# Patient Record
Sex: FEMALE | Race: BLACK OR AFRICAN AMERICAN | Employment: OTHER
[De-identification: names, ages, dates, MRNs, and addresses within clinical notes are randomized per-mention and may not be internally consistent; named-entity substitution may affect disease eponyms.]

---

## 2017-06-23 PROBLEM — I50.9 ACUTE EXACERBATION OF CHF (CONGESTIVE HEART FAILURE) (HCC): Status: ACTIVE | Noted: 2017-06-23

## 2017-06-24 PROBLEM — J96.02 ACUTE RESPIRATORY FAILURE WITH HYPERCAPNIA (HCC): Status: ACTIVE | Noted: 2017-06-24

## 2017-07-20 ENCOUNTER — TELEPHONE (OUTPATIENT)
Dept: CASE MANAGEMENT | Age: 52
End: 2017-07-20

## 2017-12-08 PROBLEM — I50.43 CHF (CONGESTIVE HEART FAILURE), NYHA CLASS I, ACUTE ON CHRONIC, COMBINED (HCC): Status: ACTIVE | Noted: 2017-12-08

## 2017-12-20 PROBLEM — I50.9 CONGESTIVE HEART FAILURE (HCC): Status: ACTIVE | Noted: 2017-12-20

## 2017-12-22 PROBLEM — R09.89 LEFT VENTRICULAR EJECTION FRACTION OF 10% TO 20%: Status: ACTIVE | Noted: 2017-12-22

## 2018-02-02 PROBLEM — R06.00 DYSPNEA: Status: ACTIVE | Noted: 2018-02-02

## 2018-02-02 PROBLEM — J16.0 CAP (COMMUNITY ACQUIRED PNEUMONIA) DUE TO CHLAMYDIA SPECIES: Status: ACTIVE | Noted: 2018-02-02

## 2018-02-02 PROBLEM — J18.9 HCAP (HEALTHCARE-ASSOCIATED PNEUMONIA): Status: ACTIVE | Noted: 2018-02-02

## 2018-02-14 PROBLEM — I25.10 CORONARY ARTERY DISEASE INVOLVING NATIVE CORONARY ARTERY OF NATIVE HEART WITHOUT ANGINA PECTORIS: Status: ACTIVE | Noted: 2018-02-14

## 2018-03-14 ENCOUNTER — TELEPHONE (OUTPATIENT)
Dept: NON INVASIVE DIAGNOSTICS | Age: 53
End: 2018-03-14

## 2018-03-14 LAB
LEFT VENTRICULAR EJECTION FRACTION MODE: NORMAL
LV EF: 15 %

## 2018-03-14 NOTE — TELEPHONE ENCOUNTER
Appears to be inappropriate oversensing in primary. She needs to be reprogrammed to 2ndary.   Thanks    Lisa Munoz MD, [facility]  Cardiac Electrophysiology  [facility]) Physicians  [facility] [place]: [facility]  5:05 PM  3/14/2018

## 2018-03-15 NOTE — TELEPHONE ENCOUNTER
Pop. I just spoke to [facility] and changes were already made by doubling the gain. But she SHOULD have early follow up in our office after she is discharged with me  (within a month if possible).   Thanks    Cori Naranjo MD, [facility]  Cardiac Electrophysiology  [facility] ([facility]) Physicians  [facility] [place]: [facility]  3:32 PM  3/15/2018

## 2018-03-28 ENCOUNTER — OFFICE VISIT (OUTPATIENT)
Dept: CARDIOLOGY CLINIC | Age: 53
End: 2018-03-28
Payer: COMMERCIAL

## 2018-03-28 ENCOUNTER — TELEPHONE (OUTPATIENT)
Dept: CARDIOLOGY CLINIC | Age: 53
End: 2018-03-28

## 2018-03-28 ENCOUNTER — HOSPITAL ENCOUNTER (OUTPATIENT)
Dept: OTHER | Age: 53
Setting detail: THERAPIES SERIES
Discharge: HOME | DRG: 286 | End: 2018-03-28
Payer: COMMERCIAL

## 2018-03-28 ENCOUNTER — APPOINTMENT (OUTPATIENT)
Dept: GENERAL RADIOLOGY | Age: 53
DRG: 286 | End: 2018-03-28
Payer: COMMERCIAL

## 2018-03-28 ENCOUNTER — HOSPITAL ENCOUNTER (INPATIENT)
Age: 53
LOS: 9 days | Discharge: HOME HEALTH | DRG: 286 | End: 2018-04-06
Attending: EMERGENCY MEDICINE | Admitting: INTERNAL MEDICINE
Payer: COMMERCIAL

## 2018-03-28 VITALS
OXYGEN SATURATION: 100 % | BODY MASS INDEX: 21.37 KG/M2 | DIASTOLIC BLOOD PRESSURE: 80 MMHG | HEIGHT: 64 IN | SYSTOLIC BLOOD PRESSURE: 120 MMHG | HEART RATE: 75 BPM | WEIGHT: 125.2 LBS | RESPIRATION RATE: 16 BRPM

## 2018-03-28 VITALS
WEIGHT: 124 LBS | RESPIRATION RATE: 18 BRPM | SYSTOLIC BLOOD PRESSURE: 157 MMHG | HEART RATE: 129 BPM | BODY MASS INDEX: 21.28 KG/M2 | DIASTOLIC BLOOD PRESSURE: 110 MMHG

## 2018-03-28 DIAGNOSIS — I50.22 CHRONIC SYSTOLIC CHF (CONGESTIVE HEART FAILURE), NYHA CLASS 3 (HCC): Primary | ICD-10-CM

## 2018-03-28 DIAGNOSIS — M79.89 LEG SWELLING: Primary | ICD-10-CM

## 2018-03-28 DIAGNOSIS — I42.8 NONISCHEMIC CARDIOMYOPATHY (HCC): ICD-10-CM

## 2018-03-28 PROBLEM — I50.23 ACUTE ON CHRONIC SYSTOLIC HEART FAILURE (HCC): Status: ACTIVE | Noted: 2018-03-28

## 2018-03-28 LAB
ALBUMIN SERPL-MCNC: 3.8 G/DL (ref 3.5–5.2)
ALP BLD-CCNC: 132 U/L (ref 35–104)
ALT SERPL-CCNC: 15 U/L (ref 0–32)
ANION GAP SERPL CALCULATED.3IONS-SCNC: 15 MMOL/L (ref 7–16)
ANION GAP SERPL CALCULATED.3IONS-SCNC: 17 MMOL/L (ref 7–16)
AST SERPL-CCNC: 22 U/L (ref 0–31)
BASOPHILS ABSOLUTE: 0.11 E9/L (ref 0–0.2)
BASOPHILS RELATIVE PERCENT: 1.6 % (ref 0–2)
BILIRUB SERPL-MCNC: 0.8 MG/DL (ref 0–1.2)
BUN BLDV-MCNC: 12 MG/DL (ref 6–20)
BUN BLDV-MCNC: 13 MG/DL (ref 6–20)
CALCIUM SERPL-MCNC: 8.8 MG/DL (ref 8.6–10.2)
CALCIUM SERPL-MCNC: 9.4 MG/DL (ref 8.6–10.2)
CHLORIDE BLD-SCNC: 103 MMOL/L (ref 98–107)
CHLORIDE BLD-SCNC: 107 MMOL/L (ref 98–107)
CO2: 21 MMOL/L (ref 22–29)
CO2: 28 MMOL/L (ref 22–29)
CREAT SERPL-MCNC: 0.7 MG/DL (ref 0.5–1)
CREAT SERPL-MCNC: 0.8 MG/DL (ref 0.5–1)
EKG ATRIAL RATE: 77 BPM
EKG ATRIAL RATE: 81 BPM
EKG P AXIS: 58 DEGREES
EKG P AXIS: 59 DEGREES
EKG P-R INTERVAL: 206 MS
EKG P-R INTERVAL: 216 MS
EKG Q-T INTERVAL: 418 MS
EKG Q-T INTERVAL: 418 MS
EKG QRS DURATION: 98 MS
EKG QRS DURATION: 98 MS
EKG QTC CALCULATION (BAZETT): 473 MS
EKG QTC CALCULATION (BAZETT): 485 MS
EKG R AXIS: -101 DEGREES
EKG R AXIS: -97 DEGREES
EKG T AXIS: 18 DEGREES
EKG T AXIS: 66 DEGREES
EKG VENTRICULAR RATE: 77 BPM
EKG VENTRICULAR RATE: 81 BPM
EOSINOPHILS ABSOLUTE: 0.14 E9/L (ref 0.05–0.5)
EOSINOPHILS RELATIVE PERCENT: 2.1 % (ref 0–6)
GFR AFRICAN AMERICAN: >60
GFR AFRICAN AMERICAN: >60
GFR NON-AFRICAN AMERICAN: >60 ML/MIN/1.73
GFR NON-AFRICAN AMERICAN: >60 ML/MIN/1.73
GLUCOSE BLD-MCNC: 100 MG/DL (ref 74–109)
GLUCOSE BLD-MCNC: 107 MG/DL (ref 74–109)
HCT VFR BLD CALC: 43.1 % (ref 34–48)
HEMOGLOBIN: 13.1 G/DL (ref 11.5–15.5)
IMMATURE GRANULOCYTES #: 0.02 E9/L
IMMATURE GRANULOCYTES %: 0.3 % (ref 0–5)
LYMPHOCYTES ABSOLUTE: 1.91 E9/L (ref 1.5–4)
LYMPHOCYTES RELATIVE PERCENT: 28.2 % (ref 20–42)
MCH RBC QN AUTO: 24.8 PG (ref 26–35)
MCHC RBC AUTO-ENTMCNC: 30.4 % (ref 32–34.5)
MCV RBC AUTO: 81.6 FL (ref 80–99.9)
MONOCYTES ABSOLUTE: 0.49 E9/L (ref 0.1–0.95)
MONOCYTES RELATIVE PERCENT: 7.2 % (ref 2–12)
NEUTROPHILS ABSOLUTE: 4.11 E9/L (ref 1.8–7.3)
NEUTROPHILS RELATIVE PERCENT: 60.6 % (ref 43–80)
PDW BLD-RTO: 15.3 FL (ref 11.5–15)
PLATELET # BLD: 348 E9/L (ref 130–450)
PMV BLD AUTO: 9.2 FL (ref 7–12)
POTASSIUM SERPL-SCNC: 3.9 MMOL/L (ref 3.5–5)
POTASSIUM SERPL-SCNC: 4 MMOL/L (ref 3.5–5)
PRO-BNP: ABNORMAL PG/ML (ref 0–125)
PRO-BNP: ABNORMAL PG/ML (ref 0–125)
RBC # BLD: 5.28 E12/L (ref 3.5–5.5)
SODIUM BLD-SCNC: 145 MMOL/L (ref 132–146)
SODIUM BLD-SCNC: 146 MMOL/L (ref 132–146)
TOTAL PROTEIN: 6.8 G/DL (ref 6.4–8.3)
TROPONIN: <0.01 NG/ML (ref 0–0.03)
WBC # BLD: 6.8 E9/L (ref 4.5–11.5)

## 2018-03-28 PROCEDURE — 2140000000 HC CCU INTERMEDIATE R&B

## 2018-03-28 PROCEDURE — 36415 COLL VENOUS BLD VENIPUNCTURE: CPT

## 2018-03-28 PROCEDURE — 71046 X-RAY EXAM CHEST 2 VIEWS: CPT

## 2018-03-28 PROCEDURE — 80053 COMPREHEN METABOLIC PANEL: CPT

## 2018-03-28 PROCEDURE — 2500000003 HC RX 250 WO HCPCS: Performed by: NURSE PRACTITIONER

## 2018-03-28 PROCEDURE — 93005 ELECTROCARDIOGRAM TRACING: CPT | Performed by: NURSE PRACTITIONER

## 2018-03-28 PROCEDURE — 99223 1ST HOSP IP/OBS HIGH 75: CPT | Performed by: INTERNAL MEDICINE

## 2018-03-28 PROCEDURE — 2580000003 HC RX 258: Performed by: INTERNAL MEDICINE

## 2018-03-28 PROCEDURE — 99285 EMERGENCY DEPT VISIT HI MDM: CPT

## 2018-03-28 PROCEDURE — APPSS180 APP SPLIT SHARED TIME > 60 MINUTES: Performed by: NURSE PRACTITIONER

## 2018-03-28 PROCEDURE — 83880 ASSAY OF NATRIURETIC PEPTIDE: CPT

## 2018-03-28 PROCEDURE — 2580000003 HC RX 258: Performed by: NURSE PRACTITIONER

## 2018-03-28 PROCEDURE — 99204 OFFICE O/P NEW MOD 45 MIN: CPT

## 2018-03-28 PROCEDURE — 2500000003 HC RX 250 WO HCPCS: Performed by: INTERNAL MEDICINE

## 2018-03-28 PROCEDURE — 84484 ASSAY OF TROPONIN QUANT: CPT

## 2018-03-28 PROCEDURE — 93000 ELECTROCARDIOGRAM COMPLETE: CPT | Performed by: INTERNAL MEDICINE

## 2018-03-28 PROCEDURE — 51702 INSERT TEMP BLADDER CATH: CPT

## 2018-03-28 PROCEDURE — 96374 THER/PROPH/DIAG INJ IV PUSH: CPT

## 2018-03-28 PROCEDURE — 6370000000 HC RX 637 (ALT 250 FOR IP): Performed by: NURSE PRACTITIONER

## 2018-03-28 PROCEDURE — 80048 BASIC METABOLIC PNL TOTAL CA: CPT

## 2018-03-28 PROCEDURE — 85025 COMPLETE CBC W/AUTO DIFF WBC: CPT

## 2018-03-28 RX ORDER — BUMETANIDE 0.25 MG/ML
1 INJECTION, SOLUTION INTRAMUSCULAR; INTRAVENOUS 2 TIMES DAILY
Status: DISCONTINUED | OUTPATIENT
Start: 2018-03-28 | End: 2018-03-29

## 2018-03-28 RX ORDER — BUMETANIDE 0.25 MG/ML
1 INJECTION, SOLUTION INTRAMUSCULAR; INTRAVENOUS ONCE
Status: COMPLETED | OUTPATIENT
Start: 2018-03-28 | End: 2018-03-28

## 2018-03-28 RX ORDER — METOPROLOL SUCCINATE 25 MG/1
25 TABLET, EXTENDED RELEASE ORAL DAILY
Qty: 30 TABLET | Refills: 3 | Status: SHIPPED | OUTPATIENT
Start: 2018-03-28 | End: 2018-03-28

## 2018-03-28 RX ORDER — OMEPRAZOLE 20 MG/1
20 CAPSULE, DELAYED RELEASE ORAL DAILY
Status: ON HOLD | COMMUNITY
End: 2018-04-05 | Stop reason: HOSPADM

## 2018-03-28 RX ORDER — PANTOPRAZOLE SODIUM 40 MG/1
40 TABLET, DELAYED RELEASE ORAL
Status: DISCONTINUED | OUTPATIENT
Start: 2018-03-29 | End: 2018-04-06 | Stop reason: HOSPADM

## 2018-03-28 RX ORDER — PANTOPRAZOLE SODIUM 40 MG/1
40 TABLET, DELAYED RELEASE ORAL DAILY
COMMUNITY
End: 2018-03-28

## 2018-03-28 RX ORDER — NITROGLYCERIN 0.4 MG/1
0.4 TABLET SUBLINGUAL EVERY 5 MIN PRN
Status: ON HOLD | COMMUNITY
End: 2018-04-05 | Stop reason: HOSPADM

## 2018-03-28 RX ORDER — ASPIRIN 81 MG/1
81 TABLET, CHEWABLE ORAL DAILY
Status: DISCONTINUED | OUTPATIENT
Start: 2018-03-28 | End: 2018-04-06 | Stop reason: HOSPADM

## 2018-03-28 RX ORDER — SODIUM CHLORIDE 0.9 % (FLUSH) 0.9 %
10 SYRINGE (ML) INJECTION PRN
Status: DISCONTINUED | OUTPATIENT
Start: 2018-03-28 | End: 2018-04-06 | Stop reason: HOSPADM

## 2018-03-28 RX ORDER — MONTELUKAST SODIUM 10 MG/1
10 TABLET ORAL NIGHTLY
Status: DISCONTINUED | OUTPATIENT
Start: 2018-03-28 | End: 2018-04-06 | Stop reason: HOSPADM

## 2018-03-28 RX ORDER — SODIUM CHLORIDE 0.9 % (FLUSH) 0.9 %
10 SYRINGE (ML) INJECTION PRN
Status: DISCONTINUED | OUTPATIENT
Start: 2018-03-28 | End: 2018-03-29 | Stop reason: HOSPADM

## 2018-03-28 RX ORDER — SODIUM CHLORIDE 0.9 % (FLUSH) 0.9 %
10 SYRINGE (ML) INJECTION EVERY 12 HOURS SCHEDULED
Status: DISCONTINUED | OUTPATIENT
Start: 2018-03-28 | End: 2018-04-06 | Stop reason: HOSPADM

## 2018-03-28 RX ORDER — METOPROLOL SUCCINATE 25 MG/1
25 TABLET, EXTENDED RELEASE ORAL DAILY
Status: DISCONTINUED | OUTPATIENT
Start: 2018-03-28 | End: 2018-03-29

## 2018-03-28 RX ORDER — CLOPIDOGREL BISULFATE 75 MG/1
75 TABLET ORAL DAILY
Status: DISCONTINUED | OUTPATIENT
Start: 2018-03-28 | End: 2018-03-29

## 2018-03-28 RX ORDER — FLUOXETINE HYDROCHLORIDE 20 MG/1
20 CAPSULE ORAL DAILY
Status: DISCONTINUED | OUTPATIENT
Start: 2018-03-28 | End: 2018-04-06 | Stop reason: HOSPADM

## 2018-03-28 RX ORDER — POTASSIUM CHLORIDE 20 MEQ/1
20 TABLET, EXTENDED RELEASE ORAL DAILY
Status: DISCONTINUED | OUTPATIENT
Start: 2018-03-28 | End: 2018-04-06 | Stop reason: HOSPADM

## 2018-03-28 RX ORDER — SPIRONOLACTONE 25 MG/1
25 TABLET ORAL DAILY
Status: DISCONTINUED | OUTPATIENT
Start: 2018-03-28 | End: 2018-04-06 | Stop reason: HOSPADM

## 2018-03-28 RX ORDER — HYDROCHLOROTHIAZIDE 12.5 MG/1
12.5 TABLET ORAL DAILY
COMMUNITY
End: 2018-03-28

## 2018-03-28 RX ORDER — NITROGLYCERIN 0.4 MG/1
0.4 TABLET SUBLINGUAL EVERY 5 MIN PRN
Status: DISCONTINUED | OUTPATIENT
Start: 2018-03-28 | End: 2018-03-29

## 2018-03-28 RX ORDER — POTASSIUM CHLORIDE 20 MEQ/1
20 TABLET, EXTENDED RELEASE ORAL DAILY
Qty: 60 TABLET | Refills: 3 | Status: SHIPPED | OUTPATIENT
Start: 2018-03-28 | End: 2018-03-28

## 2018-03-28 RX ORDER — ONDANSETRON 2 MG/ML
4 INJECTION INTRAMUSCULAR; INTRAVENOUS EVERY 6 HOURS PRN
Status: DISCONTINUED | OUTPATIENT
Start: 2018-03-28 | End: 2018-04-06 | Stop reason: HOSPADM

## 2018-03-28 RX ORDER — BUDESONIDE AND FORMOTEROL FUMARATE DIHYDRATE 160; 4.5 UG/1; UG/1
2 AEROSOL RESPIRATORY (INHALATION) 2 TIMES DAILY
COMMUNITY
End: 2018-05-16

## 2018-03-28 RX ORDER — ATORVASTATIN CALCIUM 10 MG/1
10 TABLET, FILM COATED ORAL DAILY
Status: DISCONTINUED | OUTPATIENT
Start: 2018-03-28 | End: 2018-03-29

## 2018-03-28 RX ORDER — ALBUTEROL SULFATE 90 UG/1
2 AEROSOL, METERED RESPIRATORY (INHALATION) EVERY 6 HOURS PRN
Status: DISCONTINUED | OUTPATIENT
Start: 2018-03-28 | End: 2018-04-06 | Stop reason: HOSPADM

## 2018-03-28 RX ORDER — ACETAMINOPHEN 325 MG/1
650 TABLET ORAL EVERY 4 HOURS PRN
Status: DISCONTINUED | OUTPATIENT
Start: 2018-03-28 | End: 2018-04-06 | Stop reason: HOSPADM

## 2018-03-28 RX ORDER — BUMETANIDE 0.25 MG/ML
0.5 INJECTION, SOLUTION INTRAMUSCULAR; INTRAVENOUS ONCE
Status: DISCONTINUED | OUTPATIENT
Start: 2018-03-28 | End: 2018-03-29

## 2018-03-28 RX ORDER — FLUOXETINE HYDROCHLORIDE 20 MG/1
20 CAPSULE ORAL DAILY
COMMUNITY
End: 2018-03-28

## 2018-03-28 RX ORDER — POTASSIUM CHLORIDE 1.5 G/1.77G
20 POWDER, FOR SOLUTION ORAL DAILY
COMMUNITY
End: 2018-10-22 | Stop reason: SDUPTHER

## 2018-03-28 RX ADMIN — POTASSIUM CHLORIDE 20 MEQ: 20 TABLET, EXTENDED RELEASE ORAL at 17:35

## 2018-03-28 RX ADMIN — Medication 10 ML: at 22:21

## 2018-03-28 RX ADMIN — METOPROLOL SUCCINATE 25 MG: 25 TABLET, EXTENDED RELEASE ORAL at 17:35

## 2018-03-28 RX ADMIN — MOMETASONE FUROATE AND FORMOTEROL FUMARATE DIHYDRATE 2 PUFF: 200; 5 AEROSOL RESPIRATORY (INHALATION) at 22:21

## 2018-03-28 RX ADMIN — SACUBITRIL AND VALSARTAN 1 TABLET: 97; 103 TABLET, FILM COATED ORAL at 22:21

## 2018-03-28 RX ADMIN — BUMETANIDE 1 MG: 0.25 INJECTION INTRAMUSCULAR; INTRAVENOUS at 22:54

## 2018-03-28 RX ADMIN — BUMETANIDE 1 MG: 0.25 INJECTION INTRAMUSCULAR; INTRAVENOUS at 12:20

## 2018-03-28 RX ADMIN — Medication 10 ML: at 12:19

## 2018-03-28 RX ADMIN — MONTELUKAST SODIUM 10 MG: 10 TABLET, FILM COATED ORAL at 22:21

## 2018-03-28 RX ADMIN — BUMETANIDE 2 MG: 0.25 INJECTION INTRAMUSCULAR; INTRAVENOUS at 12:19

## 2018-03-28 RX ADMIN — ALBUTEROL SULFATE 2 PUFF: 90 AEROSOL, METERED RESPIRATORY (INHALATION) at 22:21

## 2018-03-28 ASSESSMENT — EJECTION FRACTION
EF_VALUE: 10%
EF_SOURCE: 2D ECHO

## 2018-03-28 ASSESSMENT — PAIN DESCRIPTION - PAIN TYPE: TYPE: ACUTE PAIN

## 2018-03-28 ASSESSMENT — PAIN SCALES - GENERAL
PAINLEVEL_OUTOF10: 0
PAINLEVEL_OUTOF10: 0

## 2018-03-28 NOTE — PATIENT INSTRUCTIONS
metoprolol may make it harder for you to tell when you have low blood sugar);  · liver disease;  · congestive heart failure;  · problems with circulation (such as Raynaud's syndrome);  · a thyroid disorder; or  · pheochromocytoma (tumor of the adrenal gland). It is not known whether metoprolol will harm an unborn baby. Tell your doctor right away if you become pregnant while using this medicine. Metoprolol can pass into breast milk and may harm a nursing baby. Tell your doctor if you are breast-feeding a baby. Metoprolol is not approved for use by anyone younger than 25years old. How should I take metoprolol? Follow all directions on your prescription label. Your doctor may occasionally change your dose to make sure you get the best results. Do not take this medicine in larger or smaller amounts or for longer than recommended. Take the medicine at the same time each day. Metoprolol should be taken with a meal or just after a meal.  A Toprol XL  tablet can be divided in half if your doctor has told you to do so. The half tablet should be swallowed whole, without chewing or crushing. While using metoprolol, you may need frequent blood tests at your doctor's office. Your blood pressure will need to be checked often. If you need surgery, tell the surgeon ahead of time that you are using metoprolol. You should not stop using metoprolol suddenly. Stopping suddenly may make your condition worse. If you are being treated for high blood pressure, keep using this medication even if you feel well. High blood pressure often has no symptoms. You may need to use blood pressure medication for the rest of your life. Store at room temperature away from moisture and heat. What happens if I miss a dose? Take the missed dose as soon as you remember. Skip the missed dose if it is almost time for your next scheduled dose. Do not  take extra medicine to make up the missed dose. What happens if I overdose?   Seek emergency medical attention or call the Poison Help line at 1-203.234.8963. What should I avoid while taking metoprolol? Metoprolol may impair your thinking or reactions. Be careful if you drive or do anything that requires you to be alert. Drinking alcohol can increase certain side effects of metoprolol. What are the possible side effects of metoprolol? Get emergency medical help if you have signs of an allergic reaction: hives; difficulty breathing; swelling of your face, lips, tongue, or throat. Call your doctor at once if you have:  · very slow heartbeats;  · a light-headed feeling, like you might pass out;  · shortness of breath (even with mild exertion), swelling, rapid weight gain; or  · cold feeling in your hands and feet. Common side effects may include:  · dizziness, tired feeling;  · confusion, memory problems;  · nightmares, trouble sleeping;  · diarrhea; or  · mild itching or rash. This is not a complete list of side effects and others may occur. Call your doctor for medical advice about side effects. You may report side effects to FDA at 3-329-RXK-2772. What other drugs will affect metoprolol? Tell your doctor about all medicines you use, and those you start or stop using during your treatment with metoprolol, especially:  · prazosin;  · terbinafine;  · an antidepressant --bupropion, clomipramine, desipramine, duloxetine, fluoxetine, fluvoxamine, paroxetine, sertraline;  · an ergot medicine --dihydroergotamine, ergonovine, ergotamine, methylergonovine;  · heart or blood pressure medications --amlodipine, clonidine, digoxin, diltiazem, dipyridamole, hydralazine, methyldopa, nifedipine, quinidine, reserpine, verapamil, and others;  · an MAO inhibitor --isocarboxazid, linezolid, phenelzine, rasagiline, selegiline, tranylcypromine; or  · medicine to treat mental illness --chlorpromazine, fluphenazine haloperidol, thioridazine. This list is not complete.  Other drugs may interact with metoprolol, including prescription and over-the-counter medicines, vitamins, and herbal products. Not all possible interactions are listed in this medication guide. Where can I get more information? Your pharmacist can provide more information about metoprolol. Remember, keep this and all other medicines out of the reach of children, never share your medicines with others, and use this medication only for the indication prescribed. Every effort has been made to ensure that the information provided by [facility] [address] [address] is accurate, up-to-date, and complete, but no guarantee is made to that effect. Drug information contained herein may be time sensitive. [facility] information has been compiled for use by healthcare practitioners and consumers in the [place] and therefore [facility] does not warrant that uses outside of the [place] are appropriate, unless specifically indicated otherwise. [facility]'s drug information does not endorse drugs, diagnose patients or recommend therapy. [facility] drug information is an informational resource designed to assist licensed healthcare practitioners in caring for their patients and/or to serve consumers viewing this service as a supplement to, and not a substitute for, the expertise, skill, knowledge and judgment of healthcare practitioners. The absence of a warning for a given drug or drug combination in no way should be construed to indicate that the drug or drug combination is safe, effective or appropriate for any given patient. [facility] does not assume any responsibility for any aspect of healthcare administered with the aid of information [facility] provides. The information contained herein is not intended to cover all possible uses, directions, precautions, warnings, drug interactions, allergic reactions, or adverse effects. If you have questions about the drugs you are taking, check with your doctor, nurse or pharmacist.  Copyright 6599-2416 [address]. Version: 16.04. Revision date: 3/25/2016. Care instructions adapted under license by [facility] ([facility]). If you have questions about a medical condition or this instruction, always ask your healthcare professional. [address] any warranty or liability for your use of this information. Patient Education          bumetanide  Pronunciation:  byoo MET a nide  Brand:  Bumex  What is the most important information I should know about bumetanide? You should not use bumetanide if you are allergic to it, if you are unable to urinate, if you have severe kidney or liver disease, or if you are severely dehydrated. Before you take bumetanide, tell your doctor if you have kidney or liver disease, gout, diabetes, or an allergy to sulfa drugs. To be sure this medication is not causing harmful effects, your blood may need to be tested often. Your kidney or liver function may also need to be tested. Visit your doctor regularly. Bumetanide will make you urinate more often and you may get dehydrated easily. Follow your doctor's instructions about using potassium supplements or getting enough salt and potassium in your diet. Avoid becoming dehydrated. Follow your doctor's instructions about the type and amount of liquids you should drink while you are taking bumetanide. There are many other drugs that can interact with bumetanide (including some over-the-counter medicines). Tell your doctor about all medications you use. This includes prescription, over-the-counter, vitamin, and herbal products. Do not start a new medication without telling your doctor. Keep a list of all your medicines and show it to any healthcare provider who treats you. What is bumetanide? Bumetanide is a loop diuretic (water pill) that prevents your body from absorbing too much salt, allowing the salt to instead be passed in your urine.   Bumetanide treats fluid retention (edema) in people with congestive heart failure, liver disease, or a kidney made to that effect. Drug information contained herein may be time sensitive. [facility] information has been compiled for use by healthcare practitioners and consumers in the [place] and therefore [facility] does not warrant that uses outside of the [place] are appropriate, unless specifically indicated otherwise. [facility] drug information does not endorse drugs, diagnose patients or recommend therapy. [facility] drug information is an informational resource designed to assist licensed healthcare practitioners in caring for their patients and/or to serve consumers viewing this service as a supplement to, and not a substitute for, the expertise, skill, knowledge and judgment of healthcare practitioners. The absence of a warning for a given drug or drug combination in no way should be construed to indicate that the drug or drug combination is safe, effective or appropriate for any given patient. [facility] does not assume any responsibility for any aspect of healthcare administered with the aid of information [facility] provides. The information contained herein is not intended to cover all possible uses, directions, precautions, warnings, drug interactions, allergic reactions, or adverse effects. If you have questions about the drugs you are taking, check with your doctor, nurse or pharmacist.  Copyright 1212-5535 [address]. Version: 6.02. Revision date: 12/15/2010. Care instructions adapted under license by [facility] ([facility]). If you have questions about a medical condition or this instruction, always ask your healthcare professional. [place] [address] any warranty or liability for your use of this information.      Patient Education          potassium chloride  Pronunciation:  thanh GOINS [facility]  Lenard Florez, EPIKLOR/25, K-Dur 10, K-Juany, K-Tab, Tenzin Potassium 99, Kaochlor S-F, Kaon-CL 10, Kaon-CL 20%,  Corporation, KCl-20, Klor-Con, Klor-Con 10, Klor-Con 8, Klor-Con M10, Klor-Con M15, Klor-Con M20,

## 2018-03-28 NOTE — PROGRESS NOTES
Sent to [facility] from Dr Jayme Alexander office. EKG ordered for tachycardia vs a fib rate 129. /110 on repeat reading. Lower bilateral 2 + pitting edema noted. Current ef10%. bmp, bnp obtained. Education done, literature and scale given. lungs clear.

## 2018-03-28 NOTE — TELEPHONE ENCOUNTER
Tysonklaus Russell 1965 [id number]  Phoned Sole Harmon @ [address] [facility] [address]. Able to accomodate client at 1230pm today. Will provide free scale for daily weights. Per Dr Yari Santiago give  2mg iv push bumex  And flush per protocol with BMP And PRO BNP labs today. Client uses provide a ride, will walk across street to [address] [facility] and Back to [facility] as she uses [employer] transportation.    Rayne Dorado RN

## 2018-03-28 NOTE — PROGRESS NOTES
Normal bowel sounds. No bruits. soft, nondistended, no masses or organomegaly. no evidence of hernia. Percussion: Normal without hepatosplenomegally. Tenderness: absent. RECTAL: deferred, not clinically indicated  NEUROLOGIC: There are no focalizing motor or sensory deficits. CN II-XII are grossly intact. Ramiro Press EXTREMITIES: no cyanosis, no clubbing. 2+ bilateral pitting edema. Lab Data:   Ref.  Range 2/4/2018 07:07   Sodium Latest Ref Range: 132 - 146 mmol/L 140   Potassium Latest Ref Range: 3.5 - 5.0 mmol/L 3.3 (L)   Chloride Latest Ref Range: 98 - 107 mmol/L 100   CO2 Latest Ref Range: 22 - 29 mmol/L 26   BUN Latest Ref Range: 6 - 20 mg/dL 15   Creatinine Latest Ref Range: 0.5 - 1.0 mg/dL 0.8   Anion Gap Latest Ref Range: 7 - 16 mmol/L 14   GFR Non- Latest Ref Range: >=60 mL/min/1.73 >60   GFR African American Unknown >60   Magnesium Latest Ref Range: 1.6 - 2.6 mg/dL 1.7   Glucose Latest Ref Range: 74 - 109 mg/dL 107   Calcium Latest Ref Range: 8.6 - 10.2 mg/dL 8.4 (L)   Pro-BNP Latest Ref Range: 0 - 125 pg/mL 4,962 (H)   WBC Latest Ref Range: 4.5 - 11.5 E9/L 7.8   RBC Latest Ref Range: 3.50 - 5.50 E12/L 5.65 (H)   Hemoglobin Quant Latest Ref Range: 11.5 - 15.5 g/dL 15.2   Hematocrit Latest Ref Range: 34.0 - 48.0 % 47.2   MCV Latest Ref Range: 80.0 - 99.9 fL 83.5   MCH Latest Ref Range: 26.0 - 35.0 pg 26.9   MCHC Latest Ref Range: 32.0 - 34.5 % 32.2   MPV Latest Ref Range: 7.0 - 12.0 fL 10.2   RDW Latest Ref Range: 11.5 - 15.0 fL 14.0   Platelet Count Latest Ref Range: 130 - 450 E9/L 328   Neutrophils % Latest Ref Range: 43.0 - 80.0 % 69.9   Immature Granulocytes % Latest Ref Range: 0.0 - 5.0 % 0.5   Lymphocyte % Latest Ref Range: 20.0 - 42.0 % 17.4 (L)   Monocytes % Latest Ref Range: 2.0 - 12.0 % 9.3   Eosinophils % Latest Ref Range: 0.0 - 6.0 % 1.9   Basophils % Latest Ref Range: 0.0 - 2.0 % 1.0   Neutrophils # Latest Ref Range: 1.80 - 7.30 E9/L 5.42   Immature Granulocytes # Latest Units: E9/L 0.04   Lymphocytes # Latest Ref Range: 1.50 - 4.00 E9/L 1.35 (L)   Monocytes # Latest Ref Range: 0.10 - 0.95 E9/L 0.72   Eosinophils # Latest Ref Range: 0.05 - 0.50 E9/L 0.15   Basophils # Latest Ref Range: 0.00 - 0.20 E9/L 0.08       Imaging:    PFTs (04/26/2016, Dr. Amber Franklin)  Angela Ortiz:  Pulmonary function tests reveal forced vital capacity post   bronchodilator of 2.04 L, 69% of predicted with an FEV1 of 1.56 L, 66% of   predicted, with an FEV1/FVC ratio of 69%.  Maximum voluntary ventilation is   59 L/min, 60% of predicted.      Static lung volumes reveal a slow vital capacity of 2.12 L, 72% of predicted   inspiratory capacity 1.58 L, 71% of predicted; expiratory reserve volume 77%   of predicted; thoracic gas volume of 2.52 L, 90% of predicted; total lung   capacity 4.10 L,  81%.  Diffusing capacity is only 44% of predicted.      IMPRESSION:  Moderate airflow obstruction, GOLD II COPD with mild evidence of   air trapping. No restrictive pathology.  Reduced diffusing capacity   consistent with destruction at the alveolar capillary bed consistent with   emphysema.  Clinical correlation needed. CT Chest (02/02/2018):  1. Development of areas of multi segmental consolidation in the left lower lobe. This represents a combination of atelectasis/infiltrate. Please correlate clinically. This is a new development since the previous study. 2. Resolved right-sided pleural effusion since the examination of June 2017.  3. Early emphysematous changes in the upper lobes, as commented. 4. Several pulmonary nodular parenchymal opacities overall stable back to the previous study. Continued follow-up study in 6 months time interval is recommended. Device Interrogation:     (03/14/2018): Two untreated episode and 1 treated episode where a shock delivered     ECG: SR with PACs rate 81. Assessment:   1.  Chronic HFrEF  -acc stage c, nyha class III  -warm and well perfused  -does not have a scale to

## 2018-03-28 NOTE — TELEPHONE ENCOUNTER
Aaron Bullock 1965 [phone] (home)    Ambulated to [address] from [facility] office. 6 stops and sat down 3 times due to dyspnea. Once in [facility], obtained W/C, pushed to CHF clinic @ [address] [place] [address]. See CHF note.

## 2018-03-29 ENCOUNTER — APPOINTMENT (OUTPATIENT)
Dept: CARDIAC CATH/INVASIVE PROCEDURES | Age: 53
DRG: 286 | End: 2018-03-29
Payer: COMMERCIAL

## 2018-03-29 LAB
ALBUMIN SERPL-MCNC: 3.5 G/DL (ref 3.5–5.2)
ALP BLD-CCNC: 109 U/L (ref 35–104)
ALT SERPL-CCNC: 13 U/L (ref 0–32)
ANION GAP SERPL CALCULATED.3IONS-SCNC: 11 MMOL/L (ref 7–16)
ANION GAP SERPL CALCULATED.3IONS-SCNC: 13 MMOL/L (ref 7–16)
ANION GAP SERPL CALCULATED.3IONS-SCNC: 14 MMOL/L (ref 7–16)
AST SERPL-CCNC: 21 U/L (ref 0–31)
B.E.: 4.1 MMOL/L
B.E.: 8.7 MMOL/L
BILIRUB SERPL-MCNC: 0.6 MG/DL (ref 0–1.2)
BUN BLDV-MCNC: 12 MG/DL (ref 6–20)
BUN BLDV-MCNC: 12 MG/DL (ref 6–20)
BUN BLDV-MCNC: 13 MG/DL (ref 6–20)
CALCIUM SERPL-MCNC: 8.7 MG/DL (ref 8.6–10.2)
CALCIUM SERPL-MCNC: 9 MG/DL (ref 8.6–10.2)
CALCIUM SERPL-MCNC: 9.2 MG/DL (ref 8.6–10.2)
CHLORIDE BLD-SCNC: 103 MMOL/L (ref 98–107)
CHLORIDE BLD-SCNC: 97 MMOL/L (ref 98–107)
CHLORIDE BLD-SCNC: 98 MMOL/L (ref 98–107)
CO2: 29 MMOL/L (ref 22–29)
CO2: 29 MMOL/L (ref 22–29)
CO2: 34 MMOL/L (ref 22–29)
COHB: 1.3 % (ref 0–1.5)
COHB: 1.5 % (ref 0–1.5)
CREAT SERPL-MCNC: 0.7 MG/DL (ref 0.5–1)
CREAT SERPL-MCNC: 0.7 MG/DL (ref 0.5–1)
CREAT SERPL-MCNC: 0.8 MG/DL (ref 0.5–1)
CRITICAL: ABNORMAL
CRITICAL: ABNORMAL
DATE ANALYZED: ABNORMAL
DATE ANALYZED: ABNORMAL
DATE OF COLLECTION: ABNORMAL
DATE OF COLLECTION: ABNORMAL
FERRITIN: 87 NG/ML
GFR AFRICAN AMERICAN: >60
GFR NON-AFRICAN AMERICAN: >60 ML/MIN/1.73
GLUCOSE BLD-MCNC: 107 MG/DL (ref 74–109)
GLUCOSE BLD-MCNC: 117 MG/DL (ref 74–109)
GLUCOSE BLD-MCNC: 135 MG/DL (ref 74–109)
HCO3: 29.2 MMOL/L
HCO3: 33.8 MMOL/L
HHB: 48.3 %
HHB: 62.3 %
IRON SATURATION: 8 % (ref 15–50)
IRON: 33 MCG/DL (ref 37–145)
LAB: ABNORMAL
LAB: ABNORMAL
LACTIC ACID: 1.4 MMOL/L (ref 0.5–2.2)
Lab: 1311
Lab: 2100
MAGNESIUM: 1.6 MG/DL (ref 1.6–2.6)
METHB: 0.5 % (ref 0–1.5)
METHB: 0.8 % (ref 0–1.5)
MODE: ABNORMAL
MODE: ABNORMAL
O2 CONTENT: 6.7 ML/DL
O2 SATURATION: 36.4 %
O2 SATURATION: 50.7 %
O2HB: 35.6 %
O2HB: 49.7 %
OPERATOR ID: ABNORMAL
OPERATOR ID: ABNORMAL
PATIENT TEMP: 37 C
PATIENT TEMP: 37 C
PCO2: 46 MMHG
PCO2: 48.1 MMHG
PH BLOOD GAS: 7.42 (ref 7.3–7.42)
PH BLOOD GAS: 7.46 (ref 7.3–7.42)
PO2: 26.3 MMHG
PO2: 30.6 MMHG
POTASSIUM SERPL-SCNC: 3.4 MMOL/L (ref 3.5–5)
POTASSIUM SERPL-SCNC: 3.4 MMOL/L (ref 3.5–5)
POTASSIUM SERPL-SCNC: 3.8 MMOL/L (ref 3.5–5)
PRO-BNP: ABNORMAL PG/ML (ref 0–125)
SODIUM BLD-SCNC: 141 MMOL/L (ref 132–146)
SODIUM BLD-SCNC: 142 MMOL/L (ref 132–146)
SODIUM BLD-SCNC: 145 MMOL/L (ref 132–146)
SOURCE, BLOOD GAS: ABNORMAL
SOURCE, BLOOD GAS: ABNORMAL
THB: 13.3 G/DL (ref 11.5–16.5)
THB: 14.7 G/DL (ref 11.5–16.5)
TIME ANALYZED: 1315
TIME ANALYZED: 2103
TOTAL IRON BINDING CAPACITY: 430 MCG/DL (ref 250–450)
TOTAL PROTEIN: 6.4 G/DL (ref 6.4–8.3)
TRANSFERRIN: 367 MG/DL (ref 200–360)

## 2018-03-29 PROCEDURE — 2580000003 HC RX 258: Performed by: INTERNAL MEDICINE

## 2018-03-29 PROCEDURE — C1751 CATH, INF, PER/CENT/MIDLINE: HCPCS

## 2018-03-29 PROCEDURE — 82728 ASSAY OF FERRITIN: CPT

## 2018-03-29 PROCEDURE — 6370000000 HC RX 637 (ALT 250 FOR IP): Performed by: NURSE PRACTITIONER

## 2018-03-29 PROCEDURE — 2580000003 HC RX 258: Performed by: NURSE PRACTITIONER

## 2018-03-29 PROCEDURE — 4A023N6 MEASUREMENT OF CARDIAC SAMPLING AND PRESSURE, RIGHT HEART, PERCUTANEOUS APPROACH: ICD-10-PCS | Performed by: INTERNAL MEDICINE

## 2018-03-29 PROCEDURE — 2500000003 HC RX 250 WO HCPCS: Performed by: INTERNAL MEDICINE

## 2018-03-29 PROCEDURE — 83735 ASSAY OF MAGNESIUM: CPT

## 2018-03-29 PROCEDURE — 83605 ASSAY OF LACTIC ACID: CPT

## 2018-03-29 PROCEDURE — 6370000000 HC RX 637 (ALT 250 FOR IP): Performed by: INTERNAL MEDICINE

## 2018-03-29 PROCEDURE — 83540 ASSAY OF IRON: CPT

## 2018-03-29 PROCEDURE — 6360000002 HC RX W HCPCS

## 2018-03-29 PROCEDURE — 82805 BLOOD GASES W/O2 SATURATION: CPT

## 2018-03-29 PROCEDURE — 6360000002 HC RX W HCPCS: Performed by: NURSE PRACTITIONER

## 2018-03-29 PROCEDURE — 93451 RIGHT HEART CATH: CPT | Performed by: INTERNAL MEDICINE

## 2018-03-29 PROCEDURE — 99291 CRITICAL CARE FIRST HOUR: CPT | Performed by: INTERNAL MEDICINE

## 2018-03-29 PROCEDURE — 2500000003 HC RX 250 WO HCPCS

## 2018-03-29 PROCEDURE — 80053 COMPREHEN METABOLIC PANEL: CPT

## 2018-03-29 PROCEDURE — C1894 INTRO/SHEATH, NON-LASER: HCPCS

## 2018-03-29 PROCEDURE — 36415 COLL VENOUS BLD VENIPUNCTURE: CPT

## 2018-03-29 PROCEDURE — 6360000002 HC RX W HCPCS: Performed by: INTERNAL MEDICINE

## 2018-03-29 PROCEDURE — 2709999900 HC NON-CHARGEABLE SUPPLY

## 2018-03-29 PROCEDURE — 2000000000 HC ICU R&B

## 2018-03-29 PROCEDURE — 84466 ASSAY OF TRANSFERRIN: CPT

## 2018-03-29 PROCEDURE — 83550 IRON BINDING TEST: CPT

## 2018-03-29 PROCEDURE — 83880 ASSAY OF NATRIURETIC PEPTIDE: CPT

## 2018-03-29 PROCEDURE — 80048 BASIC METABOLIC PNL TOTAL CA: CPT

## 2018-03-29 RX ORDER — HEPARIN SODIUM (PORCINE) LOCK FLUSH IV SOLN 100 UNIT/ML 100 UNIT/ML
3 SOLUTION INTRAVENOUS PRN
Status: DISCONTINUED | OUTPATIENT
Start: 2018-03-29 | End: 2018-04-06 | Stop reason: HOSPADM

## 2018-03-29 RX ORDER — SODIUM CHLORIDE 0.9 % (FLUSH) 0.9 %
10 SYRINGE (ML) INJECTION PRN
Status: DISCONTINUED | OUTPATIENT
Start: 2018-03-29 | End: 2018-03-29 | Stop reason: SDUPTHER

## 2018-03-29 RX ORDER — MILRINONE LACTATE 0.2 MG/ML
0.12 INJECTION, SOLUTION INTRAVENOUS CONTINUOUS
Status: DISCONTINUED | OUTPATIENT
Start: 2018-03-29 | End: 2018-04-01

## 2018-03-29 RX ORDER — POTASSIUM CHLORIDE 20 MEQ/1
40 TABLET, EXTENDED RELEASE ORAL ONCE
Status: COMPLETED | OUTPATIENT
Start: 2018-03-29 | End: 2018-03-29

## 2018-03-29 RX ORDER — LIDOCAINE HYDROCHLORIDE 10 MG/ML
5 INJECTION, SOLUTION EPIDURAL; INFILTRATION; INTRACAUDAL; PERINEURAL ONCE
Status: DISCONTINUED | OUTPATIENT
Start: 2018-03-29 | End: 2018-04-02

## 2018-03-29 RX ORDER — HEPARIN SODIUM (PORCINE) LOCK FLUSH IV SOLN 100 UNIT/ML 100 UNIT/ML
3 SOLUTION INTRAVENOUS EVERY 12 HOURS SCHEDULED
Status: DISCONTINUED | OUTPATIENT
Start: 2018-03-29 | End: 2018-04-06 | Stop reason: HOSPADM

## 2018-03-29 RX ORDER — FENTANYL CITRATE 50 UG/ML
25 INJECTION, SOLUTION INTRAMUSCULAR; INTRAVENOUS ONCE
Status: COMPLETED | OUTPATIENT
Start: 2018-03-29 | End: 2018-03-29

## 2018-03-29 RX ADMIN — POTASSIUM CHLORIDE 20 MEQ: 20 TABLET, EXTENDED RELEASE ORAL at 18:29

## 2018-03-29 RX ADMIN — SPIRONOLACTONE 25 MG: 25 TABLET ORAL at 18:29

## 2018-03-29 RX ADMIN — ASPIRIN 81 MG 81 MG: 81 TABLET ORAL at 15:37

## 2018-03-29 RX ADMIN — PANTOPRAZOLE SODIUM 40 MG: 40 TABLET, DELAYED RELEASE ORAL at 06:23

## 2018-03-29 RX ADMIN — POTASSIUM CHLORIDE 40 MEQ: 20 TABLET, EXTENDED RELEASE ORAL at 15:37

## 2018-03-29 RX ADMIN — MONTELUKAST SODIUM 10 MG: 10 TABLET, FILM COATED ORAL at 20:42

## 2018-03-29 RX ADMIN — Medication 10 ML: at 17:42

## 2018-03-29 RX ADMIN — FENTANYL CITRATE 25 MCG: 50 INJECTION INTRAMUSCULAR; INTRAVENOUS at 14:18

## 2018-03-29 RX ADMIN — SACUBITRIL AND VALSARTAN 1 TABLET: 97; 103 TABLET, FILM COATED ORAL at 20:43

## 2018-03-29 RX ADMIN — BUMETANIDE 2 MG/HR: 0.25 INJECTION INTRAMUSCULAR; INTRAVENOUS at 14:18

## 2018-03-29 RX ADMIN — SODIUM CHLORIDE 25 MG: 9 INJECTION, SOLUTION INTRAVENOUS at 17:38

## 2018-03-29 RX ADMIN — SODIUM CHLORIDE 100 MG: 9 INJECTION, SOLUTION INTRAVENOUS at 21:03

## 2018-03-29 RX ADMIN — Medication 10 ML: at 14:19

## 2018-03-29 RX ADMIN — AMIODARONE HYDROCHLORIDE 150 MG: 50 INJECTION, SOLUTION INTRAVENOUS at 20:00

## 2018-03-29 RX ADMIN — MOMETASONE FUROATE AND FORMOTEROL FUMARATE DIHYDRATE 2 PUFF: 200; 5 AEROSOL RESPIRATORY (INHALATION) at 15:10

## 2018-03-29 RX ADMIN — Medication 10 ML: at 20:43

## 2018-03-29 RX ADMIN — MILRINONE LACTATE IN DEXTROSE 0.38 MCG/KG/MIN: 200 INJECTION, SOLUTION INTRAVENOUS at 14:18

## 2018-03-29 RX ADMIN — AMIODARONE HYDROCHLORIDE 1 MG/MIN: 50 INJECTION, SOLUTION INTRAVENOUS at 20:21

## 2018-03-29 RX ADMIN — POTASSIUM CHLORIDE 40 MEQ: 20 TABLET, EXTENDED RELEASE ORAL at 20:43

## 2018-03-29 ASSESSMENT — PAIN SCALES - GENERAL
PAINLEVEL_OUTOF10: 0
PAINLEVEL_OUTOF10: 0
PAINLEVEL_OUTOF10: 5
PAINLEVEL_OUTOF10: 0

## 2018-03-29 ASSESSMENT — PAIN DESCRIPTION - PAIN TYPE: TYPE: ACUTE PAIN

## 2018-03-30 LAB
ABO/RH: NORMAL
ALBUMIN SERPL-MCNC: 3.4 G/DL (ref 3.5–5.2)
ALBUMIN SERPL-MCNC: 3.7 G/DL (ref 3.5–5.2)
ALP BLD-CCNC: 115 U/L (ref 35–104)
ALP BLD-CCNC: 124 U/L (ref 35–104)
ALT SERPL-CCNC: 11 U/L (ref 0–32)
ALT SERPL-CCNC: 14 U/L (ref 0–32)
ANION GAP SERPL CALCULATED.3IONS-SCNC: 10 MMOL/L (ref 7–16)
ANION GAP SERPL CALCULATED.3IONS-SCNC: 13 MMOL/L (ref 7–16)
ANION GAP SERPL CALCULATED.3IONS-SCNC: 14 MMOL/L (ref 7–16)
ANTIBODY SCREEN: NORMAL
AST SERPL-CCNC: 17 U/L (ref 0–31)
AST SERPL-CCNC: 21 U/L (ref 0–31)
B.E.: 11.7 MMOL/L
B.E.: 9.1 MMOL/L
BILIRUB SERPL-MCNC: 0.6 MG/DL (ref 0–1.2)
BILIRUB SERPL-MCNC: 0.9 MG/DL (ref 0–1.2)
BUN BLDV-MCNC: 10 MG/DL (ref 6–20)
BUN BLDV-MCNC: 10 MG/DL (ref 6–20)
BUN BLDV-MCNC: 13 MG/DL (ref 6–20)
CALCIUM SERPL-MCNC: 8.7 MG/DL (ref 8.6–10.2)
CALCIUM SERPL-MCNC: 9.2 MG/DL (ref 8.6–10.2)
CALCIUM SERPL-MCNC: 9.3 MG/DL (ref 8.6–10.2)
CHLORIDE BLD-SCNC: 91 MMOL/L (ref 98–107)
CHLORIDE BLD-SCNC: 92 MMOL/L (ref 98–107)
CHLORIDE BLD-SCNC: 93 MMOL/L (ref 98–107)
CO2: 33 MMOL/L (ref 22–29)
COHB: 1.4 % (ref 0–1.5)
COHB: 1.9 % (ref 0–1.5)
CREAT SERPL-MCNC: 0.8 MG/DL (ref 0.5–1)
CREAT SERPL-MCNC: 0.8 MG/DL (ref 0.5–1)
CREAT SERPL-MCNC: 0.9 MG/DL (ref 0.5–1)
CRITICAL: ABNORMAL
CRITICAL: ABNORMAL
DATE ANALYZED: ABNORMAL
DATE ANALYZED: ABNORMAL
DATE OF COLLECTION: ABNORMAL
DATE OF COLLECTION: ABNORMAL
GFR AFRICAN AMERICAN: >60
GFR NON-AFRICAN AMERICAN: >60 ML/MIN/1.73
GLUCOSE BLD-MCNC: 139 MG/DL (ref 74–109)
GLUCOSE BLD-MCNC: 140 MG/DL (ref 74–109)
GLUCOSE BLD-MCNC: 157 MG/DL (ref 74–109)
HAV IGM SER IA-ACNC: NORMAL
HCO3: 34.7 MMOL/L
HCO3: 37 MMOL/L
HEPATITIS B CORE IGM ANTIBODY: NORMAL
HEPATITIS B SURFACE ANTIGEN INTERPRETATION: NORMAL
HEPATITIS C ANTIBODY INTERPRETATION: NORMAL
HHB: 42.4 %
HHB: 55.4 %
HIV-1 AND HIV-2 ANTIBODIES: NORMAL
LAB: ABNORMAL
LAB: ABNORMAL
Lab: 1952
Lab: 822
MAGNESIUM: 1.7 MG/DL (ref 1.6–2.6)
METHB: 0.5 % (ref 0–1.5)
METHB: 0.7 % (ref 0–1.5)
MODE: ABNORMAL
O2 CONTENT: 11.9 ML/DL
O2 SATURATION: 43.4 %
O2 SATURATION: 56.6 %
O2HB: 42.5 %
O2HB: 55.2 %
OPERATOR ID: ABNORMAL
OPERATOR ID: ABNORMAL
PATIENT TEMP: 37 C
PATIENT TEMP: 37 C
PCO2: 49.7 MMHG
PCO2: 50.7 MMHG
PH BLOOD GAS: 7.45 (ref 7.3–7.42)
PH BLOOD GAS: 7.49 (ref 7.3–7.42)
PO2: 28.6 MMHG
PO2: 32.8 MMHG
POTASSIUM SERPL-SCNC: 3.6 MMOL/L (ref 3.5–5)
POTASSIUM SERPL-SCNC: 3.7 MMOL/L (ref 3.5–5)
POTASSIUM SERPL-SCNC: 3.8 MMOL/L (ref 3.5–5)
SODIUM BLD-SCNC: 134 MMOL/L (ref 132–146)
SODIUM BLD-SCNC: 139 MMOL/L (ref 132–146)
SODIUM BLD-SCNC: 139 MMOL/L (ref 132–146)
SOURCE, BLOOD GAS: ABNORMAL
SOURCE, BLOOD GAS: ABNORMAL
THB: 14.1 G/DL (ref 11.5–16.5)
THB: 15.4 G/DL (ref 11.5–16.5)
TIME ANALYZED: 1955
TIME ANALYZED: 826
TOTAL PROTEIN: 6.6 G/DL (ref 6.4–8.3)
TOTAL PROTEIN: 6.9 G/DL (ref 6.4–8.3)

## 2018-03-30 PROCEDURE — 86900 BLOOD TYPING SEROLOGIC ABO: CPT

## 2018-03-30 PROCEDURE — 86901 BLOOD TYPING SEROLOGIC RH(D): CPT

## 2018-03-30 PROCEDURE — 86255 FLUORESCENT ANTIBODY SCREEN: CPT

## 2018-03-30 PROCEDURE — 86225 DNA ANTIBODY NATIVE: CPT

## 2018-03-30 PROCEDURE — 86703 HIV-1/HIV-2 1 RESULT ANTBDY: CPT

## 2018-03-30 PROCEDURE — 80074 ACUTE HEPATITIS PANEL: CPT

## 2018-03-30 PROCEDURE — 83516 IMMUNOASSAY NONANTIBODY: CPT

## 2018-03-30 PROCEDURE — 80048 BASIC METABOLIC PNL TOTAL CA: CPT

## 2018-03-30 PROCEDURE — 82805 BLOOD GASES W/O2 SATURATION: CPT

## 2018-03-30 PROCEDURE — 6370000000 HC RX 637 (ALT 250 FOR IP): Performed by: NURSE PRACTITIONER

## 2018-03-30 PROCEDURE — 2500000003 HC RX 250 WO HCPCS: Performed by: INTERNAL MEDICINE

## 2018-03-30 PROCEDURE — 83735 ASSAY OF MAGNESIUM: CPT

## 2018-03-30 PROCEDURE — 2000000000 HC ICU R&B

## 2018-03-30 PROCEDURE — 6370000000 HC RX 637 (ALT 250 FOR IP): Performed by: INTERNAL MEDICINE

## 2018-03-30 PROCEDURE — 36415 COLL VENOUS BLD VENIPUNCTURE: CPT

## 2018-03-30 PROCEDURE — 6360000002 HC RX W HCPCS: Performed by: INTERNAL MEDICINE

## 2018-03-30 PROCEDURE — 80053 COMPREHEN METABOLIC PANEL: CPT

## 2018-03-30 PROCEDURE — 2580000003 HC RX 258: Performed by: NURSE PRACTITIONER

## 2018-03-30 PROCEDURE — 36569 INSJ PICC 5 YR+ W/O IMAGING: CPT

## 2018-03-30 PROCEDURE — 99291 CRITICAL CARE FIRST HOUR: CPT | Performed by: INTERNAL MEDICINE

## 2018-03-30 PROCEDURE — 05HC33Z INSERTION OF INFUSION DEVICE INTO LEFT BASILIC VEIN, PERCUTANEOUS APPROACH: ICD-10-PCS | Performed by: INTERNAL MEDICINE

## 2018-03-30 PROCEDURE — 2580000003 HC RX 258: Performed by: INTERNAL MEDICINE

## 2018-03-30 PROCEDURE — 36592 COLLECT BLOOD FROM PICC: CPT

## 2018-03-30 PROCEDURE — 86850 RBC ANTIBODY SCREEN: CPT

## 2018-03-30 PROCEDURE — 86038 ANTINUCLEAR ANTIBODIES: CPT

## 2018-03-30 PROCEDURE — 86665 EPSTEIN-BARR CAPSID VCA: CPT

## 2018-03-30 PROCEDURE — 76937 US GUIDE VASCULAR ACCESS: CPT

## 2018-03-30 PROCEDURE — 6360000002 HC RX W HCPCS: Performed by: NURSE PRACTITIONER

## 2018-03-30 RX ORDER — HYDRALAZINE HYDROCHLORIDE 50 MG/1
50 TABLET, FILM COATED ORAL EVERY 8 HOURS SCHEDULED
Status: DISCONTINUED | OUTPATIENT
Start: 2018-03-31 | End: 2018-04-06 | Stop reason: HOSPADM

## 2018-03-30 RX ORDER — HEPARIN SODIUM 10000 [USP'U]/ML
5000 INJECTION, SOLUTION INTRAVENOUS; SUBCUTANEOUS EVERY 8 HOURS SCHEDULED
Status: DISCONTINUED | OUTPATIENT
Start: 2018-03-30 | End: 2018-04-06 | Stop reason: HOSPADM

## 2018-03-30 RX ORDER — HYDRALAZINE HYDROCHLORIDE 25 MG/1
25 TABLET, FILM COATED ORAL EVERY 8 HOURS SCHEDULED
Status: DISCONTINUED | OUTPATIENT
Start: 2018-03-30 | End: 2018-03-30

## 2018-03-30 RX ORDER — ISOSORBIDE DINITRATE 10 MG/1
30 TABLET ORAL 3 TIMES DAILY
Status: DISCONTINUED | OUTPATIENT
Start: 2018-03-31 | End: 2018-04-06 | Stop reason: HOSPADM

## 2018-03-30 RX ORDER — CARVEDILOL 6.25 MG/1
6.25 TABLET ORAL 2 TIMES DAILY WITH MEALS
Status: DISCONTINUED | OUTPATIENT
Start: 2018-03-31 | End: 2018-04-02

## 2018-03-30 RX ORDER — ISOSORBIDE DINITRATE 10 MG/1
20 TABLET ORAL 3 TIMES DAILY
Status: DISCONTINUED | OUTPATIENT
Start: 2018-03-30 | End: 2018-03-30

## 2018-03-30 RX ADMIN — AMIODARONE HYDROCHLORIDE 1 MG/MIN: 50 INJECTION, SOLUTION INTRAVENOUS at 21:44

## 2018-03-30 RX ADMIN — MILRINONE LACTATE IN DEXTROSE 0.38 MCG/KG/MIN: 200 INJECTION, SOLUTION INTRAVENOUS at 04:30

## 2018-03-30 RX ADMIN — SACUBITRIL AND VALSARTAN 1 TABLET: 97; 103 TABLET, FILM COATED ORAL at 08:58

## 2018-03-30 RX ADMIN — SODIUM CHLORIDE 125 MG: 9 INJECTION, SOLUTION INTRAVENOUS at 09:45

## 2018-03-30 RX ADMIN — MONTELUKAST SODIUM 10 MG: 10 TABLET, FILM COATED ORAL at 21:02

## 2018-03-30 RX ADMIN — ASPIRIN 81 MG 81 MG: 81 TABLET ORAL at 09:00

## 2018-03-30 RX ADMIN — AMIODARONE HYDROCHLORIDE 1 MG/MIN: 50 INJECTION, SOLUTION INTRAVENOUS at 13:48

## 2018-03-30 RX ADMIN — ISOSORBIDE DINITRATE 20 MG: 10 TABLET ORAL at 21:01

## 2018-03-30 RX ADMIN — Medication 10 ML: at 18:05

## 2018-03-30 RX ADMIN — Medication 10 ML: at 21:01

## 2018-03-30 RX ADMIN — ISOSORBIDE DINITRATE 20 MG: 10 TABLET ORAL at 13:07

## 2018-03-30 RX ADMIN — AMIODARONE HYDROCHLORIDE 1 MG/MIN: 50 INJECTION, SOLUTION INTRAVENOUS at 05:00

## 2018-03-30 RX ADMIN — FLUOXETINE HYDROCHLORIDE 20 MG: 20 CAPSULE ORAL at 08:58

## 2018-03-30 RX ADMIN — SODIUM CHLORIDE, PRESERVATIVE FREE 300 UNITS: 5 INJECTION INTRAVENOUS at 21:04

## 2018-03-30 RX ADMIN — ACETAMINOPHEN 650 MG: 325 TABLET, FILM COATED ORAL at 17:12

## 2018-03-30 RX ADMIN — ACETAMINOPHEN 650 MG: 325 TABLET, FILM COATED ORAL at 22:36

## 2018-03-30 RX ADMIN — SPIRONOLACTONE 25 MG: 25 TABLET ORAL at 08:58

## 2018-03-30 RX ADMIN — HYDRALAZINE HYDROCHLORIDE 25 MG: 25 TABLET, FILM COATED ORAL at 11:04

## 2018-03-30 RX ADMIN — SACUBITRIL AND VALSARTAN 1 TABLET: 97; 103 TABLET, FILM COATED ORAL at 21:01

## 2018-03-30 RX ADMIN — Medication 10 ML: at 09:45

## 2018-03-30 RX ADMIN — HYDRALAZINE HYDROCHLORIDE 25 MG: 25 TABLET, FILM COATED ORAL at 21:46

## 2018-03-30 RX ADMIN — ISOSORBIDE DINITRATE 20 MG: 10 TABLET ORAL at 17:10

## 2018-03-30 RX ADMIN — BUMETANIDE 0.5 MG/HR: 0.25 INJECTION INTRAMUSCULAR; INTRAVENOUS at 02:00

## 2018-03-30 RX ADMIN — POTASSIUM CHLORIDE 20 MEQ: 20 TABLET, EXTENDED RELEASE ORAL at 08:58

## 2018-03-30 ASSESSMENT — PAIN DESCRIPTION - PAIN TYPE: TYPE: ACUTE PAIN

## 2018-03-30 ASSESSMENT — PAIN SCALES - GENERAL
PAINLEVEL_OUTOF10: 0
PAINLEVEL_OUTOF10: 6
PAINLEVEL_OUTOF10: 0
PAINLEVEL_OUTOF10: 0
PAINLEVEL_OUTOF10: 3
PAINLEVEL_OUTOF10: 0

## 2018-03-30 ASSESSMENT — PAIN DESCRIPTION - LOCATION: LOCATION: NECK

## 2018-03-30 ASSESSMENT — PAIN DESCRIPTION - ORIENTATION: ORIENTATION: RIGHT

## 2018-03-30 ASSESSMENT — PAIN DESCRIPTION - DESCRIPTORS: DESCRIPTORS: SORE

## 2018-03-31 ENCOUNTER — APPOINTMENT (OUTPATIENT)
Dept: GENERAL RADIOLOGY | Age: 53
DRG: 286 | End: 2018-03-31
Payer: COMMERCIAL

## 2018-03-31 LAB
ALBUMIN SERPL-MCNC: 3.1 G/DL (ref 3.5–5.2)
ALBUMIN SERPL-MCNC: 3.1 G/DL (ref 3.5–5.2)
ALP BLD-CCNC: 103 U/L (ref 35–104)
ALP BLD-CCNC: 99 U/L (ref 35–104)
ALT SERPL-CCNC: 7 U/L (ref 0–32)
ALT SERPL-CCNC: 9 U/L (ref 0–32)
ANION GAP SERPL CALCULATED.3IONS-SCNC: 11 MMOL/L (ref 7–16)
ANION GAP SERPL CALCULATED.3IONS-SCNC: 12 MMOL/L (ref 7–16)
ANION GAP SERPL CALCULATED.3IONS-SCNC: 14 MMOL/L (ref 7–16)
AST SERPL-CCNC: 12 U/L (ref 0–31)
AST SERPL-CCNC: 13 U/L (ref 0–31)
B.E.: 3.9 MMOL/L
B.E.: 4 MMOL/L
B.E.: 6.8 MMOL/L
BILIRUB SERPL-MCNC: 0.6 MG/DL (ref 0–1.2)
BILIRUB SERPL-MCNC: 0.7 MG/DL (ref 0–1.2)
BUN BLDV-MCNC: 11 MG/DL (ref 6–20)
BUN BLDV-MCNC: 12 MG/DL (ref 6–20)
BUN BLDV-MCNC: 12 MG/DL (ref 6–20)
CALCIUM SERPL-MCNC: 8.1 MG/DL (ref 8.6–10.2)
CALCIUM SERPL-MCNC: 8.4 MG/DL (ref 8.6–10.2)
CALCIUM SERPL-MCNC: 8.5 MG/DL (ref 8.6–10.2)
CHLORIDE BLD-SCNC: 93 MMOL/L (ref 98–107)
CHLORIDE BLD-SCNC: 93 MMOL/L (ref 98–107)
CHLORIDE BLD-SCNC: 94 MMOL/L (ref 98–107)
CO2: 28 MMOL/L (ref 22–29)
CO2: 29 MMOL/L (ref 22–29)
CO2: 30 MMOL/L (ref 22–29)
COHB: 0.7 % (ref 0–1.5)
COHB: 1.7 % (ref 0–1.5)
COHB: 1.8 % (ref 0–1.5)
CREAT SERPL-MCNC: 0.8 MG/DL (ref 0.5–1)
CRITICAL: ABNORMAL
DATE ANALYZED: ABNORMAL
DATE OF COLLECTION: ABNORMAL
GFR AFRICAN AMERICAN: >60
GFR NON-AFRICAN AMERICAN: >60 ML/MIN/1.73
GLUCOSE BLD-MCNC: 141 MG/DL (ref 74–109)
GLUCOSE BLD-MCNC: 150 MG/DL (ref 74–109)
GLUCOSE BLD-MCNC: 154 MG/DL (ref 74–109)
HCO3: 29.1 MMOL/L
HCO3: 29.2 MMOL/L
HCO3: 30.2 MMOL/L
HHB: 28.7 %
HHB: 35 %
HHB: 35.6 %
LAB: ABNORMAL
Lab: 1342
Lab: 2053
Lab: 815
MAGNESIUM: 1.6 MG/DL (ref 1.6–2.6)
METHB: 0.1 % (ref 0–1.5)
METHB: 0.3 % (ref 0–1.5)
METHB: 0.4 % (ref 0–1.5)
MODE: ABNORMAL
MODE: ABNORMAL
O2 CONTENT: 12.4 ML/DL
O2 CONTENT: 12.8 ML/DL
O2 CONTENT: 14.4 ML/DL
O2 SATURATION: 64.1 %
O2 SATURATION: 64.2 %
O2 SATURATION: 70.7 %
O2HB: 62.9 %
O2HB: 63.6 %
O2HB: 69.2 %
OPERATOR ID: 2593
OPERATOR ID: 2593
OPERATOR ID: ABNORMAL
PATIENT TEMP: 37 C
PCO2: 38.9 MMHG
PCO2: 45.4 MMHG
PCO2: 46.1 MMHG
PH BLOOD GAS: 7.42 (ref 7.3–7.42)
PH BLOOD GAS: 7.42 (ref 7.3–7.42)
PH BLOOD GAS: 7.51 (ref 7.3–7.42)
PO2: 36.3 MMHG
PO2: 37.5 MMHG
PO2: 39.4 MMHG
POTASSIUM SERPL-SCNC: 3.5 MMOL/L (ref 3.5–5)
POTASSIUM SERPL-SCNC: 3.7 MMOL/L (ref 3.5–5)
POTASSIUM SERPL-SCNC: 3.8 MMOL/L (ref 3.5–5)
PRO-BNP: 689 PG/ML (ref 0–125)
SODIUM BLD-SCNC: 133 MMOL/L (ref 132–146)
SODIUM BLD-SCNC: 134 MMOL/L (ref 132–146)
SODIUM BLD-SCNC: 137 MMOL/L (ref 132–146)
SOURCE, BLOOD GAS: ABNORMAL
THB: 14 G/DL (ref 11.5–16.5)
THB: 14.4 G/DL (ref 11.5–16.5)
THB: 14.8 G/DL (ref 11.5–16.5)
TIME ANALYZED: 1345
TIME ANALYZED: 2058
TIME ANALYZED: 817
TOTAL PROTEIN: 5.9 G/DL (ref 6.4–8.3)
TOTAL PROTEIN: 6 G/DL (ref 6.4–8.3)

## 2018-03-31 PROCEDURE — 82805 BLOOD GASES W/O2 SATURATION: CPT

## 2018-03-31 PROCEDURE — 2580000003 HC RX 258: Performed by: NURSE PRACTITIONER

## 2018-03-31 PROCEDURE — 2500000003 HC RX 250 WO HCPCS: Performed by: INTERNAL MEDICINE

## 2018-03-31 PROCEDURE — 80048 BASIC METABOLIC PNL TOTAL CA: CPT

## 2018-03-31 PROCEDURE — 36591 DRAW BLOOD OFF VENOUS DEVICE: CPT

## 2018-03-31 PROCEDURE — 71045 X-RAY EXAM CHEST 1 VIEW: CPT

## 2018-03-31 PROCEDURE — 83735 ASSAY OF MAGNESIUM: CPT

## 2018-03-31 PROCEDURE — 2000000000 HC ICU R&B

## 2018-03-31 PROCEDURE — 2580000003 HC RX 258: Performed by: INTERNAL MEDICINE

## 2018-03-31 PROCEDURE — 83880 ASSAY OF NATRIURETIC PEPTIDE: CPT

## 2018-03-31 PROCEDURE — 6370000000 HC RX 637 (ALT 250 FOR IP): Performed by: INTERNAL MEDICINE

## 2018-03-31 PROCEDURE — 80053 COMPREHEN METABOLIC PANEL: CPT

## 2018-03-31 PROCEDURE — 6360000002 HC RX W HCPCS: Performed by: INTERNAL MEDICINE

## 2018-03-31 PROCEDURE — 6370000000 HC RX 637 (ALT 250 FOR IP): Performed by: NURSE PRACTITIONER

## 2018-03-31 PROCEDURE — 36415 COLL VENOUS BLD VENIPUNCTURE: CPT

## 2018-03-31 RX ORDER — BUMETANIDE 0.25 MG/ML
1 INJECTION, SOLUTION INTRAMUSCULAR; INTRAVENOUS 2 TIMES DAILY
Status: DISCONTINUED | OUTPATIENT
Start: 2018-03-31 | End: 2018-03-31

## 2018-03-31 RX ORDER — 0.9 % SODIUM CHLORIDE 0.9 %
500 INTRAVENOUS SOLUTION INTRAVENOUS ONCE
Status: COMPLETED | OUTPATIENT
Start: 2018-03-31 | End: 2018-03-31

## 2018-03-31 RX ORDER — SODIUM CHLORIDE 9 MG/ML
INJECTION, SOLUTION INTRAVENOUS CONTINUOUS
Status: DISCONTINUED | OUTPATIENT
Start: 2018-03-31 | End: 2018-04-01

## 2018-03-31 RX ORDER — BUMETANIDE 0.25 MG/ML
1 INJECTION, SOLUTION INTRAMUSCULAR; INTRAVENOUS DAILY
Status: DISCONTINUED | OUTPATIENT
Start: 2018-04-01 | End: 2018-03-31

## 2018-03-31 RX ORDER — 0.9 % SODIUM CHLORIDE 0.9 %
250 INTRAVENOUS SOLUTION INTRAVENOUS ONCE
Status: COMPLETED | OUTPATIENT
Start: 2018-03-31 | End: 2018-04-01

## 2018-03-31 RX ADMIN — Medication 10 ML: at 09:53

## 2018-03-31 RX ADMIN — ISOSORBIDE DINITRATE 30 MG: 10 TABLET ORAL at 09:50

## 2018-03-31 RX ADMIN — SODIUM CHLORIDE, PRESERVATIVE FREE 300 UNITS: 5 INJECTION INTRAVENOUS at 09:53

## 2018-03-31 RX ADMIN — SODIUM CHLORIDE 500 ML: 9 INJECTION, SOLUTION INTRAVENOUS at 10:34

## 2018-03-31 RX ADMIN — ACETAMINOPHEN 650 MG: 325 TABLET, FILM COATED ORAL at 09:53

## 2018-03-31 RX ADMIN — CARVEDILOL 6.25 MG: 6.25 TABLET, FILM COATED ORAL at 08:30

## 2018-03-31 RX ADMIN — CARVEDILOL 6.25 MG: 6.25 TABLET, FILM COATED ORAL at 17:45

## 2018-03-31 RX ADMIN — SODIUM CHLORIDE: 9 INJECTION, SOLUTION INTRAVENOUS at 11:53

## 2018-03-31 RX ADMIN — ISOSORBIDE DINITRATE 30 MG: 10 TABLET ORAL at 13:23

## 2018-03-31 RX ADMIN — MOMETASONE FUROATE AND FORMOTEROL FUMARATE DIHYDRATE 2 PUFF: 200; 5 AEROSOL RESPIRATORY (INHALATION) at 21:09

## 2018-03-31 RX ADMIN — FLUOXETINE HYDROCHLORIDE 20 MG: 20 CAPSULE ORAL at 09:51

## 2018-03-31 RX ADMIN — MOMETASONE FUROATE AND FORMOTEROL FUMARATE DIHYDRATE 2 PUFF: 200; 5 AEROSOL RESPIRATORY (INHALATION) at 08:00

## 2018-03-31 RX ADMIN — POTASSIUM CHLORIDE 20 MEQ: 20 TABLET, EXTENDED RELEASE ORAL at 09:50

## 2018-03-31 RX ADMIN — BUMETANIDE 1 MG: 0.25 INJECTION INTRAMUSCULAR; INTRAVENOUS at 09:51

## 2018-03-31 RX ADMIN — SPIRONOLACTONE 25 MG: 25 TABLET ORAL at 09:49

## 2018-03-31 RX ADMIN — HYDRALAZINE HYDROCHLORIDE 50 MG: 50 TABLET ORAL at 06:04

## 2018-03-31 RX ADMIN — ACETAMINOPHEN 650 MG: 325 TABLET, FILM COATED ORAL at 20:30

## 2018-03-31 RX ADMIN — PANTOPRAZOLE SODIUM 40 MG: 40 TABLET, DELAYED RELEASE ORAL at 06:03

## 2018-03-31 RX ADMIN — AMIODARONE HYDROCHLORIDE 1 MG/MIN: 50 INJECTION, SOLUTION INTRAVENOUS at 14:41

## 2018-03-31 RX ADMIN — SACUBITRIL AND VALSARTAN 1 TABLET: 97; 103 TABLET, FILM COATED ORAL at 09:51

## 2018-03-31 RX ADMIN — HYDRALAZINE HYDROCHLORIDE 50 MG: 50 TABLET ORAL at 13:23

## 2018-03-31 RX ADMIN — Medication 10 ML: at 21:53

## 2018-03-31 RX ADMIN — SODIUM CHLORIDE, PRESERVATIVE FREE 300 UNITS: 5 INJECTION INTRAVENOUS at 21:53

## 2018-03-31 RX ADMIN — MILRINONE LACTATE IN DEXTROSE 0.2 MCG/KG/MIN: 200 INJECTION, SOLUTION INTRAVENOUS at 04:55

## 2018-03-31 RX ADMIN — ASPIRIN 81 MG 81 MG: 81 TABLET ORAL at 10:35

## 2018-03-31 RX ADMIN — ACETAMINOPHEN 650 MG: 325 TABLET, FILM COATED ORAL at 04:12

## 2018-03-31 RX ADMIN — MONTELUKAST SODIUM 10 MG: 10 TABLET, FILM COATED ORAL at 21:53

## 2018-03-31 ASSESSMENT — PAIN SCALES - GENERAL
PAINLEVEL_OUTOF10: 0
PAINLEVEL_OUTOF10: 6
PAINLEVEL_OUTOF10: 0
PAINLEVEL_OUTOF10: 5
PAINLEVEL_OUTOF10: 0
PAINLEVEL_OUTOF10: 4
PAINLEVEL_OUTOF10: 0
PAINLEVEL_OUTOF10: 2

## 2018-03-31 ASSESSMENT — PAIN DESCRIPTION - ORIENTATION: ORIENTATION: RIGHT

## 2018-03-31 ASSESSMENT — PAIN DESCRIPTION - DESCRIPTORS: DESCRIPTORS: SORE;DISCOMFORT;ACHING

## 2018-03-31 ASSESSMENT — PAIN DESCRIPTION - LOCATION: LOCATION: NECK

## 2018-03-31 ASSESSMENT — PAIN DESCRIPTION - PAIN TYPE: TYPE: ACUTE PAIN

## 2018-04-01 LAB
ALBUMIN SERPL-MCNC: 2.8 G/DL (ref 3.5–5.2)
ALP BLD-CCNC: 92 U/L (ref 35–104)
ALT SERPL-CCNC: 7 U/L (ref 0–32)
ANION GAP SERPL CALCULATED.3IONS-SCNC: 13 MMOL/L (ref 7–16)
AST SERPL-CCNC: 12 U/L (ref 0–31)
B.E.: 1.4 MMOL/L
B.E.: 1.5 MMOL/L
BILIRUB SERPL-MCNC: 0.5 MG/DL (ref 0–1.2)
BUN BLDV-MCNC: 12 MG/DL (ref 6–20)
CALCIUM SERPL-MCNC: 8.3 MG/DL (ref 8.6–10.2)
CHLORIDE BLD-SCNC: 93 MMOL/L (ref 98–107)
CO2: 24 MMOL/L (ref 22–29)
COHB: 1.3 % (ref 0–1.5)
COHB: 1.4 % (ref 0–1.5)
CREAT SERPL-MCNC: 0.7 MG/DL (ref 0.5–1)
CRITICAL: NORMAL
CRITICAL: NORMAL
DATE ANALYZED: NORMAL
DATE ANALYZED: NORMAL
DATE OF COLLECTION: NORMAL
DATE OF COLLECTION: NORMAL
EPSTEIN-BARR VCA IGM: <10 U/ML (ref 0–43.9)
GFR AFRICAN AMERICAN: >60
GFR NON-AFRICAN AMERICAN: >60 ML/MIN/1.73
GLUCOSE BLD-MCNC: 163 MG/DL (ref 74–109)
HCO3: 26 MMOL/L
HCO3: 26.5 MMOL/L
HHB: 25.7 %
HHB: 32.6 %
LAB: NORMAL
LAB: NORMAL
Lab: 1954
Lab: 832
MAGNESIUM: 1.6 MG/DL (ref 1.6–2.6)
METHB: 0.5 % (ref 0–1.5)
METHB: 0.5 % (ref 0–1.5)
MODE: NORMAL
MODE: NORMAL
O2 CONTENT: 12.5 ML/DL
O2 CONTENT: 13.5 ML/DL
O2 SATURATION: 66.8 %
O2 SATURATION: 73.8 %
O2HB: 65.6 %
O2HB: 72.4 %
OPERATOR ID: 1088
OPERATOR ID: NORMAL
PATIENT TEMP: 37 C
PATIENT TEMP: 37 C
PCO2: 41.1 MMHG
PCO2: 42.9 MMHG
PH BLOOD GAS: 7.41 (ref 7.3–7.42)
PH BLOOD GAS: 7.42 (ref 7.3–7.42)
PO2: 38.5 MMHG
PO2: 42.4 MMHG
POTASSIUM SERPL-SCNC: 3.6 MMOL/L (ref 3.5–5)
SODIUM BLD-SCNC: 130 MMOL/L (ref 132–146)
SOURCE, BLOOD GAS: NORMAL
SOURCE, BLOOD GAS: NORMAL
THB: 13.3 G/DL (ref 11.5–16.5)
THB: 13.6 G/DL (ref 11.5–16.5)
TIME ANALYZED: 1956
TIME ANALYZED: 837
TOTAL PROTEIN: 5.9 G/DL (ref 6.4–8.3)

## 2018-04-01 PROCEDURE — 2000000000 HC ICU R&B

## 2018-04-01 PROCEDURE — 80053 COMPREHEN METABOLIC PANEL: CPT

## 2018-04-01 PROCEDURE — 36415 COLL VENOUS BLD VENIPUNCTURE: CPT

## 2018-04-01 PROCEDURE — 2580000003 HC RX 258: Performed by: INTERNAL MEDICINE

## 2018-04-01 PROCEDURE — 6360000002 HC RX W HCPCS: Performed by: INTERNAL MEDICINE

## 2018-04-01 PROCEDURE — 2580000003 HC RX 258: Performed by: NURSE PRACTITIONER

## 2018-04-01 PROCEDURE — 94640 AIRWAY INHALATION TREATMENT: CPT

## 2018-04-01 PROCEDURE — 99291 CRITICAL CARE FIRST HOUR: CPT | Performed by: INTERNAL MEDICINE

## 2018-04-01 PROCEDURE — 6370000000 HC RX 637 (ALT 250 FOR IP): Performed by: INTERNAL MEDICINE

## 2018-04-01 PROCEDURE — 82805 BLOOD GASES W/O2 SATURATION: CPT

## 2018-04-01 PROCEDURE — 2700000000 HC OXYGEN THERAPY PER DAY

## 2018-04-01 PROCEDURE — 83735 ASSAY OF MAGNESIUM: CPT

## 2018-04-01 PROCEDURE — 6360000002 HC RX W HCPCS: Performed by: NURSE PRACTITIONER

## 2018-04-01 PROCEDURE — 6370000000 HC RX 637 (ALT 250 FOR IP): Performed by: NURSE PRACTITIONER

## 2018-04-01 RX ADMIN — SODIUM CHLORIDE: 9 INJECTION, SOLUTION INTRAVENOUS at 11:18

## 2018-04-01 RX ADMIN — ONDANSETRON 4 MG: 2 INJECTION INTRAMUSCULAR; INTRAVENOUS at 15:43

## 2018-04-01 RX ADMIN — SPIRONOLACTONE 25 MG: 25 TABLET ORAL at 08:37

## 2018-04-01 RX ADMIN — HYDRALAZINE HYDROCHLORIDE 50 MG: 50 TABLET ORAL at 22:18

## 2018-04-01 RX ADMIN — SACUBITRIL AND VALSARTAN 1 TABLET: 97; 103 TABLET, FILM COATED ORAL at 08:37

## 2018-04-01 RX ADMIN — HYDRALAZINE HYDROCHLORIDE 50 MG: 50 TABLET ORAL at 13:07

## 2018-04-01 RX ADMIN — ACETAMINOPHEN 650 MG: 325 TABLET, FILM COATED ORAL at 11:50

## 2018-04-01 RX ADMIN — ACETAMINOPHEN 650 MG: 325 TABLET, FILM COATED ORAL at 19:55

## 2018-04-01 RX ADMIN — SODIUM CHLORIDE, PRESERVATIVE FREE 300 UNITS: 5 INJECTION INTRAVENOUS at 08:38

## 2018-04-01 RX ADMIN — SODIUM CHLORIDE: 9 INJECTION, SOLUTION INTRAVENOUS at 05:57

## 2018-04-01 RX ADMIN — Medication 10 ML: at 08:38

## 2018-04-01 RX ADMIN — ISOSORBIDE DINITRATE 30 MG: 10 TABLET ORAL at 11:18

## 2018-04-01 RX ADMIN — SACUBITRIL AND VALSARTAN 1 TABLET: 97; 103 TABLET, FILM COATED ORAL at 21:28

## 2018-04-01 RX ADMIN — PANTOPRAZOLE SODIUM 40 MG: 40 TABLET, DELAYED RELEASE ORAL at 06:16

## 2018-04-01 RX ADMIN — CARVEDILOL 6.25 MG: 6.25 TABLET, FILM COATED ORAL at 17:09

## 2018-04-01 RX ADMIN — ASPIRIN 81 MG 81 MG: 81 TABLET ORAL at 11:18

## 2018-04-01 RX ADMIN — ISOSORBIDE DINITRATE 30 MG: 10 TABLET ORAL at 15:33

## 2018-04-01 RX ADMIN — SODIUM CHLORIDE, PRESERVATIVE FREE 300 UNITS: 5 INJECTION INTRAVENOUS at 21:18

## 2018-04-01 RX ADMIN — POTASSIUM CHLORIDE 20 MEQ: 20 TABLET, EXTENDED RELEASE ORAL at 08:37

## 2018-04-01 RX ADMIN — AMIODARONE HYDROCHLORIDE 1 MG/MIN: 50 INJECTION, SOLUTION INTRAVENOUS at 05:55

## 2018-04-01 RX ADMIN — HYDRALAZINE HYDROCHLORIDE 50 MG: 50 TABLET ORAL at 06:16

## 2018-04-01 RX ADMIN — MOMETASONE FUROATE AND FORMOTEROL FUMARATE DIHYDRATE 2 PUFF: 200; 5 AEROSOL RESPIRATORY (INHALATION) at 20:10

## 2018-04-01 RX ADMIN — ACETAMINOPHEN 650 MG: 325 TABLET, FILM COATED ORAL at 03:40

## 2018-04-01 RX ADMIN — CARVEDILOL 6.25 MG: 6.25 TABLET, FILM COATED ORAL at 08:37

## 2018-04-01 RX ADMIN — ISOSORBIDE DINITRATE 30 MG: 10 TABLET ORAL at 21:28

## 2018-04-01 RX ADMIN — Medication 10 ML: at 21:28

## 2018-04-01 RX ADMIN — MOMETASONE FUROATE AND FORMOTEROL FUMARATE DIHYDRATE 2 PUFF: 200; 5 AEROSOL RESPIRATORY (INHALATION) at 08:56

## 2018-04-01 RX ADMIN — MONTELUKAST SODIUM 10 MG: 10 TABLET, FILM COATED ORAL at 19:55

## 2018-04-01 RX ADMIN — SODIUM CHLORIDE 250 ML: 9 INJECTION, SOLUTION INTRAVENOUS at 00:16

## 2018-04-01 RX ADMIN — FLUOXETINE HYDROCHLORIDE 20 MG: 20 CAPSULE ORAL at 08:37

## 2018-04-01 ASSESSMENT — PAIN SCALES - GENERAL
PAINLEVEL_OUTOF10: 10
PAINLEVEL_OUTOF10: 0
PAINLEVEL_OUTOF10: 6
PAINLEVEL_OUTOF10: 5
PAINLEVEL_OUTOF10: 0
PAINLEVEL_OUTOF10: 10

## 2018-04-01 ASSESSMENT — PAIN DESCRIPTION - DESCRIPTORS: DESCRIPTORS: SORE;DISCOMFORT

## 2018-04-01 ASSESSMENT — PAIN DESCRIPTION - LOCATION: LOCATION: NECK

## 2018-04-01 ASSESSMENT — PAIN DESCRIPTION - PAIN TYPE: TYPE: ACUTE PAIN

## 2018-04-02 ENCOUNTER — APPOINTMENT (OUTPATIENT)
Dept: ULTRASOUND IMAGING | Age: 53
DRG: 286 | End: 2018-04-02
Payer: COMMERCIAL

## 2018-04-02 LAB
ALBUMIN SERPL-MCNC: 2.6 G/DL (ref 3.5–5.2)
ALP BLD-CCNC: 82 U/L (ref 35–104)
ALT SERPL-CCNC: 6 U/L (ref 0–32)
ANCA IFA: NORMAL
ANION GAP SERPL CALCULATED.3IONS-SCNC: 12 MMOL/L (ref 7–16)
ANTI DNA DOUBLE STRANDED: NEGATIVE
ANTI-NUCLEAR ANTIBODY (ANA): NEGATIVE
AST SERPL-CCNC: 12 U/L (ref 0–31)
B.E.: 2.1 MMOL/L
BILIRUB SERPL-MCNC: 0.5 MG/DL (ref 0–1.2)
BUN BLDV-MCNC: 11 MG/DL (ref 6–20)
CALCIUM SERPL-MCNC: 8.3 MG/DL (ref 8.6–10.2)
CHLORIDE BLD-SCNC: 98 MMOL/L (ref 98–107)
CO2: 24 MMOL/L (ref 22–29)
COHB: 1.2 % (ref 0–1.5)
CREAT SERPL-MCNC: 0.7 MG/DL (ref 0.5–1)
CRITICAL: NORMAL
DATE ANALYZED: NORMAL
DATE OF COLLECTION: NORMAL
GFR AFRICAN AMERICAN: >60
GFR NON-AFRICAN AMERICAN: >60 ML/MIN/1.73
GLUCOSE BLD-MCNC: 127 MG/DL (ref 74–109)
HCO3: 27.5 MMOL/L
HCT VFR BLD CALC: 38.5 % (ref 34–48)
HEMOGLOBIN: 12.2 G/DL (ref 11.5–15.5)
HHB: 27.1 %
HISTONE ANTIBODY IGG: 0.2 UNITS (ref 0–0.9)
LAB: NORMAL
Lab: 755
MAGNESIUM: 1.8 MG/DL (ref 1.6–2.6)
MCH RBC QN AUTO: 24.9 PG (ref 26–35)
MCHC RBC AUTO-ENTMCNC: 31.7 % (ref 32–34.5)
MCV RBC AUTO: 78.7 FL (ref 80–99.9)
METHB: 0.4 % (ref 0–1.5)
O2 CONTENT: 12.7 ML/DL
O2 SATURATION: 72.5 %
O2HB: 71.3 %
OPERATOR ID: NORMAL
PATIENT TEMP: 37 C
PCO2: 46.1 MMHG
PDW BLD-RTO: 15.8 FL (ref 11.5–15)
PH BLOOD GAS: 7.39 (ref 7.3–7.42)
PLATELET # BLD: 245 E9/L (ref 130–450)
PMV BLD AUTO: 9.5 FL (ref 7–12)
PO2: 41.6 MMHG
POTASSIUM SERPL-SCNC: 4.1 MMOL/L (ref 3.5–5)
PRO-BNP: 902 PG/ML (ref 0–125)
RBC # BLD: 4.89 E12/L (ref 3.5–5.5)
SODIUM BLD-SCNC: 134 MMOL/L (ref 132–146)
SOURCE, BLOOD GAS: NORMAL
THB: 12.7 G/DL (ref 11.5–16.5)
TIME ANALYZED: 820
TOTAL PROTEIN: 5.5 G/DL (ref 6.4–8.3)
WBC # BLD: 13.9 E9/L (ref 4.5–11.5)

## 2018-04-02 PROCEDURE — 83735 ASSAY OF MAGNESIUM: CPT

## 2018-04-02 PROCEDURE — 94060 EVALUATION OF WHEEZING: CPT

## 2018-04-02 PROCEDURE — 80053 COMPREHEN METABOLIC PANEL: CPT

## 2018-04-02 PROCEDURE — 2580000003 HC RX 258: Performed by: NURSE PRACTITIONER

## 2018-04-02 PROCEDURE — 36415 COLL VENOUS BLD VENIPUNCTURE: CPT

## 2018-04-02 PROCEDURE — 2000000000 HC ICU R&B

## 2018-04-02 PROCEDURE — 94729 DIFFUSING CAPACITY: CPT

## 2018-04-02 PROCEDURE — 6370000000 HC RX 637 (ALT 250 FOR IP): Performed by: INTERNAL MEDICINE

## 2018-04-02 PROCEDURE — 99291 CRITICAL CARE FIRST HOUR: CPT | Performed by: INTERNAL MEDICINE

## 2018-04-02 PROCEDURE — 86645 CMV ANTIBODY IGM: CPT

## 2018-04-02 PROCEDURE — 83880 ASSAY OF NATRIURETIC PEPTIDE: CPT

## 2018-04-02 PROCEDURE — 2580000003 HC RX 258: Performed by: INTERNAL MEDICINE

## 2018-04-02 PROCEDURE — 2700000000 HC OXYGEN THERAPY PER DAY

## 2018-04-02 PROCEDURE — 94726 PLETHYSMOGRAPHY LUNG VOLUMES: CPT

## 2018-04-02 PROCEDURE — 6360000002 HC RX W HCPCS: Performed by: INTERNAL MEDICINE

## 2018-04-02 PROCEDURE — 85027 COMPLETE CBC AUTOMATED: CPT

## 2018-04-02 PROCEDURE — 94640 AIRWAY INHALATION TREATMENT: CPT

## 2018-04-02 PROCEDURE — 86644 CMV ANTIBODY: CPT

## 2018-04-02 PROCEDURE — 82805 BLOOD GASES W/O2 SATURATION: CPT

## 2018-04-02 PROCEDURE — 6370000000 HC RX 637 (ALT 250 FOR IP): Performed by: NURSE PRACTITIONER

## 2018-04-02 RX ORDER — 0.9 % SODIUM CHLORIDE 0.9 %
500 INTRAVENOUS SOLUTION INTRAVENOUS ONCE
Status: COMPLETED | OUTPATIENT
Start: 2018-04-02 | End: 2018-04-02

## 2018-04-02 RX ORDER — METOPROLOL SUCCINATE 25 MG/1
25 TABLET, EXTENDED RELEASE ORAL 2 TIMES DAILY
Status: DISCONTINUED | OUTPATIENT
Start: 2018-04-02 | End: 2018-04-03

## 2018-04-02 RX ADMIN — MONTELUKAST SODIUM 10 MG: 10 TABLET, FILM COATED ORAL at 20:03

## 2018-04-02 RX ADMIN — POTASSIUM CHLORIDE 20 MEQ: 20 TABLET, EXTENDED RELEASE ORAL at 09:09

## 2018-04-02 RX ADMIN — Medication 10 ML: at 09:09

## 2018-04-02 RX ADMIN — CARVEDILOL 6.25 MG: 6.25 TABLET, FILM COATED ORAL at 11:10

## 2018-04-02 RX ADMIN — METOPROLOL SUCCINATE 25 MG: 25 TABLET, FILM COATED, EXTENDED RELEASE ORAL at 20:02

## 2018-04-02 RX ADMIN — ACETAMINOPHEN 650 MG: 325 TABLET, FILM COATED ORAL at 21:43

## 2018-04-02 RX ADMIN — SODIUM CHLORIDE, PRESERVATIVE FREE 300 UNITS: 5 INJECTION INTRAVENOUS at 20:04

## 2018-04-02 RX ADMIN — ISOSORBIDE DINITRATE 30 MG: 10 TABLET ORAL at 21:14

## 2018-04-02 RX ADMIN — SACUBITRIL AND VALSARTAN 1 TABLET: 97; 103 TABLET, FILM COATED ORAL at 11:35

## 2018-04-02 RX ADMIN — ISOSORBIDE DINITRATE 30 MG: 10 TABLET ORAL at 10:15

## 2018-04-02 RX ADMIN — FLUOXETINE HYDROCHLORIDE 20 MG: 20 CAPSULE ORAL at 09:08

## 2018-04-02 RX ADMIN — ACETAMINOPHEN 650 MG: 325 TABLET, FILM COATED ORAL at 04:56

## 2018-04-02 RX ADMIN — ASPIRIN 81 MG 81 MG: 81 TABLET ORAL at 09:09

## 2018-04-02 RX ADMIN — HYDRALAZINE HYDROCHLORIDE 50 MG: 50 TABLET ORAL at 21:45

## 2018-04-02 RX ADMIN — PANTOPRAZOLE SODIUM 40 MG: 40 TABLET, DELAYED RELEASE ORAL at 09:08

## 2018-04-02 RX ADMIN — SACUBITRIL AND VALSARTAN 1 TABLET: 97; 103 TABLET, FILM COATED ORAL at 20:03

## 2018-04-02 RX ADMIN — SODIUM CHLORIDE, PRESERVATIVE FREE 300 UNITS: 5 INJECTION INTRAVENOUS at 09:08

## 2018-04-02 RX ADMIN — Medication 10 ML: at 20:04

## 2018-04-02 RX ADMIN — MOMETASONE FUROATE AND FORMOTEROL FUMARATE DIHYDRATE 2 PUFF: 200; 5 AEROSOL RESPIRATORY (INHALATION) at 21:26

## 2018-04-02 RX ADMIN — ISOSORBIDE DINITRATE 30 MG: 10 TABLET ORAL at 16:08

## 2018-04-02 RX ADMIN — SPIRONOLACTONE 25 MG: 25 TABLET ORAL at 09:08

## 2018-04-02 RX ADMIN — HYDRALAZINE HYDROCHLORIDE 50 MG: 50 TABLET ORAL at 15:29

## 2018-04-02 RX ADMIN — SODIUM CHLORIDE 500 ML: 9 INJECTION, SOLUTION INTRAVENOUS at 09:00

## 2018-04-02 ASSESSMENT — PAIN DESCRIPTION - DESCRIPTORS: DESCRIPTORS: SORE;ACHING

## 2018-04-02 ASSESSMENT — PAIN SCALES - GENERAL
PAINLEVEL_OUTOF10: 3
PAINLEVEL_OUTOF10: 9
PAINLEVEL_OUTOF10: 0

## 2018-04-02 ASSESSMENT — PAIN DESCRIPTION - FREQUENCY: FREQUENCY: INTERMITTENT

## 2018-04-02 ASSESSMENT — PAIN DESCRIPTION - PAIN TYPE: TYPE: ACUTE PAIN

## 2018-04-02 ASSESSMENT — PAIN DESCRIPTION - ORIENTATION: ORIENTATION: RIGHT

## 2018-04-02 ASSESSMENT — PAIN DESCRIPTION - LOCATION: LOCATION: NECK

## 2018-04-03 ENCOUNTER — APPOINTMENT (OUTPATIENT)
Dept: CT IMAGING | Age: 53
DRG: 286 | End: 2018-04-03
Payer: COMMERCIAL

## 2018-04-03 ENCOUNTER — APPOINTMENT (OUTPATIENT)
Dept: ULTRASOUND IMAGING | Age: 53
DRG: 286 | End: 2018-04-03
Payer: COMMERCIAL

## 2018-04-03 ENCOUNTER — APPOINTMENT (OUTPATIENT)
Dept: INTERVENTIONAL RADIOLOGY/VASCULAR | Age: 53
DRG: 286 | End: 2018-04-03
Payer: COMMERCIAL

## 2018-04-03 PROBLEM — Z67.30 BLOOD TYPE AB+: Status: ACTIVE | Noted: 2018-04-03

## 2018-04-03 LAB
ALBUMIN SERPL-MCNC: 3 G/DL (ref 3.5–5.2)
ALP BLD-CCNC: 85 U/L (ref 35–104)
ALT SERPL-CCNC: 8 U/L (ref 0–32)
ANION GAP SERPL CALCULATED.3IONS-SCNC: 14 MMOL/L (ref 7–16)
AST SERPL-CCNC: 13 U/L (ref 0–31)
BILIRUB SERPL-MCNC: 0.4 MG/DL (ref 0–1.2)
BUN BLDV-MCNC: 9 MG/DL (ref 6–20)
CALCIUM SERPL-MCNC: 8.7 MG/DL (ref 8.6–10.2)
CHLORIDE BLD-SCNC: 99 MMOL/L (ref 98–107)
CO2: 23 MMOL/L (ref 22–29)
CREAT SERPL-MCNC: 0.6 MG/DL (ref 0.5–1)
CYTOMEGALOVIRUS IGG ANTIBODY: NORMAL
CYTOMEGALOVIRUS IGM ANTIBODY: NORMAL
GFR AFRICAN AMERICAN: >60
GFR NON-AFRICAN AMERICAN: >60 ML/MIN/1.73
GLUCOSE BLD-MCNC: 144 MG/DL (ref 74–109)
MAGNESIUM: 2 MG/DL (ref 1.6–2.6)
POTASSIUM SERPL-SCNC: 3.7 MMOL/L (ref 3.5–5)
SODIUM BLD-SCNC: 136 MMOL/L (ref 132–146)
TOTAL PROTEIN: 5.8 G/DL (ref 6.4–8.3)

## 2018-04-03 PROCEDURE — 83735 ASSAY OF MAGNESIUM: CPT

## 2018-04-03 PROCEDURE — 6370000000 HC RX 637 (ALT 250 FOR IP): Performed by: NURSE PRACTITIONER

## 2018-04-03 PROCEDURE — 36415 COLL VENOUS BLD VENIPUNCTURE: CPT

## 2018-04-03 PROCEDURE — 2700000000 HC OXYGEN THERAPY PER DAY

## 2018-04-03 PROCEDURE — 71250 CT THORAX DX C-: CPT

## 2018-04-03 PROCEDURE — 99291 CRITICAL CARE FIRST HOUR: CPT | Performed by: INTERNAL MEDICINE

## 2018-04-03 PROCEDURE — 6370000000 HC RX 637 (ALT 250 FOR IP): Performed by: INTERNAL MEDICINE

## 2018-04-03 PROCEDURE — 2140000000 HC CCU INTERMEDIATE R&B

## 2018-04-03 PROCEDURE — 93922 UPR/L XTREMITY ART 2 LEVELS: CPT

## 2018-04-03 PROCEDURE — 80053 COMPREHEN METABOLIC PANEL: CPT

## 2018-04-03 PROCEDURE — 93880 EXTRACRANIAL BILAT STUDY: CPT

## 2018-04-03 PROCEDURE — 2580000003 HC RX 258: Performed by: NURSE PRACTITIONER

## 2018-04-03 PROCEDURE — 36592 COLLECT BLOOD FROM PICC: CPT

## 2018-04-03 PROCEDURE — 6360000002 HC RX W HCPCS: Performed by: INTERNAL MEDICINE

## 2018-04-03 RX ORDER — METOPROLOL SUCCINATE 50 MG/1
50 TABLET, EXTENDED RELEASE ORAL 2 TIMES DAILY
Status: DISCONTINUED | OUTPATIENT
Start: 2018-04-03 | End: 2018-04-06 | Stop reason: HOSPADM

## 2018-04-03 RX ADMIN — POTASSIUM CHLORIDE 20 MEQ: 20 TABLET, EXTENDED RELEASE ORAL at 08:30

## 2018-04-03 RX ADMIN — ASPIRIN 81 MG 81 MG: 81 TABLET ORAL at 08:30

## 2018-04-03 RX ADMIN — SODIUM CHLORIDE, PRESERVATIVE FREE 300 UNITS: 5 INJECTION INTRAVENOUS at 21:13

## 2018-04-03 RX ADMIN — METOPROLOL SUCCINATE 50 MG: 50 TABLET, FILM COATED, EXTENDED RELEASE ORAL at 21:13

## 2018-04-03 RX ADMIN — HYDRALAZINE HYDROCHLORIDE 50 MG: 50 TABLET ORAL at 21:13

## 2018-04-03 RX ADMIN — HYDRALAZINE HYDROCHLORIDE 50 MG: 50 TABLET ORAL at 14:18

## 2018-04-03 RX ADMIN — HYDRALAZINE HYDROCHLORIDE 50 MG: 50 TABLET ORAL at 05:43

## 2018-04-03 RX ADMIN — ISOSORBIDE DINITRATE 30 MG: 10 TABLET ORAL at 12:11

## 2018-04-03 RX ADMIN — SACUBITRIL AND VALSARTAN 1 TABLET: 97; 103 TABLET, FILM COATED ORAL at 21:12

## 2018-04-03 RX ADMIN — SACUBITRIL AND VALSARTAN 1 TABLET: 97; 103 TABLET, FILM COATED ORAL at 10:29

## 2018-04-03 RX ADMIN — SPIRONOLACTONE 25 MG: 25 TABLET ORAL at 08:30

## 2018-04-03 RX ADMIN — MONTELUKAST SODIUM 10 MG: 10 TABLET, FILM COATED ORAL at 21:12

## 2018-04-03 RX ADMIN — Medication 10 ML: at 21:16

## 2018-04-03 RX ADMIN — FLUOXETINE HYDROCHLORIDE 20 MG: 20 CAPSULE ORAL at 08:30

## 2018-04-03 RX ADMIN — ISOSORBIDE DINITRATE 30 MG: 10 TABLET ORAL at 21:12

## 2018-04-03 RX ADMIN — PANTOPRAZOLE SODIUM 40 MG: 40 TABLET, DELAYED RELEASE ORAL at 05:43

## 2018-04-03 RX ADMIN — METOPROLOL SUCCINATE 25 MG: 25 TABLET, FILM COATED, EXTENDED RELEASE ORAL at 08:30

## 2018-04-03 RX ADMIN — MOMETASONE FUROATE AND FORMOTEROL FUMARATE DIHYDRATE 2 PUFF: 200; 5 AEROSOL RESPIRATORY (INHALATION) at 21:00

## 2018-04-03 ASSESSMENT — PAIN SCALES - GENERAL
PAINLEVEL_OUTOF10: 0

## 2018-04-04 ENCOUNTER — APPOINTMENT (OUTPATIENT)
Dept: CT IMAGING | Age: 53
DRG: 286 | End: 2018-04-04
Payer: COMMERCIAL

## 2018-04-04 LAB
ABO/RH: NORMAL
ALBUMIN SERPL-MCNC: 3.1 G/DL (ref 3.5–5.2)
ALP BLD-CCNC: 88 U/L (ref 35–104)
ALT SERPL-CCNC: 9 U/L (ref 0–32)
ANION GAP SERPL CALCULATED.3IONS-SCNC: 11 MMOL/L (ref 7–16)
ANTIBODY SCREEN: NORMAL
AST SERPL-CCNC: 16 U/L (ref 0–31)
BILIRUB SERPL-MCNC: 0.4 MG/DL (ref 0–1.2)
BUN BLDV-MCNC: 5 MG/DL (ref 6–20)
CALCIUM SERPL-MCNC: 8.8 MG/DL (ref 8.6–10.2)
CHLORIDE BLD-SCNC: 98 MMOL/L (ref 98–107)
CO2: 26 MMOL/L (ref 22–29)
CREAT SERPL-MCNC: 0.7 MG/DL (ref 0.5–1)
GFR AFRICAN AMERICAN: >60
GFR NON-AFRICAN AMERICAN: >60 ML/MIN/1.73
GLUCOSE BLD-MCNC: 108 MG/DL (ref 74–109)
HCT VFR BLD CALC: 37.3 % (ref 34–48)
HEMOGLOBIN: 11.7 G/DL (ref 11.5–15.5)
INR BLD: 1.2
MAGNESIUM: 2.1 MG/DL (ref 1.6–2.6)
MCH RBC QN AUTO: 24.9 PG (ref 26–35)
MCHC RBC AUTO-ENTMCNC: 31.4 % (ref 32–34.5)
MCV RBC AUTO: 79.4 FL (ref 80–99.9)
PDW BLD-RTO: 15.9 FL (ref 11.5–15)
PLATELET # BLD: 297 E9/L (ref 130–450)
PMV BLD AUTO: 9.4 FL (ref 7–12)
POTASSIUM SERPL-SCNC: 3.9 MMOL/L (ref 3.5–5)
PRO-BNP: 3023 PG/ML (ref 0–125)
PROTHROMBIN TIME: 13.5 SEC (ref 9.3–12.4)
RBC # BLD: 4.7 E12/L (ref 3.5–5.5)
SODIUM BLD-SCNC: 135 MMOL/L (ref 132–146)
TOTAL PROTEIN: 6.2 G/DL (ref 6.4–8.3)
WBC # BLD: 10.5 E9/L (ref 4.5–11.5)

## 2018-04-04 PROCEDURE — 4A023N7 MEASUREMENT OF CARDIAC SAMPLING AND PRESSURE, LEFT HEART, PERCUTANEOUS APPROACH: ICD-10-PCS | Performed by: INTERNAL MEDICINE

## 2018-04-04 PROCEDURE — 83735 ASSAY OF MAGNESIUM: CPT

## 2018-04-04 PROCEDURE — 2500000003 HC RX 250 WO HCPCS

## 2018-04-04 PROCEDURE — 70450 CT HEAD/BRAIN W/O DYE: CPT

## 2018-04-04 PROCEDURE — 86850 RBC ANTIBODY SCREEN: CPT

## 2018-04-04 PROCEDURE — 86900 BLOOD TYPING SEROLOGIC ABO: CPT

## 2018-04-04 PROCEDURE — B2111ZZ FLUOROSCOPY OF MULTIPLE CORONARY ARTERIES USING LOW OSMOLAR CONTRAST: ICD-10-PCS | Performed by: INTERNAL MEDICINE

## 2018-04-04 PROCEDURE — 2709999900 HC NON-CHARGEABLE SUPPLY

## 2018-04-04 PROCEDURE — C1894 INTRO/SHEATH, NON-LASER: HCPCS

## 2018-04-04 PROCEDURE — 99233 SBSQ HOSP IP/OBS HIGH 50: CPT | Performed by: INTERNAL MEDICINE

## 2018-04-04 PROCEDURE — 80053 COMPREHEN METABOLIC PANEL: CPT

## 2018-04-04 PROCEDURE — 93454 CORONARY ARTERY ANGIO S&I: CPT | Performed by: INTERNAL MEDICINE

## 2018-04-04 PROCEDURE — 6370000000 HC RX 637 (ALT 250 FOR IP): Performed by: NURSE PRACTITIONER

## 2018-04-04 PROCEDURE — 83880 ASSAY OF NATRIURETIC PEPTIDE: CPT

## 2018-04-04 PROCEDURE — 6370000000 HC RX 637 (ALT 250 FOR IP): Performed by: INTERNAL MEDICINE

## 2018-04-04 PROCEDURE — 2580000003 HC RX 258: Performed by: NURSE PRACTITIONER

## 2018-04-04 PROCEDURE — C1887 CATHETER, GUIDING: HCPCS

## 2018-04-04 PROCEDURE — 6360000002 HC RX W HCPCS: Performed by: INTERNAL MEDICINE

## 2018-04-04 PROCEDURE — 2140000000 HC CCU INTERMEDIATE R&B

## 2018-04-04 PROCEDURE — 36415 COLL VENOUS BLD VENIPUNCTURE: CPT

## 2018-04-04 PROCEDURE — 6360000002 HC RX W HCPCS

## 2018-04-04 PROCEDURE — 86901 BLOOD TYPING SEROLOGIC RH(D): CPT

## 2018-04-04 PROCEDURE — 85027 COMPLETE CBC AUTOMATED: CPT

## 2018-04-04 PROCEDURE — C1769 GUIDE WIRE: HCPCS

## 2018-04-04 PROCEDURE — 85610 PROTHROMBIN TIME: CPT

## 2018-04-04 RX ORDER — BUMETANIDE 1 MG/1
2 TABLET ORAL DAILY
Status: DISCONTINUED | OUTPATIENT
Start: 2018-04-04 | End: 2018-04-06 | Stop reason: HOSPADM

## 2018-04-04 RX ADMIN — Medication 10 ML: at 21:10

## 2018-04-04 RX ADMIN — ISOSORBIDE DINITRATE 30 MG: 10 TABLET ORAL at 11:28

## 2018-04-04 RX ADMIN — HYDRALAZINE HYDROCHLORIDE 50 MG: 50 TABLET ORAL at 21:10

## 2018-04-04 RX ADMIN — ISOSORBIDE DINITRATE 30 MG: 10 TABLET ORAL at 04:57

## 2018-04-04 RX ADMIN — MONTELUKAST SODIUM 10 MG: 10 TABLET, FILM COATED ORAL at 21:10

## 2018-04-04 RX ADMIN — Medication 10 ML: at 09:51

## 2018-04-04 RX ADMIN — PANTOPRAZOLE SODIUM 40 MG: 40 TABLET, DELAYED RELEASE ORAL at 06:57

## 2018-04-04 RX ADMIN — ACETAMINOPHEN 650 MG: 325 TABLET, FILM COATED ORAL at 05:30

## 2018-04-04 RX ADMIN — ISOSORBIDE DINITRATE 30 MG: 10 TABLET ORAL at 19:39

## 2018-04-04 RX ADMIN — METOPROLOL SUCCINATE 50 MG: 50 TABLET, FILM COATED, EXTENDED RELEASE ORAL at 09:52

## 2018-04-04 RX ADMIN — HYDRALAZINE HYDROCHLORIDE 50 MG: 50 TABLET ORAL at 06:57

## 2018-04-04 RX ADMIN — SACUBITRIL AND VALSARTAN 1 TABLET: 97; 103 TABLET, FILM COATED ORAL at 09:52

## 2018-04-04 RX ADMIN — SPIRONOLACTONE 25 MG: 25 TABLET ORAL at 09:52

## 2018-04-04 RX ADMIN — METOPROLOL SUCCINATE 50 MG: 50 TABLET, FILM COATED, EXTENDED RELEASE ORAL at 21:10

## 2018-04-04 RX ADMIN — MOMETASONE FUROATE AND FORMOTEROL FUMARATE DIHYDRATE 2 PUFF: 200; 5 AEROSOL RESPIRATORY (INHALATION) at 19:40

## 2018-04-04 RX ADMIN — FLUOXETINE HYDROCHLORIDE 20 MG: 20 CAPSULE ORAL at 09:52

## 2018-04-04 RX ADMIN — MOMETASONE FUROATE AND FORMOTEROL FUMARATE DIHYDRATE 2 PUFF: 200; 5 AEROSOL RESPIRATORY (INHALATION) at 09:53

## 2018-04-04 RX ADMIN — BUMETANIDE 2 MG: 1 TABLET ORAL at 09:52

## 2018-04-04 RX ADMIN — SACUBITRIL AND VALSARTAN 1 TABLET: 97; 103 TABLET, FILM COATED ORAL at 21:10

## 2018-04-04 RX ADMIN — SODIUM CHLORIDE, PRESERVATIVE FREE 300 UNITS: 5 INJECTION INTRAVENOUS at 09:51

## 2018-04-04 RX ADMIN — HYDRALAZINE HYDROCHLORIDE 50 MG: 50 TABLET ORAL at 14:33

## 2018-04-04 RX ADMIN — POTASSIUM CHLORIDE 20 MEQ: 20 TABLET, EXTENDED RELEASE ORAL at 09:52

## 2018-04-04 RX ADMIN — SODIUM CHLORIDE, PRESERVATIVE FREE 300 UNITS: 5 INJECTION INTRAVENOUS at 21:10

## 2018-04-04 RX ADMIN — ASPIRIN 81 MG 81 MG: 81 TABLET ORAL at 09:52

## 2018-04-04 ASSESSMENT — PAIN SCALES - GENERAL
PAINLEVEL_OUTOF10: 0
PAINLEVEL_OUTOF10: 0
PAINLEVEL_OUTOF10: 9

## 2018-04-05 ENCOUNTER — TELEPHONE (OUTPATIENT)
Dept: INPATIENT UNIT | Age: 53
End: 2018-04-05

## 2018-04-05 PROBLEM — Z95.810 ICD (IMPLANTABLE CARDIOVERTER-DEFIBRILLATOR) IN PLACE: Status: ACTIVE | Noted: 2018-04-05

## 2018-04-05 LAB
ALBUMIN SERPL-MCNC: 2.9 G/DL (ref 3.5–5.2)
ALP BLD-CCNC: 94 U/L (ref 35–104)
ALT SERPL-CCNC: 8 U/L (ref 0–32)
ANION GAP SERPL CALCULATED.3IONS-SCNC: 14 MMOL/L (ref 7–16)
AST SERPL-CCNC: 13 U/L (ref 0–31)
BILIRUB SERPL-MCNC: 0.2 MG/DL (ref 0–1.2)
BUN BLDV-MCNC: 7 MG/DL (ref 6–20)
CALCIUM SERPL-MCNC: 8.7 MG/DL (ref 8.6–10.2)
CHLORIDE BLD-SCNC: 104 MMOL/L (ref 98–107)
CO2: 26 MMOL/L (ref 22–29)
CREAT SERPL-MCNC: 0.7 MG/DL (ref 0.5–1)
GFR AFRICAN AMERICAN: >60
GFR NON-AFRICAN AMERICAN: >60 ML/MIN/1.73
GLUCOSE BLD-MCNC: 100 MG/DL (ref 74–109)
MAGNESIUM: 1.8 MG/DL (ref 1.6–2.6)
POTASSIUM SERPL-SCNC: 4.1 MMOL/L (ref 3.5–5)
SODIUM BLD-SCNC: 144 MMOL/L (ref 132–146)
TOTAL PROTEIN: 5.6 G/DL (ref 6.4–8.3)

## 2018-04-05 PROCEDURE — 6370000000 HC RX 637 (ALT 250 FOR IP): Performed by: INTERNAL MEDICINE

## 2018-04-05 PROCEDURE — 80053 COMPREHEN METABOLIC PANEL: CPT

## 2018-04-05 PROCEDURE — 36415 COLL VENOUS BLD VENIPUNCTURE: CPT

## 2018-04-05 PROCEDURE — 2580000003 HC RX 258: Performed by: NURSE PRACTITIONER

## 2018-04-05 PROCEDURE — 6360000002 HC RX W HCPCS: Performed by: INTERNAL MEDICINE

## 2018-04-05 PROCEDURE — 99233 SBSQ HOSP IP/OBS HIGH 50: CPT | Performed by: INTERNAL MEDICINE

## 2018-04-05 PROCEDURE — 2140000000 HC CCU INTERMEDIATE R&B

## 2018-04-05 PROCEDURE — 6370000000 HC RX 637 (ALT 250 FOR IP): Performed by: NURSE PRACTITIONER

## 2018-04-05 PROCEDURE — 83735 ASSAY OF MAGNESIUM: CPT

## 2018-04-05 RX ORDER — HYDRALAZINE HYDROCHLORIDE 50 MG/1
50 TABLET, FILM COATED ORAL EVERY 8 HOURS SCHEDULED
Qty: 90 TABLET | Refills: 3 | Status: ON HOLD | OUTPATIENT
Start: 2018-04-05 | End: 2018-08-26

## 2018-04-05 RX ORDER — BUMETANIDE 2 MG/1
2 TABLET ORAL DAILY
Qty: 30 TABLET | Refills: 3 | Status: SHIPPED | OUTPATIENT
Start: 2018-04-06 | End: 2018-05-25 | Stop reason: SDUPTHER

## 2018-04-05 RX ORDER — METOPROLOL SUCCINATE 50 MG/1
50 TABLET, EXTENDED RELEASE ORAL 2 TIMES DAILY
Qty: 30 TABLET | Refills: 3 | Status: ON HOLD | OUTPATIENT
Start: 2018-04-05 | End: 2018-08-26

## 2018-04-05 RX ORDER — ISOSORBIDE DINITRATE 30 MG/1
30 TABLET ORAL 3 TIMES DAILY
Qty: 90 TABLET | Refills: 3 | Status: ON HOLD | OUTPATIENT
Start: 2018-04-05 | End: 2018-08-26

## 2018-04-05 RX ORDER — FLUOXETINE HYDROCHLORIDE 20 MG/1
20 CAPSULE ORAL DAILY
Qty: 30 CAPSULE | Refills: 3 | Status: ON HOLD | OUTPATIENT
Start: 2018-04-06 | End: 2018-08-26

## 2018-04-05 RX ADMIN — ISOSORBIDE DINITRATE 30 MG: 10 TABLET ORAL at 12:08

## 2018-04-05 RX ADMIN — ACETAMINOPHEN 650 MG: 325 TABLET, FILM COATED ORAL at 21:53

## 2018-04-05 RX ADMIN — ASPIRIN 81 MG 81 MG: 81 TABLET ORAL at 09:26

## 2018-04-05 RX ADMIN — HYDRALAZINE HYDROCHLORIDE 50 MG: 50 TABLET ORAL at 05:25

## 2018-04-05 RX ADMIN — SACUBITRIL AND VALSARTAN 1 TABLET: 97; 103 TABLET, FILM COATED ORAL at 20:58

## 2018-04-05 RX ADMIN — MOMETASONE FUROATE AND FORMOTEROL FUMARATE DIHYDRATE 2 PUFF: 200; 5 AEROSOL RESPIRATORY (INHALATION) at 20:58

## 2018-04-05 RX ADMIN — BUMETANIDE 2 MG: 1 TABLET ORAL at 09:26

## 2018-04-05 RX ADMIN — SODIUM CHLORIDE, PRESERVATIVE FREE 300 UNITS: 5 INJECTION INTRAVENOUS at 20:59

## 2018-04-05 RX ADMIN — Medication 10 ML: at 20:57

## 2018-04-05 RX ADMIN — ISOSORBIDE DINITRATE 30 MG: 10 TABLET ORAL at 04:23

## 2018-04-05 RX ADMIN — POTASSIUM CHLORIDE 20 MEQ: 20 TABLET, EXTENDED RELEASE ORAL at 09:26

## 2018-04-05 RX ADMIN — PANTOPRAZOLE SODIUM 40 MG: 40 TABLET, DELAYED RELEASE ORAL at 05:25

## 2018-04-05 RX ADMIN — METOPROLOL SUCCINATE 50 MG: 50 TABLET, FILM COATED, EXTENDED RELEASE ORAL at 20:58

## 2018-04-05 RX ADMIN — METOPROLOL SUCCINATE 50 MG: 50 TABLET, FILM COATED, EXTENDED RELEASE ORAL at 09:26

## 2018-04-05 RX ADMIN — ISOSORBIDE DINITRATE 30 MG: 10 TABLET ORAL at 20:58

## 2018-04-05 RX ADMIN — FLUOXETINE HYDROCHLORIDE 20 MG: 20 CAPSULE ORAL at 09:26

## 2018-04-05 RX ADMIN — HYDRALAZINE HYDROCHLORIDE 50 MG: 50 TABLET ORAL at 16:10

## 2018-04-05 RX ADMIN — SPIRONOLACTONE 25 MG: 25 TABLET ORAL at 09:26

## 2018-04-05 RX ADMIN — HYDRALAZINE HYDROCHLORIDE 50 MG: 50 TABLET ORAL at 21:53

## 2018-04-05 RX ADMIN — ACETAMINOPHEN 650 MG: 325 TABLET, FILM COATED ORAL at 06:29

## 2018-04-05 RX ADMIN — MONTELUKAST SODIUM 10 MG: 10 TABLET, FILM COATED ORAL at 20:58

## 2018-04-05 RX ADMIN — SACUBITRIL AND VALSARTAN 1 TABLET: 97; 103 TABLET, FILM COATED ORAL at 09:26

## 2018-04-05 ASSESSMENT — PAIN DESCRIPTION - DESCRIPTORS
DESCRIPTORS: ACHING;DISCOMFORT;DULL
DESCRIPTORS: HEADACHE

## 2018-04-05 ASSESSMENT — PAIN DESCRIPTION - ONSET: ONSET: SUDDEN

## 2018-04-05 ASSESSMENT — PAIN DESCRIPTION - LOCATION
LOCATION: HEAD
LOCATION: HEAD

## 2018-04-05 ASSESSMENT — PAIN SCALES - GENERAL
PAINLEVEL_OUTOF10: 0
PAINLEVEL_OUTOF10: 2
PAINLEVEL_OUTOF10: 9
PAINLEVEL_OUTOF10: 9
PAINLEVEL_OUTOF10: 0

## 2018-04-05 ASSESSMENT — PAIN DESCRIPTION - PROGRESSION: CLINICAL_PROGRESSION: NOT CHANGED

## 2018-04-05 ASSESSMENT — PAIN DESCRIPTION - PAIN TYPE: TYPE: ACUTE PAIN

## 2018-04-05 ASSESSMENT — PAIN DESCRIPTION - FREQUENCY: FREQUENCY: INTERMITTENT

## 2018-04-06 VITALS
OXYGEN SATURATION: 96 % | BODY MASS INDEX: 17.62 KG/M2 | WEIGHT: 103.2 LBS | RESPIRATION RATE: 18 BRPM | TEMPERATURE: 97.9 F | HEART RATE: 68 BPM | DIASTOLIC BLOOD PRESSURE: 60 MMHG | SYSTOLIC BLOOD PRESSURE: 120 MMHG | HEIGHT: 64 IN

## 2018-04-06 LAB
ALBUMIN SERPL-MCNC: 3.1 G/DL (ref 3.5–5.2)
ALP BLD-CCNC: 106 U/L (ref 35–104)
ALT SERPL-CCNC: 9 U/L (ref 0–32)
ANION GAP SERPL CALCULATED.3IONS-SCNC: 12 MMOL/L (ref 7–16)
AST SERPL-CCNC: 16 U/L (ref 0–31)
BILIRUB SERPL-MCNC: 0.2 MG/DL (ref 0–1.2)
BUN BLDV-MCNC: 8 MG/DL (ref 6–20)
CALCIUM SERPL-MCNC: 9 MG/DL (ref 8.6–10.2)
CHLORIDE BLD-SCNC: 97 MMOL/L (ref 98–107)
CHOLESTEROL, FASTING: 180 MG/DL (ref 0–199)
CO2: 31 MMOL/L (ref 22–29)
CREAT SERPL-MCNC: 0.7 MG/DL (ref 0.5–1)
GFR AFRICAN AMERICAN: >60
GFR NON-AFRICAN AMERICAN: >60 ML/MIN/1.73
GLUCOSE BLD-MCNC: 103 MG/DL (ref 74–109)
HBA1C MFR BLD: 6.4 % (ref 4.8–5.9)
HDLC SERPL-MCNC: 44 MG/DL
LDL CHOLESTEROL CALCULATED: 121 MG/DL (ref 0–99)
MAGNESIUM: 1.9 MG/DL (ref 1.6–2.6)
POTASSIUM SERPL-SCNC: 3.8 MMOL/L (ref 3.5–5)
PRO-BNP: 2718 PG/ML (ref 0–125)
SODIUM BLD-SCNC: 140 MMOL/L (ref 132–146)
TOTAL PROTEIN: 6.3 G/DL (ref 6.4–8.3)
TRIGLYCERIDE, FASTING: 76 MG/DL (ref 0–149)
VLDLC SERPL CALC-MCNC: 15 MG/DL

## 2018-04-06 PROCEDURE — 6360000002 HC RX W HCPCS: Performed by: INTERNAL MEDICINE

## 2018-04-06 PROCEDURE — 84165 PROTEIN E-PHORESIS SERUM: CPT

## 2018-04-06 PROCEDURE — 83735 ASSAY OF MAGNESIUM: CPT

## 2018-04-06 PROCEDURE — 84166 PROTEIN E-PHORESIS/URINE/CSF: CPT

## 2018-04-06 PROCEDURE — 6370000000 HC RX 637 (ALT 250 FOR IP): Performed by: NURSE PRACTITIONER

## 2018-04-06 PROCEDURE — 80053 COMPREHEN METABOLIC PANEL: CPT

## 2018-04-06 PROCEDURE — 36415 COLL VENOUS BLD VENIPUNCTURE: CPT

## 2018-04-06 PROCEDURE — 83036 HEMOGLOBIN GLYCOSYLATED A1C: CPT

## 2018-04-06 PROCEDURE — 2580000003 HC RX 258: Performed by: NURSE PRACTITIONER

## 2018-04-06 PROCEDURE — 6370000000 HC RX 637 (ALT 250 FOR IP): Performed by: INTERNAL MEDICINE

## 2018-04-06 PROCEDURE — 86334 IMMUNOFIX E-PHORESIS SERUM: CPT

## 2018-04-06 PROCEDURE — 83880 ASSAY OF NATRIURETIC PEPTIDE: CPT

## 2018-04-06 PROCEDURE — 99239 HOSP IP/OBS DSCHRG MGMT >30: CPT | Performed by: INTERNAL MEDICINE

## 2018-04-06 PROCEDURE — 80061 LIPID PANEL: CPT

## 2018-04-06 RX ADMIN — ISOSORBIDE DINITRATE 30 MG: 10 TABLET ORAL at 04:56

## 2018-04-06 RX ADMIN — HYDRALAZINE HYDROCHLORIDE 50 MG: 50 TABLET ORAL at 06:29

## 2018-04-06 RX ADMIN — Medication 10 ML: at 08:43

## 2018-04-06 RX ADMIN — ASPIRIN 81 MG 81 MG: 81 TABLET ORAL at 08:43

## 2018-04-06 RX ADMIN — PANTOPRAZOLE SODIUM 40 MG: 40 TABLET, DELAYED RELEASE ORAL at 06:29

## 2018-04-06 RX ADMIN — SPIRONOLACTONE 25 MG: 25 TABLET ORAL at 08:43

## 2018-04-06 RX ADMIN — ACETAMINOPHEN 650 MG: 325 TABLET, FILM COATED ORAL at 12:16

## 2018-04-06 RX ADMIN — MOMETASONE FUROATE AND FORMOTEROL FUMARATE DIHYDRATE 2 PUFF: 200; 5 AEROSOL RESPIRATORY (INHALATION) at 08:44

## 2018-04-06 RX ADMIN — SODIUM CHLORIDE, PRESERVATIVE FREE 300 UNITS: 5 INJECTION INTRAVENOUS at 08:44

## 2018-04-06 RX ADMIN — BUMETANIDE 2 MG: 1 TABLET ORAL at 08:43

## 2018-04-06 RX ADMIN — FLUOXETINE HYDROCHLORIDE 20 MG: 20 CAPSULE ORAL at 08:43

## 2018-04-06 RX ADMIN — METOPROLOL SUCCINATE 50 MG: 50 TABLET, FILM COATED, EXTENDED RELEASE ORAL at 08:43

## 2018-04-06 RX ADMIN — POTASSIUM CHLORIDE 20 MEQ: 20 TABLET, EXTENDED RELEASE ORAL at 08:43

## 2018-04-06 RX ADMIN — SACUBITRIL AND VALSARTAN 1 TABLET: 97; 103 TABLET, FILM COATED ORAL at 08:43

## 2018-04-06 RX ADMIN — ISOSORBIDE DINITRATE 30 MG: 10 TABLET ORAL at 11:51

## 2018-04-06 ASSESSMENT — PAIN DESCRIPTION - DESCRIPTORS: DESCRIPTORS: ACHING;HEADACHE;SHARP

## 2018-04-06 ASSESSMENT — PAIN DESCRIPTION - ORIENTATION: ORIENTATION: RIGHT

## 2018-04-06 ASSESSMENT — PAIN SCALES - GENERAL
PAINLEVEL_OUTOF10: 0
PAINLEVEL_OUTOF10: 10
PAINLEVEL_OUTOF10: 0

## 2018-04-06 ASSESSMENT — PAIN DESCRIPTION - FREQUENCY: FREQUENCY: INTERMITTENT

## 2018-04-06 ASSESSMENT — PAIN DESCRIPTION - ONSET: ONSET: PROGRESSIVE

## 2018-04-06 ASSESSMENT — PAIN DESCRIPTION - PAIN TYPE: TYPE: ACUTE PAIN

## 2018-04-06 ASSESSMENT — PAIN DESCRIPTION - LOCATION: LOCATION: HEAD

## 2018-04-09 LAB
ADDENDUM ELECTROPHORESIS URINE RANDOM: NORMAL
ALBUMIN SERPL-MCNC: 2.5 G/DL (ref 3.5–4.7)
ALPHA-1-GLOBULIN: 0.4 G/DL (ref 0.2–0.4)
ALPHA-2-GLOBULIN: 1 G/FL (ref 0.5–1)
BETA GLOBULIN: 1.1 G/DL (ref 0.8–1.3)
ELECTROPHORESIS: ABNORMAL
GAMMA GLOBULIN: 1 G/DL (ref 0.7–1.6)
IMMUNOFIXATION URINE: NORMAL
TOTAL PROTEIN: 5.9 G/DL (ref 6.4–8.3)

## 2018-04-15 PROBLEM — I50.41 ACUTE COMBINED SYSTOLIC AND DIASTOLIC ACC/AHA STAGE C CONGESTIVE HEART FAILURE (HCC): Status: ACTIVE | Noted: 2018-04-15

## 2018-04-24 ENCOUNTER — TELEPHONE (OUTPATIENT)
Dept: FAMILY MEDICINE CLINIC | Age: 53
End: 2018-04-24
Payer: COMMERCIAL

## 2018-04-24 DIAGNOSIS — I25.5 ISCHEMIC CARDIOMYOPATHY: ICD-10-CM

## 2018-04-24 DIAGNOSIS — J44.9 OBSTRUCTIVE CHRONIC BRONCHITIS WITHOUT EXACERBATION (HCC): ICD-10-CM

## 2018-04-24 DIAGNOSIS — Z01.810 PRE-OPERATIVE CARDIOVASCULAR EXAMINATION, ICD IN PLACE: ICD-10-CM

## 2018-04-24 DIAGNOSIS — I25.10 ATHEROSCLEROSIS OF NATIVE CORONARY ARTERY OF NATIVE HEART WITHOUT ANGINA PECTORIS: Primary | ICD-10-CM

## 2018-04-24 DIAGNOSIS — Z87.01 HISTORY OF RECURRENT PNEUMONIA: ICD-10-CM

## 2018-04-24 DIAGNOSIS — I50.22 CHRONIC SYSTOLIC HEART FAILURE (HCC): ICD-10-CM

## 2018-04-24 DIAGNOSIS — Z86.73 PERSONAL HISTORY OF TRANSIENT CEREBRAL ISCHEMIA: ICD-10-CM

## 2018-04-24 DIAGNOSIS — Z72.0 TOBACCO ABUSE: ICD-10-CM

## 2018-04-24 DIAGNOSIS — I11.0 HYPERTENSIVE HEART DISEASE WITH CONGESTIVE HEART FAILURE (HCC): ICD-10-CM

## 2018-04-24 DIAGNOSIS — Z91.81 PERSONAL HISTORY OF FALL: ICD-10-CM

## 2018-04-24 DIAGNOSIS — Z95.810 PRE-OPERATIVE CARDIOVASCULAR EXAMINATION, ICD IN PLACE: ICD-10-CM

## 2018-04-24 PROCEDURE — G0180 MD CERTIFICATION HHA PATIENT: HCPCS | Performed by: FAMILY MEDICINE

## 2018-04-27 ENCOUNTER — OFFICE VISIT (OUTPATIENT)
Dept: CARDIOLOGY CLINIC | Age: 53
End: 2018-04-27
Payer: COMMERCIAL

## 2018-04-27 VITALS
BODY MASS INDEX: 18.1 KG/M2 | HEIGHT: 64 IN | DIASTOLIC BLOOD PRESSURE: 58 MMHG | SYSTOLIC BLOOD PRESSURE: 94 MMHG | RESPIRATION RATE: 16 BRPM | HEART RATE: 86 BPM | WEIGHT: 106 LBS | OXYGEN SATURATION: 96 %

## 2018-04-27 DIAGNOSIS — I47.20 VENTRICULAR TACHYCARDIA: ICD-10-CM

## 2018-04-27 DIAGNOSIS — I50.22 CHRONIC SYSTOLIC CHF (CONGESTIVE HEART FAILURE), NYHA CLASS 3 (HCC): Primary | ICD-10-CM

## 2018-04-27 DIAGNOSIS — R07.9 CHEST PAIN, UNSPECIFIED TYPE: ICD-10-CM

## 2018-04-27 PROCEDURE — G8598 ASA/ANTIPLAT THER USED: HCPCS | Performed by: INTERNAL MEDICINE

## 2018-04-27 PROCEDURE — 94621 CARDIOPULM EXERCISE TESTING: CPT | Performed by: INTERNAL MEDICINE

## 2018-04-27 PROCEDURE — G8419 CALC BMI OUT NRM PARAM NOF/U: HCPCS | Performed by: INTERNAL MEDICINE

## 2018-04-27 PROCEDURE — 1036F TOBACCO NON-USER: CPT | Performed by: INTERNAL MEDICINE

## 2018-04-27 PROCEDURE — 1111F DSCHRG MED/CURRENT MED MERGE: CPT | Performed by: INTERNAL MEDICINE

## 2018-04-27 PROCEDURE — G8427 DOCREV CUR MEDS BY ELIG CLIN: HCPCS | Performed by: INTERNAL MEDICINE

## 2018-04-27 PROCEDURE — 99215 OFFICE O/P EST HI 40 MIN: CPT | Performed by: INTERNAL MEDICINE

## 2018-04-27 PROCEDURE — 3017F COLORECTAL CA SCREEN DOC REV: CPT | Performed by: INTERNAL MEDICINE

## 2018-04-27 PROCEDURE — 93000 ELECTROCARDIOGRAM COMPLETE: CPT | Performed by: INTERNAL MEDICINE

## 2018-04-27 RX ORDER — OMEPRAZOLE 20 MG/1
20 CAPSULE, DELAYED RELEASE ORAL DAILY
COMMUNITY
End: 2018-08-24

## 2018-04-27 RX ORDER — ASPIRIN 81 MG/1
81 TABLET, CHEWABLE ORAL DAILY
COMMUNITY
End: 2018-08-24

## 2018-04-27 RX ORDER — CLOPIDOGREL BISULFATE 75 MG/1
75 TABLET ORAL DAILY
COMMUNITY
End: 2018-08-24

## 2018-04-27 RX ORDER — PANTOPRAZOLE SODIUM 40 MG/1
40 TABLET, DELAYED RELEASE ORAL DAILY
COMMUNITY
End: 2018-08-24

## 2018-04-27 RX ORDER — ATORVASTATIN CALCIUM 10 MG/1
10 TABLET, FILM COATED ORAL DAILY
COMMUNITY
End: 2018-08-24

## 2018-05-02 ENCOUNTER — TELEPHONE (OUTPATIENT)
Dept: CARDIOLOGY CLINIC | Age: 53
End: 2018-05-02

## 2018-05-03 RX ORDER — BUDESONIDE AND FORMOTEROL FUMARATE DIHYDRATE 160; 4.5 UG/1; UG/1
2 AEROSOL RESPIRATORY (INHALATION) 2 TIMES DAILY
Qty: 1 INHALER | OUTPATIENT
Start: 2018-05-03

## 2018-05-09 ENCOUNTER — TELEPHONE (OUTPATIENT)
Dept: FAMILY MEDICINE CLINIC | Age: 53
End: 2018-05-09

## 2018-05-16 ENCOUNTER — OFFICE VISIT (OUTPATIENT)
Dept: CARDIOLOGY CLINIC | Age: 53
End: 2018-05-16
Payer: COMMERCIAL

## 2018-05-16 VITALS
HEART RATE: 76 BPM | HEIGHT: 64 IN | BODY MASS INDEX: 18.23 KG/M2 | RESPIRATION RATE: 18 BRPM | SYSTOLIC BLOOD PRESSURE: 114 MMHG | WEIGHT: 106.8 LBS | DIASTOLIC BLOOD PRESSURE: 72 MMHG

## 2018-05-16 DIAGNOSIS — I27.20 PULMONARY HYPERTENSION (HCC): ICD-10-CM

## 2018-05-16 DIAGNOSIS — I25.10 MILD CAD: ICD-10-CM

## 2018-05-16 DIAGNOSIS — I38 VHD (VALVULAR HEART DISEASE): ICD-10-CM

## 2018-05-16 DIAGNOSIS — I34.0 NON-RHEUMATIC MITRAL REGURGITATION: ICD-10-CM

## 2018-05-16 DIAGNOSIS — I50.22 CHRONIC SYSTOLIC CHF (CONGESTIVE HEART FAILURE), NYHA CLASS 3 (HCC): Primary | ICD-10-CM

## 2018-05-16 DIAGNOSIS — I10 ESSENTIAL HYPERTENSION: ICD-10-CM

## 2018-05-16 DIAGNOSIS — Z95.810 ICD (IMPLANTABLE CARDIOVERTER-DEFIBRILLATOR) IN PLACE: ICD-10-CM

## 2018-05-16 DIAGNOSIS — Z86.73 HISTORY OF CVA (CEREBROVASCULAR ACCIDENT): ICD-10-CM

## 2018-05-16 PROCEDURE — 1036F TOBACCO NON-USER: CPT | Performed by: INTERNAL MEDICINE

## 2018-05-16 PROCEDURE — 3017F COLORECTAL CA SCREEN DOC REV: CPT | Performed by: INTERNAL MEDICINE

## 2018-05-16 PROCEDURE — G8427 DOCREV CUR MEDS BY ELIG CLIN: HCPCS | Performed by: INTERNAL MEDICINE

## 2018-05-16 PROCEDURE — 93000 ELECTROCARDIOGRAM COMPLETE: CPT | Performed by: INTERNAL MEDICINE

## 2018-05-16 PROCEDURE — G8598 ASA/ANTIPLAT THER USED: HCPCS | Performed by: INTERNAL MEDICINE

## 2018-05-16 PROCEDURE — G8419 CALC BMI OUT NRM PARAM NOF/U: HCPCS | Performed by: INTERNAL MEDICINE

## 2018-05-16 PROCEDURE — 99214 OFFICE O/P EST MOD 30 MIN: CPT | Performed by: INTERNAL MEDICINE

## 2018-05-25 ENCOUNTER — OFFICE VISIT (OUTPATIENT)
Dept: CARDIOLOGY CLINIC | Age: 53
End: 2018-05-25
Payer: COMMERCIAL

## 2018-05-25 VITALS
DIASTOLIC BLOOD PRESSURE: 80 MMHG | WEIGHT: 108.4 LBS | OXYGEN SATURATION: 99 % | BODY MASS INDEX: 18.51 KG/M2 | RESPIRATION RATE: 16 BRPM | HEIGHT: 64 IN | SYSTOLIC BLOOD PRESSURE: 140 MMHG | HEART RATE: 67 BPM

## 2018-05-25 DIAGNOSIS — I42.8 NONISCHEMIC CARDIOMYOPATHY (HCC): ICD-10-CM

## 2018-05-25 DIAGNOSIS — I50.22 CHRONIC SYSTOLIC CHF (CONGESTIVE HEART FAILURE), NYHA CLASS 3 (HCC): Primary | ICD-10-CM

## 2018-05-25 PROCEDURE — 99215 OFFICE O/P EST HI 40 MIN: CPT | Performed by: INTERNAL MEDICINE

## 2018-05-25 PROCEDURE — 3017F COLORECTAL CA SCREEN DOC REV: CPT | Performed by: INTERNAL MEDICINE

## 2018-05-25 PROCEDURE — 93000 ELECTROCARDIOGRAM COMPLETE: CPT | Performed by: INTERNAL MEDICINE

## 2018-05-25 PROCEDURE — G8420 CALC BMI NORM PARAMETERS: HCPCS | Performed by: INTERNAL MEDICINE

## 2018-05-25 PROCEDURE — G8427 DOCREV CUR MEDS BY ELIG CLIN: HCPCS | Performed by: INTERNAL MEDICINE

## 2018-05-25 PROCEDURE — 94621 CARDIOPULM EXERCISE TESTING: CPT | Performed by: INTERNAL MEDICINE

## 2018-05-25 PROCEDURE — 1036F TOBACCO NON-USER: CPT | Performed by: INTERNAL MEDICINE

## 2018-05-25 PROCEDURE — G8598 ASA/ANTIPLAT THER USED: HCPCS | Performed by: INTERNAL MEDICINE

## 2018-05-25 RX ORDER — BUMETANIDE 2 MG/1
2 TABLET ORAL EVERY OTHER DAY
Qty: 90 TABLET | Refills: 3 | Status: ON HOLD | OUTPATIENT
Start: 2018-05-25 | End: 2018-08-26

## 2018-05-25 RX ORDER — BUMETANIDE 2 MG/1
2 TABLET ORAL EVERY OTHER DAY
Qty: 90 TABLET | Refills: 3 | Status: SHIPPED | OUTPATIENT
Start: 2018-05-25 | End: 2018-05-25 | Stop reason: SDUPTHER

## 2018-05-30 ENCOUNTER — TELEPHONE (OUTPATIENT)
Dept: NON INVASIVE DIAGNOSTICS | Age: 53
End: 2018-05-30

## 2018-06-25 ENCOUNTER — HOSPITAL ENCOUNTER (EMERGENCY)
Age: 53
Discharge: HOME | End: 2018-06-25
Payer: COMMERCIAL

## 2018-06-25 ENCOUNTER — APPOINTMENT (OUTPATIENT)
Dept: GENERAL RADIOLOGY | Age: 53
End: 2018-06-25
Payer: COMMERCIAL

## 2018-06-25 VITALS
TEMPERATURE: 98 F | WEIGHT: 107 LBS | HEIGHT: 64 IN | SYSTOLIC BLOOD PRESSURE: 158 MMHG | BODY MASS INDEX: 18.27 KG/M2 | DIASTOLIC BLOOD PRESSURE: 100 MMHG | HEART RATE: 82 BPM | OXYGEN SATURATION: 100 %

## 2018-06-25 DIAGNOSIS — S93.402A SPRAIN OF LEFT ANKLE, UNSPECIFIED LIGAMENT, INITIAL ENCOUNTER: Primary | ICD-10-CM

## 2018-06-25 DIAGNOSIS — S92.502A CLOSED DISPLACED FRACTURE OF PHALANX OF LESSER TOE OF LEFT FOOT, UNSPECIFIED PHALANX, INITIAL ENCOUNTER: ICD-10-CM

## 2018-06-25 DIAGNOSIS — S90.32XA CONTUSION OF LEFT FOOT, INITIAL ENCOUNTER: ICD-10-CM

## 2018-06-25 PROCEDURE — 73630 X-RAY EXAM OF FOOT: CPT

## 2018-06-25 PROCEDURE — 6370000000 HC RX 637 (ALT 250 FOR IP): Performed by: PHYSICIAN ASSISTANT

## 2018-06-25 PROCEDURE — 73610 X-RAY EXAM OF ANKLE: CPT

## 2018-06-25 PROCEDURE — 99283 EMERGENCY DEPT VISIT LOW MDM: CPT

## 2018-06-25 RX ORDER — ACETAMINOPHEN 325 MG/1
650 TABLET ORAL ONCE
Status: COMPLETED | OUTPATIENT
Start: 2018-06-25 | End: 2018-06-25

## 2018-06-25 RX ADMIN — ACETAMINOPHEN 650 MG: 325 TABLET, FILM COATED ORAL at 18:51

## 2018-06-25 ASSESSMENT — PAIN DESCRIPTION - PAIN TYPE: TYPE: ACUTE PAIN

## 2018-06-25 ASSESSMENT — PAIN SCALES - GENERAL: PAINLEVEL_OUTOF10: 10

## 2018-06-25 ASSESSMENT — PAIN DESCRIPTION - LOCATION: LOCATION: FOOT

## 2018-06-25 ASSESSMENT — PAIN DESCRIPTION - ORIENTATION: ORIENTATION: LEFT

## 2018-07-02 PROBLEM — J18.9 HCAP (HEALTHCARE-ASSOCIATED PNEUMONIA): Status: RESOLVED | Noted: 2018-02-02 | Resolved: 2018-07-02

## 2018-07-02 PROBLEM — I50.41 ACUTE COMBINED SYSTOLIC AND DIASTOLIC ACC/AHA STAGE C CONGESTIVE HEART FAILURE (HCC): Status: RESOLVED | Noted: 2018-04-15 | Resolved: 2018-07-02

## 2018-07-02 PROBLEM — I50.43 CHF (CONGESTIVE HEART FAILURE), NYHA CLASS I, ACUTE ON CHRONIC, COMBINED (HCC): Status: RESOLVED | Noted: 2017-12-08 | Resolved: 2018-07-02

## 2018-07-02 PROBLEM — I50.9 ACUTE EXACERBATION OF CHF (CONGESTIVE HEART FAILURE) (HCC): Status: RESOLVED | Noted: 2017-06-23 | Resolved: 2018-07-02

## 2018-07-02 PROBLEM — J96.02 ACUTE RESPIRATORY FAILURE WITH HYPERCAPNIA (HCC): Status: RESOLVED | Noted: 2017-06-24 | Resolved: 2018-07-02

## 2018-07-02 PROBLEM — I50.9 CONGESTIVE HEART FAILURE (HCC): Status: RESOLVED | Noted: 2017-12-20 | Resolved: 2018-07-02

## 2018-07-02 PROBLEM — I50.23 ACUTE ON CHRONIC SYSTOLIC HEART FAILURE (HCC): Status: RESOLVED | Noted: 2018-03-28 | Resolved: 2018-07-02

## 2018-07-06 ENCOUNTER — TELEPHONE (OUTPATIENT)
Dept: NON INVASIVE DIAGNOSTICS | Age: 53
End: 2018-07-06

## 2018-07-19 ENCOUNTER — TELEPHONE (OUTPATIENT)
Dept: NON INVASIVE DIAGNOSTICS | Age: 53
End: 2018-07-19

## 2018-07-19 NOTE — TELEPHONE ENCOUNTER
Called and spoke to patient about her device not working she told me that she does not have the right equipment [employer] was suppose to be sending her a new cellular adaptor and she has not received it. I gave her tech support number again she is suppose to call them and check with them when that is going to be sent out.      She is due July 2018 for an appointment she said as soon as she hears about a ride she will give us a call to reschedule the appointment

## 2018-07-24 ENCOUNTER — TELEPHONE (OUTPATIENT)
Dept: CARDIOLOGY CLINIC | Age: 53
End: 2018-07-24

## 2018-07-24 NOTE — TELEPHONE ENCOUNTER
Received a phone call [phone] from [employer] ([place]. They were telling her that Dr Mayo Angela sent a prescription to their pharmacy yesterday and some samples were left at the office. I called [facility] and she has not filled prescriptions there in a few years. I asked Najma Stone to let me know how I can help her. She verbalized understanding.

## 2018-08-24 ENCOUNTER — HOSPITAL ENCOUNTER (INPATIENT)
Age: 53
LOS: 2 days | Discharge: HOME | DRG: 293 | End: 2018-08-26
Attending: EMERGENCY MEDICINE | Admitting: FAMILY MEDICINE
Payer: COMMERCIAL

## 2018-08-24 ENCOUNTER — TELEPHONE (OUTPATIENT)
Dept: CARDIOLOGY CLINIC | Age: 53
End: 2018-08-24

## 2018-08-24 ENCOUNTER — APPOINTMENT (OUTPATIENT)
Dept: GENERAL RADIOLOGY | Age: 53
DRG: 293 | End: 2018-08-24
Payer: COMMERCIAL

## 2018-08-24 DIAGNOSIS — I50.22 CHRONIC SYSTOLIC CHF (CONGESTIVE HEART FAILURE), NYHA CLASS 3 (HCC): ICD-10-CM

## 2018-08-24 DIAGNOSIS — Z76.0 MEDICATION REFILL: ICD-10-CM

## 2018-08-24 DIAGNOSIS — R06.00 DYSPNEA, UNSPECIFIED TYPE: Primary | ICD-10-CM

## 2018-08-24 DIAGNOSIS — I50.9 ACUTE ON CHRONIC CONGESTIVE HEART FAILURE, UNSPECIFIED HEART FAILURE TYPE (HCC): ICD-10-CM

## 2018-08-24 PROBLEM — I50.23 SYSTOLIC CHF, ACUTE ON CHRONIC (HCC): Status: ACTIVE | Noted: 2018-08-24

## 2018-08-24 LAB
ALBUMIN SERPL-MCNC: 3.9 G/DL (ref 3.5–5.2)
ALP BLD-CCNC: 137 U/L (ref 35–104)
ALT SERPL-CCNC: 42 U/L (ref 0–32)
ANION GAP SERPL CALCULATED.3IONS-SCNC: 10 MMOL/L (ref 7–16)
AST SERPL-CCNC: 53 U/L (ref 0–31)
BILIRUB SERPL-MCNC: 0.5 MG/DL (ref 0–1.2)
BUN BLDV-MCNC: 10 MG/DL (ref 6–20)
CALCIUM SERPL-MCNC: 9.4 MG/DL (ref 8.6–10.2)
CHLORIDE BLD-SCNC: 106 MMOL/L (ref 98–107)
CO2: 24 MMOL/L (ref 22–29)
CREAT SERPL-MCNC: 0.8 MG/DL (ref 0.5–1)
GFR AFRICAN AMERICAN: >60
GFR NON-AFRICAN AMERICAN: >60 ML/MIN/1.73
GLUCOSE BLD-MCNC: 119 MG/DL (ref 74–109)
HCT VFR BLD CALC: 40 % (ref 34–48)
HEMOGLOBIN: 12.6 G/DL (ref 11.5–15.5)
MCH RBC QN AUTO: 27.8 PG (ref 26–35)
MCHC RBC AUTO-ENTMCNC: 31.5 % (ref 32–34.5)
MCV RBC AUTO: 88.3 FL (ref 80–99.9)
PDW BLD-RTO: 13.7 FL (ref 11.5–15)
PLATELET # BLD: 316 E9/L (ref 130–450)
PMV BLD AUTO: 9.9 FL (ref 7–12)
POTASSIUM SERPL-SCNC: 4.8 MMOL/L (ref 3.5–5)
PRO-BNP: ABNORMAL PG/ML (ref 0–125)
RBC # BLD: 4.53 E12/L (ref 3.5–5.5)
SODIUM BLD-SCNC: 140 MMOL/L (ref 132–146)
TOTAL PROTEIN: 7 G/DL (ref 6.4–8.3)
TROPONIN: <0.01 NG/ML (ref 0–0.03)
WBC # BLD: 6.1 E9/L (ref 4.5–11.5)

## 2018-08-24 PROCEDURE — G0378 HOSPITAL OBSERVATION PER HR: HCPCS

## 2018-08-24 PROCEDURE — 99285 EMERGENCY DEPT VISIT HI MDM: CPT

## 2018-08-24 PROCEDURE — 93005 ELECTROCARDIOGRAM TRACING: CPT | Performed by: FAMILY MEDICINE

## 2018-08-24 PROCEDURE — 2700000000 HC OXYGEN THERAPY PER DAY

## 2018-08-24 PROCEDURE — 2140000000 HC CCU INTERMEDIATE R&B

## 2018-08-24 PROCEDURE — 71045 X-RAY EXAM CHEST 1 VIEW: CPT

## 2018-08-24 PROCEDURE — 2580000003 HC RX 258: Performed by: STUDENT IN AN ORGANIZED HEALTH CARE EDUCATION/TRAINING PROGRAM

## 2018-08-24 PROCEDURE — 84484 ASSAY OF TROPONIN QUANT: CPT

## 2018-08-24 PROCEDURE — 6370000000 HC RX 637 (ALT 250 FOR IP): Performed by: STUDENT IN AN ORGANIZED HEALTH CARE EDUCATION/TRAINING PROGRAM

## 2018-08-24 PROCEDURE — 85027 COMPLETE CBC AUTOMATED: CPT

## 2018-08-24 PROCEDURE — 80053 COMPREHEN METABOLIC PANEL: CPT

## 2018-08-24 PROCEDURE — 83880 ASSAY OF NATRIURETIC PEPTIDE: CPT

## 2018-08-24 PROCEDURE — 96374 THER/PROPH/DIAG INJ IV PUSH: CPT

## 2018-08-24 PROCEDURE — 36415 COLL VENOUS BLD VENIPUNCTURE: CPT

## 2018-08-24 PROCEDURE — 94664 DEMO&/EVAL PT USE INHALER: CPT

## 2018-08-24 PROCEDURE — 6360000002 HC RX W HCPCS: Performed by: INTERNAL MEDICINE

## 2018-08-24 PROCEDURE — 6360000002 HC RX W HCPCS: Performed by: PHYSICIAN ASSISTANT

## 2018-08-24 RX ORDER — IPRATROPIUM BROMIDE AND ALBUTEROL SULFATE 2.5; .5 MG/3ML; MG/3ML
1 SOLUTION RESPIRATORY (INHALATION)
Status: CANCELLED | OUTPATIENT
Start: 2018-08-24

## 2018-08-24 RX ORDER — ISOSORBIDE DINITRATE 10 MG/1
30 TABLET ORAL 3 TIMES DAILY
Status: CANCELLED | OUTPATIENT
Start: 2018-08-24

## 2018-08-24 RX ORDER — IPRATROPIUM BROMIDE AND ALBUTEROL SULFATE 2.5; .5 MG/3ML; MG/3ML
1 SOLUTION RESPIRATORY (INHALATION) ONCE
Status: DISCONTINUED | OUTPATIENT
Start: 2018-08-24 | End: 2018-08-24 | Stop reason: SDUPTHER

## 2018-08-24 RX ORDER — ONDANSETRON 2 MG/ML
4 INJECTION INTRAMUSCULAR; INTRAVENOUS EVERY 6 HOURS PRN
Status: CANCELLED | OUTPATIENT
Start: 2018-08-24

## 2018-08-24 RX ORDER — SODIUM CHLORIDE 0.9 % (FLUSH) 0.9 %
10 SYRINGE (ML) INJECTION PRN
Status: DISCONTINUED | OUTPATIENT
Start: 2018-08-24 | End: 2018-08-24 | Stop reason: SDUPTHER

## 2018-08-24 RX ORDER — BUDESONIDE AND FORMOTEROL FUMARATE DIHYDRATE 160; 4.5 UG/1; UG/1
2 AEROSOL RESPIRATORY (INHALATION) 2 TIMES DAILY
Status: ON HOLD | COMMUNITY
End: 2018-08-26

## 2018-08-24 RX ORDER — MONTELUKAST SODIUM 10 MG/1
10 TABLET ORAL NIGHTLY
Status: DISCONTINUED | OUTPATIENT
Start: 2018-08-24 | End: 2018-08-26 | Stop reason: HOSPADM

## 2018-08-24 RX ORDER — IPRATROPIUM BROMIDE AND ALBUTEROL SULFATE 2.5; .5 MG/3ML; MG/3ML
1 SOLUTION RESPIRATORY (INHALATION)
Status: DISCONTINUED | OUTPATIENT
Start: 2018-08-24 | End: 2018-08-26 | Stop reason: HOSPADM

## 2018-08-24 RX ORDER — SODIUM CHLORIDE 0.9 % (FLUSH) 0.9 %
10 SYRINGE (ML) INJECTION EVERY 12 HOURS SCHEDULED
Status: DISCONTINUED | OUTPATIENT
Start: 2018-08-24 | End: 2018-08-26 | Stop reason: HOSPADM

## 2018-08-24 RX ORDER — SODIUM CHLORIDE 0.9 % (FLUSH) 0.9 %
10 SYRINGE (ML) INJECTION EVERY 12 HOURS SCHEDULED
Status: DISCONTINUED | OUTPATIENT
Start: 2018-08-24 | End: 2018-08-24 | Stop reason: SDUPTHER

## 2018-08-24 RX ORDER — FLUOXETINE HYDROCHLORIDE 20 MG/1
20 CAPSULE ORAL DAILY
Status: CANCELLED | OUTPATIENT
Start: 2018-08-24

## 2018-08-24 RX ORDER — HYDRALAZINE HYDROCHLORIDE 50 MG/1
50 TABLET, FILM COATED ORAL EVERY 8 HOURS SCHEDULED
Status: DISCONTINUED | OUTPATIENT
Start: 2018-08-24 | End: 2018-08-26 | Stop reason: HOSPADM

## 2018-08-24 RX ORDER — FUROSEMIDE 10 MG/ML
40 INJECTION INTRAMUSCULAR; INTRAVENOUS 2 TIMES DAILY
Status: DISCONTINUED | OUTPATIENT
Start: 2018-08-24 | End: 2018-08-26

## 2018-08-24 RX ORDER — SODIUM CHLORIDE 0.9 % (FLUSH) 0.9 %
10 SYRINGE (ML) INJECTION PRN
Status: DISCONTINUED | OUTPATIENT
Start: 2018-08-24 | End: 2018-08-26 | Stop reason: HOSPADM

## 2018-08-24 RX ORDER — IPRATROPIUM BROMIDE AND ALBUTEROL SULFATE 2.5; .5 MG/3ML; MG/3ML
1 SOLUTION RESPIRATORY (INHALATION) EVERY 4 HOURS PRN
Status: DISCONTINUED | OUTPATIENT
Start: 2018-08-24 | End: 2018-08-24 | Stop reason: SDUPTHER

## 2018-08-24 RX ORDER — ACETAMINOPHEN 325 MG/1
650 TABLET ORAL EVERY 4 HOURS PRN
Status: DISCONTINUED | OUTPATIENT
Start: 2018-08-24 | End: 2018-08-26 | Stop reason: HOSPADM

## 2018-08-24 RX ORDER — SODIUM CHLORIDE 0.9 % (FLUSH) 0.9 %
10 SYRINGE (ML) INJECTION EVERY 12 HOURS SCHEDULED
Status: CANCELLED | OUTPATIENT
Start: 2018-08-24

## 2018-08-24 RX ORDER — PANTOPRAZOLE SODIUM 40 MG/1
40 TABLET, DELAYED RELEASE ORAL
Status: DISCONTINUED | OUTPATIENT
Start: 2018-08-25 | End: 2018-08-24 | Stop reason: SDUPTHER

## 2018-08-24 RX ORDER — ISOSORBIDE DINITRATE 10 MG/1
30 TABLET ORAL 3 TIMES DAILY
Status: DISCONTINUED | OUTPATIENT
Start: 2018-08-24 | End: 2018-08-26 | Stop reason: HOSPADM

## 2018-08-24 RX ORDER — SPIRONOLACTONE 25 MG/1
25 TABLET ORAL DAILY
Status: DISCONTINUED | OUTPATIENT
Start: 2018-08-24 | End: 2018-08-26 | Stop reason: HOSPADM

## 2018-08-24 RX ORDER — FUROSEMIDE 10 MG/ML
40 INJECTION INTRAMUSCULAR; INTRAVENOUS ONCE
Status: COMPLETED | OUTPATIENT
Start: 2018-08-24 | End: 2018-08-24

## 2018-08-24 RX ORDER — ALBUTEROL SULFATE 90 UG/1
2 AEROSOL, METERED RESPIRATORY (INHALATION) EVERY 6 HOURS PRN
Status: CANCELLED | OUTPATIENT
Start: 2018-08-24

## 2018-08-24 RX ORDER — METOPROLOL SUCCINATE 50 MG/1
50 TABLET, EXTENDED RELEASE ORAL 2 TIMES DAILY
Status: CANCELLED | OUTPATIENT
Start: 2018-08-24

## 2018-08-24 RX ORDER — MONTELUKAST SODIUM 10 MG/1
10 TABLET ORAL NIGHTLY
Status: CANCELLED | OUTPATIENT
Start: 2018-08-24

## 2018-08-24 RX ORDER — HYDRALAZINE HYDROCHLORIDE 50 MG/1
50 TABLET, FILM COATED ORAL EVERY 8 HOURS SCHEDULED
Status: CANCELLED | OUTPATIENT
Start: 2018-08-24

## 2018-08-24 RX ORDER — PANTOPRAZOLE SODIUM 40 MG/1
40 TABLET, DELAYED RELEASE ORAL
Status: DISCONTINUED | OUTPATIENT
Start: 2018-08-25 | End: 2018-08-26 | Stop reason: HOSPADM

## 2018-08-24 RX ORDER — SPIRONOLACTONE 25 MG/1
25 TABLET ORAL DAILY
Status: CANCELLED | OUTPATIENT
Start: 2018-08-24

## 2018-08-24 RX ORDER — METOPROLOL SUCCINATE 50 MG/1
50 TABLET, EXTENDED RELEASE ORAL 2 TIMES DAILY
Status: DISCONTINUED | OUTPATIENT
Start: 2018-08-24 | End: 2018-08-26 | Stop reason: HOSPADM

## 2018-08-24 RX ORDER — FLUOXETINE HYDROCHLORIDE 20 MG/1
20 CAPSULE ORAL DAILY
Status: DISCONTINUED | OUTPATIENT
Start: 2018-08-24 | End: 2018-08-26 | Stop reason: HOSPADM

## 2018-08-24 RX ORDER — ALBUTEROL SULFATE 90 UG/1
2 AEROSOL, METERED RESPIRATORY (INHALATION) EVERY 6 HOURS PRN
Status: DISCONTINUED | OUTPATIENT
Start: 2018-08-24 | End: 2018-08-26 | Stop reason: HOSPADM

## 2018-08-24 RX ORDER — SODIUM CHLORIDE 0.9 % (FLUSH) 0.9 %
10 SYRINGE (ML) INJECTION PRN
Status: CANCELLED | OUTPATIENT
Start: 2018-08-24

## 2018-08-24 RX ADMIN — HYDRALAZINE HYDROCHLORIDE 50 MG: 50 TABLET, FILM COATED ORAL at 22:36

## 2018-08-24 RX ADMIN — IPRATROPIUM BROMIDE AND ALBUTEROL SULFATE 1 AMPULE: .5; 3 SOLUTION RESPIRATORY (INHALATION) at 19:29

## 2018-08-24 RX ADMIN — MONTELUKAST SODIUM 10 MG: 10 TABLET, FILM COATED ORAL at 20:40

## 2018-08-24 RX ADMIN — ACETAMINOPHEN 650 MG: 325 TABLET, FILM COATED ORAL at 21:26

## 2018-08-24 RX ADMIN — SPIRONOLACTONE 25 MG: 25 TABLET ORAL at 18:22

## 2018-08-24 RX ADMIN — METOPROLOL SUCCINATE 50 MG: 50 TABLET, FILM COATED, EXTENDED RELEASE ORAL at 20:40

## 2018-08-24 RX ADMIN — FUROSEMIDE 40 MG: 10 INJECTION, SOLUTION INTRAMUSCULAR; INTRAVENOUS at 18:22

## 2018-08-24 RX ADMIN — ISOSORBIDE DINITRATE 30 MG: 10 TABLET ORAL at 20:40

## 2018-08-24 RX ADMIN — FUROSEMIDE 40 MG: 10 INJECTION, SOLUTION INTRAMUSCULAR; INTRAVENOUS at 11:21

## 2018-08-24 RX ADMIN — SACUBITRIL AND VALSARTAN 1 TABLET: 97; 103 TABLET, FILM COATED ORAL at 20:40

## 2018-08-24 RX ADMIN — MOMETASONE FUROATE AND FORMOTEROL FUMARATE DIHYDRATE 2 PUFF: 200; 5 AEROSOL RESPIRATORY (INHALATION) at 18:24

## 2018-08-24 RX ADMIN — Medication 10 ML: at 20:40

## 2018-08-24 RX ADMIN — FLUOXETINE HYDROCHLORIDE 20 MG: 20 CAPSULE ORAL at 18:22

## 2018-08-24 ASSESSMENT — PAIN DESCRIPTION - PROGRESSION: CLINICAL_PROGRESSION: GRADUALLY IMPROVING

## 2018-08-24 ASSESSMENT — PAIN DESCRIPTION - DIRECTION
RADIATING_TOWARDS: BACK
RADIATING_TOWARDS: BACK

## 2018-08-24 ASSESSMENT — PAIN DESCRIPTION - ORIENTATION
ORIENTATION: MID

## 2018-08-24 ASSESSMENT — PAIN DESCRIPTION - LOCATION
LOCATION: CHEST

## 2018-08-24 ASSESSMENT — PAIN DESCRIPTION - PAIN TYPE
TYPE: ACUTE PAIN
TYPE: ACUTE PAIN

## 2018-08-24 ASSESSMENT — PAIN DESCRIPTION - FREQUENCY: FREQUENCY: INTERMITTENT

## 2018-08-24 ASSESSMENT — PAIN SCALES - GENERAL
PAINLEVEL_OUTOF10: 6
PAINLEVEL_OUTOF10: 6
PAINLEVEL_OUTOF10: 0
PAINLEVEL_OUTOF10: 3
PAINLEVEL_OUTOF10: 6

## 2018-08-24 ASSESSMENT — PAIN DESCRIPTION - DESCRIPTORS
DESCRIPTORS: OTHER (COMMENT)
DESCRIPTORS: TIGHTNESS
DESCRIPTORS: OTHER (COMMENT)

## 2018-08-24 NOTE — H&P
[facility] Resident Inpatient  History and Physical      CC: Shortness of Breath      HPI: History obtained from patient. Kenya Verdin is a 48 y.o. 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 Patient presented to the ED today stating ran out of all her medications 3 weeks ago due to being unable to pay for her prescritions, Patient developed JOHANSEN on 10 days ago with walking short distances, no chest pain and relieved with rest. SOB and JOHANSEN became progressively worse and 3 days ago, pt had episode SOB from rest, and developed CP. Pain was retrosternal, non radiating, dull, increasing with deep inspirations, which relieved by rest after 1-2 minutes. He had associated dizziness, diaphoresis, palpatations, nausea and episode of vomiting. Patient endorses cough with white sputum, non-bloody. Patient self treated with mother's albuterol inhaler and home oxygen (mother has CHF as well) Patient with orthopnea (three pillows at night), SOB with lying flat. Patient denies HA, blurred or double vison, rash. Patient with fever and chills occasionally, she is menopausal. Denies night sweats. No blurred or doube vision, headache. Patient denies urinary symptoms, one episode of diarrhea yesterday. Does complain of occasional paresthesias in her feet. Patient with varied diet, mostly chicken and fish with vegetables. Patient denies any trauma or travel. ED Course:      Pro-BNP 10,340  Trop negative  AST 42  ALT 53  WBC 6.1  CXR : RLL edema vs pna   12 lead - negative for STEMI  Lasix 40mg IV  UA  Admitted for CHF exacerbation      PMH: She has a past medical history of Angina at rest [facility]); Asthma; Blood type AB+; CAD (coronary artery disease);  Carpal tunnel syndrome on left; CHF (congestive heart failure) ([address] COPD (chronic

## 2018-08-25 ENCOUNTER — APPOINTMENT (OUTPATIENT)
Dept: GENERAL RADIOLOGY | Age: 53
DRG: 293 | End: 2018-08-25
Payer: COMMERCIAL

## 2018-08-25 LAB
EKG ATRIAL RATE: 95 BPM
EKG P AXIS: 67 DEGREES
EKG P-R INTERVAL: 206 MS
EKG Q-T INTERVAL: 388 MS
EKG QRS DURATION: 106 MS
EKG QTC CALCULATION (BAZETT): 487 MS
EKG R AXIS: -93 DEGREES
EKG T AXIS: 68 DEGREES
EKG VENTRICULAR RATE: 95 BPM

## 2018-08-25 PROCEDURE — 93010 ELECTROCARDIOGRAM REPORT: CPT | Performed by: FAMILY MEDICINE

## 2018-08-25 PROCEDURE — 2140000000 HC CCU INTERMEDIATE R&B

## 2018-08-25 PROCEDURE — G0378 HOSPITAL OBSERVATION PER HR: HCPCS

## 2018-08-25 PROCEDURE — 6360000002 HC RX W HCPCS: Performed by: INTERNAL MEDICINE

## 2018-08-25 PROCEDURE — 2580000003 HC RX 258: Performed by: STUDENT IN AN ORGANIZED HEALTH CARE EDUCATION/TRAINING PROGRAM

## 2018-08-25 PROCEDURE — 2700000000 HC OXYGEN THERAPY PER DAY

## 2018-08-25 PROCEDURE — 6370000000 HC RX 637 (ALT 250 FOR IP): Performed by: STUDENT IN AN ORGANIZED HEALTH CARE EDUCATION/TRAINING PROGRAM

## 2018-08-25 PROCEDURE — 94640 AIRWAY INHALATION TREATMENT: CPT

## 2018-08-25 PROCEDURE — 99222 1ST HOSP IP/OBS MODERATE 55: CPT | Performed by: INTERNAL MEDICINE

## 2018-08-25 PROCEDURE — 99223 1ST HOSP IP/OBS HIGH 75: CPT | Performed by: STUDENT IN AN ORGANIZED HEALTH CARE EDUCATION/TRAINING PROGRAM

## 2018-08-25 PROCEDURE — 6360000002 HC RX W HCPCS: Performed by: STUDENT IN AN ORGANIZED HEALTH CARE EDUCATION/TRAINING PROGRAM

## 2018-08-25 PROCEDURE — 71046 X-RAY EXAM CHEST 2 VIEWS: CPT

## 2018-08-25 RX ADMIN — SPIRONOLACTONE 25 MG: 25 TABLET ORAL at 09:36

## 2018-08-25 RX ADMIN — MOMETASONE FUROATE AND FORMOTEROL FUMARATE DIHYDRATE 2 PUFF: 200; 5 AEROSOL RESPIRATORY (INHALATION) at 09:37

## 2018-08-25 RX ADMIN — FLUOXETINE HYDROCHLORIDE 20 MG: 20 CAPSULE ORAL at 09:36

## 2018-08-25 RX ADMIN — METOPROLOL SUCCINATE 50 MG: 50 TABLET, FILM COATED, EXTENDED RELEASE ORAL at 09:36

## 2018-08-25 RX ADMIN — Medication 10 ML: at 21:45

## 2018-08-25 RX ADMIN — MONTELUKAST SODIUM 10 MG: 10 TABLET, FILM COATED ORAL at 21:44

## 2018-08-25 RX ADMIN — Medication 10 ML: at 09:37

## 2018-08-25 RX ADMIN — SACUBITRIL AND VALSARTAN 1 TABLET: 97; 103 TABLET, FILM COATED ORAL at 09:37

## 2018-08-25 RX ADMIN — SACUBITRIL AND VALSARTAN 1 TABLET: 97; 103 TABLET, FILM COATED ORAL at 21:44

## 2018-08-25 RX ADMIN — Medication 10 ML: at 17:21

## 2018-08-25 RX ADMIN — METOPROLOL SUCCINATE 50 MG: 50 TABLET, FILM COATED, EXTENDED RELEASE ORAL at 21:44

## 2018-08-25 RX ADMIN — IPRATROPIUM BROMIDE AND ALBUTEROL SULFATE 1 AMPULE: .5; 3 SOLUTION RESPIRATORY (INHALATION) at 09:14

## 2018-08-25 RX ADMIN — IPRATROPIUM BROMIDE AND ALBUTEROL SULFATE 1 AMPULE: .5; 3 SOLUTION RESPIRATORY (INHALATION) at 14:16

## 2018-08-25 RX ADMIN — FUROSEMIDE 40 MG: 10 INJECTION, SOLUTION INTRAMUSCULAR; INTRAVENOUS at 09:38

## 2018-08-25 RX ADMIN — MOMETASONE FUROATE AND FORMOTEROL FUMARATE DIHYDRATE 2 PUFF: 200; 5 AEROSOL RESPIRATORY (INHALATION) at 21:45

## 2018-08-25 RX ADMIN — ISOSORBIDE DINITRATE 30 MG: 10 TABLET ORAL at 21:45

## 2018-08-25 RX ADMIN — IPRATROPIUM BROMIDE AND ALBUTEROL SULFATE 1 AMPULE: .5; 3 SOLUTION RESPIRATORY (INHALATION) at 20:24

## 2018-08-25 RX ADMIN — HYDRALAZINE HYDROCHLORIDE 50 MG: 50 TABLET, FILM COATED ORAL at 23:38

## 2018-08-25 RX ADMIN — IPRATROPIUM BROMIDE AND ALBUTEROL SULFATE 1 AMPULE: .5; 3 SOLUTION RESPIRATORY (INHALATION) at 16:48

## 2018-08-25 RX ADMIN — ISOSORBIDE DINITRATE 30 MG: 10 TABLET ORAL at 09:36

## 2018-08-25 RX ADMIN — PANTOPRAZOLE SODIUM 40 MG: 40 TABLET, DELAYED RELEASE ORAL at 05:22

## 2018-08-25 RX ADMIN — HYDRALAZINE HYDROCHLORIDE 50 MG: 50 TABLET, FILM COATED ORAL at 05:22

## 2018-08-25 RX ADMIN — FUROSEMIDE 40 MG: 10 INJECTION, SOLUTION INTRAMUSCULAR; INTRAVENOUS at 17:21

## 2018-08-25 ASSESSMENT — PAIN SCALES - GENERAL
PAINLEVEL_OUTOF10: 0

## 2018-08-25 NOTE — PROGRESS NOTES
[place] [facility] Inpatient   Resident Progress Note    S:  Hospital day: 1:   Brief Synopsis: This is a 48 y.o. 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 Overnight patient reported chest pain that lasted 3 minutes. EKG did not show any changes. Symptoms improved with acetaminophen. Patient was asymptomatic for the rest of the night. Patient was seen and examined this morning. She reports that her shortness of breath is much improved this morning. She still has concerns about not being able to afford her medications. She denies fever or chills. Cont meds:   Scheduled meds:    mometasone-formoterol  2 puff Inhalation BID    FLUoxetine  20 mg Oral Daily    hydrALAZINE  50 mg Oral 3 times per day    isosorbide dinitrate  30 mg Oral TID    metoprolol succinate  50 mg Oral BID    montelukast  10 mg Oral Nightly    sacubitril-valsartan  1 tablet Oral BID    spironolactone  25 mg Oral Daily    sodium chloride flush  10 mL Intravenous 2 times per day    enoxaparin  40 mg Subcutaneous Daily    ipratropium-albuterol  1 ampule Inhalation Q4H WA    pantoprazole  40 mg Oral QAM AC    furosemide  40 mg Intravenous BID     PRN meds: albuterol sulfate HFA, sodium chloride flush, magnesium hydroxide, acetaminophen     I reviewed the patient's past medical and surgical history, Medications and Allergies. O:  Vitals:    08/25/18 0030 08/25/18 0515 08/25/18 0539 08/25/18 0914   BP: (!) 146/100 128/84     Pulse: 72 68     Resp: 16      Temp: 98.3 °F (36.8 °C)      TempSrc: Temporal      SpO2:    98%   Weight:   105 lb 1.6 oz (47.7 kg)    Height:         24 hour I&O: I/O last 3 completed shifts:   In: 240 [P.O.:240]  Out: 525 [Urine:525]  I/O this shift:  In: 240 [P.O.:240]  Out: -      GENERAL: Alert, edema  Patient reports that her SOB is much improved  Cardiology consulted - recommends 24 more hours of IV diuretics  SW consulted for patient's medication coverage problems  Continue to monitor symptoms    COPD/asthma  Stable, resumed home inhalers  Continue to monitor symptoms    HTN  Stable, resumed home meds    CAD  Cath from 03/2018 showing normal coronary arteries  Resumed home meds  EKG unchanged, negative cycled troponins  Cardiology following    DVT / GI prophylaxis: lovenox and PPI  Code: Full    Electronically signed by Bharath Araujo MD PGY-2 on 8/25/2018 at 12:34 PM  This case was discussed with senior resident and attending physician: Dr. Torrey Feliciano

## 2018-08-25 NOTE — CONSULTS
SICD    CARDIOVASCULAR STRESS TEST  2009 Normal      SECTION      COLONOSCOPY  10/2015    DIAGNOSTIC CARDIAC CATH LAB PROCEDURE  2007    [facility]: No significant CAD. Mild LVD with apical akinesis. EF 50%.  DIAGNOSTIC CARDIAC CATH LAB PROCEDURE  4/15    with PTCA to 1st ob [email or web address]    ENDOSCOPY, COLON, DIAGNOSTIC      HYSTERECTOMY  10/1/15    at [facility] by Dr. Cleo Poole PTCA  4/10/15    at [address] [place] [address] ECHOCARDIOGRAM  3/19/13    EF 65%, mild MR    TUBAL LIGATION         SOC:  reports that she quit smoking about 7 weeks ago. Her smoking use included Cigarettes. She has a 15.00 pack-year smoking history. She has never used smokeless tobacco. She reports that she drinks about 1.8 oz of alcohol per week . She reports that she uses drugs, including Marijuana. FH: family history includes Heart Disease in her father; High Blood Pressure in her father and mother; Pacemaker in her father; Stroke in her father and mother. OP Meds:   Prior to Admission medications    Medication Sig Start Date End Date Taking?  Authorizing Provider   budesonide-formoterol (SYMBICORT) 160-4.5 MCG/ACT AERO Inhale 2 puffs into the lungs 2 times daily   Yes Historical Provider, MD   bumetanide (BUMEX) 2 MG tablet Take 1 tablet by mouth every other day  Patient taking differently: Take 2 mg by mouth daily  18  Yes Razia Patel MD   isosorbide dinitrate (ISORDIL) 30 MG tablet Take 1 tablet by mouth 3 times daily 18  Yes Razia Patel MD   FLUoxetine (PROZAC) 20 MG capsule Take 1 capsule by mouth daily 18  Yes Razia Patel MD   hydrALAZINE (APRESOLINE) 50 MG tablet Take 1 tablet by mouth every 8 hours 18  Yes Razia Patel MD   metoprolol succinate (TOPROL XL) 50 MG extended release tablet Take 1 tablet by mouth 2 times daily 18  Yes Razia Patel MD   sacubitril-valsartan (ENTRESTO)  MG per tablet Take 1 tablet by mouth 2 times daily 4/5/18  Yes Queenie Simms MD   potassium chloride (KLOR-CON) 20 MEQ packet Take 20 mEq by mouth daily   Yes Historical Provider, MD   spironolactone (ALDACTONE) 25 MG tablet Take 1 tablet by mouth daily 12/22/17  Yes August Apley, MD   montelukast (SINGULAIR) 10 MG tablet Take 1 tablet by mouth nightly 12/20/17  Yes Gillian Snow APRN - TD   albuterol sulfate HFA (PROVENTIL HFA) 108 (90 Base) MCG/ACT inhaler Inhale 2 puffs into the lungs every 6 hours as needed for Wheezing 7/31/17 2/2/26 Yes Rodrigo Ma MD        IP Meds:   Mei Hilario mometasone-formoterol  2 puff Inhalation BID    FLUoxetine  20 mg Oral Daily    hydrALAZINE  50 mg Oral 3 times per day    isosorbide dinitrate  30 mg Oral TID    metoprolol succinate  50 mg Oral BID    montelukast  10 mg Oral Nightly    sacubitril-valsartan  1 tablet Oral BID    spironolactone  25 mg Oral Daily    sodium chloride flush  10 mL Intravenous 2 times per day    enoxaparin  40 mg Subcutaneous Daily    ipratropium-albuterol  1 ampule Inhalation Q4H WA    pantoprazole  40 mg Oral QAM AC    furosemide  40 mg Intravenous BID       Allergies: Orange fruit [citrus]; Crestor [rosuvastatin calcium];  Loratadine; Norco [hydrocodone-acetaminophen]; Sulfa antibiotics; and Aspirin    Review of Systems:  HEENT: negative for acute visual and auditory problems  Constitutional: negative for fever and chills  Respiratory: negative for cough and hemoptysis  Cardiovascular: negative for chest pain and dyspnea  Gastrointestinal: negative for abdominal pain, diarrhea, nausea and vomiting  Genitourinary: negative for dysuria and hematuria  Derm: negative for rash and skin lesion(s)  Neurological: negative for seizures and tremors  Endocrine: negative for diabetic symptoms including polydipsia and polyuria  Musculoskeletal: negative for CTD  Psychiatric: negative for anxiety and major depression      Physical Exam: well developed, alert and mentation intact. /84   Pulse 68   Temp 98.3 °F (36.8 °C) (Temporal)   Resp 16   Ht 5' 4\" (1.626 m)   Wt 105 lb 1.6 oz (47.7 kg)   LMP 05/12/2014 (Approximate)   SpO2 99%   BMI 18.04 kg/m²    CONST: In general, this is a well developed, middle aged female who appears of stated age. Awake, alert, cooperative, no apparent distress. Throat: Oral mucosa pink and moist.  HEENT: Head- normocephalic, atraumatic; Eyes:conjunctivae pink, no noted xanthomas. Neck: no stridor, no noted enlargement of the thyroid,symmetric, no tenderness, no jugular venous distention. No carotid bruit noted. CHEST: Symmetrical and non-tender to palpation. No noted accessory muscle use or intercostal retractions. LUNGS: diminished AE b/l; no creps; no ronchi. CARDIOVASCULAR:   Heart Inspection shows no noted pulsations  Heart Palpation: no heaves or thrills, PMI in 5th ISC near left midclavicular line   Heart Ausculation -Normal S1 and S2, RRR, 3/6 SM apex  PV: no extremity edema. No varicosities. Pedal pulses palpable, no clubbing or cyanosis   ABDOMEN: Soft, non-tender to light palpation. Bowel sounds present. No palpable masses no hepatosplenomegaly or splenomegaly; no abdominal bruit / pulsation  MS: Good muscle strength and tone. Not atrophy or abnormal movements. : deferred. SKIN: Warm and dry no statis dermatitis or ulcers. NEURO / PSYCH: Oriented to person, place and time. Speech clear and appropriate. Follows all commands. Pleasant affect. Labs:  Last 3 Troponin:    Lab Results   Component Value Date    TROPONINI <0.01 08/24/2018    TROPONINI <0.01 08/24/2018    TROPONINI <0.01 08/24/2018        Recent Labs      08/24/18   1038   WBC  6.1   HGB  12.6   HCT  40.0   PLT  316       Recent Labs      08/24/18   1030   NA  140   K  4.8   CL  106   CO2  24   BUN  10   CREATININE  0.8       Recent Labs      08/24/18   1030   AST  53*   ALT  42*   ALKPHOS  137*         Imaging:   CXR: mild CHF on my review.

## 2018-08-25 NOTE — PROGRESS NOTES
[facility]  Family Medicine Attending    S: 48 y.o. female with COPD, CHF, CAD, HTN who presents with dyspnea related to inability to obtain her meds for past month. States that she is unable to afford her medications and so had stopped them all. She developed SOB and presented to ED. Feels back to normal now. O: VS- Blood pressure 128/84, pulse 68, temperature 98.3 °F (36.8 °C), temperature source Temporal, resp. rate 16, height 5' 4\" (1.626 m), weight 105 lb 1.6 oz (47.7 kg), last menstrual period 05/12/2014, SpO2 98 %, not currently breastfeeding. Exam is as noted by resident with the following changes, additions or corrections:  Heart- RRR with S3 gallop  Lungs- clear  Ext - no edema    Impressions: Active Problems:    COPD (chronic obstructive pulmonary disease) ([facility])    Asthma    Chronic systolic CHF (congestive heart failure), NYHA class 3 ([facility])    Essential hypertension    Coronary artery disease involving native coronary artery of native heart without angina pectoris    Systolic CHF, acute on chronic ([facility])  Resolved Problems:    * No resolved hospital problems. *      Plan:   Restart all meds. IV diuretics for 24 hours   Could be discharge after that if able to arrange her medications. Attending Physician Statement  I have reviewed the chart, including any radiology or labs, and seen the patient with the resident(s). I personally reviewed and performed key elements of the history and exam.  I agree with the assessment, plan and orders as documented by the resident. Please refer to the resident note for additional information.       Minda Alaniz

## 2018-08-26 ENCOUNTER — HOSPITAL ENCOUNTER (INPATIENT)
Age: 53
LOS: 2 days | Discharge: HOME | DRG: 062 | End: 2018-08-28
Attending: EMERGENCY MEDICINE | Admitting: FAMILY MEDICINE
Payer: COMMERCIAL

## 2018-08-26 ENCOUNTER — APPOINTMENT (OUTPATIENT)
Dept: CT IMAGING | Age: 53
DRG: 062 | End: 2018-08-26
Payer: COMMERCIAL

## 2018-08-26 ENCOUNTER — APPOINTMENT (OUTPATIENT)
Dept: GENERAL RADIOLOGY | Age: 53
DRG: 062 | End: 2018-08-26
Payer: COMMERCIAL

## 2018-08-26 VITALS
SYSTOLIC BLOOD PRESSURE: 107 MMHG | TEMPERATURE: 98 F | HEIGHT: 64 IN | OXYGEN SATURATION: 93 % | RESPIRATION RATE: 16 BRPM | HEART RATE: 71 BPM | DIASTOLIC BLOOD PRESSURE: 66 MMHG | BODY MASS INDEX: 17.94 KG/M2 | WEIGHT: 105.1 LBS

## 2018-08-26 DIAGNOSIS — I63.9 ACUTE CVA (CEREBROVASCULAR ACCIDENT) (HCC): Primary | ICD-10-CM

## 2018-08-26 LAB
ALBUMIN SERPL-MCNC: 3.7 G/DL (ref 3.5–5.2)
ALBUMIN SERPL-MCNC: 4.3 G/DL (ref 3.5–5.2)
ALP BLD-CCNC: 111 U/L (ref 35–104)
ALP BLD-CCNC: 125 U/L (ref 35–104)
ALT SERPL-CCNC: 21 U/L (ref 0–32)
ALT SERPL-CCNC: 22 U/L (ref 0–32)
ANION GAP SERPL CALCULATED.3IONS-SCNC: 14 MMOL/L (ref 7–16)
ANION GAP SERPL CALCULATED.3IONS-SCNC: 17 MMOL/L (ref 7–16)
APTT: 31 SEC (ref 24.5–35.1)
AST SERPL-CCNC: 14 U/L (ref 0–31)
AST SERPL-CCNC: 17 U/L (ref 0–31)
BASOPHILS ABSOLUTE: 0.09 E9/L (ref 0–0.2)
BASOPHILS ABSOLUTE: 0.09 E9/L (ref 0–0.2)
BASOPHILS RELATIVE PERCENT: 1.1 % (ref 0–2)
BASOPHILS RELATIVE PERCENT: 1.2 % (ref 0–2)
BILIRUB SERPL-MCNC: 0.2 MG/DL (ref 0–1.2)
BILIRUB SERPL-MCNC: 0.5 MG/DL (ref 0–1.2)
BUN BLDV-MCNC: 13 MG/DL (ref 6–20)
BUN BLDV-MCNC: 17 MG/DL (ref 6–20)
CALCIUM SERPL-MCNC: 10.1 MG/DL (ref 8.6–10.2)
CALCIUM SERPL-MCNC: 9.2 MG/DL (ref 8.6–10.2)
CHLORIDE BLD-SCNC: 101 MMOL/L (ref 98–107)
CHLORIDE BLD-SCNC: 98 MMOL/L (ref 98–107)
CO2: 23 MMOL/L (ref 22–29)
CO2: 30 MMOL/L (ref 22–29)
CREAT SERPL-MCNC: 0.8 MG/DL (ref 0.5–1)
CREAT SERPL-MCNC: 0.9 MG/DL (ref 0.5–1)
EKG ATRIAL RATE: 74 BPM
EKG P AXIS: 73 DEGREES
EKG P-R INTERVAL: 236 MS
EKG Q-T INTERVAL: 466 MS
EKG QRS DURATION: 114 MS
EKG QTC CALCULATION (BAZETT): 517 MS
EKG R AXIS: -85 DEGREES
EKG T AXIS: 46 DEGREES
EKG VENTRICULAR RATE: 74 BPM
EOSINOPHILS ABSOLUTE: 0.15 E9/L (ref 0.05–0.5)
EOSINOPHILS ABSOLUTE: 0.19 E9/L (ref 0.05–0.5)
EOSINOPHILS RELATIVE PERCENT: 2.1 % (ref 0–6)
EOSINOPHILS RELATIVE PERCENT: 2.4 % (ref 0–6)
GFR AFRICAN AMERICAN: >60
GFR AFRICAN AMERICAN: >60
GFR NON-AFRICAN AMERICAN: >60 ML/MIN/1.73
GFR NON-AFRICAN AMERICAN: >60 ML/MIN/1.73
GLUCOSE BLD-MCNC: 103 MG/DL (ref 74–109)
GLUCOSE BLD-MCNC: 152 MG/DL (ref 74–109)
HCT VFR BLD CALC: 42.3 % (ref 34–48)
HCT VFR BLD CALC: 44.7 % (ref 34–48)
HEMOGLOBIN: 13.7 G/DL (ref 11.5–15.5)
HEMOGLOBIN: 14.4 G/DL (ref 11.5–15.5)
IMMATURE GRANULOCYTES #: 0.02 E9/L
IMMATURE GRANULOCYTES #: 0.03 E9/L
IMMATURE GRANULOCYTES %: 0.3 % (ref 0–5)
IMMATURE GRANULOCYTES %: 0.4 % (ref 0–5)
INR BLD: 1.2
LACTIC ACID: 0.9 MMOL/L (ref 0.5–2.2)
LYMPHOCYTES ABSOLUTE: 1.53 E9/L (ref 1.5–4)
LYMPHOCYTES ABSOLUTE: 2.24 E9/L (ref 1.5–4)
LYMPHOCYTES RELATIVE PERCENT: 21.2 % (ref 20–42)
LYMPHOCYTES RELATIVE PERCENT: 27.9 % (ref 20–42)
MCH RBC QN AUTO: 27.5 PG (ref 26–35)
MCH RBC QN AUTO: 27.6 PG (ref 26–35)
MCHC RBC AUTO-ENTMCNC: 32.2 % (ref 32–34.5)
MCHC RBC AUTO-ENTMCNC: 32.4 % (ref 32–34.5)
MCV RBC AUTO: 84.8 FL (ref 80–99.9)
MCV RBC AUTO: 85.8 FL (ref 80–99.9)
METER GLUCOSE: 118 MG/DL (ref 70–110)
METER GLUCOSE: 164 MG/DL (ref 70–110)
MONOCYTES ABSOLUTE: 0.62 E9/L (ref 0.1–0.95)
MONOCYTES ABSOLUTE: 0.76 E9/L (ref 0.1–0.95)
MONOCYTES RELATIVE PERCENT: 8.6 % (ref 2–12)
MONOCYTES RELATIVE PERCENT: 9.5 % (ref 2–12)
NEUTROPHILS ABSOLUTE: 4.72 E9/L (ref 1.8–7.3)
NEUTROPHILS ABSOLUTE: 4.8 E9/L (ref 1.8–7.3)
NEUTROPHILS RELATIVE PERCENT: 58.7 % (ref 43–80)
NEUTROPHILS RELATIVE PERCENT: 66.6 % (ref 43–80)
PDW BLD-RTO: 13.7 FL (ref 11.5–15)
PDW BLD-RTO: 13.8 FL (ref 11.5–15)
PLATELET # BLD: 389 E9/L (ref 130–450)
PLATELET # BLD: 396 E9/L (ref 130–450)
PMV BLD AUTO: 10 FL (ref 7–12)
PMV BLD AUTO: 9.9 FL (ref 7–12)
POTASSIUM REFLEX MAGNESIUM: 3.7 MMOL/L (ref 3.5–5)
POTASSIUM SERPL-SCNC: 3.9 MMOL/L (ref 3.5–5)
PROTHROMBIN TIME: 13.3 SEC (ref 9.3–12.4)
RBC # BLD: 4.99 E12/L (ref 3.5–5.5)
RBC # BLD: 5.21 E12/L (ref 3.5–5.5)
SODIUM BLD-SCNC: 141 MMOL/L (ref 132–146)
SODIUM BLD-SCNC: 142 MMOL/L (ref 132–146)
TOTAL PROTEIN: 6.8 G/DL (ref 6.4–8.3)
TOTAL PROTEIN: 7.4 G/DL (ref 6.4–8.3)
TROPONIN: <0.01 NG/ML (ref 0–0.03)
TSH SERPL DL<=0.05 MIU/L-ACNC: 2.47 UIU/ML (ref 0.27–4.2)
WBC # BLD: 7.2 E9/L (ref 4.5–11.5)
WBC # BLD: 8 E9/L (ref 4.5–11.5)

## 2018-08-26 PROCEDURE — 84443 ASSAY THYROID STIM HORMONE: CPT

## 2018-08-26 PROCEDURE — 99232 SBSQ HOSP IP/OBS MODERATE 35: CPT | Performed by: INTERNAL MEDICINE

## 2018-08-26 PROCEDURE — 0042T CT BRAIN PERFUSION: CPT

## 2018-08-26 PROCEDURE — 82962 GLUCOSE BLOOD TEST: CPT

## 2018-08-26 PROCEDURE — 36415 COLL VENOUS BLD VENIPUNCTURE: CPT

## 2018-08-26 PROCEDURE — 70498 CT ANGIOGRAPHY NECK: CPT

## 2018-08-26 PROCEDURE — 80053 COMPREHEN METABOLIC PANEL: CPT

## 2018-08-26 PROCEDURE — 99285 EMERGENCY DEPT VISIT HI MDM: CPT

## 2018-08-26 PROCEDURE — 85025 COMPLETE CBC W/AUTO DIFF WBC: CPT

## 2018-08-26 PROCEDURE — 70450 CT HEAD/BRAIN W/O DYE: CPT

## 2018-08-26 PROCEDURE — 6370000000 HC RX 637 (ALT 250 FOR IP): Performed by: INTERNAL MEDICINE

## 2018-08-26 PROCEDURE — 96374 THER/PROPH/DIAG INJ IV PUSH: CPT

## 2018-08-26 PROCEDURE — 99239 HOSP IP/OBS DSCHRG MGMT >30: CPT | Performed by: STUDENT IN AN ORGANIZED HEALTH CARE EDUCATION/TRAINING PROGRAM

## 2018-08-26 PROCEDURE — 70496 CT ANGIOGRAPHY HEAD: CPT

## 2018-08-26 PROCEDURE — 6360000004 HC RX CONTRAST MEDICATION: Performed by: RADIOLOGY

## 2018-08-26 PROCEDURE — 2000000000 HC ICU R&B

## 2018-08-26 PROCEDURE — 2580000003 HC RX 258: Performed by: RADIOLOGY

## 2018-08-26 PROCEDURE — G0378 HOSPITAL OBSERVATION PER HR: HCPCS

## 2018-08-26 PROCEDURE — 71045 X-RAY EXAM CHEST 1 VIEW: CPT

## 2018-08-26 PROCEDURE — 6360000002 HC RX W HCPCS

## 2018-08-26 PROCEDURE — 85610 PROTHROMBIN TIME: CPT

## 2018-08-26 PROCEDURE — 6360000002 HC RX W HCPCS: Performed by: STUDENT IN AN ORGANIZED HEALTH CARE EDUCATION/TRAINING PROGRAM

## 2018-08-26 PROCEDURE — 3E03317 INTRODUCTION OF OTHER THROMBOLYTIC INTO PERIPHERAL VEIN, PERCUTANEOUS APPROACH: ICD-10-PCS | Performed by: FAMILY MEDICINE

## 2018-08-26 PROCEDURE — 87081 CULTURE SCREEN ONLY: CPT

## 2018-08-26 PROCEDURE — 93005 ELECTROCARDIOGRAM TRACING: CPT | Performed by: STUDENT IN AN ORGANIZED HEALTH CARE EDUCATION/TRAINING PROGRAM

## 2018-08-26 PROCEDURE — 84484 ASSAY OF TROPONIN QUANT: CPT

## 2018-08-26 PROCEDURE — 85730 THROMBOPLASTIN TIME PARTIAL: CPT

## 2018-08-26 PROCEDURE — 6370000000 HC RX 637 (ALT 250 FOR IP): Performed by: STUDENT IN AN ORGANIZED HEALTH CARE EDUCATION/TRAINING PROGRAM

## 2018-08-26 PROCEDURE — 2580000003 HC RX 258: Performed by: STUDENT IN AN ORGANIZED HEALTH CARE EDUCATION/TRAINING PROGRAM

## 2018-08-26 PROCEDURE — 83605 ASSAY OF LACTIC ACID: CPT

## 2018-08-26 RX ORDER — FLUOXETINE HYDROCHLORIDE 20 MG/1
20 CAPSULE ORAL DAILY
Qty: 30 CAPSULE | Refills: 0 | Status: SHIPPED | OUTPATIENT
Start: 2018-08-26 | End: 2018-10-22 | Stop reason: SDUPTHER

## 2018-08-26 RX ORDER — SODIUM CHLORIDE 0.9 % (FLUSH) 0.9 %
10 SYRINGE (ML) INJECTION EVERY 12 HOURS SCHEDULED
Status: DISCONTINUED | OUTPATIENT
Start: 2018-08-26 | End: 2018-08-28 | Stop reason: HOSPADM

## 2018-08-26 RX ORDER — CLOPIDOGREL BISULFATE 75 MG/1
75 TABLET ORAL DAILY
Qty: 30 TABLET | Refills: 0 | Status: SHIPPED | OUTPATIENT
Start: 2018-08-26 | End: 2019-03-29 | Stop reason: SDUPTHER

## 2018-08-26 RX ORDER — ISOSORBIDE DINITRATE 30 MG/1
30 TABLET ORAL 3 TIMES DAILY
Qty: 90 TABLET | Refills: 0 | Status: SHIPPED | OUTPATIENT
Start: 2018-08-26 | End: 2018-10-22 | Stop reason: SDUPTHER

## 2018-08-26 RX ORDER — BUMETANIDE 1 MG/1
2 TABLET ORAL DAILY
Status: DISCONTINUED | OUTPATIENT
Start: 2018-08-26 | End: 2018-08-26 | Stop reason: HOSPADM

## 2018-08-26 RX ORDER — SPIRONOLACTONE 25 MG/1
25 TABLET ORAL DAILY
Qty: 30 TABLET | Refills: 0 | Status: SHIPPED | OUTPATIENT
Start: 2018-08-26 | End: 2018-10-22 | Stop reason: SDUPTHER

## 2018-08-26 RX ORDER — SODIUM CHLORIDE 0.9 % (FLUSH) 0.9 %
10 SYRINGE (ML) INJECTION ONCE
Status: COMPLETED | OUTPATIENT
Start: 2018-08-26 | End: 2018-08-26

## 2018-08-26 RX ORDER — ASPIRIN 81 MG/1
81 TABLET, CHEWABLE ORAL DAILY
Qty: 30 TABLET | Refills: 0 | Status: SHIPPED | OUTPATIENT
Start: 2018-08-26 | End: 2019-03-29 | Stop reason: SDUPTHER

## 2018-08-26 RX ORDER — ALBUTEROL SULFATE 90 UG/1
2 AEROSOL, METERED RESPIRATORY (INHALATION) EVERY 6 HOURS PRN
Qty: 1 INHALER | Refills: 0 | Status: SHIPPED | OUTPATIENT
Start: 2018-08-26 | End: 2019-03-29 | Stop reason: SDUPTHER

## 2018-08-26 RX ORDER — DEXTROSE MONOHYDRATE 25 G/50ML
12.5 INJECTION, SOLUTION INTRAVENOUS
Status: ACTIVE | OUTPATIENT
Start: 2018-08-26 | End: 2018-08-26

## 2018-08-26 RX ORDER — OMEPRAZOLE 20 MG/1
20 CAPSULE, DELAYED RELEASE ORAL DAILY
Qty: 30 CAPSULE | Refills: 0 | Status: SHIPPED | OUTPATIENT
Start: 2018-08-26 | End: 2018-10-22 | Stop reason: SDUPTHER

## 2018-08-26 RX ORDER — BUDESONIDE AND FORMOTEROL FUMARATE DIHYDRATE 160; 4.5 UG/1; UG/1
2 AEROSOL RESPIRATORY (INHALATION) 2 TIMES DAILY
Qty: 1 INHALER | Refills: 0 | Status: SHIPPED | OUTPATIENT
Start: 2018-08-26 | End: 2019-03-29 | Stop reason: SDUPTHER

## 2018-08-26 RX ORDER — BUMETANIDE 2 MG/1
2 TABLET ORAL EVERY OTHER DAY
Qty: 90 TABLET | Refills: 0 | Status: SHIPPED | OUTPATIENT
Start: 2018-08-26 | End: 2018-10-22 | Stop reason: SDUPTHER

## 2018-08-26 RX ORDER — ATORVASTATIN CALCIUM 10 MG/1
10 TABLET, FILM COATED ORAL DAILY
Qty: 30 TABLET | Refills: 0 | Status: ON HOLD | OUTPATIENT
Start: 2018-08-26 | End: 2018-08-28

## 2018-08-26 RX ORDER — HYDRALAZINE HYDROCHLORIDE 50 MG/1
50 TABLET, FILM COATED ORAL EVERY 8 HOURS SCHEDULED
Qty: 90 TABLET | Refills: 0 | Status: SHIPPED | OUTPATIENT
Start: 2018-08-26 | End: 2018-10-22 | Stop reason: SDUPTHER

## 2018-08-26 RX ORDER — SODIUM CHLORIDE 0.9 % (FLUSH) 0.9 %
10 SYRINGE (ML) INJECTION PRN
Status: DISCONTINUED | OUTPATIENT
Start: 2018-08-26 | End: 2018-08-28 | Stop reason: HOSPADM

## 2018-08-26 RX ORDER — METOPROLOL SUCCINATE 50 MG/1
50 TABLET, EXTENDED RELEASE ORAL 2 TIMES DAILY
Qty: 30 TABLET | Refills: 0 | Status: SHIPPED | OUTPATIENT
Start: 2018-08-26 | End: 2018-10-23 | Stop reason: SDUPTHER

## 2018-08-26 RX ADMIN — ALTEPLASE 4.5 MG: KIT at 22:33

## 2018-08-26 RX ADMIN — MOMETASONE FUROATE AND FORMOTEROL FUMARATE DIHYDRATE 2 PUFF: 200; 5 AEROSOL RESPIRATORY (INHALATION) at 09:02

## 2018-08-26 RX ADMIN — Medication 10 ML: at 09:00

## 2018-08-26 RX ADMIN — PANTOPRAZOLE SODIUM 40 MG: 40 TABLET, DELAYED RELEASE ORAL at 07:16

## 2018-08-26 RX ADMIN — ISOSORBIDE DINITRATE 30 MG: 10 TABLET ORAL at 09:00

## 2018-08-26 RX ADMIN — HYDRALAZINE HYDROCHLORIDE 50 MG: 50 TABLET, FILM COATED ORAL at 07:16

## 2018-08-26 RX ADMIN — Medication 10 ML: at 21:57

## 2018-08-26 RX ADMIN — BUMETANIDE 2 MG: 1 TABLET ORAL at 09:00

## 2018-08-26 RX ADMIN — SACUBITRIL AND VALSARTAN 1 TABLET: 97; 103 TABLET, FILM COATED ORAL at 09:00

## 2018-08-26 RX ADMIN — FLUOXETINE HYDROCHLORIDE 20 MG: 20 CAPSULE ORAL at 09:01

## 2018-08-26 RX ADMIN — ACETAMINOPHEN 650 MG: 325 TABLET, FILM COATED ORAL at 08:59

## 2018-08-26 RX ADMIN — METOPROLOL SUCCINATE 50 MG: 50 TABLET, FILM COATED, EXTENDED RELEASE ORAL at 09:01

## 2018-08-26 RX ADMIN — IOPAMIDOL 100 ML: 755 INJECTION, SOLUTION INTRAVENOUS at 21:56

## 2018-08-26 RX ADMIN — ALTEPLASE 40.4 MG: KIT at 22:34

## 2018-08-26 RX ADMIN — SPIRONOLACTONE 25 MG: 25 TABLET ORAL at 08:59

## 2018-08-26 ASSESSMENT — PAIN SCALES - GENERAL
PAINLEVEL_OUTOF10: 0
PAINLEVEL_OUTOF10: 0
PAINLEVEL_OUTOF10: 9
PAINLEVEL_OUTOF10: 9

## 2018-08-26 ASSESSMENT — PAIN DESCRIPTION - ONSET
ONSET: AWAKENED FROM SLEEP
ONSET: AWAKENED FROM SLEEP

## 2018-08-26 ASSESSMENT — PAIN DESCRIPTION - FREQUENCY
FREQUENCY: CONTINUOUS
FREQUENCY: CONTINUOUS

## 2018-08-26 ASSESSMENT — PAIN DESCRIPTION - ORIENTATION
ORIENTATION: MID;ANTERIOR
ORIENTATION: MID;ANTERIOR

## 2018-08-26 ASSESSMENT — PAIN DESCRIPTION - DIRECTION
RADIATING_TOWARDS: POSTERIOR
RADIATING_TOWARDS: POSTERIOR

## 2018-08-26 ASSESSMENT — PAIN DESCRIPTION - DESCRIPTORS
DESCRIPTORS: SHARP;SHOOTING;RADIATING
DESCRIPTORS: SHARP;SHOOTING;RADIATING

## 2018-08-26 ASSESSMENT — PAIN DESCRIPTION - LOCATION
LOCATION: HEAD
LOCATION: HEAD

## 2018-08-26 ASSESSMENT — PAIN DESCRIPTION - PAIN TYPE
TYPE: ACUTE PAIN
TYPE: ACUTE PAIN

## 2018-08-26 ASSESSMENT — PAIN DESCRIPTION - PROGRESSION
CLINICAL_PROGRESSION: NOT CHANGED
CLINICAL_PROGRESSION: NOT CHANGED

## 2018-08-26 NOTE — DISCHARGE SUMMARY
Discharge Summary    Stan Roland  :  1965  MRN:  [id number]    Admit date:  2018  Discharge date:  2018    Admitting Physician:  Meryl Shirley DO      Admission Condition:  poor    Discharged Condition:  good    Discharge Diagnoses:   Patient Active Problem List   Diagnosis    Hyperlipidemia    Left ovarian cyst    CVA (cerebral vascular accident)    [facility]    Carpal tunnel syndrome on left    Suicide [facility] by drug ingestion [facility]    Mitral regurgitation    Tobacco abuse    Asthma    Allergic rhinosinusitis    History of irregular menstrual bleeding    Anemia due to blood loss    History of MI (myocardial infarction)    Mass of right lobe of liver    Chronic systolic CHF (congestive heart failure), NYHA class 3 (HCC)    Non-rheumatic mitral regurgitation    Essential hypertension    ICD (implantable cardioverter-defibrillator) in place    Left ventricular ejection fraction of 10% to 20%    Dyspnea    Coronary artery disease involving native coronary artery of native heart without angina pectoris    Nonischemic cardiomyopathy ([facility]    Blood type AB+       Hospital Course:     Stan Roland is a 48 y.o. female with a PMH of CAD, CHF, severe MR, COPD/asthma 15 pack year smoker, HLD, HTN, HFrEF NYCA 3 (LVEF 15% 2018 with AICD in place), NSTEMI, CVA x 3 with right sided weakness who presents to ED for 10 days history of shortness of breath. Work up revealed CHF exacerbation. Cardiology was consulted and recommended continuing patient's HF medications and IV diuresis. The patient's course was otherwise uneventful. She progressed well and IV diuresis was transitioned to patient's home by mouth Bumex. She was in a suitable condition for discharge to home with follow-up with PCP.    Patient's decompensation/exacerbation was due to medication noncompliance, as she had not been able to refill any of her meds and therefore had not been taking them for 3 weeks. Social work was consulted during hospitalization and provided patient with prescription assistance service information. Patient was able to obtain money from her family for co-pay for her meds for the next 30 days. Upon hospital follow-up, PCP is recommended to ensure patient has means to obtain her medications for the subsequent months.       Discharge Medications:       Medication List      CHANGE how you take these medications    bumetanide 2 MG tablet  Commonly known as:  BUMEX  Take 1 tablet by mouth every other day  What changed:  when to take this        CONTINUE taking these medications    albuterol sulfate  (90 Base) MCG/ACT inhaler  Commonly known as:  PROVENTIL HFA  Inhale 2 puffs into the lungs every 6 hours as needed for Wheezing     aspirin 81 MG chewable tablet  Take 1 tablet by mouth daily     atorvastatin 10 MG tablet  Commonly known as:  LIPITOR  Take 1 tablet by mouth daily     budesonide-formoterol 160-4.5 MCG/ACT Aero  Commonly known as:  SYMBICORT  Inhale 2 puffs into the lungs 2 times daily     clopidogrel 75 MG tablet  Commonly known as:  PLAVIX  Take 1 tablet by mouth daily     FLUoxetine 20 MG capsule  Commonly known as:  PROZAC  Take 1 capsule by mouth daily     hydrALAZINE 50 MG tablet  Commonly known as:  APRESOLINE  Take 1 tablet by mouth every 8 hours     isosorbide dinitrate 30 MG tablet  Commonly known as:  ISORDIL  Take 1 tablet by mouth 3 times daily     metoprolol succinate 50 MG extended release tablet  Commonly known as:  TOPROL XL  Take 1 tablet by mouth 2 times daily     mometasone-formoterol 100-5 MCG/ACT inhaler  Commonly known as:  DULERA  Inhale 2 puffs into the lungs 2 times daily     montelukast 10 MG tablet  Commonly known as:  SINGULAIR  Take 1 tablet by mouth nightly     omeprazole 20 MG delayed release capsule  Commonly known as:  PRILOSEC  Take 1 capsule by mouth daily     potassium chloride 20 MEQ packet  Commonly known as: KLOR-CON     sacubitril-valsartan  MG per tablet  Commonly known as:  ENTRESTO  Take 1 tablet by mouth 2 times daily     spironolactone 25 MG tablet  Commonly known as:  ALDACTONE  Take 1 tablet by mouth daily        STOP taking these medications    pantoprazole 40 MG tablet  Commonly known as:  PROTONIX           Where to Get Your Medications      These medications were sent to [address] [place] [address], [address] [place] [address] [phone]  [address] [place] [id number]    Phone:  [phone]   · albuterol sulfate  (90 Base) MCG/ACT inhaler  · aspirin 81 MG chewable tablet  · atorvastatin 10 MG tablet  · budesonide-formoterol 160-4.5 MCG/ACT Aero  · bumetanide 2 MG tablet  · clopidogrel 75 MG tablet  · FLUoxetine 20 MG capsule  · hydrALAZINE 50 MG tablet  · isosorbide dinitrate 30 MG tablet  · metoprolol succinate 50 MG extended release tablet  · mometasone-formoterol 100-5 MCG/ACT inhaler  · omeprazole 20 MG delayed release capsule  · sacubitril-valsartan  MG per tablet  · spironolactone 25 MG tablet         Consults:  cardiology    Significant Diagnostic Studies:      Labs:  Na/K/Cl/CO2:  141/3.7/101/23 ( 8225)  BUN/Cr/glu/ALT/AST/amyl/lip:  13/0.8/--/22/17/--/-- ( 7964)  WBC/Hgb/Hct/Plts:  7.2/13.7/42.3/396 ( 0454)  [unfilled]  estimated creatinine clearance is 63 mL/min (based on SCr of 0.8 mg/dL). New Imaging:  Xr Chest Standard (2 Vw)    Result Date: 2018  Patient MRN:  [id number] : 1965 Age: 48 years Gender: Female Order Date:  2018 6:00 AM EXAM: XR CHEST (2 VW) NUMBER OF IMAGES:  2, VIEWS:  2 INDICATION: Shortness of Breath. COMPARISON: Chest x-rays dated 2018 and 2018. Chest CT dated 4/3/2018. Technique: PA and lateral views of the chest Findings: The lungs show areas of increased radiolucency suggestive of emphysema/COPD. The lungs are without evidence of focal consolidation.  No significant pleural effusion is seen. The pulmonary vasculature is unremarkable. The cardiac silhouette is enlarged. An ICD device again noted. 1.  No evidence of acute pulmonary disease. 2.  Emphysema/COPD. Xr Chest Portable    Result Date: 2018  Patient MRN:  [id number] : 1965 Age: 48 years Gender: Female Order Date:  2018 10:30 AM EXAM: XR CHEST PORTABLE NUMBER OF IMAGES:  1, VIEWS:  1 INDICATION: Shortness of Breath COMPARISON: Chest CT dated 4/3/2018. Chest x-ray dated 3/31/2018. Findings: There is enlargement of the cardiac silhouette and prominence of the central pulmonary vasculature and interstitial lung markings, particularly over the right lower lung zone. Obscuration of the lateral costophrenic sulci also seen suspicious for small bilateral pleural effusions. An ICD device again noted. CHF pattern versus atypical pneumonia. Treatments:   Resumed home meds, IV diuresis     Discharge Exam:  GENERAL: Alert, cooperative, no acute distress. CHEST: No tenderness or deformity, full & symmetric excursion  LUNG: CTABL bilaterally, respirations unlabored  HEART: RRR, S1 and S2 normal, systolic murmur, rub or gallop. DP pulses 2/4  EXTREMITIES:  Extremities normal, atraumatic, no cyanosis or edema. Distal pulses equal bilaterally  NEUROLOGIC: Alert & Oriented; Disposition:   home    Future Appointments  Date Time Provider Naa Pitts   11/15/2018 10:00 AM Claudia Tovar MD [address] [place] [address]       More than 30 minutes was spent in preparation of this patient's discharge including, but not limited to, examination, preparation of documents, prescription preparation, counseling and coordination.     Signed:  Qi Lovelace MD  2018, 7:17 PM

## 2018-08-26 NOTE — PROGRESS NOTES
[facility]   Resident Progress Note    S:  Hospital day: 2:   Brief Synopsis: This is a 48 y.o. 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 No acute events overnight. Patient was seen and examined this morning. She denies any SOB. Yesterday she talked to LORRAINE and her family regarding not being able to afford for co-pays for her prescriptions. She will receive some financial assistance from the family and also received prescription assistance contact info from LORRAINE. She denies fever or chills. Cont meds:   Scheduled meds:    bumetanide  2 mg Oral Daily    mometasone-formoterol  2 puff Inhalation BID    FLUoxetine  20 mg Oral Daily    hydrALAZINE  50 mg Oral 3 times per day    isosorbide dinitrate  30 mg Oral TID    metoprolol succinate  50 mg Oral BID    montelukast  10 mg Oral Nightly    sacubitril-valsartan  1 tablet Oral BID    spironolactone  25 mg Oral Daily    sodium chloride flush  10 mL Intravenous 2 times per day    enoxaparin  40 mg Subcutaneous Daily    ipratropium-albuterol  1 ampule Inhalation Q4H WA    pantoprazole  40 mg Oral QAM AC     PRN meds: albuterol sulfate HFA, sodium chloride flush, magnesium hydroxide, acetaminophen     I reviewed the patient's past medical and surgical history, Medications and Allergies. O:  Vitals:    08/25/18 1715 08/25/18 2130 08/25/18 2330 08/26/18 0716   BP: 100/66 112/76 104/72 118/78   Pulse: 82 77 84    Resp: 16  14    Temp: 98.6 °F (37 °C)  98.8 °F (37.1 °C)    TempSrc: Temporal  Temporal    SpO2: 96%  96%    Weight:       Height:         24 hour I&O: I/O last 3 completed shifts: In: 1300 [P.O.:1300]  Out: 350 [Urine:350]  No intake/output data recorded. GENERAL: Alert, cooperative, no acute distress.   CHEST: No tenderness or deformity, full & symmetric excursion  LUNG: CTABL bilaterally, respirations unlabored  HEART: RRR, S1 and S2 normal, systolic murmur, rub or gallop. DP pulses 2/4  EXTREMITIES:  Extremities normal, atraumatic, no cyanosis or edema. Distal pulses equal bilaterally  NEUROLOGIC: Alert & Oriented;       Labs:  Na/K/Cl/CO2:  141/3.7/101/23 (454)  BUN/Cr/glu/ALT/AST/amyl/lip:  13/0.8/--//17/--/-- (454)  WBC/Hgb/Hct/Plts:  7.2/13.7/42.3/396 (454)  [unfilled]  estimated creatinine clearance is 63 mL/min (based on SCr of 0.8 mg/dL). Other pertinent labs as noted below    New Imaging:  Xr Chest Portable    Result Date: 2018  Patient MRN:  [id number] : 1965 Age: 48 years Gender: Female Order Date:  2018 10:30 AM EXAM: XR CHEST PORTABLE NUMBER OF IMAGES:  1, VIEWS:  1 INDICATION: Shortness of Breath COMPARISON: Chest CT dated 4/3/2018. Chest x-ray dated 3/31/2018. Findings: There is enlargement of the cardiac silhouette and prominence of the central pulmonary vasculature and interstitial lung markings, particularly over the right lower lung zone. Obscuration of the lateral costophrenic sulci also seen suspicious for small bilateral pleural effusions. An ICD device again noted. CHF pattern versus atypical pneumonia. A/P:  Principal Problem:    Systolic CHF, acute on chronic ([facility])  Active Problems:    COPD (chronic obstructive pulmonary disease) ([facility])    Asthma    Chronic systolic CHF (congestive heart failure), NYHA class 3 ([facility]    Essential hypertension    Coronary artery disease involving native coronary artery of native heart without angina pectoris  Resolved Problems:    * No resolved hospital problems.  *    Acute on chronic CHF  Last reported EF of 15%  Patient reports 3 weeks without taking her home meds because she can't co-pay  Restarted her home heart failure meds  Repeat CXR showing interval resolution of pulmonary edema  Patient denies any SOB today  Cardiology

## 2018-08-26 NOTE — PLAN OF CARE
Problem: Discharge Planning:  Goal: Discharged to appropriate level of care  Discharged to appropriate level of care   Outcome: Met This Shift      Problem: Pain:  Goal: Pain level will decrease  Pain level will decrease   Outcome: Met This Shift      Problem: Fluid Volume:  Goal: Risk for excess fluid volume will decrease  Risk for excess fluid volume will decrease   Outcome: Met This Shift

## 2018-08-26 NOTE — PROGRESS NOTES
recent discharges. Plan:  1. Switch to PO home dose of bumex  2. Can be discharged from cardiac perspective. 3. Follow up with Dr Basilia Dunn as OP  4. Reinforced the importance of compliance with medication regimen and risk of readmissions      Dr. Lidia Preciado MD.  [id number] [facility].

## 2018-08-27 ENCOUNTER — APPOINTMENT (OUTPATIENT)
Dept: CT IMAGING | Age: 53
DRG: 062 | End: 2018-08-27
Payer: COMMERCIAL

## 2018-08-27 LAB
ANION GAP SERPL CALCULATED.3IONS-SCNC: 14 MMOL/L (ref 7–16)
APTT: 26.6 SEC (ref 24.5–35.1)
AT-III ACTIVITY: 106 % ACTIVITY (ref 83–121)
BACTERIA: ABNORMAL /HPF
BASOPHILS ABSOLUTE: 0.08 E9/L (ref 0–0.2)
BASOPHILS RELATIVE PERCENT: 1 % (ref 0–2)
BILIRUBIN URINE: NEGATIVE
BLOOD, URINE: NEGATIVE
BUN BLDV-MCNC: 13 MG/DL (ref 6–20)
CALCIUM SERPL-MCNC: 9.4 MG/DL (ref 8.6–10.2)
CHLORIDE BLD-SCNC: 101 MMOL/L (ref 98–107)
CLARITY: CLEAR
CO2: 27 MMOL/L (ref 22–29)
COLOR: ABNORMAL
CREAT SERPL-MCNC: 0.8 MG/DL (ref 0.5–1)
EOSINOPHILS ABSOLUTE: 0.2 E9/L (ref 0.05–0.5)
EOSINOPHILS RELATIVE PERCENT: 2.5 % (ref 0–6)
FIBRINOGEN: 427 MG/DL (ref 225–540)
GFR AFRICAN AMERICAN: >60
GFR NON-AFRICAN AMERICAN: >60 ML/MIN/1.73
GLUCOSE BLD-MCNC: 99 MG/DL (ref 74–109)
GLUCOSE URINE: NEGATIVE MG/DL
HCT VFR BLD CALC: 40.9 % (ref 34–48)
HEMOGLOBIN: 13.3 G/DL (ref 11.5–15.5)
IMMATURE GRANULOCYTES #: 0.02 E9/L
IMMATURE GRANULOCYTES %: 0.3 % (ref 0–5)
KETONES, URINE: NEGATIVE MG/DL
LEUKOCYTE ESTERASE, URINE: ABNORMAL
LUPUS ANTICOAG DVVT: NORMAL
LV EF: 23 %
LVEF MODALITY: NORMAL
LYMPHOCYTES ABSOLUTE: 1.99 E9/L (ref 1.5–4)
LYMPHOCYTES RELATIVE PERCENT: 24.9 % (ref 20–42)
MAGNESIUM: 2.2 MG/DL (ref 1.6–2.6)
MCH RBC QN AUTO: 27.8 PG (ref 26–35)
MCHC RBC AUTO-ENTMCNC: 32.5 % (ref 32–34.5)
MCV RBC AUTO: 85.6 FL (ref 80–99.9)
MONOCYTES ABSOLUTE: 0.71 E9/L (ref 0.1–0.95)
MONOCYTES RELATIVE PERCENT: 8.9 % (ref 2–12)
NEUTROPHILS ABSOLUTE: 5 E9/L (ref 1.8–7.3)
NEUTROPHILS RELATIVE PERCENT: 62.4 % (ref 43–80)
NITRITE, URINE: NEGATIVE
PDW BLD-RTO: 13.6 FL (ref 11.5–15)
PH UA: 8 (ref 5–9)
PLATELET # BLD: 369 E9/L (ref 130–450)
PMV BLD AUTO: 9.8 FL (ref 7–12)
POTASSIUM REFLEX MAGNESIUM: 3.8 MMOL/L (ref 3.5–5)
PROTEIN UA: NEGATIVE MG/DL
RBC # BLD: 4.78 E12/L (ref 3.5–5.5)
RBC UA: ABNORMAL /HPF (ref 0–2)
SODIUM BLD-SCNC: 142 MMOL/L (ref 132–146)
SPECIFIC GRAVITY UA: <=1.005 (ref 1–1.03)
UROBILINOGEN, URINE: 1 E.U./DL
WBC # BLD: 8 E9/L (ref 4.5–11.5)
WBC UA: ABNORMAL /HPF (ref 0–5)

## 2018-08-27 PROCEDURE — 97530 THERAPEUTIC ACTIVITIES: CPT

## 2018-08-27 PROCEDURE — 81001 URINALYSIS AUTO W/SCOPE: CPT

## 2018-08-27 PROCEDURE — 85730 THROMBOPLASTIN TIME PARTIAL: CPT

## 2018-08-27 PROCEDURE — 2000000000 HC ICU R&B

## 2018-08-27 PROCEDURE — 80048 BASIC METABOLIC PNL TOTAL CA: CPT

## 2018-08-27 PROCEDURE — 99232 SBSQ HOSP IP/OBS MODERATE 35: CPT | Performed by: SURGERY

## 2018-08-27 PROCEDURE — 85025 COMPLETE CBC W/AUTO DIFF WBC: CPT

## 2018-08-27 PROCEDURE — 81291 MTHFR GENE: CPT

## 2018-08-27 PROCEDURE — 99222 1ST HOSP IP/OBS MODERATE 55: CPT | Performed by: STUDENT IN AN ORGANIZED HEALTH CARE EDUCATION/TRAINING PROGRAM

## 2018-08-27 PROCEDURE — 83735 ASSAY OF MAGNESIUM: CPT

## 2018-08-27 PROCEDURE — 2500000003 HC RX 250 WO HCPCS: Performed by: STUDENT IN AN ORGANIZED HEALTH CARE EDUCATION/TRAINING PROGRAM

## 2018-08-27 PROCEDURE — 94640 AIRWAY INHALATION TREATMENT: CPT

## 2018-08-27 PROCEDURE — 85300 ANTITHROMBIN III ACTIVITY: CPT

## 2018-08-27 PROCEDURE — 6370000000 HC RX 637 (ALT 250 FOR IP): Performed by: NURSE PRACTITIONER

## 2018-08-27 PROCEDURE — 2700000000 HC OXYGEN THERAPY PER DAY

## 2018-08-27 PROCEDURE — 97166 OT EVAL MOD COMPLEX 45 MIN: CPT

## 2018-08-27 PROCEDURE — 99221 1ST HOSP IP/OBS SF/LOW 40: CPT | Performed by: PSYCHIATRY & NEUROLOGY

## 2018-08-27 PROCEDURE — 81400 MOPATH PROCEDURE LEVEL 1: CPT

## 2018-08-27 PROCEDURE — 6370000000 HC RX 637 (ALT 250 FOR IP): Performed by: STUDENT IN AN ORGANIZED HEALTH CARE EDUCATION/TRAINING PROGRAM

## 2018-08-27 PROCEDURE — 97535 SELF CARE MNGMENT TRAINING: CPT

## 2018-08-27 PROCEDURE — 81240 F2 GENE: CPT

## 2018-08-27 PROCEDURE — 85384 FIBRINOGEN ACTIVITY: CPT

## 2018-08-27 PROCEDURE — 70450 CT HEAD/BRAIN W/O DYE: CPT

## 2018-08-27 PROCEDURE — G8978 MOBILITY CURRENT STATUS: HCPCS

## 2018-08-27 PROCEDURE — 85306 CLOT INHIBIT PROT S FREE: CPT

## 2018-08-27 PROCEDURE — G8987 SELF CARE CURRENT STATUS: HCPCS

## 2018-08-27 PROCEDURE — 81241 F5 GENE: CPT

## 2018-08-27 PROCEDURE — 36415 COLL VENOUS BLD VENIPUNCTURE: CPT

## 2018-08-27 PROCEDURE — G8979 MOBILITY GOAL STATUS: HCPCS

## 2018-08-27 PROCEDURE — 93306 TTE W/DOPPLER COMPLETE: CPT

## 2018-08-27 PROCEDURE — G8988 SELF CARE GOAL STATUS: HCPCS

## 2018-08-27 PROCEDURE — 2580000003 HC RX 258: Performed by: STUDENT IN AN ORGANIZED HEALTH CARE EDUCATION/TRAINING PROGRAM

## 2018-08-27 PROCEDURE — 85613 RUSSELL VIPER VENOM DILUTED: CPT

## 2018-08-27 PROCEDURE — 97162 PT EVAL MOD COMPLEX 30 MIN: CPT

## 2018-08-27 PROCEDURE — 99232 SBSQ HOSP IP/OBS MODERATE 35: CPT | Performed by: NURSE PRACTITIONER

## 2018-08-27 RX ORDER — FLUOXETINE HYDROCHLORIDE 20 MG/1
20 CAPSULE ORAL DAILY
Status: DISCONTINUED | OUTPATIENT
Start: 2018-08-27 | End: 2018-08-28 | Stop reason: HOSPADM

## 2018-08-27 RX ORDER — SODIUM CHLORIDE 0.9 % (FLUSH) 0.9 %
10 SYRINGE (ML) INJECTION EVERY 12 HOURS SCHEDULED
Status: DISCONTINUED | OUTPATIENT
Start: 2018-08-27 | End: 2018-08-27 | Stop reason: SDUPTHER

## 2018-08-27 RX ORDER — LORAZEPAM 2 MG/ML
0.5 INJECTION INTRAMUSCULAR ONCE
Status: DISCONTINUED | OUTPATIENT
Start: 2018-08-27 | End: 2018-08-28

## 2018-08-27 RX ORDER — DOCUSATE SODIUM 100 MG/1
100 CAPSULE, LIQUID FILLED ORAL DAILY
Status: DISCONTINUED | OUTPATIENT
Start: 2018-08-27 | End: 2018-08-28 | Stop reason: HOSPADM

## 2018-08-27 RX ORDER — ASPIRIN 300 MG/1
300 SUPPOSITORY RECTAL DAILY
Status: DISCONTINUED | OUTPATIENT
Start: 2018-08-27 | End: 2018-08-28

## 2018-08-27 RX ORDER — PANTOPRAZOLE SODIUM 40 MG/1
40 TABLET, DELAYED RELEASE ORAL
Status: DISCONTINUED | OUTPATIENT
Start: 2018-08-28 | End: 2018-08-28 | Stop reason: HOSPADM

## 2018-08-27 RX ORDER — ATORVASTATIN CALCIUM 10 MG/1
10 TABLET, FILM COATED ORAL DAILY
Status: DISCONTINUED | OUTPATIENT
Start: 2018-08-27 | End: 2018-08-28 | Stop reason: HOSPADM

## 2018-08-27 RX ORDER — SENNA PLUS 8.6 MG/1
1 TABLET ORAL NIGHTLY
Status: DISCONTINUED | OUTPATIENT
Start: 2018-08-27 | End: 2018-08-28 | Stop reason: HOSPADM

## 2018-08-27 RX ORDER — POTASSIUM CHLORIDE 20 MEQ/1
20 TABLET, EXTENDED RELEASE ORAL
Status: DISCONTINUED | OUTPATIENT
Start: 2018-08-28 | End: 2018-08-28 | Stop reason: HOSPADM

## 2018-08-27 RX ORDER — SODIUM CHLORIDE 0.9 % (FLUSH) 0.9 %
10 SYRINGE (ML) INJECTION PRN
Status: DISCONTINUED | OUTPATIENT
Start: 2018-08-27 | End: 2018-08-27 | Stop reason: SDUPTHER

## 2018-08-27 RX ORDER — IPRATROPIUM BROMIDE AND ALBUTEROL SULFATE 2.5; .5 MG/3ML; MG/3ML
1 SOLUTION RESPIRATORY (INHALATION)
Status: DISCONTINUED | OUTPATIENT
Start: 2018-08-27 | End: 2018-08-28 | Stop reason: HOSPADM

## 2018-08-27 RX ORDER — SPIRONOLACTONE 25 MG/1
25 TABLET ORAL DAILY
Status: DISCONTINUED | OUTPATIENT
Start: 2018-08-27 | End: 2018-08-28 | Stop reason: HOSPADM

## 2018-08-27 RX ORDER — MONTELUKAST SODIUM 10 MG/1
10 TABLET ORAL NIGHTLY
Status: DISCONTINUED | OUTPATIENT
Start: 2018-08-27 | End: 2018-08-28 | Stop reason: HOSPADM

## 2018-08-27 RX ORDER — BUMETANIDE 0.25 MG/ML
1 INJECTION, SOLUTION INTRAMUSCULAR; INTRAVENOUS DAILY
Status: DISCONTINUED | OUTPATIENT
Start: 2018-08-27 | End: 2018-08-28 | Stop reason: HOSPADM

## 2018-08-27 RX ADMIN — Medication 10 ML: at 08:30

## 2018-08-27 RX ADMIN — MOMETASONE FUROATE AND FORMOTEROL FUMARATE DIHYDRATE 2 PUFF: 100; 5 AEROSOL RESPIRATORY (INHALATION) at 17:29

## 2018-08-27 RX ADMIN — SPIRONOLACTONE 25 MG: 25 TABLET, FILM COATED ORAL at 12:38

## 2018-08-27 RX ADMIN — DOCUSATE SODIUM 100 MG: 100 CAPSULE, LIQUID FILLED ORAL at 16:32

## 2018-08-27 RX ADMIN — IPRATROPIUM BROMIDE AND ALBUTEROL SULFATE 1 AMPULE: .5; 3 SOLUTION RESPIRATORY (INHALATION) at 14:04

## 2018-08-27 RX ADMIN — SENNOSIDES 8.6 MG: 8.6 TABLET, FILM COATED ORAL at 21:19

## 2018-08-27 RX ADMIN — Medication 10 ML: at 21:20

## 2018-08-27 RX ADMIN — IPRATROPIUM BROMIDE AND ALBUTEROL SULFATE 1 AMPULE: .5; 3 SOLUTION RESPIRATORY (INHALATION) at 16:38

## 2018-08-27 RX ADMIN — MONTELUKAST SODIUM 10 MG: 10 TABLET, FILM COATED ORAL at 21:19

## 2018-08-27 RX ADMIN — FLUOXETINE HYDROCHLORIDE 20 MG: 20 CAPSULE ORAL at 13:35

## 2018-08-27 RX ADMIN — IPRATROPIUM BROMIDE AND ALBUTEROL SULFATE 1 AMPULE: .5; 3 SOLUTION RESPIRATORY (INHALATION) at 09:10

## 2018-08-27 RX ADMIN — ATORVASTATIN CALCIUM 10 MG: 10 TABLET, FILM COATED ORAL at 13:35

## 2018-08-27 RX ADMIN — BUMETANIDE 1 MG: 0.25 INJECTION INTRAMUSCULAR; INTRAVENOUS at 08:29

## 2018-08-27 ASSESSMENT — PAIN SCALES - GENERAL
PAINLEVEL_OUTOF10: 0
PAINLEVEL_OUTOF10: 1
PAINLEVEL_OUTOF10: 0

## 2018-08-27 ASSESSMENT — ENCOUNTER SYMPTOMS
COUGH: 0
NAUSEA: 0
BACK PAIN: 0
SHORTNESS OF BREATH: 0
DIARRHEA: 0
COLOR CHANGE: 0
ABDOMINAL PAIN: 0
VOMITING: 0
SORE THROAT: 0
TROUBLE SWALLOWING: 0
VOICE CHANGE: 1

## 2018-08-27 ASSESSMENT — VISUAL ACUITY: VA_NORMAL: 1

## 2018-08-27 NOTE — CONSULTS
3/19/13    EF 65%, mild MR    TUBAL LIGATION  1997       Allergies:     Orange fruit [citrus]; Crestor [rosuvastatin calcium]; Loratadine; Norco [hydrocodone-acetaminophen]; Sulfa antibiotics; and Aspirin    Medications:     Prior to Admission medications    Medication Sig Start Date End Date Taking?  Authorizing Provider   aspirin 81 MG chewable tablet Take 1 tablet by mouth daily 8/26/18  Yes Erick Louis MD   mometasone-formoterol [facility]) 100-5 MCG/ACT inhaler Inhale 2 puffs into the lungs 2 times daily 8/26/18  Yes Erick Louis MD   atorvastatin (LIPITOR) 10 MG tablet Take 1 tablet by mouth daily 8/26/18  Yes Erick Louis MD   isosorbide dinitrate (ISORDIL) 30 MG tablet Take 1 tablet by mouth 3 times daily 8/26/18  Yes Erick Louis MD   albuterol sulfate HFA (PROVENTIL HFA) 108 (90 Base) MCG/ACT inhaler Inhale 2 puffs into the lungs every 6 hours as needed for Wheezing 8/26/18 9/25/18 Yes Erick Louis MD   budesonide-formoterol [facility]) 160-4.5 MCG/ACT AERO Inhale 2 puffs into the lungs 2 times daily 8/26/18  Yes Erick Louis MD   FLUoxetine (PROZAC) 20 MG capsule Take 1 capsule by mouth daily 8/26/18  Yes Erick Louis MD   hydrALAZINE (APRESOLINE) 50 MG tablet Take 1 tablet by mouth every 8 hours 8/26/18  Yes Erick Louis MD   metoprolol succinate (TOPROL XL) 50 MG extended release tablet Take 1 tablet by mouth 2 times daily 8/26/18  Yes Erick Louis MD   sacubitril-valsartan [facility])  MG per tablet Take 1 tablet by mouth 2 times daily 8/26/18  Yes Erick Louis MD   bumetanide (BUMEX) 2 MG tablet Take 1 tablet by mouth every other day 8/26/18  Yes Erick Louis MD   spironolactone (ALDACTONE) 25 MG tablet Take 1 tablet by mouth daily 8/26/18  Yes Erick Louis MD   clopidogrel (PLAVIX) 75 MG tablet Take 1 tablet by mouth daily 8/26/18  Yes Erick Louis MD   omeprazole (PRILOSEC) 20 MG delayed release capsule Pupils are equal, round, and reactive to light. CN V   Facial sensation intact. CN VII   Facial expression full, symmetric. CN VIII   Hearing: intact    CN IX, X   CN IX normal.     CN XI   CN XI normal.     CN XII   CN XII normal.     Motor Exam   Muscle bulk: decreased  Overall muscle tone: normal  Right arm tone: normal  Left arm tone: normal  Right arm pronator drift: absent  Left arm pronator drift: absent  Right leg tone: normal  Left leg tone: normal    Strength   Strength 5/5 except as noted.    Right iliopsoas: 4/5  Right quadriceps: 4/5  Right hamstrin/5  Right glutei: 4/5  Right anterior tibial: 4/5  Right posterior tibial: 4/5  Right peroneal: 4/5  Right gastroc: 4/5    Sensory Exam   Light touch normal.   Pinprick normal.     Gait, Coordination, and Reflexes     Coordination   Finger to nose coordination: normal  Heel to shin coordination: abnormal (unable to perform)    Reflexes   Right brachioradialis: 1+  Left brachioradialis: 1+  Right biceps: 0  Left biceps: 0  Right triceps: 1+  Left triceps: 1+  Right patellar: 0  Left patellar: 0  Right achilles: 0  Left achilles: 0      General medical exam:  Laying in bed NAD RRR  No increased work of breathing  Extremities well perfysed  Laboratory/Radiology:     Neuroimaging as above    Basic labs unremarkable    I independently reviewed the labs and imaging studies     Assessment:     48year old woman with significant HFrEF presenting with episode concerning for acute embolic stroke s/p TPA    Patient Active Problem List   Diagnosis    Hyperlipidemia    Left ovarian cyst    CVA (cerebral vascular accident)    COPD (chronic obstructive pulmonary disease) ([address]    Carpal tunnel syndrome on left    Suicide attempt by drug ingestion ([address]    Mitral regurgitation    Tobacco abuse    Asthma    Allergic rhinosinusitis    History of irregular menstrual bleeding    Anemia due to blood loss    History of MI (myocardial infarction)    Mass of right lobe of liver    Chronic systolic CHF (congestive heart failure), NYHA class 3 (HCC)    Non-rheumatic mitral regurgitation    Essential hypertension    ICD (implantable cardioverter-defibrillator) in place    Left ventricular ejection fraction of 10% to 20%    Dyspnea    Coronary artery disease involving native coronary artery of native heart without angina pectoris    Nonischemic cardiomyopathy ([address]    Blood type AB+    Acute [facility]vascular [facility]) [facility]       Plan:   Post TPA protocol. MRI brain.   TTE      Briseida Gamez  10:25 AM  8/27/2018

## 2018-08-27 NOTE — PROGRESS NOTES
Asthma    Chronic systolic CHF (congestive heart failure), NYHA class 3 ([facility])  Resolved Problems:    * No resolved hospital problems.  *      Plan: appreciate Neurology input   MRI of brain  Echocardiogram   To reconcile medications         Current Facility-Administered Medications   Medication Dose Route Frequency Provider Last Rate Last Dose    bumetanide (BUMEX) injection 1 mg  1 mg Intravenous Daily Reese Jaramillo MD   1 mg at 08/27/18 0829    magnesium hydroxide (MILK OF MAGNESIA) 400 MG/5ML suspension 30 mL  30 mL Oral Daily PRN Reese Jaramillo MD        aspirin suppository 300 mg  300 mg Rectal Daily Reese Jaramillo MD        ipratropium-albuterol (DUONEB) nebulizer solution 1 ampule  1 ampule Inhalation Q4H WA Reese Jaramillo MD   1 ampule at 08/27/18 0910    perflutren lipid microspheres (DEFINITY) injection 1.65 mg  1.5 mL Intravenous ONCE PRN Bennett Essex, MD        mometasone-formoterol [facility]) 100-5 MCG/ACT inhaler 2 puff  2 puff Inhalation BID Cletus Mcardle, APRN - CNP        montelukast (SINGULAIR) tablet 10 mg  10 mg Oral Nightly Cletus Mcardle, APRN - CNP        [START ON 8/28/2018] pantoprazole (PROTONIX) tablet 40 mg  40 mg Oral QAM AC Cletus Mcardle, APRN - CNP        [START ON 8/28/2018] potassium chloride (KLOR-CON M) extended release tablet 20 mEq  20 mEq Oral Daily with breakfast Cletus Mcardle, APRN - CNP        spironolactone (ALDACTONE) tablet 25 mg  25 mg Oral Daily Cletus Mcardle, APRN - CNP        FLUoxetine (PROZAC) capsule 20 mg  20 mg Oral Daily Cletus Mcardle, APRN - CNP        atorvastatin (LIPITOR) tablet 10 mg  10 mg Oral Daily Cletus Mcardle, APRN - CNP        sodium chloride flush 0.9 % injection 10 mL  10 mL Intravenous 2 times per day Teofilo Webster DO   10 mL at 08/27/18 0830    sodium chloride flush 0.9 % injection 10 mL  10 mL Intravenous PRN Teofilo Webster DO              Attending Physician Statement  I have reviewed the chart, including any radiology or labs, and

## 2018-08-27 NOTE — CONSULTS
cardioverter-defibrillator) in place 2018    Placed by Dr. Maria Guadalupe Bethea.  Left ovarian cyst     Liver lesion 7/15    Moderate mitral regurgitation 7/27/15    mild-moderate    [facility]     NSTEMI (non-ST elevated myocardial infarction) ([facility] 4/10/15--adm to [facility]    Suicide attempt by drug ingestion ([facility]     attempt in  OD on ultram    Tobacco abuse      Past Surgical History:   Procedure Laterality Date    CARDIAC CATHETERIZATION  2018    Dr. Shawna Mohamud- Clean coronaries   Fresoniyaa Judd  2016    SICD    CARDIOVASCULAR STRESS TEST  2009 Normal      SECTION      COLONOSCOPY  10/2015    DIAGNOSTIC CARDIAC CATH LAB PROCEDURE  2007    [facility]: No significant CAD. Mild LVD with apical akinesis. EF 50%.     DIAGNOSTIC CARDIAC CATH LAB PROCEDURE  4/15    with PTCA to [facility] ob diana [email or web address]    ENDOSCOPY, COLON, DIAGNOSTIC      HYSTERECTOMY  10/1/15    at [facility] by Dr. Daisy Duffy PTCA  4/10/15    at [address] [place] [address] ECHOCARDIOGRAM  3/19/13    EF 65%, mild MR    TUBAL LIGATION       Social History     Social History    Marital status: Legally      Spouse name: N/A    Number of children: N/A    Years of education: N/A     Occupational History    disability      Social History Main Topics    Smoking status: Former Smoker     Packs/day: 0.50     Years: 30.00     Types: Cigarettes     Quit date: 2018    Smokeless tobacco: Never Used    Alcohol use 1.8 oz/week     3 Cans of beer per week      Comment: caffeine use: coffee 1 cup daily    Drug use: Yes     Types: Marijuana      Comment: last used 2018    Sexual activity: Not Currently     Partners: Male, Female     Other Topics Concern    Not on file     Social History Narrative    Drinks 1-2 cups of coffee daily       OBJECTIVE  Vitals:    18 2138 18 2140 18 2145 18 2151   BP:  (!) 135/93  127/88   Pulse:  123 73 71 Resp:  14 15 19   SpO2:  98% 98% 98%   Weight: 110 lb (49.9 kg) 110 lb (49.9 kg)  110 lb (49.9 kg)   Height:    5' 4\" (1.626 m)     NIH Stroke Scale at time of initial evaluation: 8    Imaging:  CT of head without contrast displays no early infarct signs, No evidence of Intracranial Hemorrhage  CTA/CTP imaging: pending    Labs:  Labs Reviewed   PROTIME-INR - Abnormal; Notable for the following:        Result Value    Protime 13.3 (*)     All other components within normal limits   POCT GLUCOSE - Abnormal; Notable for the following:     Meter Glucose 164 (*)     All other components within normal limits   APTT   CBC WITH AUTO DIFFERENTIAL   COMPREHENSIVE METABOLIC PANEL   LACTIC ACID, PLASMA    Narrative:     Draw gray top tube-place on ice.~Separate plasma-keep cold. TROPONIN   TSH WITHOUT REFLEX   URINALYSIS   POCT GLUCOSE       IV tPA INCLUSION criteria met Yes:  Diagnosis of ischemic stroke causing a measurable neurologic deficit. Time from last known well(or stroke onset) is:  *Less than 3 hours or  *Between 3-4.5 hours and the patient is > or = [de-identified]years old, on no anticoagulants       regardless of INR, NIHSS equal or < 25 and has no history of prior stroke with diabetes. *Adult aged 25 years or older. The following EXCLUSION criteria/contraindications were noted: none. Assessment:  Working Diagnosis: Ischemic stroke   Embolic mechanism likely given severe heart failure, recent decompensation, and prior embolic stroke. Recommendations:  IV-tPA therapy. Uncertain if appropriate for any endovascular intervention- CTA/CTP pending but had left MCA occlusion documented on MRA in 2010. Consultation completed by Kendrick Sosa via telephone.       Electronically signed by Kendrick Sosa on 8/26/2018 at 10:11 PM

## 2018-08-27 NOTE — PROGRESS NOTES
FAMILY MEDICINE RESIDENCY PROGRAM  - INPATIENT PROGRESS NOTE -  DATE : 18    NAME: July Mckeon   AGE: 48 y.o. SEX: female  : 1965    ADMIT DATE: 2018 LENGTH OF STAY: 1    ADMISSION DIAGNOSIS: Acute CVA (cerebrovascular accident) ([facility] [I63.9]  Acute CVA (cerebrovascular accident) ([facility] [I63.9]    CC:   Chief Complaint   Patient presents with    Cerebrovascular Accident     LKW 20 min ago with family witnessing  weakness and acting weird  unable to speak       Brief Summary: 48year old female who presented with right sided paralysis on 2018, a few hours following discharge from the hospital following treatment for CHF exacerbation. Patient was given tPA in the ED with marked improvement of symptoms. Last 24 hour events: Patient with acute stroke, given tPA, improved neurologically to motor and sensation to right side. Subjective:  Patient seen today. Patient is sitting upright and talking. Patient with strength returning to [address] [place], RLE. Denies any CP, SOB, H/AS, N/V. Problem List:  Principal Problem:    Acute CVA (cerebrovascular accident) ([facility]  Active Problems:    Hyperlipidemia    COPD (chronic obstructive pulmonary disease) (HCC)    Asthma    Chronic systolic CHF (congestive heart failure), NYHA class 3 ([facility]  Resolved Problems:    * No resolved hospital problems. *      ALLERGY: Orange fruit [citrus]; Crestor [rosuvastatin calcium];  Loratadine; Norco [hydrocodone-acetaminophen]; Sulfa antibiotics; and Aspirin    CONTINUOUS MEDS:      SCHEDULED MEDS:   bumetanide  1 mg Intravenous Daily    aspirin  300 mg Rectal Daily    ipratropium-albuterol  1 ampule Inhalation Q4H WA    mometasone-formoterol  2 puff Inhalation BID    montelukast  10 mg Oral Nightly    [START ON 2018] pantoprazole  40 mg Oral QAM AC    [START ON 2018] potassium chloride  20 mEq Oral Daily with breakfast    spironolactone  25 mg Oral Daily    FLUoxetine  20 mg Oral Daily    or mass effect. Xr Chest Portable    Result Date: 2018  Patient MRN:  [id number] : 1965 Age: 48 years Gender: Female Order Date:  2018 10:00 PM Exam: XR CHEST PORTABLE Number of views:  1 portable AP upright view of the chest Indication: Possible stroke Comparison: 2018 FINDINGS:  A left-sided cardiac pacemaker is noted again. The cardiac silhouette is prominent. There is diffuse pulmonary interstitial prominence and peribronchial thickening. No pulmonary airspace consolidation, pleural effusion, or pneumothorax is seen. No pulmonary airspace consolidation. Diffuse pulmonary interstitial opacities, suggestive of pulmonary edema. Cardiomegaly. Cta Neck W Contrast    Result Date: 2018  Patient MRN:  [id number] : 1965 Age: 48 years Gender: Female Order Date:  2018 10:00 PM EXAM: CTA HEAD W CONTRAST, CT BRAIN PERFUSION, CTA NECK W CONTRAST NUMBER OF IMAGES:  [id number] INDICATION:  right-sided weakness, slurred speech, possible stroke COMPARISON: CTA dated 2010. Brain MRI dated 2010. Head CT dated 2018. TECHNIQUE: Axial computed tomographic images of the head were obtained without contrast. Noncontrast images were reformatted in coronal and sagittal planes. Using a CT angiographic protocol, axial CT images of the head and neck were obtained with bolus administration of intravenous contrast. Maximum intensity projection (MIP) images were generated in coronal and sagittal planes. Rotational MIP images were generated around the cervical left and right internal carotid arteries. Three-dimensional volume rendered images of the intracranial and cervical vessels were created. CT perfusion imaging of the brain was performed with injection of intravenous contrast. Axial maps of cerebral blood flow (CBF), cerebral blood volume (CBV), mean transit time (MTT), and time to drain (TTD) were generated.  Low-dose CT acquisition technique included one of following options: (1) automated exposure control;  (2) adjustment of mA and/or kV according to patient's size; or (3) use of iterative reconstruction. FINDINGS: Noncontrast Head CT Noncontrast CT of the head demonstrates no acute intracranial hemorrhage or mass effect. A full report for the noncontrast head CT dated patent PACS. CTA of the Head The bilateral internal carotid, middle cerebral, anterior cerebral, and posterior cerebral arteries are patent, without evidence of hemodynamically significant stenosis or proximal branch occlusion. There is questionably poor opacification of the distal left MCA branches to the posterior left frontal lobe, however, no definite distal branch occlusion is identified. The extradural (V3) and intradural (V4) segments of the vertebral arteries and the basilar artery are patent, without evidence of hemodynamically significant stenosis. No intracranial aneurysm is seen. CTA of the Neck The bilateral common, internal, and external carotid arteries are patent, without evidence of hemodynamically significant stenosis. The preforaminal (V1) and foraminal (V2) segments of the vertebral arteries are patent, without evidence of hemodynamically significant stenosis. The left vertebral artery originates directly from the aortic arch. The right vertebral artery originates from the right subclavian artery. CT Perfusion of the Brain There are two narrow areas of prolonged transit time and decreased cerebral blood flow in the left cerebral hemisphere. The more anterior defect is in the left frontal lobe and demonstrates a matched cerebral blood volume defect, compatible with infarct. This region appears to be at the posterior aspect of the patient's prior ischemic insult in 2010. The more posterior defect is in the left frontoparietal region and the cine of the central sulcus. Cerebral blood volume in this defect appears to be preserved, suggestive of ischemia rather than infarct.      1. CTA of the head reveals no major intracranial arterial occlusion or proximal branch occlusion. There is questionable poor opacification of at least one distal left MCA branch, which probably corresponds with one of the areas of decreased perfusion and prolonged transit time on the CTP study. 2. CTA of the neck reveals no major arterial occlusion or significant stenosis. 3. CT perfusion demonstrates two areas of prolonged transit time and decreased perfusion in the left frontal and frontoparietal regions. The left frontal perfusion defect has a matched defect in CBD, compatible with infarct. The left frontoparietal perfusion defect appears to have preserved CBV, compatible with ischemia. 4. Cerebral vasculopathy should be considered. Findings discussed with Dr. Serjio Sanders of the [facility] ED at approximately 2300 hours on 2018. Ct Brain Perfusion    Result Date: 2018  Patient MRN:  [id number] : 1965 Age: 48 years Gender: Female Order Date:  2018 10:00 PM EXAM: CTA HEAD W CONTRAST, CT BRAIN PERFUSION, CTA NECK W CONTRAST NUMBER OF IMAGES:  [id number] INDICATION:  right-sided weakness, slurred speech, possible stroke COMPARISON: CTA dated 2010. Brain MRI dated 2010. Head CT dated 2018. TECHNIQUE: Axial computed tomographic images of the head were obtained without contrast. Noncontrast images were reformatted in coronal and sagittal planes. Using a CT angiographic protocol, axial CT images of the head and neck were obtained with bolus administration of intravenous contrast. Maximum intensity projection (MIP) images were generated in coronal and sagittal planes. Rotational MIP images were generated around the cervical left and right internal carotid arteries. Three-dimensional volume rendered images of the intracranial and cervical vessels were created.  CT perfusion imaging of the brain was performed with injection of intravenous contrast. Axial maps of cerebral blood flow (CBF), cerebral blood volume (CBV), mean blood volume in this defect appears to be preserved, suggestive of ischemia rather than infarct. 1. CTA of the head reveals no major intracranial arterial occlusion or proximal branch occlusion. There is questionable poor opacification of at least one distal left MCA branch, which probably corresponds with one of the areas of decreased perfusion and prolonged transit time on the CTP study. 2. CTA of the neck reveals no major arterial occlusion or significant stenosis. 3. CT perfusion demonstrates two areas of prolonged transit time and decreased perfusion in the left frontal and frontoparietal regions. The left frontal perfusion defect has a matched defect in CBD, compatible with infarct. The left frontoparietal perfusion defect appears to have preserved CBV, compatible with ischemia. 4. Cerebral vasculopathy should be considered. Findings discussed with Dr. Doni Zuñiga of the [facility] ED at approximately 2300 hours on 2018. Cta Head W Contrast    Result Date: 2018  Patient MRN:  [id number] : 1965 Age: 48 years Gender: Female Order Date:  2018 10:00 PM EXAM: CTA HEAD W CONTRAST, CT BRAIN PERFUSION, CTA NECK W CONTRAST NUMBER OF IMAGES:  [id number] INDICATION:  right-sided weakness, slurred speech, possible stroke COMPARISON: CTA dated 2010. Brain MRI dated 2010. Head CT dated 2018. TECHNIQUE: Axial computed tomographic images of the head were obtained without contrast. Noncontrast images were reformatted in coronal and sagittal planes. Using a CT angiographic protocol, axial CT images of the head and neck were obtained with bolus administration of intravenous contrast. Maximum intensity projection (MIP) images were generated in coronal and sagittal planes. Rotational MIP images were generated around the cervical left and right internal carotid arteries. Three-dimensional volume rendered images of the intracranial and cervical vessels were created.  CT perfusion imaging of the brain 31.0  26.6     BNP: No results for input(s): BNP in the last 72 hours. Hgb A1C:   Lab Results   Component Value Date    LABA1C 6.4 (H) 04/06/2018     No results found for: EAG  ESR:   Lab Results   Component Value Date    SEDRATE 0 10/04/2017     CRP:   Lab Results   Component Value Date    CRP <0.1 10/04/2017     D Dimer:   Lab Results   Component Value Date    DDIMER 499 02/02/2018     Folate and B12: No results found for: [id number], No results found for: FOLATE  Lactic Acid:   Lab Results   Component Value Date    LACTA 0.9 08/26/2018     Thyroid Studies:  Lab Results   Component Value Date    TSH 2.470 08/26/2018       MICROBIOLOGY:    Urine Culture:  No components found for: CURINE  Blood Culture:  No components found for: CBLOOD, CFUNGUSBL  Blood Culture from Central Line:  No components found for: CBLOODLN  Stool Culture:  No components found for: CSTOOL  Sputum Culture:  No components found for: CSPUTUM  Sputum Culture for AFB:  No components found for: CAFBSM            ASSESSMENT & PLAN:    Acute ischemic stroke  NIHSS of 8  CT suggestive of left parietal infarct and left frontoparietal ischemia  CTA neck unremarkable  Tele-stroke consultation: Patient candidate for TPA - initiated treatment  Permissive hypertension: Maintain BP <185/105  Glucose 99, will follow for hypoglycemia  Nurse bedside swallow - passed this morning and diet advanced, as well as meds to PO from IV, including statin and anticoagulant after 24 hours. Maintain HOB elevated   Frequent neurovascular checks. Were q15min, q30min and q 60min overnight. - with improvement  Echo today - result pending.   Neurology consulted - MRI brain and echo to be done    HFrEF  Holding some of HF meds due to permissive hypertension  Will resume once patient's status improves  CXR with mild pulmonary edema; continue diuresis with Bumex as needed     Asthma/COPD  DuoNeb treatments  Resume inhalers once patient's status improves     DVT / GI prophylaxis:

## 2018-08-27 NOTE — PLAN OF CARE
Problem: HEMODYNAMIC STATUS  Goal: Patient has stable vital signs and fluid balance  Outcome: Met This Shift      Problem: ACTIVITY INTOLERANCE/IMPAIRED MOBILITY  Goal: Mobility/activity is maintained at optimum level for patient  Outcome: Met This Shift

## 2018-08-27 NOTE — PROGRESS NOTES
cardioverter-defibrillator) in place; Left ovarian cyst; Liver lesion; Moderate mitral regurgitation; Nonischemic cardiomyopathy ([facility] [address] NSTEMI (non-ST elevated myocardial infarction) ([facility] [address] Suicide attempt by drug ingestion ([facility] [address] and Tobacco abuse. PSH:    has a past surgical history that includes cardiovascular stress test (); Tubal ligation (); Tonsillectomy;  section; transthoracic echocardiogram (3/19/13); Diagnostic Cardiac Cath Lab Procedure (2007); Diagnostic Cardiac Cath Lab Procedure (4/15); Percutaneous Transluminal Coronary Angio (4/10/15); Hysterectomy (10/1/15); Colonoscopy (10/2015); Endoscopy, colon, diagnostic; Cardiac catheterization (2018); and Cardiac defibrillator placement (2016). Home Medications:   Prior to Admission medications    Medication Sig Start Date End Date Taking?  Authorizing Provider   aspirin 81 MG chewable tablet Take 1 tablet by mouth daily 18  Yes Dimas Bauman MD   mometasone-formoterol [facility]) 100-5 MCG/ACT inhaler Inhale 2 puffs into the lungs 2 times daily 18  Yes Dimas Bauman MD   atorvastatin (LIPITOR) 10 MG tablet Take 1 tablet by mouth daily 18  Yes Dimas Bauman MD   isosorbide dinitrate (ISORDIL) 30 MG tablet Take 1 tablet by mouth 3 times daily 18  Yes Dimas Bauman MD   albuterol sulfate HFA (PROVENTIL HFA) 108 (90 Base) MCG/ACT inhaler Inhale 2 puffs into the lungs every 6 hours as needed for Wheezing 18 Yes Dimas Bauman MD   budesonide-formoterol [facility]) 160-4.5 MCG/ACT AERO Inhale 2 puffs into the lungs 2 times daily 18  Yes Dimas Bauman MD   FLUoxetine (PROZAC) 20 MG capsule Take 1 capsule by mouth daily 18  Yes Dimas Bauman MD   hydrALAZINE (APRESOLINE) 50 MG tablet Take 1 tablet by mouth every 8 hours 18  Yes Dimas Bauman MD   metoprolol succinate (TOPROL XL) 50 MG extended release tablet Take 1 tablet by mouth 2 times daily 8/26/18  Yes Dayna Elliott MD   sacubitril-valsartan [facility])  MG per tablet Take 1 tablet by mouth 2 times daily 8/26/18  Yes Dayna Elliott MD   bumetanide [facility]) 2 MG tablet Take 1 tablet by mouth every other day 8/26/18  Yes Dayna Elliott MD   spironolactone (ALDACTONE) 25 MG tablet Take 1 tablet by mouth daily 8/26/18  Yes Dayna Elliott MD   clopidogrel (PLAVIX) 75 MG tablet Take 1 tablet by mouth daily 8/26/18  Yes Dayna Elliott MD   omeprazole (PRILOSEC) 20 MG delayed release capsule Take 1 capsule by mouth daily 8/26/18  Yes Dayna Elliott MD   potassium chloride (KLOR-CON) 20 MEQ packet Take 20 mEq by mouth daily   Yes Historical Provider, MD   montelukast (SINGULAIR) 10 MG tablet Take 1 tablet by mouth nightly 12/20/17  Yes GA Parsons - CNP       Allergies: Allergies   Allergen Reactions    Orange Fruit [Citrus] Hives and Itching     ONLY allergic to oranges - NOT any other citrus fruit. NO oranges or orange juice.  Crestor [Rosuvastatin Calcium]     Loratadine     Norco [Hydrocodone-Acetaminophen] Itching    Sulfa Antibiotics     Aspirin      Itching sometime     Patient takes 81mg Aspirin daily, but is unable to take 324 mg.         Social History:  Social History     Social History    Marital status: Legally      Spouse name: N/A    Number of children: N/A    Years of education: N/A     Occupational History    disability      Social History Main Topics    Smoking status: Former Smoker     Packs/day: 0.50     Years: 30.00     Types: Cigarettes     Quit date: 7/1/2018    Smokeless tobacco: Never Used    Alcohol use 1.8 oz/week     3 Cans of beer per week      Comment: caffeine use: coffee 1 cup daily    Drug use: Yes     Types: Marijuana      Comment: last used 8/2018    Sexual activity: Not Currently     Partners: Male, Female     Other Topics Concern    Not on file     Social History Narrative    Drinks electrolytes & replace as needed. Monitor urine output. · ID: No acute issues   · Endocrine: No acute issues. Monitor BS  · MSK: No acute issues. PT/OT  · Heme: tPA induced coagulopathy. tPA protocol      Bowel regime: MOM  Pain control/Sedation: Tylenol  DVT prophylaxis: SCDs.      GI prophylaxis: Protonix, diet  Mouth/Eye care: As needed    Family update: Questions answered for patient   Code status:  Full    Disposition:  NSICU      Electronically signed by GA Hernández CNP on 8/27/2018 at 11:30 AM

## 2018-08-27 NOTE — PROGRESS NOTES
OCCUPATIONAL THERAPY INITIAL EVALUATION      Date:2018  Patient Name: Minda Orellana  MRN: [id number]  : 1965  [facility]     Evaluating OT: Kellie Zhu OTR/L [id number]    Recommended Adaptive Equipment: TBA: shower seat and rail, AD as indicated     AM-PAC Inpatient Daily Activity Raw Score: 17/24  G code: CK    Modified Fab Scale (MRS)  Score     Description  0             No symptoms  1             No significant disability despite symptoms  2             Slight disability; able to look after own affairs  3             Moderate disability; able to ambulate without assist/ requires assist with ADLs  4             Moderate/Severe disability; requires assist to ambulate/assist with ADLs  5             Severe disability;bedridden/incontinent   6               Score:   4      Diagnosis: Acute CVA with R sided weakness; s/p tPA     Past Medical History: CAD, MR, COPD, CHF, L lacunar infarct    Precautions: falls, R sided weakness, NPO for swallow assessment, [facility]     Home Living: Pt lives alone in an 4th floor apt with elevator access and no steps to enter. Laundry room on same floor. Mother lives across the escalera. Patient's daughter also stays in the apt building. Bathroom setup: tub/shower; standard commode  Equipment owned: no DME    Prior Level of Function: IND with ADLs;  IND with IADLs. No device for ambulation.   Driving: no; daughter assists with transportation  Occupation: n/a    Pain Level: pt c/o no pain this session     Cognition: oriented x 3  WFL  Memory: good   Comprehension: good  Problem solving: fair  Safety: fair    Vision/Perception  Hearing: min [facility]  Vision: WFL; reports no vision changes ; Glasses: yes [x] no [] reading []  Perception: WFL      UE Assessment:  Hand Dominance: Right [x]  Left []     ROM Strength Additional Info:    RUE  WFL 4/5 G  and G FMC/dexterity noted during ADL tasks     LUE WFL 4/5 G  and G FMC/dexterity noted during ADL tasks

## 2018-08-27 NOTE — H&P
[facility] Resident Inpatient  History and Physical      HPI: History obtained from patient. Navin Child is a 48 y.o. female with a PMH of CAD, severe MR, COPD/asthma 15 pack year smoker, HLD, HTN, HFrEF [facility] 3 (LVEF 15% 03/2018 with AICD in place), NSTEMI, CVA x 3 who is brought to the ED by EMS for Cerebrovascular Accident (LKW 20 PTA with family witnessing weakness and slurred speech). Patient was discharged just hours prior from an acute CHF exacerbation hospitalization. Patient had not taken any of his meds for about 3 weeks because she could not afford the co-pay. Prior to discharge, scripts for all her meds were sent to her pharmacy. Patient reports that last night she was at her mother's place and she suddenly developed weakness in her right upper and lower limbs and slurred speech. Her family noticed that something was wrong, and with concern for stroke called EMS. Patient reports history of multiple CVAs, however her last one was 8-9 years ago ago. She denies fever, chest pain, abdominal pain. She denies any headache or blurry vision preceding her stroke symptoms. ED Course: The patient remained hemodynamically stable. CT perfusion study was suggestive of left parietal infarct and left frontoparietal ischemia.  tele-stroke service was consulted. Patient was deemed appropriate for TPA therapy, as she was last known well less than an hour prior, and she did not have any contraindications to TPA. EKG Rhythm strip normal sinus rhythm, prolonged QT interval, 1st degree AV block, left anterior fascicular block, unchanged from previous tracings.      Medications   iopamidol (ISOVUE-370) 76 % injection 100 mL (100 mLs Intravenous Given 8/26/18 2156)   alteplase (ACTIVASE) injection 4.5 mg (4.5 mg Intravenous Given 8/26/18 2233)     Followed by   alteplase (ACTIVASE) injection 40.4 mg (40.4 mg Intravenous New Bag 8/26/18 2234)        ED orders:   Orders Placed This Encounter   Procedures    MRSA Screening Culture Only    CT Brain Perfusion    CTA NECK W CONTRAST    CTA HEAD W CONTRAST    CT HEAD WO CONTRAST    XR CHEST PORTABLE    CBC auto differential    Comprehensive Metabolic Panel    Lactic Acid, Plasma    Troponin    Protime-INR    APTT    TSH without Reflex    Urinalysis    Lipid panel - fasting    Basic Metabolic Panel w/ Reflex to MG    Hemoglobin A1c    CBC auto differential    Microscopic Urinalysis    Diet NPO Effective Now    Telemetry monitoring    Vital signs during and post alteplase (tPA)    Notify physician prior to alteplase (tPA)    Notify physician during and post alteplase (tPA)    Notify physician during and post alteplase (tPA)    Bedrest during and post alteplase (tPA)    Elevate HOB during and post alteplase (tPA)    Neuro checks during and post alteplase (tPA)    NIH Stroke Scale assessment by nurse    Implement alteplase (tPA) hemorrhage/bleeding precautions    Avoid antiplatelet and antithrombotic medications for 24 hours post administration of alteplase (tPA)    No Anticoagulants for 24 hours after alteplase (tPA)    Implement suspected intracerebral hemorrhage (ICH) guidelines    Assess for aphasia/dysphagia/severe dysarthria    Pulse Oximetry    Vital signs    Neuro checks    Tobacco cessation education    Swallow screen by nursing before diet and oral medications started.     Stroke education    Notify Physician    Up with assistance    Full Code    Consult to Neurology    Inpatient consult to Social Work    OT eval and treat    PT evaluation and treat    Initiate Oxygen Therapy Protocol    Initiate Oxygen Therapy Protocol    HHN Treatment    Nasal Cannula Oxygen    SLP swallowing-dysphagia evaluation and treatment    POCT Glucose    EKG 12 Lead    Initiate minimum 2 IV lines for alteplase (tPA) infusion    PATIENT STATUS (FROM ED OR OR/PROCEDURAL) Inpatient    Fall precautions       PMH:  has a past medical history of Angina at rest [facility]); Asthma; Blood type AB+; CAD (coronary artery disease); Carpal tunnel syndrome on left; CHF (congestive heart failure) (HCC); COPD (chronic obstructive pulmonary disease) ([facility] [address] CVA (cerebral vascular accident) ([facility] [address] GERD (gastroesophageal reflux disease); HFrEF (heart failure with reduced ejection fraction) ([facility] [address] History of blood transfusion; Hyperlipidemia; Hypertension; ICD (implantable cardioverter-defibrillator) in place; Left ovarian cyst; Liver lesion; Moderate mitral regurgitation; Nonischemic cardiomyopathy ([facility] [address] NSTEMI (non-ST elevated myocardial infarction) ([facility] [address] Suicide attempt by drug ingestion ([facility] and Tobacco abuse. PSH:  has a past surgical history that includes cardiovascular stress test (); Tubal ligation (); Tonsillectomy;  section; transthoracic echocardiogram (3/19/13); Diagnostic Cardiac Cath Lab Procedure (2007); Diagnostic Cardiac Cath Lab Procedure (4/15); Percutaneous Transluminal Coronary Angio (4/10/15); Hysterectomy (10/1/15); Colonoscopy (10/2015); Endoscopy, colon, diagnostic; Cardiac catheterization (2018); and Cardiac defibrillator placement (2016). FH: family history includes Heart Disease in her father; High Blood Pressure in her father and mother; Pacemaker in her father; Stroke in her father and mother. Social:  reports that she quit smoking about 8 weeks ago. Her smoking use included Cigarettes. She has a 15.00 pack-year smoking history. She has never used smokeless tobacco. She reports that she drinks about 1.8 oz of alcohol per week . She reports that she uses drugs, including Marijuana. Allergies: Allergies   Allergen Reactions    Orange Fruit [Citrus] Hives and Itching     ONLY allergic to oranges - NOT any other citrus fruit. NO oranges or orange juice.      Crestor [Rosuvastatin Calcium]     Loratadine     Norco [Hydrocodone-Acetaminophen] Itching    Sulfa Antibiotics     Aspirin Itching sometime     Patient takes 81mg Aspirin daily, but is unable to take 324 mg. Home Medications:   No current facility-administered medications on file prior to encounter.       Current Outpatient Prescriptions on File Prior to Encounter   Medication Sig Dispense Refill    aspirin 81 MG chewable tablet Take 1 tablet by mouth daily 30 tablet 0    mometasone-formoterol (DULERA) 100-5 MCG/ACT inhaler Inhale 2 puffs into the lungs 2 times daily 1 Inhaler 3    atorvastatin (LIPITOR) 10 MG tablet Take 1 tablet by mouth daily 30 tablet 0    isosorbide dinitrate (ISORDIL) 30 MG tablet Take 1 tablet by mouth 3 times daily 90 tablet 0    albuterol sulfate HFA (PROVENTIL HFA) 108 (90 Base) MCG/ACT inhaler Inhale 2 puffs into the lungs every 6 hours as needed for Wheezing 1 Inhaler 0    budesonide-formoterol (SYMBICORT) 160-4.5 MCG/ACT AERO Inhale 2 puffs into the lungs 2 times daily 1 Inhaler 0    FLUoxetine (PROZAC) 20 MG capsule Take 1 capsule by mouth daily 30 capsule 0    hydrALAZINE (APRESOLINE) 50 MG tablet Take 1 tablet by mouth every 8 hours 90 tablet 0    metoprolol succinate (TOPROL XL) 50 MG extended release tablet Take 1 tablet by mouth 2 times daily 30 tablet 0    sacubitril-valsartan (ENTRESTO)  MG per tablet Take 1 tablet by mouth 2 times daily 60 tablet 0    bumetanide (BUMEX) 2 MG tablet Take 1 tablet by mouth every other day 90 tablet 0    spironolactone (ALDACTONE) 25 MG tablet Take 1 tablet by mouth daily 30 tablet 0    clopidogrel (PLAVIX) 75 MG tablet Take 1 tablet by mouth daily 30 tablet 0    omeprazole (PRILOSEC) 20 MG delayed release capsule Take 1 capsule by mouth daily 30 capsule 0    potassium chloride (KLOR-CON) 20 MEQ packet Take 20 mEq by mouth daily      montelukast (SINGULAIR) 10 MG tablet Take 1 tablet by mouth nightly 30 tablet 3       ROS:  Const: No fever, chills, night sweats, no recent unexplained weight gain/loss  HEENT: No blurred vision, double vision; no URI symptoms; slurred speech  Resp: No cough, no sputum, no pleuritic chest pain, no sob  Cardio: No chest pain, no exertional dyspnea, no PND, no orthopnea, no palpitation, no leg swelling. GI: No dysphagia, no reflux; no abdominal pain, no n/v; no c/d. No hematochezia    : No dysuria, no frequency, hesitancy; no hematuria  MSK: Right-sided weakness, diminished sensation  Neuro: no focal weakness, no slurred speech, no double vision, no numbness or tingling in extremities  Endo: no heat/cold intolerance, no polyphagia, polydipsia or polyuria  Hem: no increased bleeding, no bruising, no lymphadenopathy  Skin: no skin changes  Psych: no depressed mood, no suicidal ideation    PE:  Blood pressure 131/83, pulse 74, temperature 98.2 °F (36.8 °C), resp. rate 20, height 5' 4\" (1.626 m), weight 110 lb (49.9 kg), last menstrual period 05/12/2014, SpO2 99 %, not currently breastfeeding. General: Alert, cooperative, no acute distress. HEENT: Normocephalic, atraumatic. PERRLA, conjunctiva/corneas clear, EOM's intact, no pallor or icterus. Right facial droop  Chest: No tenderness or deformity, full & symmetric excursion  Lung: Clear to auscultation bilaterally,  respirations unlabored. No rales/wheezing/rubs  Heart: RRR, S1 and S2 normal, systolic murmur. DP pulses 2/4  Abdomen: SNTND, no masses, no organomegaly, no guarding, rebound or rigidity. Genital/Rectal: deferred  Extremities:  Extremities normal, atraumatic, no cyanosis or edema. Distal pulses equal bilaterally  Skin: Skin color, texture, turgor normal, no rashes or lesions  Lymph nodes: no lymph node enlargement appreciated  Neurologic: Alert & Oriented; diminished sensation on right lower face, unable to protrude tongue, diminished right shoulder strength 1/5 in right upper and lower extremities. Diminished sensation in right upper and lower extremities; unable to test pronator drift as patient is not able to lift right arm.     NIH Stroke Immature Granulocytes % 0.4 0.0 - 5.0 %    Lymphocytes % 27.9 20.0 - 42.0 %    Monocytes % 9.5 2.0 - 12.0 %    Eosinophils % 2.4 0.0 - 6.0 %    Basophils % 1.1 0.0 - 2.0 %    Neutrophils # 4.72 1.80 - 7.30 E9/L    Immature Granulocytes # 0.03 E9/L    Lymphocytes # 2.24 1.50 - 4.00 E9/L    Monocytes # 0.76 0.10 - 0.95 E9/L    Eosinophils # 0.19 0.05 - 0.50 E9/L    Basophils # 0.09 0.00 - 0.20 E9/L   Comprehensive Metabolic Panel   Result Value Ref Range    Sodium 142 132 - 146 mmol/L    Potassium 3.9 3.5 - 5.0 mmol/L    Chloride 98 98 - 107 mmol/L    CO2 30 (H) 22 - 29 mmol/L    Anion Gap 14 7 - 16 mmol/L    Glucose 152 (H) 74 - 109 mg/dL    BUN 17 6 - 20 mg/dL    CREATININE 0.9 0.5 - 1.0 mg/dL    GFR Non-African American >60 >=60 mL/min/1.73    GFR African American >60     Calcium 10.1 8.6 - 10.2 mg/dL    Total Protein 7.4 6.4 - 8.3 g/dL    Alb 4.3 3.5 - 5.2 g/dL    Total Bilirubin 0.2 0.0 - 1.2 mg/dL    Alkaline Phosphatase 125 (H) 35 - 104 U/L    ALT 21 0 - 32 U/L    AST 14 0 - 31 U/L   Lactic Acid, Plasma   Result Value Ref Range    Lactic Acid 0.9 0.5 - 2.2 mmol/L   Troponin   Result Value Ref Range    Troponin <0.01 0.00 - 0.03 ng/mL   Protime-INR   Result Value Ref Range    Protime 13.3 (H) 9.3 - 12.4 sec    INR 1.2    APTT   Result Value Ref Range    aPTT 31.0 24.5 - 35.1 sec   TSH without Reflex   Result Value Ref Range    TSH 2.470 0.270 - 4.200 uIU/mL   Urinalysis   Result Value Ref Range    Color, UA Straw Straw/Yellow    Clarity, UA Clear Clear    Glucose, Ur Negative Negative mg/dL    Bilirubin Urine Negative Negative    Ketones, Urine Negative Negative mg/dL    Specific Gravity, UA <=1.005 1.005 - 1.030    Blood, Urine Negative Negative    pH, UA 8.0 5.0 - 9.0    Protein, UA Negative Negative mg/dL    Urobilinogen, Urine 1.0 <2.0 E.U./dL    Nitrite, Urine Negative Negative    Leukocyte Esterase, Urine SMALL (A) Negative   POCT Glucose   Result Value Ref Range    Meter Glucose 164 (H) 70 - 110 mg/dL EKG 12 Lead   Result Value Ref Range    Ventricular Rate 74 BPM    Atrial Rate 74 BPM    P-R Interval 236 ms    QRS Duration 114 ms    Q-T Interval 466 ms    QTc Calculation (Bazett) 517 ms    P Axis 73 degrees    R Axis -85 degrees    T Axis 46 degrees       Imaging:  Xr Chest Standard (2 Vw)    Result Date: 2018  Patient MRN:  [id number] : 1965 Age: 48 years Gender: Female Order Date:  2018 6:00 AM EXAM: XR CHEST (2 VW) NUMBER OF IMAGES:  2, VIEWS:  2 INDICATION: Shortness of Breath. COMPARISON: Chest x-rays dated 2018 and 2018. Chest CT dated 4/3/2018. Technique: PA and lateral views of the chest Findings: The lungs show areas of increased radiolucency suggestive of emphysema/COPD. The lungs are without evidence of focal consolidation. No significant pleural effusion is seen. The pulmonary vasculature is unremarkable. The cardiac silhouette is enlarged. An ICD device again noted. 1.  No evidence of acute pulmonary disease. 2.  Emphysema/COPD. Ct Head Wo Contrast    Result Date: 2018  Patient MRN:  [id number] : 1965 Age: 48 years Gender: Female Order Date:  2018 10:00 PM Exam: CT HEAD WO CONTRAST Number of images:  107 Indication:  right-sided weakness, slurred speech, possible stroke Comparison: Head CT dated 2018. MRI dated 2010. Technique:  Computed tomography of the head was performed without intravenous contrast. Images were constructed in the axial plane. Multiplanar reformation of the axial images was performed in coronal and sagittal planes. Low-dose CT acquisition technique included one of following options: (1) automated exposure control;  (2) adjustment of mA and/or kV according to patient's size; or (3) use of iterative reconstruction. FINDINGS: No acute intracranial hemorrhage is seen. There is no intracranial mass effect. The ventricles are unremarkable. The calvarium is unremarkable. The paranasal sinuses and mastoid air cells are clear. No acute intracranial hemorrhage or mass effect. Xr Chest Portable    Result Date: 2018  Patient MRN:  [id number] : 1965 Age: 48 years Gender: Female Order Date:  2018 10:00 PM Exam: XR CHEST PORTABLE Number of views:  1 portable AP upright view of the chest Indication: Possible stroke Comparison: 2018 FINDINGS:  A left-sided cardiac pacemaker is noted again. The cardiac silhouette is prominent. There is diffuse pulmonary interstitial prominence and peribronchial thickening. No pulmonary airspace consolidation, pleural effusion, or pneumothorax is seen. No pulmonary airspace consolidation. Diffuse pulmonary interstitial opacities, suggestive of pulmonary edema. Cardiomegaly. Xr Chest Portable    Result Date: 2018  Patient MRN:  [id number] : 1965 Age: 48 years Gender: Female Order Date:  2018 10:30 AM EXAM: XR CHEST PORTABLE NUMBER OF IMAGES:  1, VIEWS:  1 INDICATION: Shortness of Breath COMPARISON: Chest CT dated 4/3/2018. Chest x-ray dated 3/31/2018. Findings: There is enlargement of the cardiac silhouette and prominence of the central pulmonary vasculature and interstitial lung markings, particularly over the right lower lung zone. Obscuration of the lateral costophrenic sulci also seen suspicious for small bilateral pleural effusions. An ICD device again noted. CHF pattern versus atypical pneumonia. Cta Neck W Contrast    Result Date: 2018  Patient MRN:  [id number] : 1965 Age: 48 years Gender: Female Order Date:  2018 10:00 PM EXAM: CTA HEAD W CONTRAST, CT BRAIN PERFUSION, CTA NECK W CONTRAST NUMBER OF IMAGES:  1076 INDICATION:  right-sided weakness, slurred speech, possible stroke COMPARISON: CTA dated 2010. Brain MRI dated 2010. Head CT dated 2018. TECHNIQUE: Axial computed tomographic images of the head were obtained without contrast. Noncontrast images were reformatted in coronal and sagittal planes.   Using a CT angiographic protocol, axial CT images of the head and neck were obtained with bolus administration of intravenous contrast. Maximum intensity projection (MIP) images were generated in coronal and sagittal planes. Rotational MIP images were generated around the cervical left and right internal carotid arteries. Three-dimensional volume rendered images of the intracranial and cervical vessels were created. CT perfusion imaging of the brain was performed with injection of intravenous contrast. Axial maps of cerebral blood flow (CBF), cerebral blood volume (CBV), mean transit time (MTT), and time to drain (TTD) were generated. Low-dose CT acquisition technique included one of following options: (1) automated exposure control;  (2) adjustment of mA and/or kV according to patient's size; or (3) use of iterative reconstruction. FINDINGS: Noncontrast Head CT Noncontrast CT of the head demonstrates no acute intracranial hemorrhage or mass effect. A full report for the noncontrast head CT dated patent PACS. CTA of the Head The bilateral internal carotid, middle cerebral, anterior cerebral, and posterior cerebral arteries are patent, without evidence of hemodynamically significant stenosis or proximal branch occlusion. There is questionably poor opacification of the distal left MCA branches to the posterior left frontal lobe, however, no definite distal branch occlusion is identified. The extradural (V3) and intradural (V4) segments of the vertebral arteries and the basilar artery are patent, without evidence of hemodynamically significant stenosis. No intracranial aneurysm is seen. CTA of the Neck The bilateral common, internal, and external carotid arteries are patent, without evidence of hemodynamically significant stenosis. The preforaminal (V1) and foraminal (V2) segments of the vertebral arteries are patent, without evidence of hemodynamically significant stenosis.  The left vertebral artery originates directly from the aortic arch. The right vertebral artery originates from the right subclavian artery. CT Perfusion of the Brain There are two narrow areas of prolonged transit time and decreased cerebral blood flow in the left cerebral hemisphere. The more anterior defect is in the left frontal lobe and demonstrates a matched cerebral blood volume defect, compatible with infarct. This region appears to be at the posterior aspect of the patient's prior ischemic insult in . The more posterior defect is in the left frontoparietal region and the cine of the central sulcus. Cerebral blood volume in this defect appears to be preserved, suggestive of ischemia rather than infarct. 1. CTA of the head reveals no major intracranial arterial occlusion or proximal branch occlusion. There is questionable poor opacification of at least one distal left MCA branch, which probably corresponds with one of the areas of decreased perfusion and prolonged transit time on the CTP study. 2. CTA of the neck reveals no major arterial occlusion or significant stenosis. 3. CT perfusion demonstrates two areas of prolonged transit time and decreased perfusion in the left frontal and frontoparietal regions. The left frontal perfusion defect has a matched defect in CBD, compatible with infarct. The left frontoparietal perfusion defect appears to have preserved CBV, compatible with ischemia. 4. Cerebral vasculopathy should be considered. Findings discussed with Dr. Mckinley Coleman of the [facility] ED at approximately 2300 hours on 2018. Ct Brain Perfusion    Result Date: 2018  Patient MRN:  [id number] : 1965 Age: 48 years Gender: Female Order Date:  2018 10:00 PM EXAM: CTA HEAD W CONTRAST, CT BRAIN PERFUSION, CTA NECK W CONTRAST NUMBER OF IMAGES:  1076 INDICATION:  right-sided weakness, slurred speech, possible stroke COMPARISON: CTA dated 2010. Brain MRI dated 2010. Head CT dated 2018.  TECHNIQUE: Axial computed tomographic patent, without evidence of hemodynamically significant stenosis. The preforaminal (V1) and foraminal (V2) segments of the vertebral arteries are patent, without evidence of hemodynamically significant stenosis. The left vertebral artery originates directly from the aortic arch. The right vertebral artery originates from the right subclavian artery. CT Perfusion of the Brain There are two narrow areas of prolonged transit time and decreased cerebral blood flow in the left cerebral hemisphere. The more anterior defect is in the left frontal lobe and demonstrates a matched cerebral blood volume defect, compatible with infarct. This region appears to be at the posterior aspect of the patient's prior ischemic insult in 2010. The more posterior defect is in the left frontoparietal region and the cine of the central sulcus. Cerebral blood volume in this defect appears to be preserved, suggestive of ischemia rather than infarct. 1. CTA of the head reveals no major intracranial arterial occlusion or proximal branch occlusion. There is questionable poor opacification of at least one distal left MCA branch, which probably corresponds with one of the areas of decreased perfusion and prolonged transit time on the CTP study. 2. CTA of the neck reveals no major arterial occlusion or significant stenosis. 3. CT perfusion demonstrates two areas of prolonged transit time and decreased perfusion in the left frontal and frontoparietal regions. The left frontal perfusion defect has a matched defect in CBD, compatible with infarct. The left frontoparietal perfusion defect appears to have preserved CBV, compatible with ischemia. 4. Cerebral vasculopathy should be considered. Findings discussed with Dr. Edwige Abernathy of the [facility] ED at approximately 2300 hours on 8/26/2018.       Assessment and Plan  Principal Problem:    Acute CVA (cerebrovascular accident) [facility])  Active Problems:    Hyperlipidemia    COPD (chronic obstructive pulmonary disease) ([facility] [address]    Asthma    Chronic systolic CHF (congestive heart failure), NYHA class 3 (HCC)  Resolved Problems:    * No resolved hospital problems.  *    Acute ischemic stroke  NIHSS of 8  CT suggestive of left parietal infarct and left frontoparietal ischemia  CTA neck unremarkable  Tele-stroke consultation: Patient candidate for TPA - initiated treatment  Permissive hypertension: Maintain BP <185/105  Monitor blood glucose  Nurse bedside swallow / speech consult for swallow evaluation  Anticoagulation after 24 hours from administration of TPA  Statin once patient able to tolerate by mouth meds  Maintain HOB elevated   Frequent neurovascular checks  Neurology consulted  Consider echo with bubble study    PT/OT     HFrEF  Holding some of HF meds due to permissive hypertension; also patient is NPO for possible difficulty swallowing from stroke  Will resume once patient's status improves  CXR with mild pulmonary edema; continue diuresis with Bumex  Consider consult to cardiology    Asthma/COPD  DuoNeb treatments  Resume inhalers once patient's status improves      DVT / GI prophylaxis: holding anticoagulation for now; GI prophylaxis not indicated     Dispo -  admitted to neuro ICU     Code: Full    Diet: NPO    Electronically signed by Maryann Garcia MD on 8/27/2018 at 1:49 AM, Pager [phone]  This case was discussed with attending physician,  [facility]

## 2018-08-27 NOTE — ED PROVIDER NOTES
The patient presented to the emergency department with complaint of right-sided arm and leg weakness and right facial droop. She also had slurred speech and some decreased sensation of the right upper and lower extremities. Last known normal was 20 minutes prior to arrival. The patient had just been seen 2 days prior for high blood pressure in the emergency department. Patient is a TPA candidate and had no contraindications. Vital signs stable upon arrival to the emergency department. Patient was alert and oriented ×3. Initial NIH score of 8. The history is provided by the patient and the EMS personnel. Illness    The current episode started today. The onset was sudden. The problem occurs continuously. The problem has been unchanged. The problem is severe. Nothing relieves the symptoms. Nothing aggravates the symptoms. Pertinent negatives include no fever, no abdominal pain, no diarrhea, no nausea, no vomiting, no congestion, no headaches, no sore throat, no neck pain, no cough and no rash. Review of Systems   Constitutional: Negative for activity change, appetite change, chills, diaphoresis, fatigue and fever. HENT: Positive for voice change (Slurred speech). Negative for congestion, sore throat and trouble swallowing. Eyes: Negative for visual disturbance. Respiratory: Negative for cough and shortness of breath. Cardiovascular: Negative for chest pain, palpitations and leg swelling. Gastrointestinal: Negative for abdominal pain, diarrhea, nausea and vomiting. Endocrine: Negative for polyuria. Genitourinary: Negative for difficulty urinating, dysuria, flank pain and hematuria. Musculoskeletal: Positive for gait problem. Negative for arthralgias, back pain, joint swelling, myalgias, neck pain and neck stiffness. Skin: Negative for color change, pallor, rash and wound. Allergic/Immunologic: Negative for immunocompromised state.    Neurological: Positive for speech difficulty Ataxia   (FNF/Heel-Shin) 0- no ataxia   8: Test Sensation 1 - mild to moderate sensory loss; patient feels pinprick is less sharp or is dull on the affected side; there is a loss of superficial pain with pinprick but patient is aware of being touched    9: Test Language/Aphasia 0 - no aphasia, normal   10: Test Dysarthria 1 - mild to moderate, patient slurs at least some words and at worst, can be understood with some difficulty   11: Test Extinction/Inattention 0 - no abnormality   Total 8       ED Course as of Aug 27 0335   [facility] Aug 26, 2018   2147 Telestroke paged  [LS]   2157 Case discussed with Dr. Palmira Oh on the phone. Patient is a TPA candidate if no bleed on CT.  [LS]   2227 Benefits and risks of TPA and alternative of doing nothing discussed with the patient. She agrees to TPA administration.   [LS]   2238 The attending physician spoke with Dr. Chung Iniguez family medicine and discussed case on the phone. Patient accepted for neuro ICU admission.  [LS]   2252 Discussed case with family med resident.  [LS]      ED Course User Index  [LS] Jose Harrison DO       EKG: This EKG is signed and interpreted by me. Rate: 74  Rhythm: Sinus  Interpretation: 1st degree AV block and non-specific ST changes, prolonged QT  Comparison: stable as compared to patient's most recent EKG on 8/24/2018    ED Course as of Aug 27 0335   [facility] Aug 26, 2018   2147 Telestroke paged  [LS]   2157 Case discussed with Dr. Palmira Oh on the phone. Patient is a TPA candidate if no bleed on CT.  [LS]   2227 Benefits and risks of TPA and alternative of doing nothing discussed with the patient. She agrees to TPA administration.   [LS]   2238 The attending physician spoke with Dr. Chung Iniguez family medicine and discussed case on the phone.  Patient accepted for neuro ICU admission.  [LS]   2252 Discussed case with family med resident.  [LS]      ED Course User Index  [LS] Jose Harrison DO       --------------------------------------------- PAST HISTORY and Aspirin    -------------------------------------------------- RESULTS -------------------------------------------------    LABS:  Results for orders placed or performed during the hospital encounter of 08/26/18   CBC auto differential   Result Value Ref Range    WBC 8.0 4.5 - 11.5 E9/L    RBC 5.21 3.50 - 5.50 E12/L    Hemoglobin 14.4 11.5 - 15.5 g/dL    Hematocrit 44.7 34.0 - 48.0 %    MCV 85.8 80.0 - 99.9 fL    MCH 27.6 26.0 - 35.0 pg    MCHC 32.2 32.0 - 34.5 %    RDW 13.8 11.5 - 15.0 fL    Platelets 443 797 - 444 E9/L    MPV 9.9 7.0 - 12.0 fL    Neutrophils % 58.7 43.0 - 80.0 %    Immature Granulocytes % 0.4 0.0 - 5.0 %    Lymphocytes % 27.9 20.0 - 42.0 %    Monocytes % 9.5 2.0 - 12.0 %    Eosinophils % 2.4 0.0 - 6.0 %    Basophils % 1.1 0.0 - 2.0 %    Neutrophils # 4.72 1.80 - 7.30 E9/L    Immature Granulocytes # 0.03 E9/L    Lymphocytes # 2.24 1.50 - 4.00 E9/L    Monocytes # 0.76 0.10 - 0.95 E9/L    Eosinophils # 0.19 0.05 - 0.50 E9/L    Basophils # 0.09 0.00 - 0.20 E9/L   Comprehensive Metabolic Panel   Result Value Ref Range    Sodium 142 132 - 146 mmol/L    Potassium 3.9 3.5 - 5.0 mmol/L    Chloride 98 98 - 107 mmol/L    CO2 30 (H) 22 - 29 mmol/L    Anion Gap 14 7 - 16 mmol/L    Glucose 152 (H) 74 - 109 mg/dL    BUN 17 6 - 20 mg/dL    CREATININE 0.9 0.5 - 1.0 mg/dL    GFR Non-African American >60 >=60 mL/min/1.73    GFR African American >60     Calcium 10.1 8.6 - 10.2 mg/dL    Total Protein 7.4 6.4 - 8.3 g/dL    Alb 4.3 3.5 - 5.2 g/dL    Total Bilirubin 0.2 0.0 - 1.2 mg/dL    Alkaline Phosphatase 125 (H) 35 - 104 U/L    ALT 21 0 - 32 U/L    AST 14 0 - 31 U/L   Lactic Acid, Plasma   Result Value Ref Range    Lactic Acid 0.9 0.5 - 2.2 mmol/L   Troponin   Result Value Ref Range    Troponin <0.01 0.00 - 0.03 ng/mL   Protime-INR   Result Value Ref Range    Protime 13.3 (H) 9.3 - 12.4 sec    INR 1.2    APTT   Result Value Ref Range    aPTT 31.0 24.5 - 35.1 sec   TSH without Reflex   Result Value Ref Range TSH 2.470 0.270 - 4.200 uIU/mL   Urinalysis   Result Value Ref Range    Color, UA Straw Straw/Yellow    Clarity, UA Clear Clear    Glucose, Ur Negative Negative mg/dL    Bilirubin Urine Negative Negative    Ketones, Urine Negative Negative mg/dL    Specific Gravity, UA <=1.005 1.005 - 1.030    Blood, Urine Negative Negative    pH, UA 8.0 5.0 - 9.0    Protein, UA Negative Negative mg/dL    Urobilinogen, Urine 1.0 <2.0 E.U./dL    Nitrite, Urine Negative Negative    Leukocyte Esterase, Urine SMALL (A) Negative   Microscopic Urinalysis   Result Value Ref Range    WBC, UA 5-10 0 - 5 /HPF    RBC, UA 0-1 0 - 2 /HPF    Bacteria, UA RARE (A) /HPF   POCT Glucose   Result Value Ref Range    Meter Glucose 164 (H) 70 - 110 mg/dL   EKG 12 Lead   Result Value Ref Range    Ventricular Rate 74 BPM    Atrial Rate 74 BPM    P-R Interval 236 ms    QRS Duration 114 ms    Q-T Interval 466 ms    QTc Calculation (Bazett) 517 ms    P Axis 73 degrees    R Axis -85 degrees    T Axis 46 degrees       RADIOLOGY:  CT Brain Perfusion   Final Result   1. CTA of the head reveals no major intracranial arterial occlusion or   proximal branch occlusion. There is questionable poor opacification of   at least one distal left MCA branch, which probably corresponds with   one of the areas of decreased perfusion and prolonged transit time on   the CTP study. 2. CTA of the neck reveals no major arterial occlusion or significant   stenosis. 3. CT perfusion demonstrates two areas of prolonged transit time and   decreased perfusion in the left frontal and frontoparietal regions. The left frontal perfusion defect has a matched defect in CBD,   compatible with infarct. The left frontoparietal perfusion defect   appears to have preserved CBV, compatible with ischemia. 4. Cerebral vasculopathy should be considered. Findings discussed with Dr. Ziyad Mauricio of the [facility] ED at   approximately 2300 hours on 8/26/2018.       CTA NECK W CONTRAST Final Result   1. CTA of the head reveals no major intracranial arterial occlusion or   proximal branch occlusion. There is questionable poor opacification of   at least one distal left MCA branch, which probably corresponds with   one of the areas of decreased perfusion and prolonged transit time on   the CTP study. 2. CTA of the neck reveals no major arterial occlusion or significant   stenosis. 3. CT perfusion demonstrates two areas of prolonged transit time and   decreased perfusion in the left frontal and frontoparietal regions. The left frontal perfusion defect has a matched defect in CBD,   compatible with infarct. The left frontoparietal perfusion defect   appears to have preserved CBV, compatible with ischemia. 4. Cerebral vasculopathy should be considered. Findings discussed with Dr. Allen Presumjuan of the [facility] ED at   approximately 2300 hours on 8/26/2018. CTA HEAD W CONTRAST   Final Result   1. CTA of the head reveals no major intracranial arterial occlusion or   proximal branch occlusion. There is questionable poor opacification of   at least one distal left MCA branch, which probably corresponds with   one of the areas of decreased perfusion and prolonged transit time on   the CTP study. 2. CTA of the neck reveals no major arterial occlusion or significant   stenosis. 3. CT perfusion demonstrates two areas of prolonged transit time and   decreased perfusion in the left frontal and frontoparietal regions. The left frontal perfusion defect has a matched defect in CBD,   compatible with infarct. The left frontoparietal perfusion defect   appears to have preserved CBV, compatible with ischemia. 4. Cerebral vasculopathy should be considered. Findings discussed with Dr. Alejandro Salter of the [facility] ED at   approximately 2300 hours on 8/26/2018. CT HEAD WO CONTRAST   Final Result      No acute intracranial hemorrhage or mass effect.          XR CHEST PORTABLE Final Result   No pulmonary airspace consolidation. Diffuse pulmonary interstitial opacities, suggestive of pulmonary   edema. Cardiomegaly. ------------------------- NURSING NOTES AND VITALS REVIEWED ---------------------------  Date / Time Roomed:  8/26/2018  9:38 PM  ED Bed Assignment:  [id number]    The nursing notes within the ED encounter and vital signs as below have been reviewed. Patient Vitals for the past 24 hrs:   BP Temp Temp src Pulse Resp SpO2 Height Weight   08/27/18 0318 - 98.3 °F (36.8 °C) - - 15 - - -   08/27/18 0303 105/65 - - 58 20 99 % - -   08/27/18 0233 104/62 98.3 °F (36.8 °C) Temporal 58 20 98 % - -   08/27/18 0203 - 98.3 °F (36.8 °C) Temporal - - - - -   08/27/18 0200 110/68 - - 61 20 98 % - -   08/27/18 0133 131/83 98.3 °F (36.8 °C) Temporal 65 15 - - -   08/27/18 0103 132/82 98.2 °F (36.8 °C) - 75 22 - - -   08/27/18 0100 132/82 - - 75 22 98 % - -   08/27/18 0033 139/86 98 °F (36.7 °C) - 74 20 99 % - -   08/27/18 0018 (!) 145/96 98.5 °F (36.9 °C) - - - - - -   08/27/18 0000 (!) 140/90 98.2 °F (36.8 °C) Temporal 68 20 100 % - -   08/26/18 2333 135/81 - - 76 15 100 % - -   08/26/18 2304 135/87 97.8 °F (36.6 °C) Oral 71 20 - - -   08/26/18 2249 133/81 - - 73 16 95 % - -   08/26/18 2240 - - - 73 20 97 % - -   08/26/18 2151 127/88 - - 71 19 98 % 5' 4\" (1.626 m) 110 lb (49.9 kg)   08/26/18 2145 - - - 73 15 98 % - -   08/26/18 2140 (!) 135/93 - - 123 14 98 % - 110 lb (49.9 kg)   08/26/18 2138 - - - - - - - 110 lb (49.9 kg)       Oxygen Saturation Interpretation: Normal    ------------------------------------------ PROGRESS NOTES ------------------------------------------    Counseling:  I have spoken with the patient and discussed todays results, in addition to providing specific details for the plan of care and counseling regarding the diagnosis and prognosis.   Their questions are answered at this time and they are agreeable with the plan of admission.    --------------------------------- ADDITIONAL PROVIDER NOTES ---------------------------------  Consultations:  Time: 2238. Spoke with Dr. Cherise Lu. Discussed case. They will admit the patient. This patient's ED course included: a personal history and physicial examination, re-evaluation prior to disposition, multiple bedside re-evaluations, IV medications, cardiac monitoring, continuous pulse oximetry and complex medical decision making and emergency management    This patient has remained hemodynamically stable during their ED course. Diagnosis:  1. Acute CVA (cerebrovascular accident) [facility])        Disposition:  Patient's disposition: Admit to Neuro ICU  Patient's condition is Critical.         Aftab Lester DO  Resident  08/27/18 6352  ATTENDING PROVIDER ATTESTATION:     I have personally performed and/or participated in the history, exam, medical decision making, and procedures and agree with all pertinent clinical information. I have also reviewed and agree with the past medical, family and social history unless otherwise noted. I have discussed this patient in detail with the resident, and provided the instruction and education regarding \  .     My findings/Plan: AGREE AND STABLE         Michelle Kaplan MD  08/27/18 5564

## 2018-08-28 ENCOUNTER — TELEPHONE (OUTPATIENT)
Dept: ADMINISTRATIVE | Age: 53
End: 2018-08-28

## 2018-08-28 VITALS
BODY MASS INDEX: 18.78 KG/M2 | TEMPERATURE: 98.3 F | SYSTOLIC BLOOD PRESSURE: 114 MMHG | WEIGHT: 110 LBS | HEIGHT: 64 IN | RESPIRATION RATE: 20 BRPM | HEART RATE: 81 BPM | DIASTOLIC BLOOD PRESSURE: 64 MMHG | OXYGEN SATURATION: 96 %

## 2018-08-28 PROBLEM — I63.9 ACUTE CVA (CEREBROVASCULAR ACCIDENT) (HCC): Status: RESOLVED | Noted: 2018-08-26 | Resolved: 2018-08-28

## 2018-08-28 LAB
ANION GAP SERPL CALCULATED.3IONS-SCNC: 12 MMOL/L (ref 7–16)
BASOPHILS ABSOLUTE: 0.08 E9/L (ref 0–0.2)
BASOPHILS RELATIVE PERCENT: 1.4 % (ref 0–2)
BUN BLDV-MCNC: 11 MG/DL (ref 6–20)
CALCIUM SERPL-MCNC: 9.4 MG/DL (ref 8.6–10.2)
CHLORIDE BLD-SCNC: 101 MMOL/L (ref 98–107)
CHOLESTEROL, TOTAL: 192 MG/DL (ref 0–199)
CO2: 28 MMOL/L (ref 22–29)
CREAT SERPL-MCNC: 0.8 MG/DL (ref 0.5–1)
EKG ATRIAL RATE: 90 BPM
EKG P AXIS: 66 DEGREES
EKG P-R INTERVAL: 208 MS
EKG Q-T INTERVAL: 408 MS
EKG QRS DURATION: 102 MS
EKG QTC CALCULATION (BAZETT): 499 MS
EKG R AXIS: -88 DEGREES
EKG T AXIS: 60 DEGREES
EKG VENTRICULAR RATE: 90 BPM
EOSINOPHILS ABSOLUTE: 0.29 E9/L (ref 0.05–0.5)
EOSINOPHILS RELATIVE PERCENT: 5.2 % (ref 0–6)
GFR AFRICAN AMERICAN: >60
GFR NON-AFRICAN AMERICAN: >60 ML/MIN/1.73
GLUCOSE BLD-MCNC: 100 MG/DL (ref 74–109)
HBA1C MFR BLD: 5.5 % (ref 4–5.6)
HCT VFR BLD CALC: 41.9 % (ref 34–48)
HDLC SERPL-MCNC: 50 MG/DL
HEMOGLOBIN: 13.3 G/DL (ref 11.5–15.5)
IMMATURE GRANULOCYTES #: 0.01 E9/L
IMMATURE GRANULOCYTES %: 0.2 % (ref 0–5)
LDL CHOLESTEROL CALCULATED: 127 MG/DL (ref 0–99)
LYMPHOCYTES ABSOLUTE: 1.94 E9/L (ref 1.5–4)
LYMPHOCYTES RELATIVE PERCENT: 35.1 % (ref 20–42)
MCH RBC QN AUTO: 27.3 PG (ref 26–35)
MCHC RBC AUTO-ENTMCNC: 31.7 % (ref 32–34.5)
MCV RBC AUTO: 85.9 FL (ref 80–99.9)
MONOCYTES ABSOLUTE: 0.51 E9/L (ref 0.1–0.95)
MONOCYTES RELATIVE PERCENT: 9.2 % (ref 2–12)
MRSA CULTURE ONLY: NORMAL
NEUTROPHILS ABSOLUTE: 2.7 E9/L (ref 1.8–7.3)
NEUTROPHILS RELATIVE PERCENT: 48.9 % (ref 43–80)
PDW BLD-RTO: 13.6 FL (ref 11.5–15)
PLATELET # BLD: 348 E9/L (ref 130–450)
PMV BLD AUTO: 9.8 FL (ref 7–12)
POTASSIUM REFLEX MAGNESIUM: 3.9 MMOL/L (ref 3.5–5)
RBC # BLD: 4.88 E12/L (ref 3.5–5.5)
SODIUM BLD-SCNC: 141 MMOL/L (ref 132–146)
TRIGL SERPL-MCNC: 76 MG/DL (ref 0–149)
VLDLC SERPL CALC-MCNC: 15 MG/DL
WBC # BLD: 5.5 E9/L (ref 4.5–11.5)

## 2018-08-28 PROCEDURE — 6370000000 HC RX 637 (ALT 250 FOR IP): Performed by: NURSE PRACTITIONER

## 2018-08-28 PROCEDURE — 2700000000 HC OXYGEN THERAPY PER DAY

## 2018-08-28 PROCEDURE — 6370000000 HC RX 637 (ALT 250 FOR IP): Performed by: STUDENT IN AN ORGANIZED HEALTH CARE EDUCATION/TRAINING PROGRAM

## 2018-08-28 PROCEDURE — 80061 LIPID PANEL: CPT

## 2018-08-28 PROCEDURE — 99232 SBSQ HOSP IP/OBS MODERATE 35: CPT | Performed by: PSYCHIATRY & NEUROLOGY

## 2018-08-28 PROCEDURE — 2580000003 HC RX 258: Performed by: STUDENT IN AN ORGANIZED HEALTH CARE EDUCATION/TRAINING PROGRAM

## 2018-08-28 PROCEDURE — 99232 SBSQ HOSP IP/OBS MODERATE 35: CPT | Performed by: SURGERY

## 2018-08-28 PROCEDURE — 6360000002 HC RX W HCPCS: Performed by: NURSE PRACTITIONER

## 2018-08-28 PROCEDURE — G8989 SELF CARE D/C STATUS: HCPCS

## 2018-08-28 PROCEDURE — 80048 BASIC METABOLIC PNL TOTAL CA: CPT

## 2018-08-28 PROCEDURE — 97535 SELF CARE MNGMENT TRAINING: CPT

## 2018-08-28 PROCEDURE — 2500000003 HC RX 250 WO HCPCS: Performed by: STUDENT IN AN ORGANIZED HEALTH CARE EDUCATION/TRAINING PROGRAM

## 2018-08-28 PROCEDURE — 83036 HEMOGLOBIN GLYCOSYLATED A1C: CPT

## 2018-08-28 PROCEDURE — 94640 AIRWAY INHALATION TREATMENT: CPT

## 2018-08-28 PROCEDURE — 99232 SBSQ HOSP IP/OBS MODERATE 35: CPT | Performed by: STUDENT IN AN ORGANIZED HEALTH CARE EDUCATION/TRAINING PROGRAM

## 2018-08-28 PROCEDURE — 85025 COMPLETE CBC W/AUTO DIFF WBC: CPT

## 2018-08-28 PROCEDURE — 97530 THERAPEUTIC ACTIVITIES: CPT

## 2018-08-28 RX ORDER — ASPIRIN 81 MG/1
81 TABLET ORAL DAILY
Status: DISCONTINUED | OUTPATIENT
Start: 2018-08-28 | End: 2018-08-28 | Stop reason: HOSPADM

## 2018-08-28 RX ORDER — ATORVASTATIN CALCIUM 40 MG/1
40 TABLET, FILM COATED ORAL DAILY
Qty: 30 TABLET | Refills: 0 | Status: SHIPPED | OUTPATIENT
Start: 2018-08-28 | End: 2019-03-29 | Stop reason: SDUPTHER

## 2018-08-28 RX ADMIN — ATORVASTATIN CALCIUM 10 MG: 10 TABLET, FILM COATED ORAL at 07:56

## 2018-08-28 RX ADMIN — DOCUSATE SODIUM 100 MG: 100 CAPSULE, LIQUID FILLED ORAL at 07:56

## 2018-08-28 RX ADMIN — MOMETASONE FUROATE AND FORMOTEROL FUMARATE DIHYDRATE 2 PUFF: 100; 5 AEROSOL RESPIRATORY (INHALATION) at 07:57

## 2018-08-28 RX ADMIN — SPIRONOLACTONE 25 MG: 25 TABLET, FILM COATED ORAL at 07:56

## 2018-08-28 RX ADMIN — Medication 10 ML: at 07:55

## 2018-08-28 RX ADMIN — IPRATROPIUM BROMIDE AND ALBUTEROL SULFATE 1 AMPULE: .5; 3 SOLUTION RESPIRATORY (INHALATION) at 09:24

## 2018-08-28 RX ADMIN — BUMETANIDE 1 MG: 0.25 INJECTION INTRAMUSCULAR; INTRAVENOUS at 07:56

## 2018-08-28 RX ADMIN — POTASSIUM CHLORIDE 20 MEQ: 1500 TABLET, EXTENDED RELEASE ORAL at 07:56

## 2018-08-28 RX ADMIN — PANTOPRAZOLE SODIUM 40 MG: 40 TABLET, DELAYED RELEASE ORAL at 07:56

## 2018-08-28 RX ADMIN — FLUOXETINE HYDROCHLORIDE 20 MG: 20 CAPSULE ORAL at 07:56

## 2018-08-28 RX ADMIN — ASPIRIN 81 MG: 81 TABLET, COATED ORAL at 07:56

## 2018-08-28 ASSESSMENT — PAIN SCALES - GENERAL
PAINLEVEL_OUTOF10: 0

## 2018-08-28 NOTE — PROGRESS NOTES
without evidence of hemodynamically significant stenosis. The left vertebral artery originates directly from the aortic arch. The right vertebral artery originates from the right subclavian artery. CT Perfusion of the Brain There are two narrow areas of prolonged transit time and decreased cerebral blood flow in the left cerebral hemisphere. The more anterior defect is in the left frontal lobe and demonstrates a matched cerebral blood volume defect, compatible with infarct. This region appears to be at the posterior aspect of the patient's prior ischemic insult in . The more posterior defect is in the left frontoparietal region and the cine of the central sulcus. Cerebral blood volume in this defect appears to be preserved, suggestive of ischemia rather than infarct. 1. CTA of the head reveals no major intracranial arterial occlusion or proximal branch occlusion. There is questionable poor opacification of at least one distal left MCA branch, which probably corresponds with one of the areas of decreased perfusion and prolonged transit time on the CTP study. 2. CTA of the neck reveals no major arterial occlusion or significant stenosis. 3. CT perfusion demonstrates two areas of prolonged transit time and decreased perfusion in the left frontal and frontoparietal regions. The left frontal perfusion defect has a matched defect in CBD, compatible with infarct. The left frontoparietal perfusion defect appears to have preserved CBV, compatible with ischemia. 4. Cerebral vasculopathy should be considered. Findings discussed with Dr. Chris Hopkins of the [facility] ED at approximately 2300 hours on 2018.     Ct Brain Perfusion    Result Date: 2018  Patient MRN:  [id number] : 1965 Age: 48 years Gender: Female Order Date:  2018 10:00 PM EXAM: CTA HEAD W CONTRAST, CT BRAIN PERFUSION, CTA NECK W CONTRAST NUMBER OF IMAGES:  [id number] INDICATION:  right-sided weakness, slurred speech, possible stroke COMPARISON: CTA dated 12/27/2010. Brain MRI dated 12/29/2010. Head CT dated 4/4/2018. TECHNIQUE: Axial computed tomographic images of the head were obtained without contrast. Noncontrast images were reformatted in coronal and sagittal planes. Using a CT angiographic protocol, axial CT images of the head and neck were obtained with bolus administration of intravenous contrast. Maximum intensity projection (MIP) images were generated in coronal and sagittal planes. Rotational MIP images were generated around the cervical left and right internal carotid arteries. Three-dimensional volume rendered images of the intracranial and cervical vessels were created. CT perfusion imaging of the brain was performed with injection of intravenous contrast. Axial maps of cerebral blood flow (CBF), cerebral blood volume (CBV), mean transit time (MTT), and time to drain (TTD) were generated. Low-dose CT acquisition technique included one of following options: (1) automated exposure control;  (2) adjustment of mA and/or kV according to patient's size; or (3) use of iterative reconstruction. FINDINGS: Noncontrast Head CT Noncontrast CT of the head demonstrates no acute intracranial hemorrhage or mass effect. A full report for the noncontrast head CT dated patent PACS. CTA of the Head The bilateral internal carotid, middle cerebral, anterior cerebral, and posterior cerebral arteries are patent, without evidence of hemodynamically significant stenosis or proximal branch occlusion. There is questionably poor opacification of the distal left MCA branches to the posterior left frontal lobe, however, no definite distal branch occlusion is identified. The extradural (V3) and intradural (V4) segments of the vertebral arteries and the basilar artery are patent, without evidence of hemodynamically significant stenosis. No intracranial aneurysm is seen.  CTA of the Neck The bilateral common, internal, and external carotid arteries are patent, without BRAIN PERFUSION, CTA NECK W CONTRAST NUMBER OF IMAGES:  [id number] INDICATION:  right-sided weakness, slurred speech, possible stroke COMPARISON: CTA dated 12/27/2010. Brain MRI dated 12/29/2010. Head CT dated 4/4/2018. TECHNIQUE: Axial computed tomographic images of the head were obtained without contrast. Noncontrast images were reformatted in coronal and sagittal planes. Using a CT angiographic protocol, axial CT images of the head and neck were obtained with bolus administration of intravenous contrast. Maximum intensity projection (MIP) images were generated in coronal and sagittal planes. Rotational MIP images were generated around the cervical left and right internal carotid arteries. Three-dimensional volume rendered images of the intracranial and cervical vessels were created. CT perfusion imaging of the brain was performed with injection of intravenous contrast. Axial maps of cerebral blood flow (CBF), cerebral blood volume (CBV), mean transit time (MTT), and time to drain (TTD) were generated. Low-dose CT acquisition technique included one of following options: (1) automated exposure control;  (2) adjustment of mA and/or kV according to patient's size; or (3) use of iterative reconstruction. FINDINGS: Noncontrast Head CT Noncontrast CT of the head demonstrates no acute intracranial hemorrhage or mass effect. A full report for the noncontrast head CT dated patent PACS. CTA of the Head The bilateral internal carotid, middle cerebral, anterior cerebral, and posterior cerebral arteries are patent, without evidence of hemodynamically significant stenosis or proximal branch occlusion. There is questionably poor opacification of the distal left MCA branches to the posterior left frontal lobe, however, no definite distal branch occlusion is identified. The extradural (V3) and intradural (V4) segments of the vertebral arteries and the basilar artery are patent, without evidence of hemodynamically significant stenosis. 08/26/18 2151 08/27/18 0445 08/28/18   0537   WBC  8.0  8.0  5.5   RBC  5.21  4.78  4.88   HGB  14.4  13.3  13.3   HCT  44.7  40.9  41.9   MCV  85.8  85.6  85.9   RDW  13.8  13.6  13.6   PLT  389  369  348       BMP/CMP:   Recent Labs      08/26/18 2151 08/27/18 0445 08/28/18   0537   NA  142  142  141   K  3.9  3.8  3.9   CL  98  101  101   CO2  30*  27  28   BUN  17  13  11   CREATININE  0.9  0.8  0.8   MG   --   2.2   --      Recent Labs      08/26/18 0454 08/26/18 2151   PROT  6.8  7.4   ALKPHOS  111*  125*   ALT  22  21   AST  17  14   BILITOT  0.5  0.2       OTHER LABS:    Cardiac enzymes:  Lab Results   Component Value Date    CKTOTAL 58 04/19/2014    CKMB 0.9 04/19/2014    TROPONINI <0.01 08/26/2018     PT/INR:   Recent Labs      08/26/18 2142 08/27/18   1030   INR  1.2   --    APTT  31.0  26.6     BNP: No results for input(s): BNP in the last 72 hours.   Hgb A1C:   Lab Results   Component Value Date    LABA1C 5.5 08/28/2018     No results found for: EAG  ESR:   Lab Results   Component Value Date    SEDRATE 0 10/04/2017     CRP:   Lab Results   Component Value Date    CRP <0.1 10/04/2017     D Dimer:   Lab Results   Component Value Date    DDIMER 499 02/02/2018     Folate and B12: No results found for: [id number], No results found for: FOLATE  Lactic Acid:   Lab Results   Component Value Date    LACTA 0.9 08/26/2018     Thyroid Studies:  Lab Results   Component Value Date    TSH 2.470 08/26/2018       MICROBIOLOGY:    Urine Culture:  No components found for: CURINE  Blood Culture:  No components found for: CBLOOD, CFUNGUSBL  Blood Culture from Central Line:  No components found for: CBLOODLN  Stool Culture:  No components found for: CSTOOL  Sputum Culture:  No components found for: CSPUTUM  Sputum Culture for AFB:  No components found for: CAFBSM          ASSESSMENT & PLAN:    Acute ischemic stroke  NIHSS of 8  CT suggestive of left parietal infarct and left frontoparietal ischemia  CTA neck unremarkable  Tele-stroke consultation: Patient candidate for TPA - initiated treatment  Permissive hypertension: Maintain BP <185/105  Nurse bedside swallow - passed  Maintain HOB elevated   Echo today - no R -> L shunt noted. EF of 20-25%. Mild LAE, moderate MR. Neurology consulted - CT brain repeated last night with no hemorrhage noted. - Consult today with recommendation to continue antiplatelet therapy. - F/U with PCP, stroke clinic and cardiology.      HFrEF  Holding some of HF meds due to permissive hypertension  Will resume once patient's status improves - improved for discharge     Asthma/COPD  DuoNeb treatments  Resume inhalers once patient's status improves     DVT / GI prophylaxis: holding anticoagulation for now, SCDs;  Protonix 40mg PO qD     Dispo -  admitted to neuro ICU     Diet: cardiac            Electronically signed by:    Jez Sofia MD  PGY 1  Family Medicine Resident    This case was discussed with (s) Winston Zaidi and  Jim Bay

## 2018-08-28 NOTE — PROGRESS NOTES
noted during ADL tasks      Sensation: reports numbness R hand finger tips   Tone: WFL   Edema:WFL     Functional Assessment:    Initial Status 8/27 Current Status: 8/28   Feeding  NPO IND seated up in [employer]  S; set up seated up in chair  IND standing sink level with G tolerance and no LOB   Upper Body Dressing Min A due to medical lines   IND   Lower Body Dressing Min A for dynamic balance  Mod I F+ to G dynamic balance   Bathing Min A  Mod I standing sink level to sponge bathe   Toileting  Min A  Mod i    Bed Mobility  Supine to Sit: SBA  Sit to Supine:NT NT; pt up in chair upon entry    Functional Transfers Min A  Sit<>stand  3in1 commode: Min A   SPT: Min A  Sit<>stand IND  Commode: Mod I    Functional Mobility Min A hand held with min cues for technique IND no device; no LOB       Sit balance: IND  Stand balance: Mod I  Endurance/Activity tolerance: G with moderate activity; ADL routine    Pain: no c/o pain    Cognition:O x 3; WFL; occasional cues for recall of safety instruction during self care routine. Goals: All goals met. No further OT needs at this time. Recommend shower seat as needed for energy conservation. Comments: Upon arrival pt seated up in chair. At end of session pt assisted back to chair with all lines and tubes intact, call light within reach. · Pt has made G progress towards set goals.    · Continue with current plan of care    Time in: 0930  Time [id number]  Total Tx Time: 23 min    Zena Warner, OTR/L [id number]

## 2018-08-28 NOTE — PROGRESS NOTES
all functional mobility with independence. Pt reported ambulating at baseline. No difficulty completing stair negotiation. Pt left in room with OT. Will discontinue skilled PT at this time as pt has no acute care needs.      Time in: 0930  Time out: Jenna Jimenez, PT, DPT  [id number]

## 2018-08-28 NOTE — PROGRESS NOTES
[address] [place] [address] Attending    S: 48 y.o. female , with significant cardiac and COPD history, presented with right-sided weakness and dysarthria, after a recent hiatus without her medications. Currently showing marked improvement, with good use of all extremities and speech    O: VS- Blood pressure (!) 135/97, pulse 76, temperature 98.3 °F (36.8 °C), temperature source Oral, resp. rate 14, height 5' 4\" (1.626 m), weight 110 lb (49.9 kg), last menstrual period 05/12/2014, SpO2 97 %, not currently breastfeeding. Exam is as noted by resident.     Is elated with her improving status  Neck supple  No bruit  No facial droop noted  Heart RR  Lungs clear   abd supple    Speech fluent  Neuro exam as documented    Lab Results   Component Value Date    WBC 5.5 08/28/2018    WBC 8.0 08/27/2018    WBC 8.0 08/26/2018    HGB 13.3 08/28/2018    HCT 41.9 08/28/2018    MCV 85.9 08/28/2018     08/28/2018       Lab Results   Component Value Date    CRP <0.1 10/04/2017    CRP 1.0 (H) 03/23/2011     Lab Results   Component Value Date    SEDRATE 0 10/04/2017    SEDRATE 0 10/02/2017    SEDRATE 19 03/23/2011       Lab Results   Component Value Date     08/28/2018    K 3.9 08/28/2018     08/28/2018    CO2 28 08/28/2018    BUN 11 08/28/2018    CREATININE 0.8 08/28/2018    GFRAA >60 08/28/2018    LABGLOM >60 08/28/2018    GLUCOSE 100 08/28/2018    GLUCOSE 82 03/01/2011    PROT 7.4 08/26/2018    LABALBU 4.3 08/26/2018    LABALBU 4.2 03/01/2011    CALCIUM 9.4 08/28/2018    BILITOT 0.2 08/26/2018    ALKPHOS 125 08/26/2018    AST 14 08/26/2018    ALT 21 08/26/2018           Impressions:   Principal Problem (Resolved):    Acute CVA (cerebrovascular accident) ([facility]  Active Problems:    Hyperlipidemia    COPD (chronic obstructive pulmonary disease) (HCC)    Asthma    Chronic systolic CHF (congestive heart failure), NYHA class 3 ([facility]      Plan: Med Crissie Latch with Neurology re discharge   To arrange

## 2018-08-28 NOTE — PROGRESS NOTES
[facility] Quality Flow/Interdisciplinary Rounds Progress Note        Quality Flow Rounds held on August 28, 2018    Disciplines Attending: Bedside Nurse, Case Management, Physical Therapy, Occupational Therapy, and Nursing Unit Leadership    Stan Roland was admitted on 8/26/2018  9:38 PM    Anticipated Discharge Date:  Expected Discharge Date: 08/31/18    Disposition:    Randolph Score:  Randolph Scale Score: 20    Readmission Risk              Risk of Unplanned Readmission:        18             Discussed patient goal for the day, patient clinical progression, and barriers to discharge. The following Goal(s) of the Day/Commitment(s) have been identified:  -HLM score: 6 with a goal of 8. Potential tele transfer today. Currently sitting up in chair.        Siena Castanon  August 28, 2018

## 2018-08-28 NOTE — PROGRESS NOTES
[facility]  SURGICAL INTENSIVE CARE UNIT (SICU)  ATTENDING PHYSICIAN CRITICAL CARE PROGRESS NOTE     I have examined the patient, reviewed the record, and discussed the case with the APN/  Resident. I have reviewed all relevant labs and imaging data. Please refer to the  APN/ resident's note. I agree with the  assessment and plan with the following corrections/ additions. The following summarizes my clinical findings and independent assessment. Date of admission:  8/26/2018    CC: Right Sided weakness     HOSPITAL COURSE:  8/26 Left MCA CVA TPA     Radiology   Echo Pending   CT head no intracranial hemorraghic     Physical Exam:  Physical Exam   Constitutional: She is oriented to person, place, and time. She appears well-developed and well-nourished. HENT:   Head: Normocephalic and atraumatic. Eyes: Pupils are equal, round, and reactive to light. Conjunctivae are normal.   Neck: Normal range of motion. Neck supple. Cardiovascular: Normal rate and regular rhythm. Pulmonary/Chest: Effort normal and breath sounds normal.   Abdominal: Soft. She exhibits no distension. There is no tenderness. Musculoskeletal:   5/5 strength in all extremities    Neurological: She is alert and oriented to person, place, and time. Skin: Skin is warm and dry. Psychiatric: She has a normal mood and affect. Assessment:   Principal Problem:    Acute CVA (cerebrovascular accident) ([address]  Active Problems:    Hyperlipidemia    COPD (chronic obstructive pulmonary disease) (HCC)    Asthma    Chronic systolic CHF (congestive heart failure), NYHA class 3 ([address]  Resolved Problems:    * No resolved hospital problems. *       Pertinent PMH: CHF, HTN, Hylerlipidemia, Pacer, Asthma, COPD, MI    Plan:     · Neuro: Prosac  · CV: ASA 81mg, Home Lipator, Start home metoprolol, holding isordil, hydralazine, Plavix,  entrestol - Monitor hemodynamics.  Echo still pending  · Pulm: Duonebs, dulera, singular - Monitor RR, SpO2 >88  · GI: Cardiac Diet   · Renal: Bumex, aldactone, hep lock  - Monitor UOP  · ID: No indication for Abx   · Endo: No glycemic issues  - Monitor glucose   · Heme:      No indication for transfusion   · MSK: PT/OT       Bowel regimen: Senna/Colace  DVT proph:  PCD's, Lovenox Daily   GI proph:  PPI home medication  Seizure proph:  NA   Glucose protocol: NA   Mouth/eye care: NA   Jacinto:   NA   CVC sites:  NA   Ancillary consults:    Neurology, IM ( admission)   Family Update: As available   CODE Status:  Full     Dispo: Transfer RNF     Pollo Mccartney MD  8/28/2018  7:33 AM

## 2018-08-28 NOTE — PLAN OF CARE
Problem: Risk for Impaired Skin Integrity  Goal: Tissue integrity - skin and mucous membranes  Structural intactness and normal physiological function of skin and  mucous membranes.    Outcome: Met This Shift      Problem: Falls - Risk of:  Goal: Will remain free from falls  Will remain free from falls   Outcome: Met This Shift    Goal: Absence of physical injury  Absence of physical injury   Outcome: Met This Shift      Problem: Neurological  Goal: Maximum potential motor/sensory/cognitive function  Outcome: Ongoing      Problem: HEMODYNAMIC STATUS  Goal: Patient has stable vital signs and fluid balance  Outcome: Ongoing      Problem: ACTIVITY INTOLERANCE/IMPAIRED MOBILITY  Goal: Mobility/activity is maintained at optimum level for patient  Outcome: Ongoing

## 2018-08-28 NOTE — PLAN OF CARE
Problem: Risk for Impaired Skin Integrity  Goal: Tissue integrity - skin and mucous membranes  Structural intactness and normal physiological function of skin and  mucous membranes.    Outcome: Met This Shift      Problem: Falls - Risk of:  Goal: Will remain free from falls  Will remain free from falls   Outcome: Met This Shift    Goal: Absence of physical injury  Absence of physical injury   Outcome: Met This Shift      Problem: Neurological  Goal: Maximum potential motor/sensory/cognitive function  Outcome: Met This Shift      Problem: HEMODYNAMIC STATUS  Goal: Patient has stable vital signs and fluid balance  Outcome: Met This Shift      Problem: ACTIVITY INTOLERANCE/IMPAIRED MOBILITY  Goal: Mobility/activity is maintained at optimum level for patient  Outcome: Met This Shift

## 2018-08-28 NOTE — PROGRESS NOTES
Neurology progress note  Events over last 24 hours: no acute events pateint feels back to baseline    Subjective: no complaints    Objective:    Exam:   Vitals:    08/28/18 0800 08/28/18 0900 08/28/18 0925 08/28/18 1000   BP: 119/67 123/86  (!) 135/97   Pulse: 74 89  76   Resp: 20 24 21 14   Temp: 98.3 °F (36.8 °C)      TempSrc: Oral      SpO2: 96% 93% 96% 97%   Weight:       Height:            Physical Exam: normal Nonfocal neurologic exam.       Current Facility-Administered Medications   Medication Dose Route Frequency Provider Last Rate Last Dose    aspirin EC tablet 81 mg  81 mg Oral Daily Tanika RHEA RuizN - CNP   81 mg at 08/28/18 0756    enoxaparin (LOVENOX) injection 40 mg  40 mg Subcutaneous Daily Tanikacarissa Ruiz APRN - TD        bumetanide (BUMEX) injection 1 mg  1 mg Intravenous Daily Paz Esparza MD   1 mg at 08/28/18 0756    magnesium hydroxide (MILK OF MAGNESIA) 400 MG/5ML suspension 30 mL  30 mL Oral Daily PRN Paz Esparza MD        ipratropium-albuterol (DUONEB) nebulizer solution 1 ampule  1 ampule Inhalation Q4H WA Paz Esparza MD   1 ampule at 08/28/18 0924    mometasone-formoterol (DULERA) 100-5 MCG/ACT inhaler 2 puff  2 puff Inhalation BID Tanika Joseph APRN - CNP   2 puff at 08/28/18 0757    montelukast (SINGULAIR) tablet 10 mg  10 mg Oral Nightly Tanika Joseph APRN - CNP   10 mg at 08/27/18 2119    pantoprazole (PROTONIX) tablet 40 mg  40 mg Oral QAM AC Tanika Joseph, APRN - CNP   40 mg at 08/28/18 0756    potassium chloride (KLOR-CON M) extended release tablet 20 mEq  20 mEq Oral Daily with breakfast Tanika Joseph, APRN - CNP   20 mEq at 08/28/18 0756    spironolactone (ALDACTONE) tablet 25 mg  25 mg Oral Daily Tanika Joseph, APRN - CNP   25 mg at 08/28/18 0756    FLUoxetine (PROZAC) capsule 20 mg  20 mg Oral Daily Tanika Joseph, APRN - CNP   20 mg at 08/28/18 0756    atorvastatin (LIPITOR) tablet 10 mg  10 mg Oral Daily GA Gonzalez CNP   10 mg at 08/28/18 0756    senna (SENOKOT) tablet 8.6 mg  1 tablet Oral Nightly Marilounellytamia GA Bassett - CNP   8.6 mg at 08/27/18 2119    docusate sodium (COLACE) capsule 100 mg  100 mg Oral Daily GA Hernández CNP   100 mg at 08/28/18 2174    sodium chloride flush 0.9 % injection 10 mL  10 mL Intravenous 2 times per day Solange Henderson, DO   10 mL at 08/28/18 0755    sodium chloride flush 0.9 % injection 10 mL  10 mL Intravenous PRN Solange Henderson, DO               Principal Problem:    Acute CVA (cerebrovascular accident) [facility])  Active Problems:    Hyperlipidemia    COPD (chronic obstructive pulmonary disease) ([facility])    Asthma    Chronic systolic CHF (congestive heart failure), NYHA class 3 ([facility]  Resolved Problems:    * No resolved hospital problems. *        Assessment and Plan:  Suspect small embolic stroke in setting of heart failure. She had not been compliant with medications in the past. Recently discharged on aspirin and plavix. OK to continue home antiplt medications. Without evidence of intracardiac thrombus the role of anticoagulation in patient with reduced EF is not clear but should be considered and can be further discussed with her heart failure doctors. Pt OK for discharge from stroke standpoint. Pt should follow up in stroke clinic along with previously planned PCP and cardiology.        I spent 25 minutes with the patient, with 50% or more counseling them on their stroke    Shonda Patel MD

## 2018-08-29 LAB — PROTEIN S ANTIGEN, FREE: 99 % (ref 55–123)

## 2018-09-06 LAB — THROMBOPHILIA DNA ASSAY: NORMAL

## 2018-09-10 ENCOUNTER — TELEPHONE (OUTPATIENT)
Dept: CARDIOLOGY CLINIC | Age: 53
End: 2018-09-10

## 2018-09-15 ENCOUNTER — TELEPHONE (OUTPATIENT)
Dept: CARDIOLOGY CLINIC | Age: 53
End: 2018-09-15

## 2018-09-15 NOTE — TELEPHONE ENCOUNTER
Per Dr. Petros Issa, she requested us to call patient to see how shei doing. I left a message yesterday and today. Today I gave her my cell phone to call and advise us how she is feeling.

## 2018-10-01 ENCOUNTER — TELEPHONE (OUTPATIENT)
Dept: NON INVASIVE DIAGNOSTICS | Age: 53
End: 2018-10-01

## 2018-10-05 ENCOUNTER — TELEPHONE (OUTPATIENT)
Dept: SURGERY | Age: 53
End: 2018-10-05

## 2018-10-05 NOTE — LETTER
[facility]  CHILDREN Surgery  [address] [facility] [address]  Phone: [phone]  Fax: [phone]    Ce Machado MD        October 5, 2018     [address]  [facility] [place] [address]      Dear Julio Mcgarry: We are sorry that you missed your appointment with Dr. Tg Recinos on 10/5/2018. Your health and follow-up medical care are important to us. Please call our office as soon as possible so that we may reschedule your appointment. If you have already rescheduled your appointment, please disregard this letter.     Sincerely,        Ce Machado MD

## 2018-10-22 ENCOUNTER — OFFICE VISIT (OUTPATIENT)
Dept: FAMILY MEDICINE CLINIC | Age: 53
End: 2018-10-22
Payer: COMMERCIAL

## 2018-10-22 VITALS
HEART RATE: 86 BPM | TEMPERATURE: 98 F | RESPIRATION RATE: 18 BRPM | WEIGHT: 106 LBS | BODY MASS INDEX: 18.1 KG/M2 | SYSTOLIC BLOOD PRESSURE: 129 MMHG | OXYGEN SATURATION: 97 % | DIASTOLIC BLOOD PRESSURE: 85 MMHG | HEIGHT: 64 IN

## 2018-10-22 DIAGNOSIS — I50.22 CHRONIC SYSTOLIC CHF (CONGESTIVE HEART FAILURE), NYHA CLASS 3 (HCC): ICD-10-CM

## 2018-10-22 DIAGNOSIS — Z86.73 HISTORY OF CVA (CEREBROVASCULAR ACCIDENT): ICD-10-CM

## 2018-10-22 DIAGNOSIS — Z76.0 MEDICATION REFILL: Primary | ICD-10-CM

## 2018-10-22 DIAGNOSIS — J44.9 CHRONIC OBSTRUCTIVE PULMONARY DISEASE, UNSPECIFIED COPD TYPE (HCC): ICD-10-CM

## 2018-10-22 PROCEDURE — G8419 CALC BMI OUT NRM PARAM NOF/U: HCPCS | Performed by: STUDENT IN AN ORGANIZED HEALTH CARE EDUCATION/TRAINING PROGRAM

## 2018-10-22 PROCEDURE — 99212 OFFICE O/P EST SF 10 MIN: CPT | Performed by: STUDENT IN AN ORGANIZED HEALTH CARE EDUCATION/TRAINING PROGRAM

## 2018-10-22 PROCEDURE — 3017F COLORECTAL CA SCREEN DOC REV: CPT | Performed by: STUDENT IN AN ORGANIZED HEALTH CARE EDUCATION/TRAINING PROGRAM

## 2018-10-22 PROCEDURE — 1036F TOBACCO NON-USER: CPT | Performed by: STUDENT IN AN ORGANIZED HEALTH CARE EDUCATION/TRAINING PROGRAM

## 2018-10-22 PROCEDURE — G8598 ASA/ANTIPLAT THER USED: HCPCS | Performed by: STUDENT IN AN ORGANIZED HEALTH CARE EDUCATION/TRAINING PROGRAM

## 2018-10-22 PROCEDURE — G0008 ADMIN INFLUENZA VIRUS VAC: HCPCS

## 2018-10-22 PROCEDURE — G8926 SPIRO NO PERF OR DOC: HCPCS | Performed by: STUDENT IN AN ORGANIZED HEALTH CARE EDUCATION/TRAINING PROGRAM

## 2018-10-22 PROCEDURE — 3023F SPIROM DOC REV: CPT | Performed by: STUDENT IN AN ORGANIZED HEALTH CARE EDUCATION/TRAINING PROGRAM

## 2018-10-22 PROCEDURE — 90686 IIV4 VACC NO PRSV 0.5 ML IM: CPT

## 2018-10-22 PROCEDURE — 99213 OFFICE O/P EST LOW 20 MIN: CPT | Performed by: STUDENT IN AN ORGANIZED HEALTH CARE EDUCATION/TRAINING PROGRAM

## 2018-10-22 PROCEDURE — G8482 FLU IMMUNIZE ORDER/ADMIN: HCPCS | Performed by: STUDENT IN AN ORGANIZED HEALTH CARE EDUCATION/TRAINING PROGRAM

## 2018-10-22 PROCEDURE — 6360000002 HC RX W HCPCS

## 2018-10-22 PROCEDURE — G8427 DOCREV CUR MEDS BY ELIG CLIN: HCPCS | Performed by: STUDENT IN AN ORGANIZED HEALTH CARE EDUCATION/TRAINING PROGRAM

## 2018-10-22 RX ORDER — POTASSIUM CHLORIDE 1.5 G/1.77G
20 POWDER, FOR SOLUTION ORAL DAILY
Qty: 30 EACH | Refills: 2 | Status: SHIPPED | OUTPATIENT
Start: 2018-10-22 | End: 2019-03-22 | Stop reason: SDUPTHER

## 2018-10-22 RX ORDER — ISOSORBIDE DINITRATE 30 MG/1
30 TABLET ORAL 3 TIMES DAILY
Qty: 90 TABLET | Refills: 0 | Status: SHIPPED | OUTPATIENT
Start: 2018-10-22 | End: 2019-03-25 | Stop reason: SDUPTHER

## 2018-10-22 RX ORDER — OMEPRAZOLE 20 MG/1
20 CAPSULE, DELAYED RELEASE ORAL DAILY
Qty: 30 CAPSULE | Refills: 0 | Status: SHIPPED | OUTPATIENT
Start: 2018-10-22 | End: 2019-03-25 | Stop reason: SDUPTHER

## 2018-10-22 RX ORDER — SPIRONOLACTONE 25 MG/1
25 TABLET ORAL DAILY
Qty: 30 TABLET | Refills: 0 | Status: SHIPPED | OUTPATIENT
Start: 2018-10-22 | End: 2019-03-29 | Stop reason: SDUPTHER

## 2018-10-22 RX ORDER — MONTELUKAST SODIUM 10 MG/1
10 TABLET ORAL NIGHTLY
Qty: 30 TABLET | Refills: 3 | Status: SHIPPED | OUTPATIENT
Start: 2018-10-22 | End: 2019-03-29 | Stop reason: SDUPTHER

## 2018-10-22 RX ORDER — BUMETANIDE 2 MG/1
2 TABLET ORAL EVERY OTHER DAY
Qty: 90 TABLET | Refills: 0 | Status: SHIPPED | OUTPATIENT
Start: 2018-10-22 | End: 2019-03-25 | Stop reason: SDUPTHER

## 2018-10-22 RX ORDER — FLUOXETINE HYDROCHLORIDE 20 MG/1
20 CAPSULE ORAL DAILY
Qty: 30 CAPSULE | Refills: 0 | Status: SHIPPED | OUTPATIENT
Start: 2018-10-22 | End: 2019-03-25 | Stop reason: SDUPTHER

## 2018-10-22 RX ORDER — HYDRALAZINE HYDROCHLORIDE 50 MG/1
50 TABLET, FILM COATED ORAL EVERY 8 HOURS SCHEDULED
Qty: 90 TABLET | Refills: 0 | Status: SHIPPED | OUTPATIENT
Start: 2018-10-22 | End: 2019-03-25 | Stop reason: SDUPTHER

## 2018-10-22 ASSESSMENT — PATIENT HEALTH QUESTIONNAIRE - PHQ9
SUM OF ALL RESPONSES TO PHQ QUESTIONS 1-9: 0
1. LITTLE INTEREST OR PLEASURE IN DOING THINGS: 0
SUM OF ALL RESPONSES TO PHQ9 QUESTIONS 1 & 2: 0
SUM OF ALL RESPONSES TO PHQ QUESTIONS 1-9: 0
2. FEELING DOWN, DEPRESSED OR HOPELESS: 0

## 2018-10-22 NOTE — PROGRESS NOTES
S: 48 y.o. female here for CAD and HF s/p AICD placement. On isordil and sacubitril-valsartan and lipitor, asa, aldactone, bumex, metoprolol, plavix. Still smoking. Cannot afford copay of meds ($25 in total). O: VS: /85 (Site: Right Upper Arm, Position: Sitting, Cuff Size: Medium Adult)   Pulse 86   Temp 98 °F (36.7 °C) (Oral)   Resp 18   Ht 5' 4\" (1.626 m)   Wt 106 lb (48.1 kg)   LMP 05/12/2014 (Approximate)   SpO2 97%   BMI 18.19 kg/m²    General: NAD, alert and interacting appropriately. CV:  RRR, no gallops, rubs, or murmurs    Resp: CTAB   Ext:  No edema    Impression: CAD, HF, stroke hx. Plan:   Refill meds  Smoking cessation. Pt not ready to quit  F/u w/ cards  rtc 1 mo for CAD, stroke    Attending Physician Statement  I have discussed the case, including pertinent history and exam findings with the resident. I agree with the documented assessment and plan.
tablet by mouth daily  Dispense: 30 tablet; Refill: 0    3. Chronic obstructive pulmonary disease, unspecified COPD type (HCC)  - montelukast (SINGULAIR) 10 MG tablet; Take 1 tablet by mouth nightly  Dispense: 30 tablet; Refill: 3    4. History of CVA (cerebrovascular accident)  Stable. Patient back to baseline. Continue ASA and Plavix    5. Smoking   Patient not ready to quit smoking       Issues to address in the future appointment/s: medication refills     Return to [address] [place] [address] in about 1 month (around 11/22/2018) for check up .       Medication List:    Current Outpatient Prescriptions   Medication Sig Dispense Refill    isosorbide dinitrate (ISORDIL) 30 MG tablet Take 1 tablet by mouth 3 times daily 90 tablet 0    hydrALAZINE (APRESOLINE) 50 MG tablet Take 1 tablet by mouth every 8 hours 90 tablet 0    FLUoxetine (PROZAC) 20 MG capsule Take 1 capsule by mouth daily 30 capsule 0    bumetanide (BUMEX) 2 MG tablet Take 1 tablet by mouth every other day 90 tablet 0    spironolactone (ALDACTONE) 25 MG tablet Take 1 tablet by mouth daily 30 tablet 0    omeprazole (PRILOSEC) 20 MG delayed release capsule Take 1 capsule by mouth daily 30 capsule 0    potassium chloride (KLOR-CON) 20 MEQ packet Take 20 mEq by mouth daily 30 each 2    montelukast (SINGULAIR) 10 MG tablet Take 1 tablet by mouth nightly 30 tablet 3    budesonide-formoterol (SYMBICORT) 160-4.5 MCG/ACT AERO Inhale 2 puffs into the lungs 2 times daily 1 Inhaler 0    sacubitril-valsartan (ENTRESTO)  MG per tablet Take 1 tablet by mouth 2 times daily 60 tablet 0    metoprolol succinate (TOPROL XL) 50 MG extended release tablet Take 1 tablet by mouth 2 times daily 30 tablet 0    atorvastatin (LIPITOR) 40 MG tablet Take 1 tablet by mouth daily 30 tablet 0    aspirin 81 MG chewable tablet Take 1 tablet by mouth daily 30 tablet 0    mometasone-formoterol (DULERA) 100-5 MCG/ACT inhaler Inhale 2 puffs into the lungs 2 times daily 1 Inhaler 3   

## 2018-10-23 RX ORDER — METOPROLOL SUCCINATE 50 MG/1
50 TABLET, EXTENDED RELEASE ORAL 2 TIMES DAILY
Qty: 30 TABLET | Refills: 0 | Status: SHIPPED | OUTPATIENT
Start: 2018-10-23 | End: 2019-03-22 | Stop reason: SDUPTHER

## 2018-10-27 ASSESSMENT — ENCOUNTER SYMPTOMS
WHEEZING: 0
SHORTNESS OF BREATH: 0
ABDOMINAL PAIN: 0

## 2018-12-14 ENCOUNTER — TELEPHONE (OUTPATIENT)
Dept: ICU | Age: 53
End: 2018-12-14

## 2018-12-14 NOTE — TELEPHONE ENCOUNTER
Stroke Navigator 90 Day Discharge Follow Up      Discharge MRS: 0  Post 90 Day MRS: 0  Last NIHSS: 8    Spoke with:PATIENT  Discharge Disposition: HOME    SPOKE WITH PATIENT REGARDING STROKE FOLLOW UP. PATIENT SAID SHE DID NOT HAVE TRANSPORTATION TO HER NEUROLOGY APPOINTMENT AND THAT IS WHY SHE DID NOT FOLLOW UP. I ENCOURAGED THE PATIENT TO SPEAK TO  AT [address] [place] [address]. SHE STATED SHE HAS BEEN HAVING INTERMITTENT PERIODS OF FATIGUE AND APPROXIMATELY ONE WEEK AGO SHE PASSED OUT ON THE BATHROOM FLOOR AND HER NIECE HELPED HER UP. SHE STATES SHE DID NOT SEEK MEDICAL CARE. I ADVISED PATIENT THAT IN THE FUTURE TO ALERT EMS AND TO SCHEDULE FOLLOW UP WITH HER PCP TO DISCUSS HER RECENT SYMPTOMS. PATIENT STATED SHE WOULD CALL WHEN WE GOT OFF THE PHONE, AND SHE HAD THE PHONE NUMBER. NO QUESTIONS AT THIS TIME OF PATIENT AND SHE WAS THANKFUL FOR THE CALL.        Electronically signed by Mike Merritt RN on 12/14/2018 at 1:51 PM

## 2018-12-18 ENCOUNTER — OFFICE VISIT (OUTPATIENT)
Dept: FAMILY MEDICINE CLINIC | Age: 53
End: 2018-12-18
Payer: COMMERCIAL

## 2018-12-18 ENCOUNTER — HOSPITAL ENCOUNTER (EMERGENCY)
Age: 53
Discharge: HOME | End: 2018-12-18
Attending: EMERGENCY MEDICINE
Payer: COMMERCIAL

## 2018-12-18 VITALS
OXYGEN SATURATION: 98 % | TEMPERATURE: 98.5 F | BODY MASS INDEX: 18.78 KG/M2 | SYSTOLIC BLOOD PRESSURE: 172 MMHG | HEART RATE: 88 BPM | HEIGHT: 64 IN | WEIGHT: 110 LBS | DIASTOLIC BLOOD PRESSURE: 104 MMHG | RESPIRATION RATE: 18 BRPM

## 2018-12-18 VITALS
HEIGHT: 63 IN | RESPIRATION RATE: 14 BRPM | SYSTOLIC BLOOD PRESSURE: 150 MMHG | TEMPERATURE: 97.8 F | HEART RATE: 77 BPM | WEIGHT: 150 LBS | OXYGEN SATURATION: 99 % | BODY MASS INDEX: 26.58 KG/M2 | DIASTOLIC BLOOD PRESSURE: 90 MMHG
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
## 2018-12-18 DIAGNOSIS — R53.1 GENERALIZED WEAKNESS: Primary | ICD-10-CM

## 2018-12-18 DIAGNOSIS — R55 SYNCOPE AND COLLAPSE: Primary | ICD-10-CM

## 2018-12-18 LAB
ALBUMIN SERPL-MCNC: 4.2 G/DL (ref 3.5–5.2)
ALP BLD-CCNC: 100 U/L (ref 35–104)
ALT SERPL-CCNC: 24 U/L (ref 0–32)
ANION GAP SERPL CALCULATED.3IONS-SCNC: 12 MMOL/L (ref 7–16)
AST SERPL-CCNC: 23 U/L (ref 0–31)
BASOPHILS ABSOLUTE: 0.06 E9/L (ref 0–0.2)
BASOPHILS RELATIVE PERCENT: 0.7 % (ref 0–2)
BILIRUB SERPL-MCNC: 1.1 MG/DL (ref 0–1.2)
BUN BLDV-MCNC: 9 MG/DL (ref 6–20)
CALCIUM SERPL-MCNC: 9.6 MG/DL (ref 8.6–10.2)
CHLORIDE BLD-SCNC: 103 MMOL/L (ref 98–107)
CO2: 22 MMOL/L (ref 22–29)
CREAT SERPL-MCNC: 0.7 MG/DL (ref 0.5–1)
EOSINOPHILS ABSOLUTE: 0.06 E9/L (ref 0.05–0.5)
EOSINOPHILS RELATIVE PERCENT: 0.7 % (ref 0–6)
GFR AFRICAN AMERICAN: >60
GFR NON-AFRICAN AMERICAN: >60 ML/MIN/1.73
GLUCOSE BLD-MCNC: 113 MG/DL (ref 74–99)
GLUCOSE BLD-MCNC: 118 MG/DL
HCT VFR BLD CALC: 39.9 % (ref 34–48)
HEMOGLOBIN: 12.8 G/DL (ref 11.5–15.5)
IMMATURE GRANULOCYTES #: 0.05 E9/L
IMMATURE GRANULOCYTES %: 0.6 % (ref 0–5)
LIPASE: 10 U/L (ref 13–60)
LYMPHOCYTES ABSOLUTE: 1.79 E9/L (ref 1.5–4)
LYMPHOCYTES RELATIVE PERCENT: 22.3 % (ref 20–42)
MCH RBC QN AUTO: 27.6 PG (ref 26–35)
MCHC RBC AUTO-ENTMCNC: 32.1 % (ref 32–34.5)
MCV RBC AUTO: 86.2 FL (ref 80–99.9)
MONOCYTES ABSOLUTE: 0.71 E9/L (ref 0.1–0.95)
MONOCYTES RELATIVE PERCENT: 8.8 % (ref 2–12)
NEUTROPHILS ABSOLUTE: 5.36 E9/L (ref 1.8–7.3)
NEUTROPHILS RELATIVE PERCENT: 66.9 % (ref 43–80)
PDW BLD-RTO: 15.2 FL (ref 11.5–15)
PLATELET # BLD: 295 E9/L (ref 130–450)
PMV BLD AUTO: 10.2 FL (ref 7–12)
POTASSIUM REFLEX MAGNESIUM: 3.9 MMOL/L (ref 3.5–5)
RBC # BLD: 4.63 E12/L (ref 3.5–5.5)
SODIUM BLD-SCNC: 137 MMOL/L (ref 132–146)
TOTAL PROTEIN: 7.2 G/DL (ref 6.4–8.3)
TROPONIN: <0.01 NG/ML (ref 0–0.03)
TSH SERPL DL<=0.05 MIU/L-ACNC: 0.9 UIU/ML (ref 0.27–4.2)
WBC # BLD: 8 E9/L (ref 4.5–11.5)

## 2018-12-18 PROCEDURE — 82962 GLUCOSE BLOOD TEST: CPT | Performed by: FAMILY MEDICINE

## 2018-12-18 PROCEDURE — 2580000003 HC RX 258: Performed by: EMERGENCY MEDICINE

## 2018-12-18 PROCEDURE — 93005 ELECTROCARDIOGRAM TRACING: CPT | Performed by: EMERGENCY MEDICINE

## 2018-12-18 PROCEDURE — 80053 COMPREHEN METABOLIC PANEL: CPT

## 2018-12-18 PROCEDURE — 99283 EMERGENCY DEPT VISIT LOW MDM: CPT

## 2018-12-18 PROCEDURE — 1036F TOBACCO NON-USER: CPT | Performed by: FAMILY MEDICINE

## 2018-12-18 PROCEDURE — G8419 CALC BMI OUT NRM PARAM NOF/U: HCPCS | Performed by: FAMILY MEDICINE

## 2018-12-18 PROCEDURE — G8598 ASA/ANTIPLAT THER USED: HCPCS | Performed by: FAMILY MEDICINE

## 2018-12-18 PROCEDURE — G8427 DOCREV CUR MEDS BY ELIG CLIN: HCPCS | Performed by: FAMILY MEDICINE

## 2018-12-18 PROCEDURE — 99213 OFFICE O/P EST LOW 20 MIN: CPT | Performed by: FAMILY MEDICINE

## 2018-12-18 PROCEDURE — G8482 FLU IMMUNIZE ORDER/ADMIN: HCPCS | Performed by: FAMILY MEDICINE

## 2018-12-18 PROCEDURE — 85025 COMPLETE CBC W/AUTO DIFF WBC: CPT

## 2018-12-18 PROCEDURE — 83690 ASSAY OF LIPASE: CPT

## 2018-12-18 PROCEDURE — 3017F COLORECTAL CA SCREEN DOC REV: CPT | Performed by: FAMILY MEDICINE

## 2018-12-18 PROCEDURE — 6360000002 HC RX W HCPCS

## 2018-12-18 PROCEDURE — 99999 PR OFFICE/OUTPT VISIT,PROCEDURE ONLY: CPT | Performed by: FAMILY MEDICINE

## 2018-12-18 PROCEDURE — 84484 ASSAY OF TROPONIN QUANT: CPT

## 2018-12-18 PROCEDURE — 84443 ASSAY THYROID STIM HORMONE: CPT

## 2018-12-18 RX ORDER — ALBUTEROL SULFATE 2.5 MG/3ML
2.5 SOLUTION RESPIRATORY (INHALATION) ONCE
Status: COMPLETED | OUTPATIENT
Start: 2018-12-18 | End: 2018-12-18

## 2018-12-18 RX ORDER — 0.9 % SODIUM CHLORIDE 0.9 %
1000 INTRAVENOUS SOLUTION INTRAVENOUS ONCE
Status: COMPLETED | OUTPATIENT
Start: 2018-12-18 | End: 2018-12-18

## 2018-12-18 RX ORDER — DIPHENHYDRAMINE HCL 25 MG
25 CAPSULE ORAL EVERY 6 HOURS PRN
Qty: 30 CAPSULE | Refills: 0 | Status: SHIPPED | OUTPATIENT
Start: 2018-12-18 | End: 2018-12-25

## 2018-12-18 RX ORDER — ALBUTEROL SULFATE 90 UG/1
2 AEROSOL, METERED RESPIRATORY (INHALATION) EVERY 6 HOURS PRN
COMMUNITY
End: 2019-03-29 | Stop reason: SDUPTHER

## 2018-12-18 RX ADMIN — SODIUM CHLORIDE 1000 ML: 9 INJECTION, SOLUTION INTRAVENOUS at 15:43

## 2018-12-18 RX ADMIN — Medication 0.5 MG: at 11:59

## 2018-12-18 RX ADMIN — ALBUTEROL SULFATE 2.5 MG: 2.5 SOLUTION RESPIRATORY (INHALATION) at 11:58

## 2018-12-18 ASSESSMENT — ENCOUNTER SYMPTOMS
ABDOMINAL DISTENTION: 0
CHEST TIGHTNESS: 0
COLOR CHANGE: 0
WHEEZING: 0
COUGH: 0
WHEEZING: 0
NAUSEA: 1
RHINORRHEA: 0
SORE THROAT: 0
ABDOMINAL PAIN: 0
NAUSEA: 0
BLOOD IN STOOL: 0
VISUAL CHANGE: 0
EYE REDNESS: 0
VOMITING: 0
RHINORRHEA: 0
PHOTOPHOBIA: 0
SINUS PRESSURE: 0
EYE REDNESS: 0
HEMATOCHEZIA: 0
CONSTIPATION: 0
SINUS PAIN: 0
SHORTNESS OF BREATH: 0
SORE THROAT: 0
ABDOMINAL PAIN: 0
BLOOD IN STOOL: 0
VOMITING: 1
COUGH: 0
SINUS PRESSURE: 0
DIARRHEA: 0
SHORTNESS OF BREATH: 1
DIARRHEA: 0
BACK PAIN: 0
EYE PAIN: 0
TROUBLE SWALLOWING: 0

## 2018-12-18 NOTE — PROGRESS NOTES
Patient was walking in front of nursing station, became weak and lowered self to floor. Remained alert and responsive stating she does not feel well. Dr Patrick Gallego notified and vital signs and blood sugar taken. Called 911 for transport to hospital. Patient remained alert and talkative during episode.

## 2018-12-18 NOTE — ED PROVIDER NOTES
for pain, redness and visual disturbance. Respiratory: Negative for cough, shortness of breath and wheezing. Cardiovascular: Negative for chest pain, palpitations, leg swelling, syncope and near-syncope. Gastrointestinal: Positive for nausea and vomiting. Negative for abdominal pain, blood in stool, diarrhea, hematochezia and melena. Endocrine: Negative for cold intolerance and heat intolerance. Genitourinary: Negative for dysuria, frequency and hematuria. Musculoskeletal: Positive for falls. Negative for arthralgias, back pain, myalgias, neck pain and neck stiffness. Skin: Negative for color change and rash. Neurological: Positive for weakness (generalized). Negative for dizziness, tremors, seizures, loss of consciousness, syncope, facial asymmetry, speech difficulty, light-headedness, numbness and headaches. Hematological: Negative for adenopathy. Does not bruise/bleed easily. Physical Exam   Constitutional: She is oriented to person, place, and time. She appears well-developed and well-nourished. No distress. HENT:   Head: Normocephalic and atraumatic. Mouth/Throat: Oropharynx is clear and moist. No oropharyngeal exudate. Eyes: Pupils are equal, round, and reactive to light. Conjunctivae and EOM are normal. Right eye exhibits no discharge. Left eye exhibits no discharge. No scleral icterus. Neck: Normal range of motion. Neck supple. No JVD present. No tracheal deviation present. No thyromegaly present. Cardiovascular: Normal rate, regular rhythm, normal heart sounds and intact distal pulses. Exam reveals no gallop and no friction rub. No murmur heard. Pulmonary/Chest: Effort normal and breath sounds normal. No respiratory distress. She has no wheezes. She has no rales. She exhibits no tenderness. Abdominal: Soft. Bowel sounds are normal. She exhibits no distension. There is no tenderness. There is no rebound and no guarding. Musculoskeletal: Normal range of motion.  She antibiotics; and Aspirin    -------------------------------------------------- RESULTS -------------------------------------------------  Labs:  Results for orders placed or performed during the hospital encounter of 12/18/18   CBC Auto Differential   Result Value Ref Range    WBC 8.0 4.5 - 11.5 E9/L    RBC 4.63 3.50 - 5.50 E12/L    Hemoglobin 12.8 11.5 - 15.5 g/dL    Hematocrit 39.9 34.0 - 48.0 %    MCV 86.2 80.0 - 99.9 fL    MCH 27.6 26.0 - 35.0 pg    MCHC 32.1 32.0 - 34.5 %    RDW 15.2 (H) 11.5 - 15.0 fL    Platelets 969 716 - 782 E9/L    MPV 10.2 7.0 - 12.0 fL    Neutrophils % 66.9 43.0 - 80.0 %    Immature Granulocytes % 0.6 0.0 - 5.0 %    Lymphocytes % 22.3 20.0 - 42.0 %    Monocytes % 8.8 2.0 - 12.0 %    Eosinophils % 0.7 0.0 - 6.0 %    Basophils % 0.7 0.0 - 2.0 %    Neutrophils # 5.36 1.80 - 7.30 E9/L    Immature Granulocytes # 0.05 E9/L    Lymphocytes # 1.79 1.50 - 4.00 E9/L    Monocytes # 0.71 0.10 - 0.95 E9/L    Eosinophils # 0.06 0.05 - 0.50 E9/L    Basophils # 0.06 0.00 - 0.20 E9/L   Comprehensive Metabolic Panel w/ Reflex to MG   Result Value Ref Range    Sodium 137 132 - 146 mmol/L    Potassium reflex Magnesium 3.9 3.5 - 5.0 mmol/L    Chloride 103 98 - 107 mmol/L    CO2 22 22 - 29 mmol/L    Anion Gap 12 7 - 16 mmol/L    Glucose 113 (H) 74 - 99 mg/dL    BUN 9 6 - 20 mg/dL    CREATININE 0.7 0.5 - 1.0 mg/dL    GFR Non-African American >60 >=60 mL/min/1.73    GFR African American >60     Calcium 9.6 8.6 - 10.2 mg/dL    Total Protein 7.2 6.4 - 8.3 g/dL    Alb 4.2 3.5 - 5.2 g/dL    Total Bilirubin 1.1 0.0 - 1.2 mg/dL    Alkaline Phosphatase 100 35 - 104 U/L    ALT 24 0 - 32 U/L    AST 23 0 - 31 U/L   Lipase   Result Value Ref Range    Lipase 10 (L) 13 - 60 U/L   Troponin   Result Value Ref Range    Troponin <0.01 0.00 - 0.03 ng/mL   TSH without Reflex   Result Value Ref Range    TSH 0.903 0.270 - 4.200 uIU/mL   EKG 12 Lead   Result Value Ref Range    Ventricular Rate 71 BPM    Atrial Rate 71 BPM    P-R

## 2018-12-21 LAB
EKG ATRIAL RATE: 71 BPM
EKG P AXIS: 71 DEGREES
EKG P-R INTERVAL: 212 MS
EKG Q-T INTERVAL: 452 MS
EKG QRS DURATION: 114 MS
EKG QTC CALCULATION (BAZETT): 491 MS
EKG R AXIS: -88 DEGREES
EKG T AXIS: 83 DEGREES
EKG VENTRICULAR RATE: 71 BPM

## 2019-03-22 ENCOUNTER — TELEPHONE (OUTPATIENT)
Dept: ADMINISTRATIVE | Age: 54
End: 2019-03-22

## 2019-03-25 RX ORDER — OMEPRAZOLE 20 MG/1
20 CAPSULE, DELAYED RELEASE ORAL DAILY
Qty: 30 CAPSULE | Refills: 0 | Status: SHIPPED | OUTPATIENT
Start: 2019-03-25 | End: 2019-03-29 | Stop reason: SDUPTHER

## 2019-03-25 RX ORDER — BUMETANIDE 2 MG/1
2 TABLET ORAL EVERY OTHER DAY
Qty: 90 TABLET | Refills: 0 | Status: SHIPPED | OUTPATIENT
Start: 2019-03-25 | End: 2019-03-29 | Stop reason: SDUPTHER

## 2019-03-25 RX ORDER — ISOSORBIDE DINITRATE 30 MG/1
30 TABLET ORAL 3 TIMES DAILY
Qty: 90 TABLET | Refills: 0 | Status: SHIPPED | OUTPATIENT
Start: 2019-03-25 | End: 2019-03-29 | Stop reason: SDUPTHER

## 2019-03-25 RX ORDER — HYDRALAZINE HYDROCHLORIDE 50 MG/1
50 TABLET, FILM COATED ORAL EVERY 8 HOURS SCHEDULED
Qty: 90 TABLET | Refills: 0 | Status: SHIPPED | OUTPATIENT
Start: 2019-03-25 | End: 2019-03-29 | Stop reason: SDUPTHER

## 2019-03-25 RX ORDER — FLUOXETINE HYDROCHLORIDE 20 MG/1
20 CAPSULE ORAL DAILY
Qty: 30 CAPSULE | Refills: 0 | Status: SHIPPED | OUTPATIENT
Start: 2019-03-25 | End: 2019-03-29 | Stop reason: SDUPTHER

## 2019-03-25 RX ORDER — POTASSIUM CHLORIDE 1.5 G/1.77G
20 POWDER, FOR SOLUTION ORAL DAILY
Qty: 30 EACH | Refills: 2 | Status: ON HOLD | OUTPATIENT
Start: 2019-03-25 | End: 2019-12-27 | Stop reason: SDUPTHER

## 2019-03-25 RX ORDER — METOPROLOL SUCCINATE 50 MG/1
50 TABLET, EXTENDED RELEASE ORAL 2 TIMES DAILY
Qty: 30 TABLET | Refills: 0 | Status: SHIPPED | OUTPATIENT
Start: 2019-03-25 | End: 2019-03-29 | Stop reason: SDUPTHER

## 2019-03-29 ENCOUNTER — OFFICE VISIT (OUTPATIENT)
Dept: FAMILY MEDICINE CLINIC | Age: 54
End: 2019-03-29
Payer: COMMERCIAL

## 2019-03-29 ENCOUNTER — HOSPITAL ENCOUNTER (OUTPATIENT)
Age: 54
Discharge: HOME | End: 2019-03-31
Payer: COMMERCIAL

## 2019-03-29 VITALS
SYSTOLIC BLOOD PRESSURE: 149 MMHG | HEART RATE: 86 BPM | DIASTOLIC BLOOD PRESSURE: 89 MMHG | OXYGEN SATURATION: 97 % | BODY MASS INDEX: 18.44 KG/M2 | TEMPERATURE: 97.9 F | WEIGHT: 108 LBS | HEIGHT: 64 IN

## 2019-03-29 DIAGNOSIS — J44.9 CHRONIC OBSTRUCTIVE PULMONARY DISEASE, UNSPECIFIED COPD TYPE (HCC): ICD-10-CM

## 2019-03-29 DIAGNOSIS — I50.22 CHRONIC SYSTOLIC CHF (CONGESTIVE HEART FAILURE), NYHA CLASS 3 (HCC): ICD-10-CM

## 2019-03-29 DIAGNOSIS — I10 ESSENTIAL HYPERTENSION: ICD-10-CM

## 2019-03-29 DIAGNOSIS — Z76.0 MEDICATION REFILL: Primary | ICD-10-CM

## 2019-03-29 LAB
ALBUMIN SERPL-MCNC: 4.9 G/DL (ref 3.5–5.2)
ALP BLD-CCNC: 130 U/L (ref 35–104)
ALT SERPL-CCNC: 18 U/L (ref 0–32)
ANION GAP SERPL CALCULATED.3IONS-SCNC: 13 MMOL/L (ref 7–16)
AST SERPL-CCNC: 28 U/L (ref 0–31)
BILIRUB SERPL-MCNC: 0.5 MG/DL (ref 0–1.2)
BUN BLDV-MCNC: 12 MG/DL (ref 6–20)
CALCIUM SERPL-MCNC: 9.7 MG/DL (ref 8.6–10.2)
CHLORIDE BLD-SCNC: 106 MMOL/L (ref 98–107)
CO2: 25 MMOL/L (ref 22–29)
CREAT SERPL-MCNC: 0.7 MG/DL (ref 0.5–1)
GFR AFRICAN AMERICAN: >60
GFR NON-AFRICAN AMERICAN: >60 ML/MIN/1.73
GLUCOSE BLD-MCNC: 114 MG/DL (ref 74–99)
POTASSIUM SERPL-SCNC: 4.2 MMOL/L (ref 3.5–5)
SODIUM BLD-SCNC: 144 MMOL/L (ref 132–146)
TOTAL PROTEIN: 8.1 G/DL (ref 6.4–8.3)

## 2019-03-29 PROCEDURE — 93010 ELECTROCARDIOGRAM REPORT: CPT | Performed by: FAMILY MEDICINE

## 2019-03-29 PROCEDURE — G8420 CALC BMI NORM PARAMETERS: HCPCS | Performed by: FAMILY MEDICINE

## 2019-03-29 PROCEDURE — 99213 OFFICE O/P EST LOW 20 MIN: CPT | Performed by: FAMILY MEDICINE

## 2019-03-29 PROCEDURE — 99212 OFFICE O/P EST SF 10 MIN: CPT | Performed by: FAMILY MEDICINE

## 2019-03-29 PROCEDURE — G8926 SPIRO NO PERF OR DOC: HCPCS | Performed by: FAMILY MEDICINE

## 2019-03-29 PROCEDURE — 93005 ELECTROCARDIOGRAM TRACING: CPT | Performed by: FAMILY MEDICINE

## 2019-03-29 PROCEDURE — 1036F TOBACCO NON-USER: CPT | Performed by: FAMILY MEDICINE

## 2019-03-29 PROCEDURE — 36415 COLL VENOUS BLD VENIPUNCTURE: CPT

## 2019-03-29 PROCEDURE — 3023F SPIROM DOC REV: CPT | Performed by: FAMILY MEDICINE

## 2019-03-29 PROCEDURE — 80053 COMPREHEN METABOLIC PANEL: CPT

## 2019-03-29 PROCEDURE — G8482 FLU IMMUNIZE ORDER/ADMIN: HCPCS | Performed by: FAMILY MEDICINE

## 2019-03-29 PROCEDURE — G8427 DOCREV CUR MEDS BY ELIG CLIN: HCPCS | Performed by: FAMILY MEDICINE

## 2019-03-29 PROCEDURE — G8598 ASA/ANTIPLAT THER USED: HCPCS | Performed by: FAMILY MEDICINE

## 2019-03-29 PROCEDURE — 3017F COLORECTAL CA SCREEN DOC REV: CPT | Performed by: FAMILY MEDICINE

## 2019-03-29 RX ORDER — OMEPRAZOLE 20 MG/1
20 CAPSULE, DELAYED RELEASE ORAL DAILY
Qty: 30 CAPSULE | Refills: 0 | Status: ON HOLD | OUTPATIENT
Start: 2019-03-29 | End: 2019-12-27 | Stop reason: SDUPTHER

## 2019-03-29 RX ORDER — CLOPIDOGREL BISULFATE 75 MG/1
75 TABLET ORAL DAILY
Qty: 30 TABLET | Refills: 0 | Status: ON HOLD | OUTPATIENT
Start: 2019-03-29 | End: 2019-12-27 | Stop reason: SDUPTHER

## 2019-03-29 RX ORDER — FLUOXETINE HYDROCHLORIDE 20 MG/1
20 CAPSULE ORAL DAILY
Qty: 30 CAPSULE | Refills: 0 | Status: ON HOLD | OUTPATIENT
Start: 2019-03-29 | End: 2019-12-27 | Stop reason: SDUPTHER

## 2019-03-29 RX ORDER — ALBUTEROL SULFATE 90 UG/1
2 AEROSOL, METERED RESPIRATORY (INHALATION) EVERY 6 HOURS PRN
Qty: 1 INHALER | Refills: 0 | Status: ON HOLD | OUTPATIENT
Start: 2019-03-29 | End: 2019-12-27

## 2019-03-29 RX ORDER — MONTELUKAST SODIUM 10 MG/1
10 TABLET ORAL NIGHTLY
Qty: 30 TABLET | Refills: 3 | Status: ON HOLD | OUTPATIENT
Start: 2019-03-29 | End: 2019-12-27 | Stop reason: SDUPTHER

## 2019-03-29 RX ORDER — ATORVASTATIN CALCIUM 40 MG/1
40 TABLET, FILM COATED ORAL DAILY
Qty: 30 TABLET | Refills: 0 | Status: ON HOLD | OUTPATIENT
Start: 2019-03-29 | End: 2019-12-27 | Stop reason: SDUPTHER

## 2019-03-29 RX ORDER — ALBUTEROL SULFATE 90 UG/1
2 AEROSOL, METERED RESPIRATORY (INHALATION) EVERY 6 HOURS PRN
Qty: 1 INHALER | Refills: 3 | Status: ON HOLD | OUTPATIENT
Start: 2019-03-29 | End: 2019-12-27 | Stop reason: SDUPTHER

## 2019-03-29 RX ORDER — BUDESONIDE AND FORMOTEROL FUMARATE DIHYDRATE 160; 4.5 UG/1; UG/1
2 AEROSOL RESPIRATORY (INHALATION) 2 TIMES DAILY
Qty: 1 INHALER | Refills: 0 | Status: ON HOLD | OUTPATIENT
Start: 2019-03-29 | End: 2019-12-27 | Stop reason: SDUPTHER

## 2019-03-29 RX ORDER — HYDRALAZINE HYDROCHLORIDE 50 MG/1
50 TABLET, FILM COATED ORAL EVERY 8 HOURS SCHEDULED
Qty: 90 TABLET | Refills: 0 | Status: ON HOLD | OUTPATIENT
Start: 2019-03-29 | End: 2019-12-27 | Stop reason: SDUPTHER

## 2019-03-29 RX ORDER — SPIRONOLACTONE 25 MG/1
25 TABLET ORAL DAILY
Qty: 30 TABLET | Refills: 0 | Status: ON HOLD | OUTPATIENT
Start: 2019-03-29 | End: 2019-12-27 | Stop reason: SDUPTHER

## 2019-03-29 RX ORDER — METOPROLOL SUCCINATE 50 MG/1
50 TABLET, EXTENDED RELEASE ORAL 2 TIMES DAILY
Qty: 30 TABLET | Refills: 0 | Status: ON HOLD | OUTPATIENT
Start: 2019-03-29 | End: 2019-12-27 | Stop reason: SDUPTHER

## 2019-03-29 RX ORDER — ISOSORBIDE DINITRATE 30 MG/1
30 TABLET ORAL 3 TIMES DAILY
Qty: 90 TABLET | Refills: 0 | Status: ON HOLD | OUTPATIENT
Start: 2019-03-29 | End: 2019-12-27 | Stop reason: SDUPTHER

## 2019-03-29 RX ORDER — BUMETANIDE 2 MG/1
2 TABLET ORAL EVERY OTHER DAY
Qty: 90 TABLET | Refills: 0 | Status: ON HOLD | OUTPATIENT
Start: 2019-03-29 | End: 2019-12-27 | Stop reason: SDUPTHER

## 2019-03-29 RX ORDER — ASPIRIN 81 MG/1
81 TABLET, CHEWABLE ORAL DAILY
Qty: 30 TABLET | Refills: 0 | Status: ON HOLD | OUTPATIENT
Start: 2019-03-29 | End: 2019-12-27 | Stop reason: SDUPTHER

## 2019-03-29 ASSESSMENT — PATIENT HEALTH QUESTIONNAIRE - PHQ9
SUM OF ALL RESPONSES TO PHQ QUESTIONS 1-9: 0
1. LITTLE INTEREST OR PLEASURE IN DOING THINGS: 0
SUM OF ALL RESPONSES TO PHQ QUESTIONS 1-9: 0
2. FEELING DOWN, DEPRESSED OR HOPELESS: 0
SUM OF ALL RESPONSES TO PHQ9 QUESTIONS 1 & 2: 0

## 2019-03-29 ASSESSMENT — ENCOUNTER SYMPTOMS
CONSTIPATION: 0
EYE REDNESS: 0
DIARRHEA: 0
PHOTOPHOBIA: 0
BLOOD IN STOOL: 0
ABDOMINAL DISTENTION: 0
CHEST TIGHTNESS: 0
COUGH: 0
VOMITING: 0
COLOR CHANGE: 1
EYE PAIN: 0
WHEEZING: 0
ABDOMINAL PAIN: 0
SHORTNESS OF BREATH: 1
NAUSEA: 0

## 2019-04-08 ENCOUNTER — TELEPHONE (OUTPATIENT)
Dept: FAMILY MEDICINE CLINIC | Age: 54
End: 2019-04-08

## 2019-06-14 ENCOUNTER — TELEPHONE (OUTPATIENT)
Dept: CARDIOLOGY CLINIC | Age: 54
End: 2019-06-14

## 2019-06-14 NOTE — TELEPHONE ENCOUNTER
Patient stated she has been having lightheadedness and weakness x1 week. She has canceled her past several appointments with our office. She was a no-show for her appointment with Dr. Mainor Chavarria on 5/17/19. Scheduled her an appointment with Rosendo Kayser, CNP on 6/26/19 at 10:00 am.  Informed her she is to go to the ER if her symptoms worsen or do not improve. She understood.

## 2019-07-15 ENCOUNTER — TELEPHONE (OUTPATIENT)
Dept: NON INVASIVE DIAGNOSTICS | Age: 54
End: 2019-07-15

## 2019-12-26 ENCOUNTER — APPOINTMENT (OUTPATIENT)
Dept: GENERAL RADIOLOGY | Age: 54
DRG: 291 | End: 2019-12-26
Payer: COMMERCIAL

## 2019-12-26 ENCOUNTER — HOSPITAL ENCOUNTER (INPATIENT)
Age: 54
LOS: 1 days | Discharge: HOME | DRG: 291 | End: 2019-12-27
Attending: EMERGENCY MEDICINE | Admitting: INTERNAL MEDICINE
Payer: COMMERCIAL

## 2019-12-26 PROBLEM — I50.9 ACUTE DECOMPENSATED HEART FAILURE (HCC): Status: ACTIVE | Noted: 2019-12-26

## 2019-12-26 PROBLEM — J81.0 ACUTE PULMONARY EDEMA (HCC): Status: ACTIVE | Noted: 2019-12-26

## 2019-12-26 LAB
ALBUMIN SERPL-MCNC: 4.3 G/DL (ref 3.5–5.2)
ALP BLD-CCNC: 127 U/L (ref 35–104)
ALT SERPL-CCNC: 14 U/L (ref 0–32)
ANION GAP SERPL CALCULATED.3IONS-SCNC: 12 MMOL/L (ref 7–16)
AST SERPL-CCNC: 23 U/L (ref 0–31)
BASOPHILS ABSOLUTE: 0.06 E9/L (ref 0–0.2)
BASOPHILS RELATIVE PERCENT: 1.1 % (ref 0–2)
BILIRUB SERPL-MCNC: 0.6 MG/DL (ref 0–1.2)
BUN BLDV-MCNC: 11 MG/DL (ref 6–20)
CALCIUM SERPL-MCNC: 9.3 MG/DL (ref 8.6–10.2)
CHLORIDE BLD-SCNC: 103 MMOL/L (ref 98–107)
CO2: 25 MMOL/L (ref 22–29)
CREAT SERPL-MCNC: 0.8 MG/DL (ref 0.5–1)
EOSINOPHILS ABSOLUTE: 0.15 E9/L (ref 0.05–0.5)
EOSINOPHILS RELATIVE PERCENT: 2.8 % (ref 0–6)
GFR AFRICAN AMERICAN: >60
GFR NON-AFRICAN AMERICAN: >60 ML/MIN/1.73
GLUCOSE BLD-MCNC: 113 MG/DL (ref 74–99)
HCT VFR BLD CALC: 37.3 % (ref 34–48)
HEMOGLOBIN: 11.6 G/DL (ref 11.5–15.5)
IMMATURE GRANULOCYTES #: 0.01 E9/L
IMMATURE GRANULOCYTES %: 0.2 % (ref 0–5)
LYMPHOCYTES ABSOLUTE: 1.38 E9/L (ref 1.5–4)
LYMPHOCYTES RELATIVE PERCENT: 26.2 % (ref 20–42)
MCH RBC QN AUTO: 27.2 PG (ref 26–35)
MCHC RBC AUTO-ENTMCNC: 31.1 % (ref 32–34.5)
MCV RBC AUTO: 87.6 FL (ref 80–99.9)
MONOCYTES ABSOLUTE: 0.64 E9/L (ref 0.1–0.95)
MONOCYTES RELATIVE PERCENT: 12.1 % (ref 2–12)
NEUTROPHILS ABSOLUTE: 3.03 E9/L (ref 1.8–7.3)
NEUTROPHILS RELATIVE PERCENT: 57.6 % (ref 43–80)
PDW BLD-RTO: 14.3 FL (ref 11.5–15)
PLATELET # BLD: 250 E9/L (ref 130–450)
PMV BLD AUTO: 9.9 FL (ref 7–12)
POTASSIUM REFLEX MAGNESIUM: 4.4 MMOL/L (ref 3.5–5)
PRO-BNP: 7151 PG/ML (ref 0–125)
RBC # BLD: 4.26 E12/L (ref 3.5–5.5)
SODIUM BLD-SCNC: 140 MMOL/L (ref 132–146)
TOTAL PROTEIN: 7.3 G/DL (ref 6.4–8.3)
TROPONIN: <0.01 NG/ML (ref 0–0.03)
WBC # BLD: 5.3 E9/L (ref 4.5–11.5)

## 2019-12-26 PROCEDURE — G0378 HOSPITAL OBSERVATION PER HR: HCPCS

## 2019-12-26 PROCEDURE — 6370000000 HC RX 637 (ALT 250 FOR IP): Performed by: INTERNAL MEDICINE

## 2019-12-26 PROCEDURE — 2580000003 HC RX 258

## 2019-12-26 PROCEDURE — 96375 TX/PRO/DX INJ NEW DRUG ADDON: CPT

## 2019-12-26 PROCEDURE — 0100U HC RESPIRPTHGN MULT REV TRANS & AMP PRB TECH 21 TRGT: CPT

## 2019-12-26 PROCEDURE — 2580000003 HC RX 258: Performed by: INTERNAL MEDICINE

## 2019-12-26 PROCEDURE — 6360000002 HC RX W HCPCS: Performed by: NURSE PRACTITIONER

## 2019-12-26 PROCEDURE — 94664 DEMO&/EVAL PT USE INHALER: CPT

## 2019-12-26 PROCEDURE — 99285 EMERGENCY DEPT VISIT HI MDM: CPT

## 2019-12-26 PROCEDURE — 80053 COMPREHEN METABOLIC PANEL: CPT

## 2019-12-26 PROCEDURE — 83880 ASSAY OF NATRIURETIC PEPTIDE: CPT

## 2019-12-26 PROCEDURE — 87077 CULTURE AEROBIC IDENTIFY: CPT

## 2019-12-26 PROCEDURE — 87070 CULTURE OTHR SPECIMN AEROBIC: CPT

## 2019-12-26 PROCEDURE — 6370000000 HC RX 637 (ALT 250 FOR IP): Performed by: NURSE PRACTITIONER

## 2019-12-26 PROCEDURE — 96374 THER/PROPH/DIAG INJ IV PUSH: CPT

## 2019-12-26 PROCEDURE — 93005 ELECTROCARDIOGRAM TRACING: CPT | Performed by: NURSE PRACTITIONER

## 2019-12-26 PROCEDURE — 99222 1ST HOSP IP/OBS MODERATE 55: CPT | Performed by: INTERNAL MEDICINE

## 2019-12-26 PROCEDURE — 87206 SMEAR FLUORESCENT/ACID STAI: CPT

## 2019-12-26 PROCEDURE — 84484 ASSAY OF TROPONIN QUANT: CPT

## 2019-12-26 PROCEDURE — 85025 COMPLETE CBC W/AUTO DIFF WBC: CPT

## 2019-12-26 PROCEDURE — 94640 AIRWAY INHALATION TREATMENT: CPT

## 2019-12-26 PROCEDURE — 2060000000 HC ICU INTERMEDIATE R&B

## 2019-12-26 PROCEDURE — 36415 COLL VENOUS BLD VENIPUNCTURE: CPT

## 2019-12-26 PROCEDURE — 71046 X-RAY EXAM CHEST 2 VIEWS: CPT

## 2019-12-26 RX ORDER — METOPROLOL SUCCINATE 50 MG/1
50 TABLET, EXTENDED RELEASE ORAL 2 TIMES DAILY
Status: DISCONTINUED | OUTPATIENT
Start: 2019-12-26 | End: 2019-12-27 | Stop reason: HOSPADM

## 2019-12-26 RX ORDER — SODIUM CHLORIDE 0.9 % (FLUSH) 0.9 %
SYRINGE (ML) INJECTION
Status: COMPLETED
Start: 2019-12-26 | End: 2019-12-26

## 2019-12-26 RX ORDER — IPRATROPIUM BROMIDE AND ALBUTEROL SULFATE 2.5; .5 MG/3ML; MG/3ML
3 SOLUTION RESPIRATORY (INHALATION) ONCE
Status: COMPLETED | OUTPATIENT
Start: 2019-12-26 | End: 2019-12-26

## 2019-12-26 RX ORDER — PANTOPRAZOLE SODIUM 40 MG/1
40 TABLET, DELAYED RELEASE ORAL
Status: DISCONTINUED | OUTPATIENT
Start: 2019-12-27 | End: 2019-12-27 | Stop reason: HOSPADM

## 2019-12-26 RX ORDER — ATORVASTATIN CALCIUM 40 MG/1
40 TABLET, FILM COATED ORAL DAILY
Status: DISCONTINUED | OUTPATIENT
Start: 2019-12-27 | End: 2019-12-27 | Stop reason: HOSPADM

## 2019-12-26 RX ORDER — FUROSEMIDE 10 MG/ML
40 INJECTION INTRAMUSCULAR; INTRAVENOUS ONCE
Status: COMPLETED | OUTPATIENT
Start: 2019-12-26 | End: 2019-12-26

## 2019-12-26 RX ORDER — ASPIRIN 81 MG/1
81 TABLET, CHEWABLE ORAL DAILY
Status: DISCONTINUED | OUTPATIENT
Start: 2019-12-27 | End: 2019-12-27 | Stop reason: HOSPADM

## 2019-12-26 RX ORDER — MONTELUKAST SODIUM 10 MG/1
10 TABLET ORAL NIGHTLY
Status: DISCONTINUED | OUTPATIENT
Start: 2019-12-26 | End: 2019-12-27 | Stop reason: HOSPADM

## 2019-12-26 RX ORDER — ONDANSETRON 2 MG/ML
4 INJECTION INTRAMUSCULAR; INTRAVENOUS EVERY 6 HOURS PRN
Status: DISCONTINUED | OUTPATIENT
Start: 2019-12-26 | End: 2019-12-27 | Stop reason: HOSPADM

## 2019-12-26 RX ORDER — POTASSIUM CHLORIDE 20 MEQ/1
40 TABLET, EXTENDED RELEASE ORAL PRN
Status: DISCONTINUED | OUTPATIENT
Start: 2019-12-26 | End: 2019-12-27 | Stop reason: HOSPADM

## 2019-12-26 RX ORDER — SODIUM CHLORIDE 0.9 % (FLUSH) 0.9 %
10 SYRINGE (ML) INJECTION PRN
Status: DISCONTINUED | OUTPATIENT
Start: 2019-12-26 | End: 2019-12-27 | Stop reason: HOSPADM

## 2019-12-26 RX ORDER — FUROSEMIDE 10 MG/ML
20 INJECTION INTRAMUSCULAR; INTRAVENOUS 2 TIMES DAILY
Status: DISCONTINUED | OUTPATIENT
Start: 2019-12-27 | End: 2019-12-27

## 2019-12-26 RX ORDER — CLOPIDOGREL BISULFATE 75 MG/1
75 TABLET ORAL DAILY
Status: DISCONTINUED | OUTPATIENT
Start: 2019-12-27 | End: 2019-12-27 | Stop reason: HOSPADM

## 2019-12-26 RX ORDER — ISOSORBIDE DINITRATE 10 MG/1
30 TABLET ORAL 3 TIMES DAILY
Status: DISCONTINUED | OUTPATIENT
Start: 2019-12-26 | End: 2019-12-27 | Stop reason: HOSPADM

## 2019-12-26 RX ORDER — MAGNESIUM SULFATE 1 G/100ML
1 INJECTION INTRAVENOUS PRN
Status: DISCONTINUED | OUTPATIENT
Start: 2019-12-26 | End: 2019-12-27 | Stop reason: HOSPADM

## 2019-12-26 RX ORDER — 0.9 % SODIUM CHLORIDE 0.9 %
1000 INTRAVENOUS SOLUTION INTRAVENOUS ONCE
Status: DISCONTINUED | OUTPATIENT
Start: 2019-12-26 | End: 2019-12-26

## 2019-12-26 RX ORDER — METHYLPREDNISOLONE SODIUM SUCCINATE 125 MG/2ML
125 INJECTION, POWDER, LYOPHILIZED, FOR SOLUTION INTRAMUSCULAR; INTRAVENOUS ONCE
Status: COMPLETED | OUTPATIENT
Start: 2019-12-26 | End: 2019-12-26

## 2019-12-26 RX ORDER — SODIUM CHLORIDE 0.9 % (FLUSH) 0.9 %
SYRINGE (ML) INJECTION
Status: DISPENSED
Start: 2019-12-26 | End: 2019-12-27

## 2019-12-26 RX ORDER — SODIUM CHLORIDE 0.9 % (FLUSH) 0.9 %
10 SYRINGE (ML) INJECTION EVERY 12 HOURS SCHEDULED
Status: DISCONTINUED | OUTPATIENT
Start: 2019-12-26 | End: 2019-12-27 | Stop reason: HOSPADM

## 2019-12-26 RX ORDER — ACETAMINOPHEN 325 MG/1
650 TABLET ORAL EVERY 4 HOURS PRN
Status: DISCONTINUED | OUTPATIENT
Start: 2019-12-26 | End: 2019-12-27 | Stop reason: HOSPADM

## 2019-12-26 RX ORDER — HYDRALAZINE HYDROCHLORIDE 50 MG/1
50 TABLET, FILM COATED ORAL EVERY 8 HOURS SCHEDULED
Status: DISCONTINUED | OUTPATIENT
Start: 2019-12-26 | End: 2019-12-27 | Stop reason: HOSPADM

## 2019-12-26 RX ORDER — POTASSIUM CHLORIDE 7.45 MG/ML
10 INJECTION INTRAVENOUS PRN
Status: DISCONTINUED | OUTPATIENT
Start: 2019-12-26 | End: 2019-12-27 | Stop reason: HOSPADM

## 2019-12-26 RX ADMIN — MOMETASONE FUROATE AND FORMOTEROL FUMARATE DIHYDRATE 2 PUFF: 200; 5 AEROSOL RESPIRATORY (INHALATION) at 22:19

## 2019-12-26 RX ADMIN — METHYLPREDNISOLONE SODIUM SUCCINATE 125 MG: 125 INJECTION, POWDER, FOR SOLUTION INTRAMUSCULAR; INTRAVENOUS at 18:43

## 2019-12-26 RX ADMIN — SODIUM CHLORIDE, PRESERVATIVE FREE 10 ML: 5 INJECTION INTRAVENOUS at 23:22

## 2019-12-26 RX ADMIN — HYDRALAZINE HYDROCHLORIDE 50 MG: 50 TABLET, FILM COATED ORAL at 23:19

## 2019-12-26 RX ADMIN — MONTELUKAST SODIUM 10 MG: 10 TABLET, FILM COATED ORAL at 23:19

## 2019-12-26 RX ADMIN — FUROSEMIDE 40 MG: 10 INJECTION, SOLUTION INTRAMUSCULAR; INTRAVENOUS at 20:55

## 2019-12-26 RX ADMIN — SODIUM CHLORIDE, PRESERVATIVE FREE 10 ML: 5 INJECTION INTRAVENOUS at 20:55

## 2019-12-26 RX ADMIN — ISOSORBIDE DINITRATE 30 MG: 10 TABLET ORAL at 23:21

## 2019-12-26 RX ADMIN — SACUBITRIL AND VALSARTAN 1 TABLET: 97; 103 TABLET, FILM COATED ORAL at 23:21

## 2019-12-26 RX ADMIN — METOPROLOL SUCCINATE 50 MG: 50 TABLET, EXTENDED RELEASE ORAL at 23:19

## 2019-12-26 RX ADMIN — IPRATROPIUM BROMIDE AND ALBUTEROL SULFATE 3 AMPULE: .5; 3 SOLUTION RESPIRATORY (INHALATION) at 18:33

## 2019-12-26 ASSESSMENT — PAIN SCALES - GENERAL
PAINLEVEL_OUTOF10: 0
PAINLEVEL_OUTOF10: 6

## 2019-12-26 ASSESSMENT — PAIN DESCRIPTION - DESCRIPTORS: DESCRIPTORS: ACHING

## 2019-12-26 ASSESSMENT — PAIN DESCRIPTION - LOCATION: LOCATION: CHEST

## 2019-12-26 ASSESSMENT — PAIN DESCRIPTION - PAIN TYPE: TYPE: ACUTE PAIN

## 2019-12-26 NOTE — ED PROVIDER NOTES
Ref Range    Sodium 140 132 - 146 mmol/L    Potassium reflex Magnesium 4.4 3.5 - 5.0 mmol/L    Chloride 103 98 - 107 mmol/L    CO2 25 22 - 29 mmol/L    Anion Gap 12 7 - 16 mmol/L    Glucose 113 (H) 74 - 99 mg/dL    BUN 11 6 - 20 mg/dL    CREATININE 0.8 0.5 - 1.0 mg/dL    GFR Non-African American >60 >=60 mL/min/1.73    GFR African American >60     Calcium 9.3 8.6 - 10.2 mg/dL    Total Protein 7.3 6.4 - 8.3 g/dL    Alb 4.3 3.5 - 5.2 g/dL    Total Bilirubin 0.6 0.0 - 1.2 mg/dL    Alkaline Phosphatase 127 (H) 35 - 104 U/L    ALT 14 0 - 32 U/L    AST 23 0 - 31 U/L   Troponin   Result Value Ref Range    Troponin <0.01 0.00 - 0.03 ng/mL   EKG 12 Lead   Result Value Ref Range    Ventricular Rate 106 BPM    Atrial Rate 106 BPM    P-R Interval 200 ms    QRS Duration 108 ms    Q-T Interval 338 ms    QTc Calculation (Bazett) 448 ms    P Axis 71 degrees    R Axis -77 degrees    T Axis 70 degrees     Imaging: All Radiology results interpreted by Radiologist unless otherwise noted. XR CHEST STANDARD (2 VW)   Final Result      Diffuse interstitial lung disease which may reflect acute infiltrate   such as pulmonary edema or infection or underlying chronic process. EKG #1:  Interpreted by emergency department physician unless otherwise noted. Time:  1913  Rate: 106  Rhythm: .  Sinus tachycardia  Interpretation: This tachycardia with incomplete right bundle branch block.   No STEMI    ED Course / Medical Decision Making     Medications   furosemide (LASIX) injection 40 mg (has no administration in time range)   sodium chloride flush 0.9 % injection (has no administration in time range)   sodium chloride flush 0.9 % injection (has no administration in time range)   sodium chloride flush 0.9 % injection (has no administration in time range)   ipratropium-albuterol (DUONEB) nebulizer solution 3 ampule (3 ampules Inhalation Given 12/26/19 1833)   methylPREDNISolone sodium (SOLU-MEDROL) injection 125 mg (125 mg Intravenous Given 12/26/19 1843)        Re-Evaluations:  12/26/19      Time: 2001-patient states she had a little reduction in shortness of breath after breathing treatments. Dr. Izabella Chino recommends patient receive 40 mg of IV Lasix due to chest x-ray results. Patients symptoms show no change. Consultations:             IP CONSULT TO FAMILY MEDICINE  2035- Spoke with Dr. Marilyn Willson admission he says he will place admission order and will be down to the emergency Angelica Garcia to evaluate patient. .    Procedures:   none    MDM:    Patient presents to the ED for shortness of breath for 3 days. Differential diagnoses included but not limited to COPD exacerbation, bronchitis, pneumonia, pulmonary edema. Workup in the ED revealed x-ray revealed possibly pulmonary edema versus infection, BNP is pending. Opponent was less than 0.01. Patient was given duo nebs and Lasix for their symptoms with mild improvement. Patient requires continued workup and management of their symptoms and will be admitted to the hospital for further evaluation and treatment. Counseling:   I have spoken with the patient and discussed todays results, in addition to providing specific details for the plan of care and counseling regarding the diagnosis and prognosis and are agreeable with the plan agrees with admission. Assessment      1. Acute on chronic congestive heart failure, unspecified heart failure type ([address]    2. Shortness of breath      This patient's ED course included: a personal history and physicial examination, re-evaluation prior to disposition, multiple bedside re-evaluations, IV medications, cardiac monitoring and continuous pulse oximetry  This patient has remained hemodynamically stable during their ED course. Plan   Admit to telemetry. Patient condition is stable.     New Medications     New Prescriptions    No medications on file       12/26/19  8:48 PM:    I received this patient at sign out from [facility], pending admission by Dr. Huey Fabry. Patient resting comfortably   I have introduced my self to the patient / family members present currently and have answered their questions to this point to the best of my ability with the information available to me up to now. I have discussed the patient's initial exam, treatment and plan of care with the out going physician. I have examined the patient myself and reviewed ordered tests / medications and reviewed any available results to this point. Impression:  1. Acute on chronic congestive heart failure, unspecified heart failure type ([address]    2. Shortness of breath        END OF ED PROVIDER NOTE    Electronically signed by Brett Salas PA-C   DD: 12/26/19  Original note by Amado Ramirez   **This report was transcribed using voice recognition software. Every effort was made to ensure accuracy; however, inadvertent computerized transcription errors may be present.   END OF PROVIDER NOTE     Brett Salas PA-C  12/26/19 1904

## 2019-12-27 VITALS
HEART RATE: 66 BPM | WEIGHT: 103 LBS | OXYGEN SATURATION: 97 % | RESPIRATION RATE: 20 BRPM | BODY MASS INDEX: 17.58 KG/M2 | TEMPERATURE: 97.9 F | DIASTOLIC BLOOD PRESSURE: 51 MMHG | HEIGHT: 64 IN | SYSTOLIC BLOOD PRESSURE: 104 MMHG

## 2019-12-27 LAB
ADENOVIRUS BY PCR: NOT DETECTED
ALBUMIN SERPL-MCNC: 4.6 G/DL (ref 3.5–5.2)
ALP BLD-CCNC: 133 U/L (ref 35–104)
ALT SERPL-CCNC: 12 U/L (ref 0–32)
ANION GAP SERPL CALCULATED.3IONS-SCNC: 12 MMOL/L (ref 7–16)
AST SERPL-CCNC: 16 U/L (ref 0–31)
BASOPHILS ABSOLUTE: 0.03 E9/L (ref 0–0.2)
BASOPHILS RELATIVE PERCENT: 0.6 % (ref 0–2)
BILIRUB SERPL-MCNC: 0.7 MG/DL (ref 0–1.2)
BILIRUBIN DIRECT: 0.2 MG/DL (ref 0–0.3)
BILIRUBIN, INDIRECT: 0.5 MG/DL (ref 0–1)
BORDETELLA PARAPERTUSSIS BY PCR: NOT DETECTED
BORDETELLA PERTUSSIS BY PCR: NOT DETECTED
BUN BLDV-MCNC: 14 MG/DL (ref 6–20)
CALCIUM SERPL-MCNC: 9.5 MG/DL (ref 8.6–10.2)
CHLAMYDOPHILIA PNEUMONIAE BY PCR: NOT DETECTED
CHLORIDE BLD-SCNC: 102 MMOL/L (ref 98–107)
CO2: 25 MMOL/L (ref 22–29)
CORONAVIRUS 229E BY PCR: NOT DETECTED
CORONAVIRUS HKU1 BY PCR: NOT DETECTED
CORONAVIRUS NL63 BY PCR: NOT DETECTED
CORONAVIRUS OC43 BY PCR: NOT DETECTED
CREAT SERPL-MCNC: 0.8 MG/DL (ref 0.5–1)
EKG ATRIAL RATE: 106 BPM
EKG P AXIS: 71 DEGREES
EKG P-R INTERVAL: 200 MS
EKG Q-T INTERVAL: 338 MS
EKG QRS DURATION: 108 MS
EKG QTC CALCULATION (BAZETT): 448 MS
EKG R AXIS: -77 DEGREES
EKG T AXIS: 70 DEGREES
EKG VENTRICULAR RATE: 106 BPM
EOSINOPHILS ABSOLUTE: 0 E9/L (ref 0.05–0.5)
EOSINOPHILS RELATIVE PERCENT: 0 % (ref 0–6)
GFR AFRICAN AMERICAN: >60
GFR NON-AFRICAN AMERICAN: >60 ML/MIN/1.73
GLUCOSE BLD-MCNC: 227 MG/DL (ref 74–99)
HCT VFR BLD CALC: 42.6 % (ref 34–48)
HEMOGLOBIN: 13.4 G/DL (ref 11.5–15.5)
HUMAN METAPNEUMOVIRUS BY PCR: NOT DETECTED
HUMAN RHINOVIRUS/ENTEROVIRUS BY PCR: NOT DETECTED
IMMATURE GRANULOCYTES #: 0.02 E9/L
IMMATURE GRANULOCYTES %: 0.4 % (ref 0–5)
INFLUENZA A BY PCR: NOT DETECTED
INFLUENZA B BY PCR: NOT DETECTED
LYMPHOCYTES ABSOLUTE: 0.51 E9/L (ref 1.5–4)
LYMPHOCYTES RELATIVE PERCENT: 9.4 % (ref 20–42)
MAGNESIUM: 1.9 MG/DL (ref 1.6–2.6)
MCH RBC QN AUTO: 26.9 PG (ref 26–35)
MCHC RBC AUTO-ENTMCNC: 31.5 % (ref 32–34.5)
MCV RBC AUTO: 85.4 FL (ref 80–99.9)
MONOCYTES ABSOLUTE: 0.07 E9/L (ref 0.1–0.95)
MONOCYTES RELATIVE PERCENT: 1.3 % (ref 2–12)
MYCOPLASMA PNEUMONIAE BY PCR: NOT DETECTED
NEUTROPHILS ABSOLUTE: 4.78 E9/L (ref 1.8–7.3)
NEUTROPHILS RELATIVE PERCENT: 88.3 % (ref 43–80)
PARAINFLUENZA VIRUS 1 BY PCR: NOT DETECTED
PARAINFLUENZA VIRUS 2 BY PCR: NOT DETECTED
PARAINFLUENZA VIRUS 3 BY PCR: NOT DETECTED
PARAINFLUENZA VIRUS 4 BY PCR: NOT DETECTED
PDW BLD-RTO: 14.3 FL (ref 11.5–15)
PLATELET # BLD: 303 E9/L (ref 130–450)
PMV BLD AUTO: 10 FL (ref 7–12)
POTASSIUM REFLEX MAGNESIUM: 3.8 MMOL/L (ref 3.5–5)
RBC # BLD: 4.99 E12/L (ref 3.5–5.5)
RESPIRATORY SYNCYTIAL VIRUS BY PCR: NOT DETECTED
SODIUM BLD-SCNC: 139 MMOL/L (ref 132–146)
TOTAL PROTEIN: 7.9 G/DL (ref 6.4–8.3)
WBC # BLD: 5.4 E9/L (ref 4.5–11.5)

## 2019-12-27 PROCEDURE — 6360000002 HC RX W HCPCS: Performed by: INTERNAL MEDICINE

## 2019-12-27 PROCEDURE — 97165 OT EVAL LOW COMPLEX 30 MIN: CPT

## 2019-12-27 PROCEDURE — G0378 HOSPITAL OBSERVATION PER HR: HCPCS

## 2019-12-27 PROCEDURE — 80048 BASIC METABOLIC PNL TOTAL CA: CPT

## 2019-12-27 PROCEDURE — 85025 COMPLETE CBC W/AUTO DIFF WBC: CPT

## 2019-12-27 PROCEDURE — 83735 ASSAY OF MAGNESIUM: CPT

## 2019-12-27 PROCEDURE — 99232 SBSQ HOSP IP/OBS MODERATE 35: CPT | Performed by: INTERNAL MEDICINE

## 2019-12-27 PROCEDURE — 6370000000 HC RX 637 (ALT 250 FOR IP): Performed by: INTERNAL MEDICINE

## 2019-12-27 PROCEDURE — 2580000003 HC RX 258: Performed by: INTERNAL MEDICINE

## 2019-12-27 PROCEDURE — 36415 COLL VENOUS BLD VENIPUNCTURE: CPT

## 2019-12-27 PROCEDURE — 94660 CPAP INITIATION&MGMT: CPT

## 2019-12-27 PROCEDURE — 80076 HEPATIC FUNCTION PANEL: CPT

## 2019-12-27 PROCEDURE — 96376 TX/PRO/DX INJ SAME DRUG ADON: CPT

## 2019-12-27 PROCEDURE — 94640 AIRWAY INHALATION TREATMENT: CPT

## 2019-12-27 RX ORDER — SPIRONOLACTONE 25 MG/1
25 TABLET ORAL DAILY
Qty: 30 TABLET | Refills: 0 | Status: ON HOLD | OUTPATIENT
Start: 2019-12-27 | End: 2020-01-16 | Stop reason: SDUPTHER

## 2019-12-27 RX ORDER — POTASSIUM CHLORIDE 1.5 G/1.77G
20 POWDER, FOR SOLUTION ORAL DAILY
Qty: 30 EACH | Refills: 0 | Status: ON HOLD | OUTPATIENT
Start: 2019-12-27 | End: 2020-01-16 | Stop reason: SDUPTHER

## 2019-12-27 RX ORDER — FLUOXETINE HYDROCHLORIDE 20 MG/1
20 CAPSULE ORAL DAILY
Qty: 30 CAPSULE | Refills: 0 | Status: SHIPPED | OUTPATIENT
Start: 2019-12-27 | End: 2020-03-16

## 2019-12-27 RX ORDER — OMEPRAZOLE 20 MG/1
20 CAPSULE, DELAYED RELEASE ORAL DAILY
Qty: 30 CAPSULE | Refills: 0 | Status: SHIPPED | OUTPATIENT
Start: 2019-12-27 | End: 2020-03-16

## 2019-12-27 RX ORDER — MONTELUKAST SODIUM 10 MG/1
10 TABLET ORAL NIGHTLY
Qty: 30 TABLET | Refills: 0 | Status: ON HOLD | OUTPATIENT
Start: 2019-12-27 | End: 2020-01-16 | Stop reason: SDUPTHER

## 2019-12-27 RX ORDER — ATORVASTATIN CALCIUM 40 MG/1
40 TABLET, FILM COATED ORAL DAILY
Qty: 30 TABLET | Refills: 0 | Status: ON HOLD | OUTPATIENT
Start: 2019-12-27 | End: 2020-01-16 | Stop reason: SDUPTHER

## 2019-12-27 RX ORDER — METOPROLOL SUCCINATE 50 MG/1
50 TABLET, EXTENDED RELEASE ORAL 2 TIMES DAILY
Qty: 30 TABLET | Refills: 0 | Status: ON HOLD | OUTPATIENT
Start: 2019-12-27 | End: 2020-01-16 | Stop reason: SDUPTHER

## 2019-12-27 RX ORDER — ALBUTEROL SULFATE 90 UG/1
2 AEROSOL, METERED RESPIRATORY (INHALATION) EVERY 6 HOURS PRN
Qty: 1 INHALER | Refills: 3 | Status: SHIPPED | OUTPATIENT
Start: 2019-12-27 | End: 2020-03-16

## 2019-12-27 RX ORDER — HYDRALAZINE HYDROCHLORIDE 50 MG/1
50 TABLET, FILM COATED ORAL EVERY 8 HOURS SCHEDULED
Qty: 90 TABLET | Refills: 0 | Status: ON HOLD | OUTPATIENT
Start: 2019-12-27 | End: 2020-01-16 | Stop reason: HOSPADM

## 2019-12-27 RX ORDER — BUDESONIDE AND FORMOTEROL FUMARATE DIHYDRATE 160; 4.5 UG/1; UG/1
2 AEROSOL RESPIRATORY (INHALATION) 2 TIMES DAILY
Qty: 1 INHALER | Refills: 0 | Status: SHIPPED | OUTPATIENT
Start: 2019-12-27 | End: 2020-03-23 | Stop reason: SDUPTHER

## 2019-12-27 RX ORDER — FUROSEMIDE 10 MG/ML
20 INJECTION INTRAMUSCULAR; INTRAVENOUS 2 TIMES DAILY
Status: DISCONTINUED | OUTPATIENT
Start: 2019-12-27 | End: 2019-12-27 | Stop reason: HOSPADM

## 2019-12-27 RX ORDER — ALBUTEROL SULFATE 90 UG/1
2 AEROSOL, METERED RESPIRATORY (INHALATION) EVERY 6 HOURS PRN
Qty: 1 INHALER | Refills: 0 | Status: SHIPPED | OUTPATIENT
Start: 2019-12-27 | End: 2020-03-16

## 2019-12-27 RX ORDER — ISOSORBIDE DINITRATE 30 MG/1
30 TABLET ORAL 3 TIMES DAILY
Qty: 90 TABLET | Refills: 0 | Status: ON HOLD | OUTPATIENT
Start: 2019-12-27 | End: 2020-01-16 | Stop reason: HOSPADM

## 2019-12-27 RX ORDER — CLOPIDOGREL BISULFATE 75 MG/1
75 TABLET ORAL DAILY
Qty: 30 TABLET | Refills: 0 | Status: ON HOLD | OUTPATIENT
Start: 2019-12-27 | End: 2020-01-16 | Stop reason: SDUPTHER

## 2019-12-27 RX ORDER — BUMETANIDE 2 MG/1
2 TABLET ORAL EVERY OTHER DAY
Qty: 90 TABLET | Refills: 0 | Status: ON HOLD | OUTPATIENT
Start: 2019-12-27 | End: 2020-01-16 | Stop reason: HOSPADM

## 2019-12-27 RX ORDER — ASPIRIN 81 MG/1
81 TABLET, CHEWABLE ORAL DAILY
Qty: 30 TABLET | Refills: 0 | Status: SHIPPED | OUTPATIENT
Start: 2019-12-27 | End: 2020-03-23 | Stop reason: SDUPTHER

## 2019-12-27 RX ADMIN — SODIUM CHLORIDE, PRESERVATIVE FREE 10 ML: 5 INJECTION INTRAVENOUS at 09:04

## 2019-12-27 RX ADMIN — MOMETASONE FUROATE AND FORMOTEROL FUMARATE DIHYDRATE 2 PUFF: 200; 5 AEROSOL RESPIRATORY (INHALATION) at 08:24

## 2019-12-27 RX ADMIN — HYDRALAZINE HYDROCHLORIDE 50 MG: 50 TABLET, FILM COATED ORAL at 14:38

## 2019-12-27 RX ADMIN — METOPROLOL SUCCINATE 50 MG: 50 TABLET, EXTENDED RELEASE ORAL at 09:03

## 2019-12-27 RX ADMIN — ASPIRIN 81 MG 81 MG: 81 TABLET ORAL at 09:03

## 2019-12-27 RX ADMIN — ISOSORBIDE DINITRATE 30 MG: 10 TABLET ORAL at 14:38

## 2019-12-27 RX ADMIN — FUROSEMIDE 20 MG: 10 INJECTION, SOLUTION INTRAMUSCULAR; INTRAVENOUS at 01:34

## 2019-12-27 RX ADMIN — PANTOPRAZOLE SODIUM 40 MG: 40 TABLET, DELAYED RELEASE ORAL at 06:28

## 2019-12-27 RX ADMIN — CLOPIDOGREL BISULFATE 75 MG: 75 TABLET ORAL at 09:03

## 2019-12-27 RX ADMIN — ATORVASTATIN CALCIUM 40 MG: 40 TABLET, FILM COATED ORAL at 09:03

## 2019-12-27 RX ADMIN — FUROSEMIDE 20 MG: 10 INJECTION, SOLUTION INTRAMUSCULAR; INTRAVENOUS at 09:03

## 2019-12-27 RX ADMIN — SACUBITRIL AND VALSARTAN 1 TABLET: 97; 103 TABLET, FILM COATED ORAL at 09:03

## 2019-12-27 RX ADMIN — ISOSORBIDE DINITRATE 30 MG: 10 TABLET ORAL at 09:03

## 2019-12-27 RX ADMIN — HYDRALAZINE HYDROCHLORIDE 50 MG: 50 TABLET, FILM COATED ORAL at 06:28

## 2019-12-27 ASSESSMENT — PAIN SCALES - GENERAL: PAINLEVEL_OUTOF10: 0

## 2019-12-27 NOTE — PLAN OF CARE
Problem: Pain:  Goal: Pain level will decrease  Description  Pain level will decrease  Outcome: Met This Shift  Goal: Control of acute pain  Description  Control of acute pain  Outcome: Met This Shift  Goal: Control of chronic pain  Description  Control of chronic pain  Outcome: Met This Shift     Problem: OXYGENATION/RESPIRATORY FUNCTION  Goal: Patient will maintain patent airway  Outcome: Met This Shift

## 2019-12-27 NOTE — PROGRESS NOTES
[facility] Hospitalist   Progress Note    Admitting Date and Time: 12/26/2019  5:46 PM  Admit Dx: Acute decompensated heart failure ([facility] [I50.9]  Acute decompensated [facility] failure ([facility] [I50.9]    Subjective: Admitted in the evening of 26, came with shortness of breath, patient with nonischemic cardiomyopathy, has HFr EF, also preserved RV function, AICD, CVA in 2018, residual right-sided weakness, COPD, current smoker. BNP of 7151. Evidence of pulmonary edema as well as acute hypoxic respiratory failure on admission. Patient was admitted with Acute decompensated heart failure ([facility] [I50.9]  Acute decompensated heart failure ([facility] [I50.9]. Patient feels much better, at this awake, alert, sitting in the bed. Likely says that she she has no money to get her medications. She will not be able to get till next year. Per RN: Case management already spoke to the patient  Has to be $20 per month. Unfortunately the patient cannot be given any medications with her on discharge. Merits of the bed program will also require event from the patient. ROS: denies fever, chills, cp, sob, n/v, HA unless stated above.      furosemide  20 mg Intravenous BID    aspirin  81 mg Oral Daily    atorvastatin  40 mg Oral Daily    mometasone-formoterol  2 puff Inhalation BID    clopidogrel  75 mg Oral Daily    hydrALAZINE  50 mg Oral 3 times per day    isosorbide dinitrate  30 mg Oral TID    metoprolol succinate  50 mg Oral BID    pantoprazole  40 mg Oral QAM AC    montelukast  10 mg Oral Nightly    sacubitril-valsartan  1 tablet Oral BID    sodium chloride flush  10 mL Intravenous 2 times per day    enoxaparin  40 mg Subcutaneous Daily     sodium chloride flush, 10 mL, PRN  potassium chloride, 40 mEq, PRN    Or  potassium alternative oral replacement, 40 mEq, PRN    Or  potassium chloride, 10 mEq, PRN  magnesium sulfate, 1 g, PRN  magnesium hydroxide, 30 mL, Daily PRN  ondansetron, 4 mg, Q6H PRN  acetaminophen, 650 mg, Q4H PRN         Objective:    /60   Pulse 73   Temp 98.2 °F (36.8 °C) (Oral)   Resp 20   Ht 5' 4\" (1.626 m)   Wt 103 lb (46.7 kg)   LMP 05/12/2014 (Approximate)   SpO2 97%   BMI 17.68 kg/m²   General Appearance: alert and oriented to person, place and time, well-developed and well-nourished, in no acute distress  Skin: warm and dry, no rash or erythema  Head: normocephalic and atraumatic  Eyes: pupils equal, round, and reactive to light, extraocular eye movements intact, conjunctivae normal  ENT: tympanic membrane, external ear and ear canal normal bilaterally, oropharynx clear and moist with normal mucous membranes  Neck: neck supple and non tender without mass, no thyromegaly or thyroid nodules, no cervical lymphadenopathy   Pulmonary/Chest: clear to auscultation bilaterally- no wheezes, rales or rhonchi, normal air movement, no respiratory distress  Cardiovascular: normal rate, normal S1 and S2, no gallops, intact distal pulses and no carotid bruits  Abdomen: soft, non-tender, non-distended, normal bowel sounds, no masses or organomegaly      Recent Labs     12/26/19  1859 12/27/19  0415    139   K 4.4 3.8    102   CO2 25 25   BUN 11 14   CREATININE 0.8 0.8   GLUCOSE 113* 227*   CALCIUM 9.3 9.5       Recent Labs     12/26/19  1859 12/27/19  0415   WBC 5.3 5.4   RBC 4.26 4.99   HGB 11.6 13.4   HCT 37.3 42.6   MCV 87.6 85.4   MCH 27.2 26.9   MCHC 31.1* 31.5*   RDW 14.3 14.3    303   MPV 9.9 10.0           Radiology:   XR CHEST STANDARD (2 VW)   Final Result      Diffuse interstitial lung disease which may reflect acute infiltrate   such as pulmonary edema or infection or underlying chronic process.                 Assessment:    Principal Problem:    Acute decompensated heart failure (HCC)  Active Problems:    Hyperlipidemia    COPD (chronic obstructive pulmonary disease) ([facility]    Essential hypertension    ICD (implantable cardioverter-defibrillator) in

## 2019-12-27 NOTE — PROGRESS NOTES
Physical Therapy    Facility/Department: [facility] MED SURG      NAME: Molly Larry  : 1965  MRN: [id number]    Date of Service: 2019     Order received for PT evaluation. Pt up independently in room and hallway. Denies any PT needs at this time. Will discontinue PT.   Emma PT [id number]

## 2019-12-27 NOTE — DISCHARGE SUMMARY
[facility] Physician Discharge Summary       Jazmín Del Valle MD  [address] [address] [phone]            Activity level: As tolerated    Diet: DIET CARB CONTROL; Low Sodium (2 GM); Daily Fluid Restriction: 1500 ml    Labs:    Dispo: Home    Condition at discharge: Stable    Continue supplemental oxygen via nasal canula @ 2 LPM round-the-clock. Continue CPAP / BiPAP during sleep as prior to admission. Patient ID:  Salvatore Dutta  [id number]  48 y.o.  1965    Admit date: 12/26/2019    Discharge date and time:  12/27/2019  2:49 PM    Admission Diagnoses: Principal Problem:    Acute decompensated heart failure ([address]  Active Problems:    Hyperlipidemia    COPD (chronic obstructive pulmonary disease) ([address]    Essential hypertension    ICD (implantable cardioverter-defibrillator) in place    Acute pulmonary edema ([address]  Resolved Problems:    * No resolved hospital problems. *      Discharge Diagnoses: Principal Problem:    Acute decompensated heart failure (HCC)  Active Problems:    Hyperlipidemia    COPD (chronic obstructive pulmonary disease) ([address]    Essential hypertension    ICD (implantable cardioverter-defibrillator) in place    Acute pulmonary edema (HCC)  Resolved Problems:    * No resolved hospital problems. *      Consults:  IP CONSULT TO SOCIAL WORK    Procedures: None    Hospital Course: Patient was admitted with Acute decompensated heart failure ([address] [I50.9]  Acute decompensated heart failure ([address] [I50.9]. Patient was admitted with decompensated heart failure, patient was improved significantly with diuresis and -900 mL. Patient feels great, the most problem she had was not getting any medications.   Once nursing explained her that she can get the partial prescription filled, I spoke to her she feels fine she is comfortable getting her prescriptions filled, she wanted a new prescription which are given, will plan to DC her home as she is back to her baseline at this time. Discharge Exam:  Vitals:    12/27/19 0400 12/27/19 0615 12/27/19 0628 12/27/19 0730   BP: 124/69 110/65 110/65 110/60   Pulse: 68 76  73   Resp: 25 24  20   Temp: 97.9 °F (36.6 °C)   98.2 °F (36.8 °C)   TempSrc: Oral   Oral   SpO2: 95%   97%   Weight:       Height:           General Appearance: alert and oriented to person, place and time, well-developed and well-nourished, in no acute distress  Skin: warm and dry, no rash or erythema  Head: normocephalic and atraumatic  Eyes: pupils equal, round, and reactive to light, extraocular eye movements intact, conjunctivae normal  ENT: tympanic membrane, external ear and ear canal normal bilaterally, oropharynx clear and moist with normal mucous membranes  Neck: neck supple and non tender without mass, no thyromegaly or thyroid nodules, no cervical lymphadenopathy   Pulmonary/Chest: clear to auscultation bilaterally- no wheezes, rales or rhonchi, normal air movement, no respiratory distress  Cardiovascular: normal rate, normal S1 and S2, no gallops, intact distal pulses and no carotid bruits  Abdomen: soft, non-tender, non-distended, normal bowel sounds, no masses or organomegaly  I/O last 3 completed shifts: In: 360 [P.O.:360]  Out: 1250 [Urine:1250]  No intake/output data recorded. LABS:  Recent Labs     12/26/19 1859 12/27/19  0415    139   K 4.4 3.8    102   CO2 25 25   BUN 11 14   CREATININE 0.8 0.8   GLUCOSE 113* 227*   CALCIUM 9.3 9.5       Recent Labs     12/26/19  1859 12/27/19  0415   WBC 5.3 5.4   RBC 4.26 4.99   HGB 11.6 13.4   HCT 37.3 42.6   MCV 87.6 85.4   MCH 27.2 26.9   MCHC 31.1* 31.5*   RDW 14.3 14.3    303   MPV 9.9 10.0       No results for input(s): POCGLU in the last 72 hours.         Imaging:   XR CHEST STANDARD (2 VW)   Final Result      Diffuse interstitial lung disease which may reflect acute infiltrate   such as pulmonary edema or infection or underlying chronic process.                 Patient Instructions:      Medication List      CONTINUE taking these medications    * albuterol sulfate  (90 Base) MCG/ACT inhaler  Commonly known as:  PROVENTIL HFA  Inhale 2 puffs into the lungs every 6 hours as needed for Wheezing     * albuterol sulfate  (90 Base) MCG/ACT inhaler  Inhale 2 puffs into the lungs every 6 hours as needed for Wheezing     aspirin 81 MG chewable tablet  Take 1 tablet by mouth daily     atorvastatin 40 MG tablet  Commonly known as:  LIPITOR  Take 1 tablet by mouth daily     budesonide-formoterol 160-4.5 MCG/ACT Aero  Commonly known as:  SYMBICORT  Inhale 2 puffs into the lungs 2 times daily     bumetanide 2 MG tablet  Commonly known as:  BUMEX  Take 1 tablet by mouth every other day     clopidogrel 75 MG tablet  Commonly known as:  PLAVIX  Take 1 tablet by mouth daily     FLUoxetine 20 MG capsule  Commonly known as:  PROZAC  Take 1 capsule by mouth daily     hydrALAZINE 50 MG tablet  Commonly known as:  APRESOLINE  Take 1 tablet by mouth every 8 hours     isosorbide dinitrate 30 MG tablet  Commonly known as:  ISORDIL  Take 1 tablet by mouth 3 times daily     metoprolol succinate 50 MG extended release tablet  Commonly known as:  TOPROL XL  Take 1 tablet by mouth 2 times daily     mometasone-formoterol 100-5 MCG/ACT inhaler  Commonly known as:  DULERA  Inhale 2 puffs into the lungs 2 times daily     montelukast 10 MG tablet  Commonly known as:  SINGULAIR  Take 1 tablet by mouth nightly     omeprazole 20 MG delayed release capsule  Commonly known as:  PRILOSEC  Take 1 capsule by mouth daily     potassium chloride 20 MEQ packet  Commonly known as:  KLOR-CON  Take 20 mEq by mouth daily     sacubitril-valsartan  MG per tablet  Commonly known as:  ENTRESTO  Take 1 tablet by mouth 2 times daily     spironolactone 25 MG tablet  Commonly known as:  ALDACTONE  Take 1 tablet by mouth daily         * This list has 2 medication(s) that are the same as

## 2019-12-27 NOTE — PROGRESS NOTES
Date: 12/27/2019    Time: 12:33 AM    Patient Placed On BIPAP/CPAP/ Non-Invasive Ventilation? Yes    If no must comment. Facial area red/color change? No           If YES are Blister/Lesion present? No   If yes must notify nursing staff  BIPAP/CPAP skin barrier? Yes    Skin barrier type:mepilex       Comments: Pt placed on BiPAP for the night, mepilex in place. Machine plugged into red outlet.         Jailene Ice      12/27/19 0033   NIV Type   Mode Bilevel   Mask Type Full face mask   Mask Size Small   Settings/Measurements   Rate Ordered 12   Resp 28   FiO2  40 %   Vt Exhaled 482 mL   Minute Volume 13.5 Liters   Mask Leak (lpm) 29 lpm   Comfort Level Good   Using Accessory Muscles No   SpO2 95

## 2019-12-27 NOTE — H&P
[facility] History and Physical      CHIEF COMPLAINT: had concerns including Shortness of Breath. History of Present Illness:    Informant(s) for H&P:Pt and chart   Esther Abdalla is a 48 y.o. female with PMH of non ischemic cardiomyopathy with HFrEF with preserved RV function, moderate to severe secondary MR (LVEF 15% 03/2018 with AICD in place)p, stoke in 2018 s/p tPA with residual right side weakness (able to walk), COPD current smoker, presented to the ER in [facility] with few day history of SOB and JOHANSEN, patient ran out her meds for about a month because she doesn't have money for copay.  +++ orthopnea    Denied any fever, nausea, vomiting, diarrhea, abdominal pain, blood in stool or black stool.  Denied any chest pain, no recent lightheadedness or syncope     CXR  Impression:     Diffuse interstitial lung disease which may reflect acute infiltrate  such as pulmonary edema or infection or underlying chronic process. probnp [id number]    In ER:    Vitals BP (!) 154/102   Pulse 105   Temp 98.6 °F (37 °C) (Oral)   Resp 18   Ht 5' 4\" (1.626 m)   Wt 103 lb (46.7 kg)   LMP 05/12/2014 (Approximate)   SpO2 94%   BMI 17.68 kg/m²   WBC, neutrophil %, neutrophil absolute count   Lab Results   Component Value Date    WBC 5.3 12/26/2019    NEUTOPHILPCT 57.6 12/26/2019    NEUTROABS 3.03 12/26/2019     Lactic acid No results found for: LACTSEPSIS  Cr   Lab Results   Component Value Date    CREATININE 0.8 12/26/2019       REVIEW OF SYSTEMS:  A comprehensive review of systems was negative except for: what is in the HPI    PMH:  Past Medical History:   Diagnosis Date    Angina at rest [facility])     cath in 2007 showed no CAD    Asthma     Blood type AB+ 4/3/2018    CAD (coronary artery disease)     Carpal tunnel syndrome on left     CHF (congestive heart failure) ([address]     COPD (chronic obstructive pulmonary disease) ([address]     CVA (cerebral vascular accident) [address] 12/2009 and 12/2010. tablet Take 1 tablet by mouth every 8 hours 3/29/19  Yes Vinny Dotson MD   [facility]) 20 MG capsule Take 1 capsule by mouth daily 3/29/19  Yes Vinny Dotson MD   bumetanide [facility]) 2 MG tablet Take 1 tablet by mouth every other day 3/29/19  Yes Vinny Dotson MD   omeprazole (PRILOSEC) 20 MG delayed release capsule Take 1 capsule by mouth daily 3/29/19  Yes Vinny Dotson MD   albuterol sulfate  (90 Base) MCG/ACT inhaler Inhale 2 puffs into the lungs every 6 hours as needed for Wheezing 3/29/19  Yes Vinny Dotson MD   spironolactone (ALDACTONE) 25 MG tablet Take 1 tablet by mouth daily 3/29/19  Yes Vinny Dotson MD   montelukast (SINGULAIR) 10 MG tablet Take 1 tablet by mouth nightly 3/29/19  Yes Vinny Dotson MD   atorvastatin (LIPITOR) 40 MG tablet Take 1 tablet by mouth daily 3/29/19  Yes Vinny Dotson MD   aspirin 81 MG chewable tablet Take 1 tablet by mouth daily 3/29/19  Yes Vinny Dotson MD   albuterol sulfate HFA (PROVENTIL HFA) 108 (90 Base) MCG/ACT inhaler Inhale 2 puffs into the lungs every 6 hours as needed for Wheezing 3/29/19 12/26/19 Yes Vinny Dotson MD   budesonide-formoterol [facility]) 160-4.5 MCG/ACT AERO Inhale 2 puffs into the lungs 2 times daily 3/29/19  Yes Vinny Dotson MD   sacubitril-valsartan [facility])  MG per tablet Take 1 tablet by mouth 2 times daily 3/29/19  Yes Vinny Dotson MD   clopidogrel (PLAVIX) 75 MG tablet Take 1 tablet by mouth daily 3/29/19  Yes Vinny Dotson MD   potassium chloride (KLOR-CON) 20 MEQ packet Take 20 mEq by mouth daily 3/25/19  Yes Vinny Dotson MD   mometasone-formoterol [facility]) 100-5 MCG/ACT inhaler Inhale 2 puffs into the lungs 2 times daily 8/26/18  Yes Blake Milan MD       Allergies:    Orange fruit [citrus]; Crestor [rosuvastatin calcium];  Loratadine; Norco [hydrocodone-acetaminophen]; Sulfa antibiotics; and Aspirin    Social History:    reports that she quit smoking about 17 months ago. Her smoking use included cigarettes. She has a 15.00 pack-year smoking history. She has never used smokeless tobacco. She reports current alcohol use of about 3.0 standard drinks of alcohol per week. She reports current drug use. Drug: Marijuana. Family History:   family history includes Heart Disease in her father; High Blood Pressure in her father and mother; Pacemaker in her father; Stroke in her father and mother. PHYSICAL EXAM:  Vitals:  BP (!) 154/102   Pulse 105   Temp 98.6 °F (37 °C) (Oral)   Resp 18   Ht 5' 4\" (1.626 m)   Wt 103 lb (46.7 kg)   LMP 05/12/2014 (Approximate)   SpO2 94%   BMI 17.68 kg/m²   General Appearance: AOx3 and NAD  Skin: Warm and dry  Head: Normocephalic and atraumatic, mucous membranes well hydrated   Eyes: Pupils equal, round, and reactive to light  Neck: Supple and non tender without mass   Pulmonary/Chest: Clear to auscultation bilaterally- no wheezes, + bibasilar crackle, not in respiratory distress  Cardiovascular: tachycarida, normal S1 and S2 and no carotid bruits  Abdomen: Soft, non-tender, non-distended, no rebound, no rigidity, + bowel sounds  Extremities: No cyanosis, no clubbing and no edema  Neurologic: No neurologic focal deficits, speech is normal, coherent     LABS:  CBC  Recent Labs     12/26/19  1859   WBC 5.3   HGB 11.6   HCT 37.3   MCV 87.6        BMP  Recent Labs     12/26/19  1859      K 4.4      CO2 25   BUN 11   CREATININE 0.8   LABGLOM >60   GLUCOSE 113*   CALCIUM 9.3     Lab Results   Component Value Date    MG 2.2 08/27/2018    PHOS 2.7 06/26/2017     LFT  Recent Labs     12/26/19  1859   ALT 14   AST 23   ALKPHOS 127*   BILITOT 0.6     Albumin  Lab Results   Component Value Date    LABALBU 4.3 12/26/2019    LABALBU 4.9 03/29/2019    LABALBU 4.2 12/18/2018     Lactic acid   No results for input(s): LACTSEPSIS in the last 72 hours.   Cardiac  Troponin   Lab Results   Component Value Date    TROPONINI <0.01 12/26/2019 TROPONINI <0.01 12/18/2018    TROPONINI <0.01 08/26/2018     Pro-BNP   Lab Results   Component Value Date    PROBNP 7,151 (H) 12/26/2019    PROBNP 10,430 (H) 08/24/2018    PROBNP 2,718 (H) 04/06/2018     Iron studies  Lab Results   Component Value Date    FERRITIN 87 03/29/2018    IRON 33 (L) 03/29/2018    TIBC 430 03/29/2018       Radiology:   XR CHEST STANDARD (2 VW)   Final Result      Diffuse interstitial lung disease which may reflect acute infiltrate   such as pulmonary edema or infection or underlying chronic process. EKG: Sinus tachycardia     ASSESSMENT and PLAN:      Problem   Acute Decompensated Heart Failure (Hcc)   Acute Pulmonary Edema (Hcc)   Copd (Chronic Obstructive Pulmonary Disease) (Hcc)   Icd (Implantable Cardioverter-Defibrillator) in Place   Essential Hypertension   Hyperlipidemia     ADHF  Acute pulmonary edema   Acute respiratory failure without hypoxia    Saturating well on room air. Non ischemic   · In setting of non compliance due to financial problem  · Given lasix 40 mg IV in ER   · C.w lasix IV 20 mg BID  · C.w Entresto   · C.w metoprolol succinate 50 mg BID  · C.w hydralazine+ isosorbide dinitrate   · Hold spironolactone   · BiPAP at night   · Social work consult for financial problem    History of Stroke  · C.w aspirin ad plavix  · C.w atorvastatin     COPD  Current daily smoker   · C.w Duoneb  · C.w DULERA  · C.w montelukast       Code Status: Full   DVT prophylaxis: Lovenox   Diet: cardiac with Na and H2O restriction       PLEASE NOTE:    This report was transcribed using typing and voice recognition software. Every effort was made to ensure accuracy; however, inadvertent computerized transcription errors may be present.  More than 50 minutes have been spent in evaluating the patient, reviewing records and results, discussing with staff / family and other treating physicians.     Chantal Gaytan MD, MSc   [facility]

## 2019-12-27 NOTE — PROGRESS NOTES
Occupational Therapy  OCCUPATIONAL THERAPY INITIAL EVALUATION      Date:2019  Patient Name: Karissa Keys  MRN: [id number]  : 1965  Room: [address] [place], [place] [address]    Evaluating OT: Yuliya Snow OTR/L [id number]    AM-PAC Daily Activity Raw Score:     Recommended Adaptive Equipment: none      Diagnosis: heart failure. Pt presents to ED from home with SOB. Pertinent Medical History: COPD, CVA, CHF, CAD   Precautions:  none     Home Living: Pt lives with her daughter in a single story home   Bathroom setup: tub/shower     Prior Level of Function: Independent with ADLs, daughter assists PRN with IADLs; completed functional mobility no AD  Driving: No    Pain Level: no reported pain    Cognition: A&O:     Problem solving:  WFL   Judgement/safety:  WFL     Functional Assessment:   Initial Eval Status  Date: 19   Feeding Independent   Grooming Independent  Standing sink level   UB Dressing Independent   LB Dressing Independent  Wister/donning B socks   Bathing Independent   Toileting Independent   Bed Mobility  Supine <> sit: Independent   Functional Transfers Independent   Functional Mobility Independent no AD  Household distance   Balance Sitting: Good    Standing: Fair plus no AD   Activity Tolerance WFL        Strength ROM Additional Info:    RUE  WFL WFL good  and wfl FMC/dexterity noted during ADL tasks       LUE WFL WFL good  and wfl FMC/dexterity noted during ADL tasks         Hearing: WFL   Vision: WFL   Sensation:  No c/o numbness or tingling   Edema: none                             Pt educated on purpose of OT services with verbalized understanding. Pt reports no need for further OT interventions at this time. OT evaluation only no indicated need for further skilled OT services. Please reconsult if any new need arises. Pt able to safely reach to bottom of cupboard and retrieve overnight bag and get needed supplies for bathing task bathroom level.  OT assists in set up of bathroom environment including BSC for safety incase of needed seated rest break. Pt demonstrates good safety and technique for set up and initiation of bathing bathroom level. OT educates patient on ECT in home environment with good understanding. Eval Complexity: Low    Upon arrival, patient seated EOB. At end of session, patient standing at sink with call light and phone within reach, all lines and tubes intact. Low Evaluation     Evaluation time includes thorough review of current medical information, gathering information on past medical history/social history and prior level of function, completion of standardized testing/informal observation of tasks and assessment of data.     Jack Arellano OTR/L  [id number]

## 2019-12-27 NOTE — PROGRESS NOTES
[facility] Quality Flow/Interdisciplinary Rounds Progress Note        Quality Flow Rounds held on December 27, 2019    Disciplines Attending:  Bedside Nurse, ,  and Nursing Unit Leadership    Jolly Chaves was admitted on 12/26/2019  5:46 PM    Anticipated Discharge Date:  Expected Discharge Date: 12/29/19    Disposition:    Randolph Score:  Randolph Scale Score: 22    Readmission Risk              Risk of Unplanned Readmission:        12           Discussed patient goal for the day, patient clinical progression, and barriers to discharge.   The following Goal(s) of the Day/Commitment(s) have been identified:  Check for assistance with meds, check plan      Thayer Arnol  December 27, 2019

## 2019-12-27 NOTE — DISCHARGE INSTR - DIET
Daily Value for sodium. Choose products with low Percent Daily Values for sodium. · Be aware that sodium can come in forms other than salt, including monosodium glutamate (MSG), sodium citrate, and sodium bicarbonate (baking soda). MSG is often added to Asian food. When you eat out, you can sometimes ask for food without MSG or added salt. Buy low-sodium foods  · Buy foods that are labeled \"unsalted\" (no salt added), \"sodium-free\" (less than 5 mg of sodium per serving), or \"low-sodium\" (less than 140 mg of sodium per serving). Foods labeled \"reduced-sodium\" and \"light sodium\" may still have too much sodium. Be sure to read the label to see how much sodium you are getting. · Buy fresh vegetables, or frozen vegetables without added sauces. Buy low-sodium versions of canned vegetables, soups, and other canned goods. Prepare low-sodium meals  · Cut back on the amount of salt you use in cooking. This will help you adjust to the taste. Do not add salt after cooking. One teaspoon of salt has about 2,300 mg of sodium. · Take the salt shaker off the table. · Flavor your food with garlic, lemon juice, onion, vinegar, herbs, and spices. Do not use soy sauce, lite soy sauce, steak sauce, onion salt, garlic salt, celery salt, mustard, or ketchup on your food. · Use low-sodium salad dressings, sauces, and ketchup. Or make your own salad dressings and sauces without adding salt. · Use less salt (or none) when recipes call for it. You can often use half the salt a recipe calls for without losing flavor. Other foods such as rice, pasta, and grains do not need added salt. · Rinse canned vegetables, and cook them in fresh water. This removes some--but not all--of the salt. · Avoid water that is naturally high in sodium or that has been treated with water softeners, which add sodium. Call your local water company to find out the sodium content of your water supply.  If you buy bottled water, read the label and choose a sodium-free brand. Avoid high-sodium foods  · Avoid eating:  ? Smoked, cured, salted, and canned meat, fish, and poultry. ? Ham, cruz, hot dogs, and luncheon meats. ? Regular, hard, and processed cheese and regular peanut butter. ? Crackers with salted tops, and other salted snack foods such as pretzels, chips, and salted popcorn. ? Frozen prepared meals, unless labeled low-sodium. ? Canned and dried soups, broths, and bouillon, unless labeled sodium-free or low-sodium. ? Canned vegetables, unless labeled sodium-free or low-sodium. ? Western Felicia fries, pizza, tacos, and other fast foods. ? Pickles, olives, ketchup, and other condiments, especially soy sauce, unless labeled sodium-free or low-sodium. Where can you learn more? Go to [email or web address] org and sign in to your Always Prepped account. Enter E113 in the [facility] box to learn more about \"Low Sodium Diet (2,000 Milligram): Care Instructions. \"     If you do not have an account, please click on the \"Sign Up Now\" link. Current as of: November 7, 2018  Content Version: 12.1  © 1792-5533 [employer]wise, Incorporated. Care instructions adapted under license by [facility] ([facility]). If you have questions about a medical condition or this instruction, always ask your healthcare professional. [place] [address] any warranty or liability for your use of this information.

## 2019-12-27 NOTE — CARE COORDINATION
Social work / Discharge Planning:       Social work consult noted regarding \"patient getting admitted the scond time for ADHF due to inability to afford copay for her meds $ 20/month       Social work met with patient for initial assessment. She lives with her daughter and reports being independent prior to admission. She has no history of HC or MALIKA. Patient sees physicians at the [facility]. Her discharge plan is home. Patient has health insurance with a medication drug plan.   Electronically signed by HARI Larios on 12/27/2019 at 9:53 AM

## 2019-12-29 LAB
CULTURE, RESPIRATORY: NORMAL
SMEAR, RESPIRATORY: NORMAL

## 2020-01-13 ENCOUNTER — APPOINTMENT (OUTPATIENT)
Dept: GENERAL RADIOLOGY | Age: 55
DRG: 293 | End: 2020-01-13
Payer: COMMERCIAL

## 2020-01-13 ENCOUNTER — HOSPITAL ENCOUNTER (INPATIENT)
Age: 55
LOS: 2 days | Discharge: HOME | DRG: 293 | End: 2020-01-16
Attending: EMERGENCY MEDICINE | Admitting: FAMILY MEDICINE
Payer: COMMERCIAL

## 2020-01-13 PROBLEM — I50.23 ACUTE ON CHRONIC SYSTOLIC HEART FAILURE (HCC): Status: ACTIVE | Noted: 2020-01-13

## 2020-01-13 LAB
ALBUMIN SERPL-MCNC: 4.1 G/DL (ref 3.5–5.2)
ALP BLD-CCNC: 115 U/L (ref 35–104)
ALT SERPL-CCNC: 9 U/L (ref 0–32)
ANION GAP SERPL CALCULATED.3IONS-SCNC: 15 MMOL/L (ref 7–16)
AST SERPL-CCNC: 19 U/L (ref 0–31)
BASOPHILS ABSOLUTE: 0.11 E9/L (ref 0–0.2)
BASOPHILS RELATIVE PERCENT: 1.3 % (ref 0–2)
BILIRUB SERPL-MCNC: 1 MG/DL (ref 0–1.2)
BUN BLDV-MCNC: 12 MG/DL (ref 6–20)
CALCIUM SERPL-MCNC: 10 MG/DL (ref 8.6–10.2)
CHLORIDE BLD-SCNC: 107 MMOL/L (ref 98–107)
CO2: 23 MMOL/L (ref 22–29)
CREAT SERPL-MCNC: 0.8 MG/DL (ref 0.5–1)
EOSINOPHILS ABSOLUTE: 0.14 E9/L (ref 0.05–0.5)
EOSINOPHILS RELATIVE PERCENT: 1.6 % (ref 0–6)
GFR AFRICAN AMERICAN: >60
GFR NON-AFRICAN AMERICAN: >60 ML/MIN/1.73
GLUCOSE BLD-MCNC: 113 MG/DL (ref 74–99)
HCT VFR BLD CALC: 41.6 % (ref 34–48)
HEMOGLOBIN: 12.6 G/DL (ref 11.5–15.5)
IMMATURE GRANULOCYTES #: 0.04 E9/L
IMMATURE GRANULOCYTES %: 0.5 % (ref 0–5)
LYMPHOCYTES ABSOLUTE: 2.2 E9/L (ref 1.5–4)
LYMPHOCYTES RELATIVE PERCENT: 25.4 % (ref 20–42)
MCH RBC QN AUTO: 26.4 PG (ref 26–35)
MCHC RBC AUTO-ENTMCNC: 30.3 % (ref 32–34.5)
MCV RBC AUTO: 87 FL (ref 80–99.9)
MONOCYTES ABSOLUTE: 0.68 E9/L (ref 0.1–0.95)
MONOCYTES RELATIVE PERCENT: 7.9 % (ref 2–12)
NEUTROPHILS ABSOLUTE: 5.49 E9/L (ref 1.8–7.3)
NEUTROPHILS RELATIVE PERCENT: 63.3 % (ref 43–80)
PDW BLD-RTO: 14.6 FL (ref 11.5–15)
PLATELET # BLD: 418 E9/L (ref 130–450)
PMV BLD AUTO: 10 FL (ref 7–12)
POTASSIUM SERPL-SCNC: 3.8 MMOL/L (ref 3.5–5)
PRO-BNP: ABNORMAL PG/ML (ref 0–125)
RBC # BLD: 4.78 E12/L (ref 3.5–5.5)
SODIUM BLD-SCNC: 145 MMOL/L (ref 132–146)
TOTAL PROTEIN: 7.7 G/DL (ref 6.4–8.3)
TROPONIN: <0.01 NG/ML (ref 0–0.03)
WBC # BLD: 8.7 E9/L (ref 4.5–11.5)

## 2020-01-13 PROCEDURE — 71046 X-RAY EXAM CHEST 2 VIEWS: CPT

## 2020-01-13 PROCEDURE — 2500000003 HC RX 250 WO HCPCS: Performed by: NURSE PRACTITIONER

## 2020-01-13 PROCEDURE — 80053 COMPREHEN METABOLIC PANEL: CPT

## 2020-01-13 PROCEDURE — 85025 COMPLETE CBC W/AUTO DIFF WBC: CPT

## 2020-01-13 PROCEDURE — 99285 EMERGENCY DEPT VISIT HI MDM: CPT

## 2020-01-13 PROCEDURE — 84484 ASSAY OF TROPONIN QUANT: CPT

## 2020-01-13 PROCEDURE — 96375 TX/PRO/DX INJ NEW DRUG ADDON: CPT

## 2020-01-13 PROCEDURE — 93005 ELECTROCARDIOGRAM TRACING: CPT | Performed by: NURSE PRACTITIONER

## 2020-01-13 PROCEDURE — 96374 THER/PROPH/DIAG INJ IV PUSH: CPT

## 2020-01-13 PROCEDURE — 36415 COLL VENOUS BLD VENIPUNCTURE: CPT

## 2020-01-13 PROCEDURE — 94761 N-INVAS EAR/PLS OXIMETRY MLT: CPT

## 2020-01-13 PROCEDURE — 83880 ASSAY OF NATRIURETIC PEPTIDE: CPT

## 2020-01-13 RX ORDER — BUMETANIDE 0.25 MG/ML
1 INJECTION, SOLUTION INTRAMUSCULAR; INTRAVENOUS ONCE
Status: COMPLETED | OUTPATIENT
Start: 2020-01-13 | End: 2020-01-13

## 2020-01-13 RX ADMIN — BUMETANIDE 1 MG: 0.25 INJECTION INTRAMUSCULAR; INTRAVENOUS at 23:30

## 2020-01-14 ENCOUNTER — APPOINTMENT (OUTPATIENT)
Dept: ULTRASOUND IMAGING | Age: 55
DRG: 293 | End: 2020-01-14
Payer: COMMERCIAL

## 2020-01-14 LAB
ANION GAP SERPL CALCULATED.3IONS-SCNC: 14 MMOL/L (ref 7–16)
ANION GAP SERPL CALCULATED.3IONS-SCNC: 16 MMOL/L (ref 7–16)
BUN BLDV-MCNC: 12 MG/DL (ref 6–20)
BUN BLDV-MCNC: 13 MG/DL (ref 6–20)
CALCIUM SERPL-MCNC: 9.5 MG/DL (ref 8.6–10.2)
CALCIUM SERPL-MCNC: 9.6 MG/DL (ref 8.6–10.2)
CHLORIDE BLD-SCNC: 104 MMOL/L (ref 98–107)
CHLORIDE BLD-SCNC: 108 MMOL/L (ref 98–107)
CO2: 21 MMOL/L (ref 22–29)
CO2: 22 MMOL/L (ref 22–29)
CREAT SERPL-MCNC: 0.7 MG/DL (ref 0.5–1)
CREAT SERPL-MCNC: 0.8 MG/DL (ref 0.5–1)
EKG ATRIAL RATE: 103 BPM
EKG ATRIAL RATE: 94 BPM
EKG P AXIS: 70 DEGREES
EKG P AXIS: 78 DEGREES
EKG P-R INTERVAL: 182 MS
EKG P-R INTERVAL: 192 MS
EKG Q-T INTERVAL: 352 MS
EKG Q-T INTERVAL: 354 MS
EKG QRS DURATION: 106 MS
EKG QRS DURATION: 106 MS
EKG QTC CALCULATION (BAZETT): 440 MS
EKG QTC CALCULATION (BAZETT): 463 MS
EKG R AXIS: -82 DEGREES
EKG R AXIS: -85 DEGREES
EKG T AXIS: 61 DEGREES
EKG T AXIS: 78 DEGREES
EKG VENTRICULAR RATE: 103 BPM
EKG VENTRICULAR RATE: 94 BPM
GFR AFRICAN AMERICAN: >60
GFR AFRICAN AMERICAN: >60
GFR NON-AFRICAN AMERICAN: >60 ML/MIN/1.73
GFR NON-AFRICAN AMERICAN: >60 ML/MIN/1.73
GLUCOSE BLD-MCNC: 113 MG/DL (ref 74–99)
GLUCOSE BLD-MCNC: 122 MG/DL (ref 74–99)
MAGNESIUM: 1.8 MG/DL (ref 1.6–2.6)
MAGNESIUM: 1.8 MG/DL (ref 1.6–2.6)
POTASSIUM SERPL-SCNC: 3.8 MMOL/L (ref 3.5–5)
POTASSIUM SERPL-SCNC: 3.8 MMOL/L (ref 3.5–5)
SODIUM BLD-SCNC: 141 MMOL/L (ref 132–146)
SODIUM BLD-SCNC: 144 MMOL/L (ref 132–146)
TROPONIN: <0.01 NG/ML (ref 0–0.03)
TROPONIN: <0.01 NG/ML (ref 0–0.03)
TSH SERPL DL<=0.05 MIU/L-ACNC: 2.86 UIU/ML (ref 0.27–4.2)

## 2020-01-14 PROCEDURE — 96365 THER/PROPH/DIAG IV INF INIT: CPT

## 2020-01-14 PROCEDURE — 80048 BASIC METABOLIC PNL TOTAL CA: CPT

## 2020-01-14 PROCEDURE — 84484 ASSAY OF TROPONIN QUANT: CPT

## 2020-01-14 PROCEDURE — 2580000003 HC RX 258: Performed by: NURSE PRACTITIONER

## 2020-01-14 PROCEDURE — 6370000000 HC RX 637 (ALT 250 FOR IP): Performed by: STUDENT IN AN ORGANIZED HEALTH CARE EDUCATION/TRAINING PROGRAM

## 2020-01-14 PROCEDURE — 93005 ELECTROCARDIOGRAM TRACING: CPT | Performed by: NURSE PRACTITIONER

## 2020-01-14 PROCEDURE — 93010 ELECTROCARDIOGRAM REPORT: CPT | Performed by: INTERNAL MEDICINE

## 2020-01-14 PROCEDURE — 99222 1ST HOSP IP/OBS MODERATE 55: CPT | Performed by: FAMILY MEDICINE

## 2020-01-14 PROCEDURE — 84443 ASSAY THYROID STIM HORMONE: CPT

## 2020-01-14 PROCEDURE — 96372 THER/PROPH/DIAG INJ SC/IM: CPT

## 2020-01-14 PROCEDURE — 96366 THER/PROPH/DIAG IV INF ADDON: CPT

## 2020-01-14 PROCEDURE — 6360000002 HC RX W HCPCS: Performed by: STUDENT IN AN ORGANIZED HEALTH CARE EDUCATION/TRAINING PROGRAM

## 2020-01-14 PROCEDURE — 2500000003 HC RX 250 WO HCPCS: Performed by: STUDENT IN AN ORGANIZED HEALTH CARE EDUCATION/TRAINING PROGRAM

## 2020-01-14 PROCEDURE — 96376 TX/PRO/DX INJ SAME DRUG ADON: CPT

## 2020-01-14 PROCEDURE — 36415 COLL VENOUS BLD VENIPUNCTURE: CPT

## 2020-01-14 PROCEDURE — 83735 ASSAY OF MAGNESIUM: CPT

## 2020-01-14 PROCEDURE — 76705 ECHO EXAM OF ABDOMEN: CPT

## 2020-01-14 PROCEDURE — APPSS180 APP SPLIT SHARED TIME > 60 MINUTES: Performed by: NURSE PRACTITIONER

## 2020-01-14 PROCEDURE — 99223 1ST HOSP IP/OBS HIGH 75: CPT | Performed by: INTERNAL MEDICINE

## 2020-01-14 PROCEDURE — 2060000000 HC ICU INTERMEDIATE R&B

## 2020-01-14 RX ORDER — PANTOPRAZOLE SODIUM 40 MG/1
40 TABLET, DELAYED RELEASE ORAL
Status: DISCONTINUED | OUTPATIENT
Start: 2020-01-14 | End: 2020-01-16 | Stop reason: HOSPADM

## 2020-01-14 RX ORDER — FLUOXETINE HYDROCHLORIDE 20 MG/1
20 CAPSULE ORAL DAILY
Status: DISCONTINUED | OUTPATIENT
Start: 2020-01-14 | End: 2020-01-16 | Stop reason: HOSPADM

## 2020-01-14 RX ORDER — SODIUM CHLORIDE 0.9 % (FLUSH) 0.9 %
10 SYRINGE (ML) INJECTION PRN
Status: DISCONTINUED | OUTPATIENT
Start: 2020-01-14 | End: 2020-01-14 | Stop reason: SDUPTHER

## 2020-01-14 RX ORDER — ATORVASTATIN CALCIUM 40 MG/1
40 TABLET, FILM COATED ORAL DAILY
Status: DISCONTINUED | OUTPATIENT
Start: 2020-01-14 | End: 2020-01-16 | Stop reason: HOSPADM

## 2020-01-14 RX ORDER — ASPIRIN 81 MG/1
81 TABLET, CHEWABLE ORAL DAILY
Status: DISCONTINUED | OUTPATIENT
Start: 2020-01-14 | End: 2020-01-16 | Stop reason: HOSPADM

## 2020-01-14 RX ORDER — ISOSORBIDE DINITRATE 10 MG/1
30 TABLET ORAL 3 TIMES DAILY
Status: DISCONTINUED | OUTPATIENT
Start: 2020-01-14 | End: 2020-01-15

## 2020-01-14 RX ORDER — ONDANSETRON 2 MG/ML
4 INJECTION INTRAMUSCULAR; INTRAVENOUS EVERY 6 HOURS PRN
Status: DISCONTINUED | OUTPATIENT
Start: 2020-01-14 | End: 2020-01-16 | Stop reason: HOSPADM

## 2020-01-14 RX ORDER — SODIUM CHLORIDE 0.9 % (FLUSH) 0.9 %
10 SYRINGE (ML) INJECTION EVERY 12 HOURS SCHEDULED
Status: DISCONTINUED | OUTPATIENT
Start: 2020-01-14 | End: 2020-01-14 | Stop reason: SDUPTHER

## 2020-01-14 RX ORDER — SODIUM CHLORIDE 0.9 % (FLUSH) 0.9 %
10 SYRINGE (ML) INJECTION PRN
Status: DISCONTINUED | OUTPATIENT
Start: 2020-01-14 | End: 2020-01-16 | Stop reason: HOSPADM

## 2020-01-14 RX ORDER — MAGNESIUM SULFATE IN WATER 40 MG/ML
2 INJECTION, SOLUTION INTRAVENOUS ONCE
Status: COMPLETED | OUTPATIENT
Start: 2020-01-14 | End: 2020-01-14

## 2020-01-14 RX ORDER — SODIUM CHLORIDE 0.9 % (FLUSH) 0.9 %
10 SYRINGE (ML) INJECTION EVERY 12 HOURS SCHEDULED
Status: DISCONTINUED | OUTPATIENT
Start: 2020-01-14 | End: 2020-01-16 | Stop reason: HOSPADM

## 2020-01-14 RX ORDER — BUMETANIDE 1 MG/1
2 TABLET ORAL DAILY
Status: DISCONTINUED | OUTPATIENT
Start: 2020-01-14 | End: 2020-01-14

## 2020-01-14 RX ORDER — BUMETANIDE 0.25 MG/ML
1 INJECTION, SOLUTION INTRAMUSCULAR; INTRAVENOUS 2 TIMES DAILY
Status: DISCONTINUED | OUTPATIENT
Start: 2020-01-14 | End: 2020-01-15

## 2020-01-14 RX ORDER — MONTELUKAST SODIUM 10 MG/1
10 TABLET ORAL NIGHTLY
Status: DISCONTINUED | OUTPATIENT
Start: 2020-01-14 | End: 2020-01-16 | Stop reason: HOSPADM

## 2020-01-14 RX ORDER — HYDRALAZINE HYDROCHLORIDE 25 MG/1
50 TABLET, FILM COATED ORAL EVERY 8 HOURS SCHEDULED
Status: DISCONTINUED | OUTPATIENT
Start: 2020-01-14 | End: 2020-01-15

## 2020-01-14 RX ORDER — SPIRONOLACTONE 25 MG/1
25 TABLET ORAL DAILY
Status: DISCONTINUED | OUTPATIENT
Start: 2020-01-14 | End: 2020-01-16 | Stop reason: HOSPADM

## 2020-01-14 RX ORDER — ALBUTEROL SULFATE 90 UG/1
2 AEROSOL, METERED RESPIRATORY (INHALATION) EVERY 6 HOURS PRN
Status: DISCONTINUED | OUTPATIENT
Start: 2020-01-14 | End: 2020-01-16 | Stop reason: HOSPADM

## 2020-01-14 RX ORDER — METOPROLOL SUCCINATE 50 MG/1
50 TABLET, EXTENDED RELEASE ORAL 2 TIMES DAILY
Status: DISCONTINUED | OUTPATIENT
Start: 2020-01-14 | End: 2020-01-16 | Stop reason: HOSPADM

## 2020-01-14 RX ADMIN — PANTOPRAZOLE SODIUM 40 MG: 40 TABLET, DELAYED RELEASE ORAL at 06:19

## 2020-01-14 RX ADMIN — HYDRALAZINE HYDROCHLORIDE 50 MG: 25 TABLET, FILM COATED ORAL at 22:07

## 2020-01-14 RX ADMIN — ENOXAPARIN SODIUM 40 MG: 40 INJECTION SUBCUTANEOUS at 09:12

## 2020-01-14 RX ADMIN — ISOSORBIDE DINITRATE 30 MG: 10 TABLET ORAL at 22:07

## 2020-01-14 RX ADMIN — HYDRALAZINE HYDROCHLORIDE 50 MG: 25 TABLET, FILM COATED ORAL at 14:48

## 2020-01-14 RX ADMIN — SPIRONOLACTONE 25 MG: 25 TABLET ORAL at 09:08

## 2020-01-14 RX ADMIN — MONTELUKAST SODIUM 10 MG: 10 TABLET ORAL at 21:18

## 2020-01-14 RX ADMIN — MAGNESIUM SULFATE HEPTAHYDRATE 2 G: 40 INJECTION, SOLUTION INTRAVENOUS at 09:15

## 2020-01-14 RX ADMIN — METOPROLOL SUCCINATE 50 MG: 50 TABLET, EXTENDED RELEASE ORAL at 03:01

## 2020-01-14 RX ADMIN — METOPROLOL SUCCINATE 50 MG: 50 TABLET, EXTENDED RELEASE ORAL at 21:18

## 2020-01-14 RX ADMIN — ASPIRIN 81 MG 81 MG: 81 TABLET ORAL at 09:12

## 2020-01-14 RX ADMIN — POTASSIUM BICARBONATE 20 MEQ: 782 TABLET, EFFERVESCENT ORAL at 09:13

## 2020-01-14 RX ADMIN — ALBUTEROL SULFATE 2 PUFF: 90 AEROSOL, METERED RESPIRATORY (INHALATION) at 06:20

## 2020-01-14 RX ADMIN — METOPROLOL SUCCINATE 50 MG: 50 TABLET, EXTENDED RELEASE ORAL at 09:12

## 2020-01-14 RX ADMIN — ATORVASTATIN CALCIUM 40 MG: 40 TABLET, FILM COATED ORAL at 21:18

## 2020-01-14 RX ADMIN — SACUBITRIL AND VALSARTAN 1 TABLET: 97; 103 TABLET, FILM COATED ORAL at 03:01

## 2020-01-14 RX ADMIN — Medication 10 ML: at 09:08

## 2020-01-14 RX ADMIN — FLUOXETINE HYDROCHLORIDE 20 MG: 20 CAPSULE ORAL at 09:08

## 2020-01-14 RX ADMIN — BUMETANIDE 1 MG: 0.25 INJECTION, SOLUTION INTRAMUSCULAR; INTRAVENOUS at 21:18

## 2020-01-14 RX ADMIN — ISOSORBIDE DINITRATE 30 MG: 10 TABLET ORAL at 09:08

## 2020-01-14 RX ADMIN — Medication 10 ML: at 21:19

## 2020-01-14 RX ADMIN — HYDRALAZINE HYDROCHLORIDE 50 MG: 25 TABLET, FILM COATED ORAL at 06:19

## 2020-01-14 RX ADMIN — BUMETANIDE 2 MG: 1 TABLET ORAL at 09:13

## 2020-01-14 RX ADMIN — SACUBITRIL AND VALSARTAN 1 TABLET: 97; 103 TABLET, FILM COATED ORAL at 22:07

## 2020-01-14 RX ADMIN — MOMETASONE FUROATE AND FORMOTEROL FUMARATE DIHYDRATE 2 PUFF: 100; 5 AEROSOL RESPIRATORY (INHALATION) at 21:18

## 2020-01-14 ASSESSMENT — PAIN SCALES - GENERAL
PAINLEVEL_OUTOF10: 0
PAINLEVEL_OUTOF10: 0

## 2020-01-14 NOTE — H&P
Aspirin daily, but is unable to take 324 mg. Medications prior admission :   No current facility-administered medications on file prior to encounter.       Current Outpatient Medications on File Prior to Encounter   Medication Sig Dispense Refill    aspirin 81 MG chewable tablet Take 1 tablet by mouth daily 30 tablet 0    isosorbide dinitrate (ISORDIL) 30 MG tablet Take 1 tablet by mouth 3 times daily 90 tablet 0    albuterol sulfate HFA (PROVENTIL HFA) 108 (90 Base) MCG/ACT inhaler Inhale 2 puffs into the lungs every 6 hours as needed for Wheezing 1 Inhaler 0    albuterol sulfate  (90 Base) MCG/ACT inhaler Inhale 2 puffs into the lungs every 6 hours as needed for Wheezing 1 Inhaler 3    budesonide-formoterol (SYMBICORT) 160-4.5 MCG/ACT AERO Inhale 2 puffs into the lungs 2 times daily 1 Inhaler 0    mometasone-formoterol (DULERA) 100-5 MCG/ACT inhaler Inhale 2 puffs into the lungs 2 times daily 1 Inhaler 3    montelukast (SINGULAIR) 10 MG tablet Take 1 tablet by mouth nightly 30 tablet 0    FLUoxetine (PROZAC) 20 MG capsule Take 1 capsule by mouth daily 30 capsule 0    atorvastatin (LIPITOR) 40 MG tablet Take 1 tablet by mouth daily 30 tablet 0    hydrALAZINE (APRESOLINE) 50 MG tablet Take 1 tablet by mouth every 8 hours 90 tablet 0    metoprolol succinate (TOPROL XL) 50 MG extended release tablet Take 1 tablet by mouth 2 times daily 30 tablet 0    sacubitril-valsartan (ENTRESTO)  MG per tablet Take 1 tablet by mouth 2 times daily 60 tablet 0    bumetanide (BUMEX) 2 MG tablet Take 1 tablet by mouth every other day 90 tablet 0    spironolactone (ALDACTONE) 25 MG tablet Take 1 tablet by mouth daily 30 tablet 0    potassium chloride (KLOR-CON) 20 MEQ packet Take 20 mEq by mouth daily 30 each 0    clopidogrel (PLAVIX) 75 MG tablet Take 1 tablet by mouth daily 30 tablet 0    omeprazole (PRILOSEC) 20 MG delayed release capsule Take 1 capsule by mouth daily 30 capsule 0       SYSTEMS SYMPTOMS   Constitutional:  Eyes:  HENT:  Respiratory:  Cardiovascular:  Gastrointestinal:  Genitourinary:  Musculoskeletal:  Endocrine:  Hematology:  Neurological:  Psychiatric: No fever, no chills, + involuntary weight changes  No blurry vision, no visual changes, no photophobia  No headache, no hearing change, no vertigo  No cough, +shortness of breath, no wheezes  No chest pain, no palpitations, no edema  No N/V, no D/C, no abdominal pain, no bloody or black stools  No dysuria, no hematuria, no trouble voiding  No joint pain, no muscle pain  No heat/cold intolerance, no hair changes  No increased bleeding, no increased bruising  No tremors, no weakness, no  numbness/tingling  No depressed mood, no hallucinations       Physical Exam :    VITAL SIGNS :   Vitals:    01/13/20 1859   BP: (!) 155/95   Pulse: 100   Resp: 16   Temp: 96.6 °F (35.9 °C)   TempSrc: Temporal   SpO2: 98%   Weight: 103 lb (46.7 kg)   Height: 5' 4\" (1.626 m)        FINDINGS   Gen. Appearance:  Head/face:  Eyes:  Nose/Sinuses:  Mouth/Throat:  Neck:  Thorax:  Lungs:  Heart:    Abdomen:  Extremities:  Peripheral pulses:  Musculoskeletal:  Neurologic:      Psychiatric:   NAD, appears stated age, cooperative, thin  Normocephalic and atraumatic  PERRL, EOM intacts and sclera nonicteric  Septum midline. Mucosa normal. No drainage  Pharynx without erythema, edema or exudate  Supple, no thyromegaly, no lymphadenopathy  Symmetric with good expansion  CTAB, diminished air movement, respiration unlabored, no crackles or rales  RRR, normal S1/S2, no murmur, rub, gallop  Soft, ND/NT, BS+, no masses or HSM. No CVA tenderness  Warm to touch without edema and no clubbing  Radial pulse 2+ bilaterally, dorsalis pedis pulse 2+ bilaterally  Full ROM in all major joints.  No swelling or deformity  Strength 5/5 throughout, cranial nerves intact, normal sensation, patellar reflex +2 bilaterally  A&O to person, place and time, normal behavior, normal thought content, Imaging studies :  Xr Chest Standard (2 Vw)    Result Date: 2020  Patient MRN:  [id number] : 1965 Age: 48 years Gender: Female Order Date:  2020 7:15 PM TECHNIQUE/NUMBER OF IMAGES/COMPARISON/CLINICAL HISTORY: Chest lateral views 2 images 2 views Comparison December 10 6. Difficult breathing. 57-year-old female patient. Patient is anterior epicardial monitory device/defibrillator. Pacemaker is located in the left lateral chest wall. There is diffuse enlargement of the cardiac area. No acute infiltrates, consolidations or pleural effusions seen. No significant changes since study of 2019. No superimposed acute cardiopulmonary process. Xr Chest Standard (2 Vw)    Result Date: 2019  Clinical indications: Shortness of breath. TECHNIQUE: Single frontal projection of the chest (1 view). COMPARISON: 2018. FINDINGS: The heart is enlarged. Left-sided pacemaker is once again present and in unchanged configuration. There is diffuse interstitial prominence. The heart, lungs, mediastinum and regional skeleton are otherwise unremarkable. Diffuse interstitial lung disease which may reflect acute infiltrate such as pulmonary edema or infection or underlying chronic process.        Other Active Health Issues :  Patient Active Problem List   Diagnosis    Hyperlipidemia    Left ovarian cyst    CVA (cerebral vascular accident)    COPD (chronic obstructive pulmonary disease) ([address]    Carpal tunnel syndrome on left    Suicide attempt by drug ingestion ([address]    Mitral regurgitation    Tobacco abuse    Asthma    Allergic rhinosinusitis    History of irregular menstrual bleeding    Anemia due to blood loss    History of MI (myocardial infarction)    Mass of right lobe of liver    Chronic systolic CHF (congestive heart failure), NYHA class 3 ([address]    Non-rheumatic mitral regurgitation    Essential hypertension    ICD (implantable cardioverter-defibrillator) in prophylaxis  - Lovenox 40 mg daily     Disposition: Social Work consult for medication assistance        Admit orders :  Type of admission : Observation   Estimated length of stay :  24 to 48 hours  Type of floor : Telemetry floor  Isolation contact : No  Respiratory care : Patient has an incentive spirometer   Oxygen therapy : Continue room air  Peripheral IV line : Yes IV access  Central line, PICC line : No  NG tube in place : No NG tube   PEG tube in place : No PEG tube  Jacinto in place : No Jacinto Catheter in place   Hemodialysis : Not needed  Wound care : Not needed  Diet : Low Carbs diet  Glucose protocol : Not indicated  Bowel regimen : Milk of magnesia  Activity : Up with assistance  DVT prophylaxis : Lovenox 40 mg daily  GI prophylaxis : Not indicated    ----------------------------------------------------------------  Signed by :  Amina John M.D.., PGY-3    This case was discussed with JaspersTomás Adam

## 2020-01-14 NOTE — CARE COORDINATION
Social Work /Discharge Planning:    Pt presents to the ED secondary to shortness of breath. Pt states she was visiting a family member at this hospital when her sister told her she did not look well. Pt was recently admitted at [facility] on 12/26 with heart failure and discharged on 12/27. Upon discharge, pt stated she did not have money to cover all the copays for her prescriptions but did state she should have the money for her prescriptions in the next week. LORRAINE met with pt who was alert and oriented x3. Pt again stating that she has prescriptions waiting at [facility] that she is unable to afford because she currently does not have any money. SW spoke to pharmacist at [facility] ([phone]), who reports pt has prescriptions waiting there but also has not filled any prescriptions since March 2019. Pt states she did not have stable housing last year and was moving around a lot. Pt also states she receives social security disability but has not received the money yet because there was a miscommunication about an appointment she was to attend. Pt states she has been waiting for the money that she was supposed to get on the first.       Per pharmacist at [facility]], all of pt's copays for her prescriptions total $24.50. LORRAINE informed pt of this.

## 2020-01-14 NOTE — PLAN OF CARE
1/14/20- Pt with HFrEF. -HF care plan, HF education points and HF discharge instructions on chart  -Orders: 2 gram sodium diet, daily weights, I/O  - will see for teaching.     Tara Yip RN 3:35 PM

## 2020-01-14 NOTE — ED NOTES
FIRST PROVIDER CONTACT ASSESSMENT NOTE      Department of Emergency Medicine   ED  First Provider Note   1/13/20  7:12 PM    Chief Complaint: Shortness of Breath (seen at [address] [place] [address] for the same states she has been unable to get her meds filled and her lungs are filled up )      History of Present Illness:    Lrorie Alatorre is a 48 y.o. female who presents to the ED by private car for increasing shortness of breath. Patient reports that she was seen and admitted at [facility]. Patient reports that she has not been able to get any of her prescriptions filled since her discharge and has not taken any of them. Patient on multiple medications. Patient reports prescriptions cost $27 and does not have the money to pick them up from the pharmacy  Focused Screening Exam:  Constitutional:  Alert, appears stated age and is in no distress. *ALLERGIES*     Orange fruit [citrus]; Crestor [rosuvastatin calcium];  Loratadine; Norco [hydrocodone-acetaminophen]; Sulfa antibiotics; and Aspirin     ED Triage Vitals [01/13/20 5689]   BP Temp Temp Source Pulse Resp SpO2 Height Weight   (!) 155/95 96.6 °F (35.9 °C) Temporal 100 16 98 % 5' 4\" (1.626 m) 103 lb (46.7 kg)        Initial Plan of Care:  Initiate Treatment-Testing, Proceed toTreatment Area When Bed Available for ED Attending/MLP to Continue Care    -----------------END OF FIRST PROVIDER CONTACT ASSESSMENT NOTE--------------  Electronically signed by GA Corley CNP   DD: 1/13/20         GA Corley CNP  01/13/20 8902

## 2020-01-14 NOTE — ED PROVIDER NOTES
ED Physician   HPI:  1/13/20, Time: 11:07 PM         Lela Park is a 48 y.o. female presenting to the ED for increasing shortness of breath. Lela Park is a 48 y.o. female who presents to the ED by private car for increasing shortness of breath. Patient reports that she was seen and admitted at [facility] at the end of December. Patient reports that she has not been able to get any of her prescriptions filled since her discharge and has not taken any of them. Patient on multiple medications. Patient reports prescriptions cost $27 and does not have the money to pick them up from the pharmacy, although patient does admit to drinking alcohol and smoking weed. Patient denies any actual chest pain denies any abdominal pain as well as no noted fevers. Denies any nausea, vomiting, diarrhea. Patient reports that she has not made any follow-up appointments since discharge to make her primary care physician aware that she has not filled any of her prescriptions. Patient reports that she has been having increasing shortness of breath with ambulation as well as  needing to sleep upright with multiple pillows.   Review of Systems:   Pertinent positives and negatives are stated within HPI, all other systems reviewed and are negative.          --------------------------------------------- PAST HISTORY ---------------------------------------------  Past Medical History:  has a past medical history of Angina at rest [facility]), Asthma, Blood type AB+, CAD (coronary artery disease), Carpal tunnel syndrome on left, CHF (congestive heart failure) ([facility] COPD (chronic obstructive pulmonary disease) ([facility] CVA (cerebral vascular accident) ([facility] GERD (gastroesophageal reflux disease), HFrEF (heart failure with reduced ejection fraction) ([facility] [address] History of blood transfusion, Hyperlipidemia, Hypertension, ICD (implantable cardioverter-defibrillator) in place, Left ovarian cyst, Liver lesion, Moderate mitral regurgitation, Nonischemic cardiomyopathy ([facility] [address] NSTEMI (non-ST elevated myocardial infarction) ([facility] Suicide attempt by drug ingestion ([facility] and Tobacco abuse. Past Surgical History:  has a past surgical history that includes cardiovascular stress test (); Tubal ligation (); Tonsillectomy;  section; transthoracic echocardiogram (3/19/13); Diagnostic Cardiac Cath Lab Procedure (2007); Diagnostic Cardiac Cath Lab Procedure (4/15); Percutaneous Transluminal Coronary Angio (4/10/15); Hysterectomy (10/1/15); Colonoscopy (10/2015); Endoscopy, colon, diagnostic; Cardiac catheterization (2018); and Cardiac defibrillator placement (2016). Social History:  reports that she quit smoking about 18 months ago. Her smoking use included cigarettes. She has a 15.00 pack-year smoking history. She has never used smokeless tobacco. She reports current alcohol use of about 3.0 standard drinks of alcohol per week. She reports current drug use. Drug: Marijuana. Family History: family history includes Heart Disease in her father; High Blood Pressure in her father and mother; Pacemaker in her father; Stroke in her father and mother. The patients home medications have been reviewed. Allergies: Orange fruit [citrus]; Crestor [rosuvastatin calcium];  Loratadine; Norco [hydrocodone-acetaminophen]; Sulfa antibiotics; and Aspirin    -------------------------------------------------- RESULTS -------------------------------------------------  All laboratory and radiology results have been personally reviewed by myself   LABS:  Results for orders placed or performed during the hospital encounter of 20   Troponin   Result Value Ref Range    Troponin <0.01 0.00 - 0.03 ng/mL   CBC Auto Differential   Result Value Ref Range    WBC 8.7 4.5 - 11.5 E9/L    RBC 4.78 3.50 - 5.50 E12/L    Hemoglobin 12.6 11.5 - 15.5 g/dL    Hematocrit 41.6 34.0 - 48.0 %    MCV 87.0 80.0 - 99.9 fL    MCH 26.4 26.0 - 35.0 pg    MCHC 30.3 (L) 32.0 - 34.5 %    RDW 14.6 11.5 - 15.0 fL    Platelets 055 199 - 547 E9/L    MPV 10.0 7.0 - 12.0 fL    Neutrophils % 63.3 43.0 - 80.0 %    Immature Granulocytes % 0.5 0.0 - 5.0 %    Lymphocytes % 25.4 20.0 - 42.0 %    Monocytes % 7.9 2.0 - 12.0 %    Eosinophils % 1.6 0.0 - 6.0 %    Basophils % 1.3 0.0 - 2.0 %    Neutrophils Absolute 5.49 1.80 - 7.30 E9/L    Immature Granulocytes # 0.04 E9/L    Lymphocytes Absolute 2.20 1.50 - 4.00 E9/L    Monocytes Absolute 0.68 0.10 - 0.95 E9/L    Eosinophils Absolute 0.14 0.05 - 0.50 E9/L    Basophils Absolute 0.11 0.00 - 0.20 E9/L   Comprehensive Metabolic Panel   Result Value Ref Range    Sodium 145 132 - 146 mmol/L    Potassium 3.8 3.5 - 5.0 mmol/L    Chloride 107 98 - 107 mmol/L    CO2 23 22 - 29 mmol/L    Anion Gap 15 7 - 16 mmol/L    Glucose 113 (H) 74 - 99 mg/dL    BUN 12 6 - 20 mg/dL    CREATININE 0.8 0.5 - 1.0 mg/dL    GFR Non-African American >60 >=60 mL/min/1.73    GFR African American >60     Calcium 10.0 8.6 - 10.2 mg/dL    Total Protein 7.7 6.4 - 8.3 g/dL    Alb 4.1 3.5 - 5.2 g/dL    Total Bilirubin 1.0 0.0 - 1.2 mg/dL    Alkaline Phosphatase 115 (H) 35 - 104 U/L    ALT 9 0 - 32 U/L    AST 19 0 - 31 U/L   Brain Natriuretic Peptide   Result Value Ref Range    Pro-BNP 16,201 (H) 0 - 125 pg/mL   EKG 12 Lead   Result Value Ref Range    Ventricular Rate 94 BPM    Atrial Rate 94 BPM    P-R Interval 192 ms    QRS Duration 106 ms    Q-T Interval 352 ms    QTc Calculation (Bazett) 440 ms    P Axis 78 degrees    R Axis -85 degrees    T Axis 78 degrees       RADIOLOGY:  Interpreted by Radiologist.  XR CHEST STANDARD (2 VW)   Final Result   No significant changes since study of December 26, 2019. No superimposed acute cardiopulmonary process. ------------------------- NURSING NOTES AND VITALS REVIEWED ---------------------------   The nursing notes within the ED encounter and vital signs as below have been reviewed.    BP (!) 155/95   Pulse 100   Temp 96.6

## 2020-01-14 NOTE — PROGRESS NOTES
[address] [facility]  Family Medicine Attending    S: 48 y.o. female with past medical history of CAD, nonischemic cardiomyopathy s/p ICD placement, HFrEF (EF 20% August 2018), mitral regurgitation, history of CVA, hypertension, hyperlipidemia, COPD, tobacco/alcohol/drug abuse. Admitted last month for same problems of SOB and CHF at [facility], but unable to fill medications. Today, feels SOB and chest tightness. States that urine output has not been more than usual    O: VS- Blood pressure (!) 134/90, pulse 84, temperature 96.6 °F (35.9 °C), temperature source Temporal, resp. rate 16, height 5' 4\" (1.626 m), weight 103 lb (46.7 kg), last menstrual period 05/12/2014, SpO2 95 %, not currently breastfeeding. Exam is as noted by resident with the following changes, additions or corrections:  Awake, alert, lying flat on cot without distress  Heart- RRR  Lungs- bibasilar rales  Ext - no edema    Impressions:   Principal Problem:    Acute on chronic systolic heart failure (HCC)  Active Problems:    Hyperlipidemia    Mitral regurgitation    Asthma    Mass of right lobe of liver    Essential hypertension    Nonischemic cardiomyopathy (HCC)  Resolved Problems:    * No resolved hospital problems. *      Plan:   CHF cardiology consult   Will give IV Bumex BID    to evaluate ability to obtain meds     Attending Physician Statement  I have reviewed the chart and seen the patient with the resident(s). I personally reviewed images, EKG's and similar tests, if present. I personally reviewed and performed key elements of the history and exam.  I have reviewed and confirmed student and/or resident history and exam with changes as indicated above. I agree with the assessment, plan and orders as documented by the resident. Please refer to the resident and/or student note for additional information.       Ellen Haas

## 2020-01-14 NOTE — ED NOTES
Patient's telemetry alarm was going off nursing went and noted to be in A. fib, EKG obtained and she is sinus tach on the EKG but I did witness the regularity so patient appears to be going in and out of A. fib. We will provide her with dose of Lovenox 1 mg/kg. Repeat EKG obtained does show heart rate of 103, sinus tachycardia. Left atrial enlargement with incomplete right bundle branch block. No acute changes from initial EKG. Patient was just resting in bed she denied having any chest pain during this incident but does still report feeling short of breath especially with movement.   Patient will be admitted with congestive heart failure     GA Cooley CNP  01/14/20 0036

## 2020-01-14 NOTE — CONSULTS
Advanced Heart Failure and Pulmonary Hypertension Consult Note      Reason for Consultation: Heart Failure     Referring Physician: Dr. Jas Escobar  Primary Cardiologist: Dr. Dani Logn  HF Cardiologist: Dr. Bao Mayo         History of Present Illness:     Ms. Mahi Maya is a 48year old female with a PMHx of nonischemic cardiomyopathy, HFrEF, CAD, moderate-severe MR, SVT, left parietal CVA in 2009, hypertension, hyperlipidemia, tobacco abuse, asthma / COPD, GERD and medication noncompliance. She was last seen by Dr. Bao Mayo in 5/2018. She presented to [facility] ED with complaints of increased shortness of breath, orthopnea and fatigue. Of note she has not been taking her cardiac medications for some time reportedly due to cost.    She reports dyspnea with exertion, shortness of breath, or decline in overall functional capacity. She denies orthopnea, PND, nocturnal cough or hemoptysis. She denies abdominal distention or bloating, early satiety, anorexia/change in appetite, unintentional weight loss. She does not lower extremity edema. She denies exertional lightheadedness. She denies palpitations, syncope or near syncope. Review of systems is negative for chest pain, pressure, discomfort. When ambulating on an incline, he/she reports/denies leg claudication. History is negative for neurological symptoms including transient loss of vision, asymmetric weakness, aphasia, dysphasia, numbness, tingling. Past medical, surgical, family and social histories have been reviewed. Any changes in the past medical history, social history or family history have been made and are reflected in the electronic medical record.          Patient Active Problem List    Diagnosis Date Noted    Acute on chronic systolic heart failure ([address] 01/13/2020    Acute decompensated heart failure ([address] 12/26/2019    Acute pulmonary edema ([address] 12/26/2019    Blood type AB+ 04/03/2018    Nonischemic cardiomyopathy ([address]     Coronary artery disease involving native coronary artery of native heart without angina pectoris 02/14/2018    Dyspnea 02/02/2018    Left ventricular ejection fraction of 10% to 20% 12/22/2017    ICD (implantable cardioverter-defibrillator) in place 08/23/2016     Subcutaneous ICD  [employer] Emblem   Implanted 8/8/2016      Chronic systolic CHF (congestive heart failure), NYHA class 3 ([address] 07/25/2016    Non-rheumatic mitral regurgitation 07/25/2016    Essential hypertension 07/25/2016    Mass of right lobe of liver 07/27/2015     On MRI done in July of 2015, look for report it says addendum will be done      History of MI (myocardial infarction) 07/26/2015    History of irregular menstrual bleeding 05/16/2014    Anemia due to blood loss 05/16/2014     Resolved       Allergic rhinosinusitis 08/27/2013    Asthma     Mitral regurgitation 05/10/2013    Tobacco abuse 05/10/2013    Suicide attempt by drug ingestion ([address] 11/27/2012    Hyperlipidemia     Left ovarian cyst     CVA (cerebral vascular accident)     COPD (chronic obstructive pulmonary disease) ([address]     Carpal tunnel syndrome on left            Past Medical History:   Diagnosis Date    Angina at rest [facility])     cath in 2007 showed no CAD    Asthma     Blood type AB+ 4/3/2018    CAD (coronary artery disease)     Carpal tunnel syndrome on left     CHF (congestive heart failure) (HCC)     COPD (chronic obstructive pulmonary disease) ([address]     CVA (cerebral vascular accident) ([address] 12/2009 and 12/2010. left parietal    GERD (gastroesophageal reflux disease)     HFrEF (heart failure with reduced ejection fraction) ([address] 01/14/2020 8/26/19- echo- LVEF 20-25%, LA enlarged, moderate MR, mild TR, mild PH, LVDD: 6.7, RVDD: 2.2 (12/9/17- echo- LVEF 10%, stage III DD, severely dilated LA, appears L>R atrial shunt, mod TR, LVDD: 6.6,  RVDD: 2.3)    History of blood transfusion     Hyperlipidemia Diagnosed in 2007. Dyslipidemia.     Hypertension diagnosed in     ICD (implantable cardioverter-defibrillator) in place 2018    Placed by Dr. Tano Celis.  Left ovarian cyst     Liver lesion 7/15    Moderate mitral regurgitation 7/27/15    mild-moderate    Nonischemic cardiomyopathy ([address]     NSTEMI (non-ST elevated myocardial infarction) ([facility] 4/10/15--[facility] to [facility]    Suicide attempt by drug ingestion ([facility] [facility]     attempt in  OD on ultram    Tobacco abuse            Past Surgical History:   Procedure Laterality Date    CARDIAC CATHETERIZATION  2018    Dr. Trini Avila- Clean coronaries   Thonyfiliberto Luis  2016    SICD    CARDIOVASCULAR STRESS TEST  2009 Normal      SECTION      COLONOSCOPY  10/2015    DIAGNOSTIC CARDIAC CATH LAB PROCEDURE  2007    [facility]: No significant CAD. Mild LVD with apical akinesis. EF 50%.     DIAGNOSTIC CARDIAC CATH LAB PROCEDURE  4/15    with PTCA to [facility] ob [email or web address]    ENDOSCOPY, COLON, DIAGNOSTIC      HYSTERECTOMY  10/1/15    at [facility] by Dr. Mayte Neely PTCA  4/10/15    at [address] [place] [address] ECHOCARDIOGRAM  3/19/13    EF 65%, mild MR    TUBAL LIGATION           Family History   Problem Relation Age of Onset    High Blood Pressure Mother     Stroke Mother     High Blood Pressure Father     Stroke Father     Heart Disease Father     Pacemaker Father        Social History     Socioeconomic History    Marital status: Legally      Spouse name: None    Number of children: None    Years of education: None    Highest education level: None   Occupational History    Occupation: disability   Social Needs    Financial resource strain: None    Food insecurity:     Worry: None     Inability: None    Transportation needs:     Medical: None     Non-medical: None   Tobacco Use    Smoking status: Former Smoker     Packs/day: 0.50     Years: 30.00     Pack years: 15.00     Types: Cigarettes     Last attempt to quit: budesonide-formoterol (SYMBICORT) 160-4.5 MCG/ACT AERO Inhale 2 puffs into the lungs 2 times daily 1 Inhaler 0    mometasone-formoterol (DULERA) 100-5 MCG/ACT inhaler Inhale 2 puffs into the lungs 2 times daily 1 Inhaler 3    montelukast (SINGULAIR) 10 MG tablet Take 1 tablet by mouth nightly 30 tablet 0    FLUoxetine (PROZAC) 20 MG capsule Take 1 capsule by mouth daily 30 capsule 0    atorvastatin (LIPITOR) 40 MG tablet Take 1 tablet by mouth daily 30 tablet 0    hydrALAZINE (APRESOLINE) 50 MG tablet Take 1 tablet by mouth every 8 hours 90 tablet 0    metoprolol succinate (TOPROL XL) 50 MG extended release tablet Take 1 tablet by mouth 2 times daily 30 tablet 0    sacubitril-valsartan (ENTRESTO)  MG per tablet Take 1 tablet by mouth 2 times daily 60 tablet 0    bumetanide (BUMEX) 2 MG tablet Take 1 tablet by mouth every other day 90 tablet 0    spironolactone (ALDACTONE) 25 MG tablet Take 1 tablet by mouth daily 30 tablet 0    potassium chloride (KLOR-CON) 20 MEQ packet Take 20 mEq by mouth daily 30 each 0    clopidogrel (PLAVIX) 75 MG tablet Take 1 tablet by mouth daily 30 tablet 0    omeprazole (PRILOSEC) 20 MG delayed release capsule Take 1 capsule by mouth daily 30 capsule 0       Review of Systems:   Cardiac: As per HPI  General: No fever, chills, rigors  Pulmonary: As per HPI  HEENT: No visual disturbances, difficult swallowing  GI: No nausea, vomiting, abdominal pain  : No dysuria or hematuria  Endocrine: No thyroid disease or diabetes  Musculoskeletal: SANCHEZ x 4, no focal motor deficits  Skin: Intact, no rashes  Neuro/Psych: No headache or seizures          Weights:   Wt Readings from Last 3 Encounters:   01/14/20 100 lb 14.4 oz (45.8 kg)   12/26/19 103 lb (46.7 kg)   03/29/19 108 lb (49 kg)         Physical Examination:     /68   Pulse 66   Temp 97.3 °F (36.3 °C) (Oral)   Resp 16   Ht 5' 4\" (1.626 m)   Wt 100 lb 14.4 oz (45.8 kg)   LMP 05/12/2014 (Approximate)   SpO2 98% Monitor daily weights, I/O, BMP and BNP  4. Sodium restricted diet  5. CHF RN navigator  6. Check iron levels      Thank you for allowing us to participate in the care of this patient. We will follow as medical course develops. Do not hesitate to contact us with further questions.

## 2020-01-15 LAB
ANION GAP SERPL CALCULATED.3IONS-SCNC: 14 MMOL/L (ref 7–16)
BUN BLDV-MCNC: 14 MG/DL (ref 6–20)
CALCIUM SERPL-MCNC: 9.5 MG/DL (ref 8.6–10.2)
CHLORIDE BLD-SCNC: 98 MMOL/L (ref 98–107)
CHOLESTEROL, TOTAL: 216 MG/DL (ref 0–199)
CO2: 25 MMOL/L (ref 22–29)
CREAT SERPL-MCNC: 0.8 MG/DL (ref 0.5–1)
FERRITIN: 254 NG/ML
GFR AFRICAN AMERICAN: >60
GFR NON-AFRICAN AMERICAN: >60 ML/MIN/1.73
GLUCOSE BLD-MCNC: 120 MG/DL (ref 74–99)
HDLC SERPL-MCNC: 50 MG/DL
IRON SATURATION: 11 % (ref 15–50)
IRON: 36 MCG/DL (ref 37–145)
LDL CHOLESTEROL CALCULATED: 148 MG/DL (ref 0–99)
MAGNESIUM: 2 MG/DL (ref 1.6–2.6)
POTASSIUM SERPL-SCNC: 3.3 MMOL/L (ref 3.5–5)
PRO-BNP: 1488 PG/ML (ref 0–125)
SODIUM BLD-SCNC: 137 MMOL/L (ref 132–146)
TOTAL IRON BINDING CAPACITY: 320 MCG/DL (ref 250–450)
TRANSFERRIN: 261 MG/DL (ref 200–360)
TRIGL SERPL-MCNC: 92 MG/DL (ref 0–149)
VLDLC SERPL CALC-MCNC: 18 MG/DL

## 2020-01-15 PROCEDURE — 99233 SBSQ HOSP IP/OBS HIGH 50: CPT | Performed by: INTERNAL MEDICINE

## 2020-01-15 PROCEDURE — 36415 COLL VENOUS BLD VENIPUNCTURE: CPT

## 2020-01-15 PROCEDURE — 6370000000 HC RX 637 (ALT 250 FOR IP): Performed by: STUDENT IN AN ORGANIZED HEALTH CARE EDUCATION/TRAINING PROGRAM

## 2020-01-15 PROCEDURE — 82728 ASSAY OF FERRITIN: CPT

## 2020-01-15 PROCEDURE — 2500000003 HC RX 250 WO HCPCS: Performed by: INTERNAL MEDICINE

## 2020-01-15 PROCEDURE — 99232 SBSQ HOSP IP/OBS MODERATE 35: CPT | Performed by: FAMILY MEDICINE

## 2020-01-15 PROCEDURE — 96376 TX/PRO/DX INJ SAME DRUG ADON: CPT

## 2020-01-15 PROCEDURE — 80048 BASIC METABOLIC PNL TOTAL CA: CPT

## 2020-01-15 PROCEDURE — 2580000003 HC RX 258: Performed by: NURSE PRACTITIONER

## 2020-01-15 PROCEDURE — 2500000003 HC RX 250 WO HCPCS: Performed by: STUDENT IN AN ORGANIZED HEALTH CARE EDUCATION/TRAINING PROGRAM

## 2020-01-15 PROCEDURE — 83735 ASSAY OF MAGNESIUM: CPT

## 2020-01-15 PROCEDURE — 83550 IRON BINDING TEST: CPT

## 2020-01-15 PROCEDURE — 80061 LIPID PANEL: CPT

## 2020-01-15 PROCEDURE — 84466 ASSAY OF TRANSFERRIN: CPT

## 2020-01-15 PROCEDURE — 83540 ASSAY OF IRON: CPT

## 2020-01-15 PROCEDURE — 83880 ASSAY OF NATRIURETIC PEPTIDE: CPT

## 2020-01-15 PROCEDURE — 2060000000 HC ICU INTERMEDIATE R&B

## 2020-01-15 RX ORDER — BUMETANIDE 0.25 MG/ML
2 INJECTION, SOLUTION INTRAMUSCULAR; INTRAVENOUS 2 TIMES DAILY
Status: DISCONTINUED | OUTPATIENT
Start: 2020-01-15 | End: 2020-01-16

## 2020-01-15 RX ORDER — ACETAMINOPHEN 325 MG/1
650 TABLET ORAL EVERY 6 HOURS PRN
Status: DISCONTINUED | OUTPATIENT
Start: 2020-01-15 | End: 2020-01-16 | Stop reason: HOSPADM

## 2020-01-15 RX ADMIN — HYDRALAZINE HYDROCHLORIDE 50 MG: 25 TABLET, FILM COATED ORAL at 05:35

## 2020-01-15 RX ADMIN — MOMETASONE FUROATE AND FORMOTEROL FUMARATE DIHYDRATE 2 PUFF: 100; 5 AEROSOL RESPIRATORY (INHALATION) at 11:10

## 2020-01-15 RX ADMIN — SACUBITRIL AND VALSARTAN 1 TABLET: 97; 103 TABLET, FILM COATED ORAL at 20:36

## 2020-01-15 RX ADMIN — METOPROLOL SUCCINATE 50 MG: 50 TABLET, EXTENDED RELEASE ORAL at 20:34

## 2020-01-15 RX ADMIN — Medication 10 ML: at 11:06

## 2020-01-15 RX ADMIN — BUMETANIDE 1 MG: 0.25 INJECTION, SOLUTION INTRAMUSCULAR; INTRAVENOUS at 11:06

## 2020-01-15 RX ADMIN — ATORVASTATIN CALCIUM 40 MG: 40 TABLET, FILM COATED ORAL at 11:07

## 2020-01-15 RX ADMIN — FLUOXETINE HYDROCHLORIDE 20 MG: 20 CAPSULE ORAL at 11:07

## 2020-01-15 RX ADMIN — MONTELUKAST SODIUM 10 MG: 10 TABLET ORAL at 20:36

## 2020-01-15 RX ADMIN — MOMETASONE FUROATE AND FORMOTEROL FUMARATE DIHYDRATE 2 PUFF: 100; 5 AEROSOL RESPIRATORY (INHALATION) at 19:38

## 2020-01-15 RX ADMIN — PANTOPRAZOLE SODIUM 40 MG: 40 TABLET, DELAYED RELEASE ORAL at 05:35

## 2020-01-15 RX ADMIN — SACUBITRIL AND VALSARTAN 1 TABLET: 97; 103 TABLET, FILM COATED ORAL at 11:08

## 2020-01-15 RX ADMIN — POTASSIUM BICARBONATE 40 MEQ: 782 TABLET, EFFERVESCENT ORAL at 20:33

## 2020-01-15 RX ADMIN — ASPIRIN 81 MG 81 MG: 81 TABLET ORAL at 11:08

## 2020-01-15 RX ADMIN — POTASSIUM BICARBONATE 40 MEQ: 782 TABLET, EFFERVESCENT ORAL at 11:06

## 2020-01-15 RX ADMIN — SPIRONOLACTONE 25 MG: 25 TABLET ORAL at 11:08

## 2020-01-15 RX ADMIN — BUMETANIDE 2 MG: 0.25 INJECTION, SOLUTION INTRAMUSCULAR; INTRAVENOUS at 20:33

## 2020-01-15 RX ADMIN — Medication 10 ML: at 20:34

## 2020-01-15 ASSESSMENT — PAIN DESCRIPTION - LOCATION: LOCATION: HEAD

## 2020-01-15 ASSESSMENT — PAIN SCALES - GENERAL
PAINLEVEL_OUTOF10: 0
PAINLEVEL_OUTOF10: 8

## 2020-01-15 ASSESSMENT — PAIN DESCRIPTION - PROGRESSION: CLINICAL_PROGRESSION: GRADUALLY WORSENING

## 2020-01-15 ASSESSMENT — PAIN DESCRIPTION - ONSET: ONSET: ON-GOING

## 2020-01-15 ASSESSMENT — PAIN DESCRIPTION - PAIN TYPE: TYPE: ACUTE PAIN

## 2020-01-15 ASSESSMENT — PAIN DESCRIPTION - DESCRIPTORS: DESCRIPTORS: ACHING;DISCOMFORT;HEADACHE

## 2020-01-15 ASSESSMENT — PAIN DESCRIPTION - FREQUENCY: FREQUENCY: CONTINUOUS

## 2020-01-15 ASSESSMENT — PAIN - FUNCTIONAL ASSESSMENT: PAIN_FUNCTIONAL_ASSESSMENT: ACTIVITIES ARE NOT PREVENTED

## 2020-01-15 NOTE — PROGRESS NOTES
[facility] - [facility] Inpatient   Resident Progress Note    S:  Hospital day: 1   Brief Synopsis: Dio Stein is a 48 y.o. female who presented with SOB. Workup revealed AoC HF 2/2 medication non-compliance. Diuresed and cardiology was consulted. UOP on 1/14 was 1350mL. Followed by Cardiology    No acute events overnight. Patient was seen and examined this morning. Denies CP or SOB. Denies F/C. Overall doing well. Refused wong. Cont meds:   Scheduled meds:    sodium chloride flush  10 mL Intravenous 2 times per day    enoxaparin  1 mg/kg Subcutaneous Once    aspirin  81 mg Oral Daily    atorvastatin  40 mg Oral Daily    mometasone-formoterol  2 puff Inhalation BID    FLUoxetine  20 mg Oral Daily    hydrALAZINE  50 mg Oral 3 times per day    isosorbide dinitrate  30 mg Oral TID    metoprolol succinate  50 mg Oral BID    montelukast  10 mg Oral Nightly    pantoprazole  40 mg Oral QAM AC    potassium bicarb-citric acid  20 mEq Oral Daily    sacubitril-valsartan  1 tablet Oral BID    spironolactone  25 mg Oral Daily    enoxaparin  40 mg Subcutaneous Daily    bumetanide  1 mg Intravenous BID    influenza virus vaccine  0.5 mL Intramuscular Once     PRN meds: sodium chloride flush, albuterol sulfate HFA, magnesium hydroxide, ondansetron     I reviewed the patient's past medical and surgical history, Medications and Allergies. O:  /68   Pulse 66   Temp 97.3 °F (36.3 °C) (Oral)   Resp 16   Ht 5' 4\" (1.626 m)   Wt 100 lb 14.4 oz (45.8 kg)   LMP 05/12/2014 (Approximate)   SpO2 98%   Breastfeeding? No   BMI 17.32 kg/m²   24 hour I&O: I/O last 3 completed shifts: In: 980 [P.O.:980]  Out: 1350 [Urine:1350]  No intake/output data recorded. GENERAL: Alert, cooperative, no acute distress. HEENT: Normocephalic, atraumatic. PERRLA, no pallor or icterus.    NECK: Supple, symmetrical  CHEST: No tenderness or deformity, full & symmetric excursion  LUNG: Clear to auscultation bilaterally, which may reflect acute infiltrate such as pulmonary edema or infection or underlying chronic process. Us Abdomen Limited Specify Organ? Liver    Result Date: 2020  Patient MRN:  [id number] : 1965 Age: 48 years Gender: Female Order Date:  2020 2:30 AM EXAM: US ABDOMEN LIMITED NUMBER OF IMAGES:  97 INDICATION:  known liver lesion, scan for change in size known liver lesion, scan for change in size Specify organ?->LIVER COMPARISON: CT scan 11/3/1997 and ultrasound 2017 The pancreas appears unremarkable. Abnormal. There is a hypoechoic region in the liver measuring 3.1 x 2.8 x 3.1 cm The proximal aorta and the proximal IVC appear   to be unremarkable. The right kidney appears to be unremarkable. The common duct is within normal limits. The gallbladder wall appears unremarkable. Calculi are not identified. Sludge is not identified. The left kidney and spleen are not imaged  No definite ascites is seen     Solid mass in the right lobe of the liver. This appears to be present on the previous CT scan from 11/3/2017. When compared to the previous studies this does not appear to be significantly changed in the interval .     A/P:  Principal Problem:    Acute on chronic systolic heart failure (HCC)  Active Problems:    Hyperlipidemia    Mitral regurgitation    Asthma    Mass of right lobe of liver    Essential hypertension    Nonischemic cardiomyopathy (HCC)  Resolved Problems:    * No resolved hospital problems.  *    Acute on Chronic Heart Failure with reduced LVEF  Likely 2/2 medication non-compliance  - pt presented with recurrent episode of JOHANSEN and SOB; CXR without pulmonary edema; Pro-BNP 16,201 above baseline of 5,000  - NYHA Class III;  last echo 2018 with EF 20%  - home regimen includes: medications toprol, entresto, aldactone, imdur, hydralazine  - sodium restriction of 2 g  - pt follows with Dr. Nani Gonzalez and Marilyn Murphy and dismissed from EP secondary to non-compliance  - Strict

## 2020-01-15 NOTE — PROGRESS NOTES
Advanced Heart Failure and Pulmonary Hypertension  Inpatient Progress Note          CC/ID: Ms. Raymond Krishna is a 48year old female with a PMHx of nonischemic cardiomyopathy, HFrEF, CAD, moderate-severe MR, SVT, left parietal CVA in 2009, hypertension, hyperlipidemia, tobacco abuse, asthma / COPD, GERD and medication noncompliance. She was last seen by Dr. Mike Corado in 5/2018. Presented in acute heart failure secondary to noncompliance. 24-hour events/subjective:  -no acute overnight events  -primary team resumed home meds  -tolerated well for the most part but then decreased bp this morning  -urinating well  -uop 1.6 l yesterday, net negative only 300 cc los  -she is thin and chronic hf so hides the fluid well, but probnp remains 16 K  -subjectively improved though remains fatigued and sob        Telemetry:  Sinus bradycardia      Inotropes, Pressors, and Intravenous Therapy:  1 mg IV Bumex BID        Physical Exam:    BP (!) 89/55   Pulse 54   Temp 97.5 °F (36.4 °C) (Temporal)   Resp 18   Ht 5' 4\" (1.626 m)   Wt 100 lb 9.6 oz (45.6 kg)   LMP 05/12/2014 (Approximate)   SpO2 98%   Breastfeeding? No   BMI 17.27 kg/m²   Wt Readings from Last 3 Encounters:   01/15/20 100 lb 9.6 oz (45.6 kg)   12/26/19 103 lb (46.7 kg)   03/29/19 108 lb (49 kg)     Appearance: Awake, alert, no acute respiratory distress  Skin: Intact, no rash  Head: Normocephalic, atraumatic  Eyes: EOMI, no conjunctival erythema, anicteric sclerae  ENMT: No pharyngeal erythema, MMM, no rhinorrhea  Neck: Soft, supple, mild jvd/hjr. Lungs: Clear to auscultation bilaterally. No wheezes, rales, or rhonchi. Cardiac: Regular rate and rhythm, +S1S2, no murmurs apparent  Abdomen: Soft, nontender, +bowel sounds  Extremities: Moves all extremities x 4, no lower extremity edema. Warm and well perfused.    Neurologic: No focal motor deficits apparent, normal mood and affect  Peripheral Pulses: Intact posterior tibial pulses  enoxaparin (LOVENOX) injection 40 mg  40 mg Subcutaneous Daily Abhay Loza MD   40 mg at 01/14/20 0912    bumetanide (BUMEX) injection 1 mg  1 mg Intravenous BID Lidya Junior MD   1 mg at 01/15/20 1106    influenza quadrivalent split vaccine (FLUZONE;FLUARIX;FLULAVAL;AFLURIA) injection 0.5 mL  0.5 mL Intramuscular Once Arnol Link DO             IMAGING:     CXR (1/13/2020)  Patient is anterior epicardial monitory device/defibrillator. Pacemaker is located in the left lateral chest wall. There is diffuse enlargement of the cardiac area. No acute infiltrates, consolidations or pleural effusions seen. Impression:  No significant changes since study of December 26, 2019. No superimposed acute cardiopulmonary process.     US Abdomen (1/14/2020)  Impression  Solid mass in the right lobe of the liver. This appears to be present on the previous CT scan from 11/3/2017. When compared to the previous studies this does not appear to be significantly changed in the interval.        CARDIAC TESTING:     [facility] (3/28/2018, Dr. Preston Parry)  Findings:  Left main: 0%  stenosis  LAD: 0. %  stenosis  Circumflex: 0. %   stenosis  RCA: Dominant.  0. %  stenosis  Hemodynamics:. Ao: 112/62. Post op diagnosis:  Normal coronary arteries     TTE (12/8/2017, Dr. Grady Saunders)  [facility]   Severely dilated left atrium, with LUZ MARINA 59 ml/m2. Appears to be possible left to right inter atrial shunt demonstrated with color doppler on image 0065, 0066., 0812.   Eccentric hypertrophy. Severely dilated left ventricle. Severe left ventricular systolic function. Visually estimated LVEF is 10%. Severe global hypokinesis with regional variation. Grade 3 restrictive diastolic dysfunction, which correlates with very poor prognosis.   Apically tethered mitral valve leaflets with posterior leaflet restriction. Severe mitral regurgitation.  Eccentric posteriorly directed jet.   Trace pericardial effusion, no tamponade physiology.   RV measurements biased by such significant LV dilatation with septal shift into the RV. Severe RV dysfunction. RVSP is 50 mm Hg consistent with pulmonary hypertension.   Moderate tricuspid regurgitation, central regurgitant jet.     TTE with [facility] (8/26/2018)   Summary   Agitated saline injection showed no right to left intracardiac shunt.   No intracardiac mass.   Left ventricle mildly dilated, 67.cm.   Severe global hypokinesis, EF 20-25%.   Mild LVH.   Left atrium is enlarged.   Increased LA volume index.   Interatrial septum not well visualized, can not rule out left to right shunt.   Mild right ventricular hypokinesis.   Moderate mitral regurgitation, jet radius 0.4cm.   No mitral valve prolapse.   Normal aortic valve structure and function.   Normal tricuspid valve structure.   There is mild tricuspid regurgitation.   Mild pulmonary hypertension, RVSP 39mmHg.   Normal aortic root and ascending aorta.   Small pericardial effusion without tamponade.   Pericardium appears normal.   Compared to prior Echo from 12/2017.     EKG  Sinus Tachycardia           Guideline directed medical/device therapy:  ARNI/ACE I/ARB: Yes  Beta blocker: Yes  Aldosterone antagonist:  Yes  ICD/CRT-P/-D:  ICD  QRS interval on recent ECG (personally reviewed/interpreted): <120 ms  Percentage RV pacing (personally reviewed/interpreted): %/NA              ASSESSMENT:  1. Acute on chronic HFrEF  2. ACC stage C / NYHA class III  3. Hypervolemic  4. Nonischemic cardiomyopathy, unknown etiology  5. LVEF 20-25%, LVEDD 67, LVMI 144  6. [facility] in 2007 with small OM1 disease, no significant disease on [address] [place] [address] 4/4/2018  7. Moderate - severe secondary MR  8. Mild RV dysfunction  9. Evidence for pulmonary hypertension   10. History carpal tunnel, and LVMI 144 gm/m2. SPEP - negative, UPEP - negative, no renal dsyfunction  11. ICD in situ  12. Hx of SVT   13. Hypertension   14. HLD   15. Hx of left parietal CVA  16.  Tobacco use - reportedly quit   17. Asthma/COPD - HR CT, no significant parenchymal disease. PFTS (4/6/2018)- nonspecific PFT, low FVC, normal FEV1/FVC, normal TLC, DLCO is severely reduced. No bronchodilator response. 18. Hx of medical noncompliance         PLAN:  1. Hold hydralazine / isordil for now - patient was not taking any medication and then resumed on all medication on admission and now is hypotensive. Will continue high dose entresto / toprol and spironolactone. If she continues to have FC III symptoms after toleration of above then will consider reintroducing hydralazine / nitrates. This should be done as an outpatient. 2. Continue IV diuresis - 2 mg BID, proBNP is still 56960. Monitor renal function in light of some hypotension this morning. 3.  Iron studies pending - Class IIb recommendation for intravenous iron replacement in patients with NYHA class II and III HF and iron deficiency (ferritin <100 ng/ml or 100-300 ng/ml if transferrin saturation <20%), to improve functional status and QoL. 4. CHF RN navigator  5. Daily weights, I/O, BMP and BNP  6. Sodium restricted diet  7. Medical compliance reiterated. Thank you for allowing us to participate in the care of this patient. We will follow as medical course develops. Do not hesitate to contact us with further questions. Susannah Matias M.D.  [facility] [place]  Heart Failure and Pulmonary Hypertension  Mobile [phone]

## 2020-01-15 NOTE — PLAN OF CARE
her cardiologist / doctor with a weight gain of 3# in one day or a total of 5# or more in one week. Also, if she would have a significant weight loss of 3# or more in one day to call her doctor, she may have to decrease or hold her diuretic dose. On days she feels nauseated and not eating / drinking, having emesis or diarrhea,  informed to call her cardiologist  / doctor, she may have to decrease or hold her diuretic to avoid dehydration. I stressed the importance of informing her cardiologist the first day of onset of any of the signs and symptoms in the \"Yellow Zone\" of the HF Zones. She verbalizes understanding. SW working with meds to beds program.    Echocardiogram / EF: 20-25%    BNP:   Lab Results   Component Value Date    PROBNP 16,201 (H) 2020       History of:    has a past medical history of Angina at rest [facility]), Asthma, Blood type AB+, CAD (coronary artery disease), Carpal tunnel syndrome on left, CHF (congestive heart failure) ([address] COPD (chronic obstructive pulmonary disease) ([address] CVA (cerebral vascular accident) ([address] GERD (gastroesophageal reflux disease), HFrEF (heart failure with reduced ejection fraction) ([address] History of blood transfusion, Hyperlipidemia, Hypertension, ICD (implantable cardioverter-defibrillator) in place, Left ovarian cyst, Liver lesion, Moderate mitral regurgitation, Nonischemic cardiomyopathy ([address] NSTEMI (non-ST elevated myocardial infarction) ([address] Suicide attempt by drug ingestion ([address] and Tobacco abuse.   has a past surgical history that includes cardiovascular stress test (); Tubal ligation (); Tonsillectomy;  section; transthoracic echocardiogram (3/19/13); Diagnostic Cardiac Cath Lab Procedure (2007); Diagnostic Cardiac Cath Lab Procedure (4/15); Percutaneous Transluminal Coronary Angio (4/10/15); Hysterectomy (10/1/15); Colonoscopy (10/2015);  Endoscopy, colon, diagnostic; Cardiac catheterization (2018); and Cardiac Failure  · Signs and symptoms of HF to report  · Weight Yourself Each Day  · Home Self Management- activity, weight tracking, taking medications as prescribed, meals /diet planning (sodium and fluid restriction), how to read food labels, keeping physician follow ups, smoking cessation, follow the Heart Failure Zones    Instructed  to call 911 if you have any of the following symptoms:    ·    Struggling to breathe unrelieved with rest,  ·    Having chest pain, confusion or can't think clearly  ·    Have confusion or cant think clearly    Readmission Risk Score: 15        Discharge Plan:  I placed the Heart Failure Home Instructions in her discharge instructions. Per Heart Failure GWTG, the patient should have a follow-up appointment made within 7 days of discharge. CHF Clinic appt- will place on dc instructions- time / date / phone number / driving directions. Harrison LOYD, RN, CHFN    Heart Failure Navigator    CONGESTIVE HEART FAILURE (CHF) GUIDELINES  (Must be completed for Primary Diagnosis CHF or History of CHF)    Type of CHF:    [] Acute   [] Chronic     [x] Acute on Chronic     Ventricular Function Assessment (check):             [] HFpEF  [] HFpEF, borderline  [] HFpEF, improved  [x] HFrEF  Ejection Fraction (%): Angiotensin-Converting-Enzyme (ACE) inhibitor ordered:  [] Yes  [x] No (specify contraindication):  [] Renal Insufficiency  [] Cough  [] Hypotension  [] Allergy/angioedema  [] No left ventricular systolic dysfunction (LVSD)  [] Hyperkalemia  [] Moderate to severe aortic stenosis  [x] Other (Specify):     Angiotensin II receptor blockers (ARB) ordered:  [] Yes  [x] No (specify contraindication):  [] Renal Insufficiency  [] Hypotension  [] Allergy/angioedema  [] No LVSD  [] Hyperkalemia  [] Moderate to severe aortic stenosis  [x] Other (Specify):      ARNI - Angiotensin Receptor Neprilysin Inhibitor ordered:  [x]

## 2020-01-16 VITALS
TEMPERATURE: 98.2 F | OXYGEN SATURATION: 98 % | RESPIRATION RATE: 15 BRPM | SYSTOLIC BLOOD PRESSURE: 92 MMHG | WEIGHT: 100 LBS | DIASTOLIC BLOOD PRESSURE: 53 MMHG | BODY MASS INDEX: 17.07 KG/M2 | HEIGHT: 64 IN | HEART RATE: 54 BPM

## 2020-01-16 PROBLEM — I50.23 ACUTE ON CHRONIC SYSTOLIC HEART FAILURE (HCC): Status: RESOLVED | Noted: 2020-01-13 | Resolved: 2020-01-16

## 2020-01-16 LAB
ANION GAP SERPL CALCULATED.3IONS-SCNC: 13 MMOL/L (ref 7–16)
BUN BLDV-MCNC: 15 MG/DL (ref 6–20)
CALCIUM SERPL-MCNC: 9.4 MG/DL (ref 8.6–10.2)
CHLORIDE BLD-SCNC: 95 MMOL/L (ref 98–107)
CO2: 27 MMOL/L (ref 22–29)
CREAT SERPL-MCNC: 0.8 MG/DL (ref 0.5–1)
GFR AFRICAN AMERICAN: >60
GFR NON-AFRICAN AMERICAN: >60 ML/MIN/1.73
GLUCOSE BLD-MCNC: 119 MG/DL (ref 74–99)
MAGNESIUM: 2 MG/DL (ref 1.6–2.6)
POTASSIUM SERPL-SCNC: 3.7 MMOL/L (ref 3.5–5)
SODIUM BLD-SCNC: 135 MMOL/L (ref 132–146)

## 2020-01-16 PROCEDURE — 2580000003 HC RX 258: Performed by: NURSE PRACTITIONER

## 2020-01-16 PROCEDURE — 6360000002 HC RX W HCPCS: Performed by: FAMILY MEDICINE

## 2020-01-16 PROCEDURE — 90686 IIV4 VACC NO PRSV 0.5 ML IM: CPT | Performed by: FAMILY MEDICINE

## 2020-01-16 PROCEDURE — 6360000002 HC RX W HCPCS: Performed by: NURSE PRACTITIONER

## 2020-01-16 PROCEDURE — 96376 TX/PRO/DX INJ SAME DRUG ADON: CPT

## 2020-01-16 PROCEDURE — 96375 TX/PRO/DX INJ NEW DRUG ADDON: CPT

## 2020-01-16 PROCEDURE — 6370000000 HC RX 637 (ALT 250 FOR IP): Performed by: STUDENT IN AN ORGANIZED HEALTH CARE EDUCATION/TRAINING PROGRAM

## 2020-01-16 PROCEDURE — 83735 ASSAY OF MAGNESIUM: CPT

## 2020-01-16 PROCEDURE — 96367 TX/PROPH/DG ADDL SEQ IV INF: CPT

## 2020-01-16 PROCEDURE — 96366 THER/PROPH/DIAG IV INF ADDON: CPT

## 2020-01-16 PROCEDURE — 99232 SBSQ HOSP IP/OBS MODERATE 35: CPT | Performed by: INTERNAL MEDICINE

## 2020-01-16 PROCEDURE — 80048 BASIC METABOLIC PNL TOTAL CA: CPT

## 2020-01-16 PROCEDURE — G0008 ADMIN INFLUENZA VIRUS VAC: HCPCS | Performed by: FAMILY MEDICINE

## 2020-01-16 PROCEDURE — 99239 HOSP IP/OBS DSCHRG MGMT >30: CPT | Performed by: FAMILY MEDICINE

## 2020-01-16 PROCEDURE — 2500000003 HC RX 250 WO HCPCS: Performed by: INTERNAL MEDICINE

## 2020-01-16 PROCEDURE — 36415 COLL VENOUS BLD VENIPUNCTURE: CPT

## 2020-01-16 PROCEDURE — 6360000002 HC RX W HCPCS: Performed by: STUDENT IN AN ORGANIZED HEALTH CARE EDUCATION/TRAINING PROGRAM

## 2020-01-16 RX ORDER — METOPROLOL SUCCINATE 50 MG/1
50 TABLET, EXTENDED RELEASE ORAL 2 TIMES DAILY
Qty: 30 TABLET | Refills: 1 | Status: CANCELLED | OUTPATIENT
Start: 2020-01-16

## 2020-01-16 RX ORDER — BUMETANIDE 2 MG/1
2 TABLET ORAL DAILY
Qty: 30 TABLET | Refills: 3 | Status: SHIPPED | OUTPATIENT
Start: 2020-01-16 | End: 2020-03-16

## 2020-01-16 RX ORDER — METOPROLOL SUCCINATE 50 MG/1
50 TABLET, EXTENDED RELEASE ORAL 2 TIMES DAILY
Qty: 60 TABLET | Refills: 1 | Status: ON HOLD | OUTPATIENT
Start: 2020-01-16 | End: 2020-03-20 | Stop reason: SDUPTHER

## 2020-01-16 RX ORDER — POTASSIUM CHLORIDE 1.5 G/1.77G
20 POWDER, FOR SOLUTION ORAL DAILY
Qty: 30 EACH | Refills: 1 | Status: CANCELLED | OUTPATIENT
Start: 2020-01-16

## 2020-01-16 RX ORDER — ATORVASTATIN CALCIUM 40 MG/1
40 TABLET, FILM COATED ORAL DAILY
Qty: 30 TABLET | Refills: 1 | Status: SHIPPED | OUTPATIENT
Start: 2020-01-16 | End: 2020-03-23 | Stop reason: SDUPTHER

## 2020-01-16 RX ORDER — MONTELUKAST SODIUM 10 MG/1
10 TABLET ORAL NIGHTLY
Qty: 30 TABLET | Refills: 0 | Status: CANCELLED | OUTPATIENT
Start: 2020-01-16

## 2020-01-16 RX ORDER — MONTELUKAST SODIUM 10 MG/1
10 TABLET ORAL NIGHTLY
Qty: 30 TABLET | Refills: 1 | Status: SHIPPED | OUTPATIENT
Start: 2020-01-16 | End: 2020-03-23 | Stop reason: SDUPTHER

## 2020-01-16 RX ORDER — FLUOXETINE HYDROCHLORIDE 20 MG/1
20 CAPSULE ORAL DAILY
Qty: 30 CAPSULE | Refills: 1 | Status: CANCELLED | OUTPATIENT
Start: 2020-01-16

## 2020-01-16 RX ORDER — POTASSIUM CHLORIDE 1.5 G/1.77G
20 POWDER, FOR SOLUTION ORAL DAILY
Qty: 30 EACH | Refills: 1 | Status: SHIPPED | OUTPATIENT
Start: 2020-01-16 | End: 2020-03-16

## 2020-01-16 RX ORDER — ALBUTEROL SULFATE 90 UG/1
2 AEROSOL, METERED RESPIRATORY (INHALATION) EVERY 6 HOURS PRN
Qty: 1 INHALER | Refills: 0 | Status: CANCELLED | OUTPATIENT
Start: 2020-01-16 | End: 2020-02-15

## 2020-01-16 RX ORDER — CLOPIDOGREL BISULFATE 75 MG/1
75 TABLET ORAL DAILY
Qty: 30 TABLET | Refills: 1 | Status: CANCELLED | OUTPATIENT
Start: 2020-01-16

## 2020-01-16 RX ORDER — CLOPIDOGREL BISULFATE 75 MG/1
75 TABLET ORAL DAILY
Qty: 30 TABLET | Refills: 1 | Status: SHIPPED | OUTPATIENT
Start: 2020-01-16 | End: 2020-03-16

## 2020-01-16 RX ORDER — OMEPRAZOLE 20 MG/1
20 CAPSULE, DELAYED RELEASE ORAL DAILY
Qty: 30 CAPSULE | Refills: 1 | Status: CANCELLED | OUTPATIENT
Start: 2020-01-16

## 2020-01-16 RX ORDER — SPIRONOLACTONE 25 MG/1
25 TABLET ORAL DAILY
Qty: 30 TABLET | Refills: 1 | Status: SHIPPED | OUTPATIENT
Start: 2020-01-16 | End: 2020-03-16

## 2020-01-16 RX ORDER — BUMETANIDE 1 MG/1
2 TABLET ORAL DAILY
Status: DISCONTINUED | OUTPATIENT
Start: 2020-01-16 | End: 2020-01-16 | Stop reason: HOSPADM

## 2020-01-16 RX ADMIN — METOPROLOL SUCCINATE 50 MG: 50 TABLET, EXTENDED RELEASE ORAL at 10:26

## 2020-01-16 RX ADMIN — INFLUENZA A VIRUS A/BRISBANE/02/2018 IVR-190 (H1N1) ANTIGEN (PROPIOLACTONE INACTIVATED), INFLUENZA A VIRUS A/KANSAS/14/2017 X-327 (H3N2) ANTIGEN (PROPIOLACTONE INACTIVATED), INFLUENZA B VIRUS B/MARYLAND/15/2016 ANTIGEN (PROPIOLACTONE INACTIVATED), INFLUENZA B VIRUS B/PHUKET/3073/2013 BVR-1B ANTIGEN (PROPIOLACTONE INACTIVATED) 0.5 ML: 15; 15; 15; 15 INJECTION, SUSPENSION INTRAMUSCULAR at 14:47

## 2020-01-16 RX ADMIN — FLUOXETINE HYDROCHLORIDE 20 MG: 20 CAPSULE ORAL at 10:26

## 2020-01-16 RX ADMIN — BUMETANIDE 2 MG: 0.25 INJECTION, SOLUTION INTRAMUSCULAR; INTRAVENOUS at 10:26

## 2020-01-16 RX ADMIN — ASPIRIN 81 MG 81 MG: 81 TABLET ORAL at 10:26

## 2020-01-16 RX ADMIN — SODIUM CHLORIDE 100 MG: 9 INJECTION, SOLUTION INTRAVENOUS at 15:59

## 2020-01-16 RX ADMIN — POTASSIUM BICARBONATE 40 MEQ: 782 TABLET, EFFERVESCENT ORAL at 10:27

## 2020-01-16 RX ADMIN — SPIRONOLACTONE 25 MG: 25 TABLET ORAL at 10:25

## 2020-01-16 RX ADMIN — MOMETASONE FUROATE AND FORMOTEROL FUMARATE DIHYDRATE 2 PUFF: 100; 5 AEROSOL RESPIRATORY (INHALATION) at 10:28

## 2020-01-16 RX ADMIN — Medication 10 ML: at 10:27

## 2020-01-16 RX ADMIN — SODIUM CHLORIDE, PRESERVATIVE FREE 10 ML: 5 INJECTION INTRAVENOUS at 14:46

## 2020-01-16 RX ADMIN — ATORVASTATIN CALCIUM 40 MG: 40 TABLET, FILM COATED ORAL at 10:26

## 2020-01-16 RX ADMIN — PANTOPRAZOLE SODIUM 40 MG: 40 TABLET, DELAYED RELEASE ORAL at 06:39

## 2020-01-16 RX ADMIN — ONDANSETRON 4 MG: 2 INJECTION INTRAMUSCULAR; INTRAVENOUS at 07:27

## 2020-01-16 RX ADMIN — SACUBITRIL AND VALSARTAN 1 TABLET: 97; 103 TABLET, FILM COATED ORAL at 10:26

## 2020-01-16 RX ADMIN — SODIUM CHLORIDE 25 MG: 9 INJECTION, SOLUTION INTRAVENOUS at 14:47

## 2020-01-16 ASSESSMENT — PAIN SCALES - GENERAL
PAINLEVEL_OUTOF10: 0

## 2020-01-16 NOTE — PROGRESS NOTES
Received call from our pharmacy stating they were trying to get [facility] to cancel the insurance on the meds sitting at [address] [place] [address] and transfer the prescription here so we can fill it and give them to her. [facility] refused to do so and the patient will have to pick them up there.

## 2020-01-16 NOTE — CARE COORDINATION
Pharmacy voucher left to cover copays for meds to beds. Pharmacy notified that voucher left for patient. For possible discharge later today.

## 2020-01-16 NOTE — PROGRESS NOTES
[address] [facility]  Family Medicine Attending    S: 48 y.o. female with past medical history of CAD, nonischemic cardiomyopathy s/p ICD placement, HFrEF (EF 20% August 2018), mitral regurgitation, history of CVA, hypertension, hyperlipidemia, COPD, tobacco/alcohol/drug abuse. Admitted last month for same problems of SOB and CHF at [facility], but unable to fill medications. Developed some hypotension when medications were restarted, now on meds without hydralazine and isordil  This morning she feels much better    O: VS- Blood pressure (!) 103/59, pulse 55, temperature 98.2 °F (36.8 °C), temperature source Temporal, resp. rate 16, height 5' 4\" (1.626 m), weight 100 lb (45.4 kg), last menstrual period 05/12/2014, SpO2 98 %, not currently breastfeeding. Exam is as noted by resident   In no acute distress. Speaking in full sentences  Neck supple, no bruit  Heart, regular rhythm, no gallop  Lungs clear  No abdominal tenderness  No leg tenderness    1350 cc out yesterday    Impressions:   Principal Problem:    Acute on chronic systolic heart failure (HCC)  Active Problems:    Hyperlipidemia    Mitral regurgitation    Asthma    Mass of right lobe of liver    Essential hypertension    Nonischemic cardiomyopathy ([address]    Has improved upon resumption of her prescribed medications. Plan:     Continue current regimen  Meds to beds  Possibly home today if she remains stable       Attending Physician Statement  I have reviewed the chart and seen the patient with the resident(s). I personally reviewed images, EKG's and similar tests, if present. I personally reviewed and performed key elements of the history and exam.  I have reviewed and confirmed student and/or resident history and exam with changes as indicated above. I agree with the assessment, plan and orders as documented by the resident. Please refer to the resident and/or student note for additional information.       Vidhya Malcolm

## 2020-01-16 NOTE — PROGRESS NOTES
CLINICAL PHARMACY NOTE: MEDS TO [address] Select Patient?: No  Total # of Prescriptions Filled: 1   The following medications were delivered to the patient:  · Metoprolol succinate er 50  Total # of Interventions Completed: 3  Time Spent (min): 30    Additional Documentation:

## 2020-01-16 NOTE — PROGRESS NOTES
auscultation bilaterally,  respirations unlabored. No rales/wheezing/rubs  HEART: RRR, S1 and S2 normal, no murmur, rub or gallop. DP pulses 2/4  ABDOMEN: SNTND, no guarding, rebound or rigidity. GENITOURINARY: Urinary cath not present   EXTREMITIES:  Extremities normal, atraumatic, no cyanosis or edema. Distal pulses equal bilaterally  SKIN: Skin color normal, no rashes or lesions  MUSCULOSKELETAL: No joint swelling, no muscle tenderness. Normal ROM in extremities. NEUROLOGIC: Alert & Oriented; Normal and symmetric strength in UEs and LEs; Sensation intact    Labs:  Na/K/Cl/CO2:  135/3.7/95/27 (416)  BUN/Cr/glu/ALT/AST/amyl/lip:  15/0.8/--/--/--/--/-- (416)  WBC/Hgb/Hct/Plts:  8.7/12.6/41.6/418 (1935)  CrCl cannot be calculated (Unknown ideal weight.). Other pertinent labs as noted below    New Imaging:  Xr Chest Standard (2 Vw)    Result Date: 2020  Patient MRN:  [id number] : 1965 Age: 48 years Gender: Female Order Date:  2020 7:15 PM TECHNIQUE/NUMBER OF IMAGES/COMPARISON/CLINICAL HISTORY: Chest lateral views 2 images 2 views Comparison December 10 6. Difficult breathing. 51-year-old female patient. Patient is anterior epicardial monitory device/defibrillator. Pacemaker is located in the left lateral chest wall. There is diffuse enlargement of the cardiac area. No acute infiltrates, consolidations or pleural effusions seen. No significant changes since study of 2019. No superimposed acute cardiopulmonary process. Xr Chest Standard (2 Vw)    Result Date: 2019  Clinical indications: Shortness of breath. TECHNIQUE: Single frontal projection of the chest (1 view). COMPARISON: 2018. FINDINGS: The heart is enlarged. Left-sided pacemaker is once again present and in unchanged configuration. There is diffuse interstitial prominence. The heart, lungs, mediastinum and regional skeleton are otherwise unremarkable.      Diffuse interstitial lung disease which may reflect acute infiltrate such as pulmonary edema or infection or underlying chronic process. Us Abdomen Limited Specify Organ? Liver    Result Date: 2020  Patient MRN:  [id number] : 1965 Age: 48 years Gender: Female Order Date:  2020 2:30 AM EXAM: US ABDOMEN LIMITED NUMBER OF IMAGES:  97 INDICATION:  known liver lesion, scan for change in size known liver lesion, scan for change in size Specify organ?->LIVER COMPARISON: CT scan 11/3/1997 and ultrasound 2017 The pancreas appears unremarkable. Abnormal. There is a hypoechoic region in the liver measuring 3.1 x 2.8 x 3.1 cm The proximal aorta and the proximal IVC appear   to be unremarkable. The right kidney appears to be unremarkable. The common duct is within normal limits. The gallbladder wall appears unremarkable. Calculi are not identified. Sludge is not identified. The left kidney and spleen are not imaged  No definite ascites is seen     Solid mass in the right lobe of the liver. This appears to be present on the previous CT scan from 11/3/2017.  When compared to the previous studies this does not appear to be significantly changed in the interval .     A/P:  Principal Problem (Resolved):    Acute on chronic systolic heart failure (HCC)  Active Problems:    Hyperlipidemia    Mitral regurgitation    Asthma    Mass of right lobe of liver    Essential hypertension    Nonischemic cardiomyopathy (HCC)    Acute on Chronic Heart Failure with reduced LVEF  Likely 2/2 medication non-compliance  - pt presented with recurrent episode of JOHANSEN and SOB; CXR without pulmonary edema; Pro-BNP 16,201 above baseline of 5,000  - NYHA Class III;  last echo 2018 with EF 20%  - home regimen includes: medications toprol, entresto, aldactone, imdur, hydralazine  - sodium restriction of 2 g  - pt follows with Dr. Christine Castillo and Janki Ahmadi and dismissed from EP secondary to non-compliance  - Strict I&O, daily weights, BNP's qod  -

## 2020-01-16 NOTE — PROGRESS NOTES
Intravenous 2 times per day Cuate Tea, APRN - CNP   10 mL at 01/16/20 1027    sodium chloride flush 0.9 % injection 10 mL  10 mL Intravenous PRN Cuate Tea, APRN - TD        enoxaparin (LOVENOX) injection 50 mg  1 mg/kg Subcutaneous Once Cuate Tea, APRN - CNP        albuterol sulfate  (90 Base) MCG/ACT inhaler 2 puff  2 puff Inhalation Q6H PRN Amrita Saldana MD   2 puff at 01/14/20 0620    aspirin chewable tablet 81 mg  81 mg Oral Daily Amrita Saldana MD   81 mg at 01/16/20 1026    atorvastatin (LIPITOR) tablet 40 mg  40 mg Oral Daily Amrita Saldana MD   40 mg at 01/16/20 1026    mometasone-formoterol (DULERA) 100-5 MCG/ACT inhaler 2 puff  2 puff Inhalation BID Amrita Saldana MD   2 puff at 01/16/20 1028    FLUoxetine (PROZAC) capsule 20 mg  20 mg Oral Daily Amrita Saldana MD   20 mg at 01/16/20 1026    metoprolol succinate (TOPROL XL) extended release tablet 50 mg  50 mg Oral BID Amrita Saldana MD   50 mg at 01/16/20 1026    montelukast (SINGULAIR) tablet 10 mg  10 mg Oral Nightly Amrita Saldana MD   10 mg at 01/15/20 2036    pantoprazole (PROTONIX) tablet 40 mg  40 mg Oral QAM AC Amrita Saldana MD   40 mg at 01/16/20 2565    sacubitril-valsartan (ENTRESTO)  MG per tablet 1 tablet  1 tablet Oral BID Amrita Saldana MD   1 tablet at 01/16/20 1026    spironolactone (ALDACTONE) tablet 25 mg  25 mg Oral Daily Amrita Saldana MD   25 mg at 01/16/20 1025    magnesium hydroxide (MILK OF MAGNESIA) 400 MG/5ML suspension 30 mL  30 mL Oral Daily PRN Amrita Saldana MD        ondansetron [facility]) injection 4 mg  4 mg Intravenous Q6H PRN Amrita Saldana MD   4 mg at 01/16/20 0727    enoxaparin (LOVENOX) injection 40 mg  40 mg Subcutaneous Daily Amrita Saldana MD   40 mg at 01/14/20 0912    influenza quadrivalent split vaccine (FLUZONE;FLUARIX;FLULAVAL;AFLURIA) injection 0.5 mL  0.5 mL Intramuscular Once Hannah Melendrez DO IMAGING:     CXR (1/13/2020)  Patient is anterior epicardial monitory device/defibrillator. Pacemaker is located in the left lateral chest wall. There is diffuse enlargement of the cardiac area. No acute infiltrates, consolidations or pleural effusions seen. Impression:  No significant changes since study of December 26, 2019. No superimposed acute cardiopulmonary process.     US Abdomen (1/14/2020)  Impression  Solid mass in the right lobe of the liver. This appears to be present on the previous CT scan from 11/3/2017. When compared to the previous studies this does not appear to be significantly changed in the interval.        CARDIAC TESTING:     [facility] (3/28/2018, Dr. Aspen Ceja)  Findings:  Left main: 0%  stenosis  LAD: 0. %  stenosis  Circumflex: 0. %   stenosis  RCA: Dominant.  0. %  stenosis  Hemodynamics:. Ao: 112/62. Post op diagnosis:  Normal coronary arteries     TTE (12/8/2017, Dr. Chad Javier)  [facility]   Severely dilated left atrium, with LUZ MARINA 59 ml/m2. Appears to be possible left to right inter atrial shunt demonstrated with color doppler on image 0065, 0066., 1308.   Eccentric hypertrophy. Severely dilated left ventricle. Severe left ventricular systolic function. Visually estimated LVEF is 10%. Severe global hypokinesis with regional variation. Grade 3 restrictive diastolic dysfunction, which correlates with very poor prognosis.   Apically tethered mitral valve leaflets with posterior leaflet restriction. Severe mitral regurgitation. Eccentric posteriorly directed jet.   Trace pericardial effusion, no tamponade physiology.   RV measurements biased by such significant LV dilatation with septal shift into the RV. Severe RV dysfunction.  RVSP is 50 mm Hg consistent with pulmonary hypertension.   Moderate tricuspid regurgitation, central regurgitant jet.     TTE with [facility] (8/26/2018)   Summary   Agitated saline injection showed no right to left intracardiac shunt.   No intracardiac mass.   Left ventricle mildly dilated, 67.cm.   Severe global hypokinesis, EF 20-25%.   Mild LVH.   Left atrium is enlarged.   Increased LA volume index.   Interatrial septum not well visualized, can not rule out left to right shunt.   Mild right ventricular hypokinesis.   Moderate mitral regurgitation, jet radius 0.4cm.   No mitral valve prolapse.   Normal aortic valve structure and function.   Normal tricuspid valve structure.   There is mild tricuspid regurgitation.   Mild pulmonary hypertension, RVSP 39mmHg.   Normal aortic root and ascending aorta.   Small pericardial effusion without tamponade.   Pericardium appears normal.   Compared to prior Echo from 12/2017.     EKG  Sinus Tachycardia           Guideline directed medical/device therapy:  ARNI/ACE I/ARB: Yes  Beta blocker: Yes  Aldosterone antagonist:  Yes  ICD/CRT-P/-D:  ICD  QRS interval on recent ECG (personally reviewed/interpreted): <120 ms  Percentage RV pacing (personally reviewed/interpreted): %/NA              ASSESSMENT:  1. Acute on chronic HFrEF  2. ACC stage C / NYHA class III  3. Hypervolemic  4. Nonischemic cardiomyopathy, unknown etiology  5. LVEF 20-25%, LVEDD 67, LVMI 144  6. [facility] in 2007 with small OM1 disease, no significant disease on [address] [place] [address] 4/4/2018  7. Moderate - severe secondary MR  8. Mild RV dysfunction  9. Evidence for pulmonary hypertension   10. History carpal tunnel, and LVMI 144 gm/m2. SPEP - negative, UPEP - negative, no renal dsyfunction  11. ICD in situ  12. Hx of SVT   13. Hypertension   14. HLD   15. Hx of left parietal CVA  16. Tobacco use - reportedly quit   17. Asthma/COPD - HR CT, no significant parenchymal disease. PFTS (4/6/2018)- nonspecific PFT, low FVC, normal FEV1/FVC, normal TLC, DLCO is severely reduced. No bronchodilator response. 18. Hx of medical noncompliance   19.  Iron deficiency  - Class IIb recommendation for intravenous iron replacement in patients with NYHA class II and III HF

## 2020-01-20 ENCOUNTER — TELEPHONE (OUTPATIENT)
Dept: CARDIOLOGY CLINIC | Age: 55
End: 2020-01-20

## 2020-01-20 NOTE — TELEPHONE ENCOUNTER
Per verbal from Dr. Giuliana Ramsey: ok to schedule with me this week for hospital FU. I attempted to call pt to schedule hospital FU accordingly but no answer. I left a message on her VM to call me back at my direct # to schedule this appt.

## 2020-01-21 ENCOUNTER — HOSPITAL ENCOUNTER (OUTPATIENT)
Dept: OTHER | Age: 55
Setting detail: THERAPIES SERIES
Discharge: HOME | End: 2020-01-21
Payer: COMMERCIAL

## 2020-01-21 NOTE — TELEPHONE ENCOUNTER
Attempted to call pt to schedule appt again but no answer. I left a message on her VM advising pt that Dr. Carter Mimes recommending pt be seen this week and to call our office back to schedule this appt. Office phone # provided.

## 2020-01-24 NOTE — DISCHARGE SUMMARY
Coronary artery disease involving native coronary artery of native heart without angina pectoris    Nonischemic cardiomyopathy ([facility] [address]    Blood type AB+    Acute decompensated heart failure (HCC)    Acute pulmonary edema [facility])         Hospital Course: Pastor Chaparro is a 48 y.o. female with PMH of CAD, nonischemic cardiomyopathy s/p ICD placement, HFrEF (EF 20% August 2018), mitral regurgitation, history of CVA, hypertension, hyperlipidemia, COPD, tobacco/alcohol/drug abuse, and largely medically non-complaint who presented for evaluation of SOB. Workup revealed acute on chronic heart failure 2/2 medication non-compliance. Diuresed well with IV Bumex and transitioned back to PO bumex and tolerating well. Patient seen by Dr. Iza Manzano with evaluation showing improvement in BNP, improved BP feeling better and well diuresed. Hydralazine and Imdur stopped on discharge, with Entresto, toprol and aldactone continued. Patient able to be discharged home with medications courtesy of [facility] to [facility]. Discussion with patient on day of discharge regarding obtaining refills going forward. Lab Results   Component Value Date    WBC 8.7 01/13/2020    HGB 12.6 01/13/2020     01/13/2020     01/16/2020    CL 95 01/16/2020    K 3.7 01/16/2020    K 3.8 12/27/2019    BUN 15 01/16/2020    CREATININE 0.8 01/16/2020    GLUCOSE 119 01/16/2020    GLUCOSE 82 03/01/2011    LABGLOM >60 01/16/2020    PROTIME 13.3 08/26/2018    PROTIME 11.8 12/28/2010    INR 1.2 08/26/2018    LABALBU 4.1 01/13/2020    LABALBU 4.2 03/01/2011    PROT 7.7 01/13/2020    CALCIUM 9.4 01/16/2020    MG 2.0 01/16/2020    PHOS 2.7 06/26/2017    BILITOT 1.0 01/13/2020    BILIDIR 0.2 12/27/2019    ALKPHOS 115 01/13/2020    AST 19 01/13/2020    ALT 9 01/13/2020       Discharge Exam:   See progress note of the day of discharge.     Disposition: home       Medication List      CHANGE how you take these medications    bumetanide 2 MG tablet  Commonly known as: BUMEX  Take 1 tablet by mouth daily  What changed:  when to take this        CONTINUE taking these medications    * albuterol sulfate  (90 Base) MCG/ACT inhaler  Commonly known as:  Proventil HFA  Inhale 2 puffs into the lungs every 6 hours as needed for Wheezing     * albuterol sulfate  (90 Base) MCG/ACT inhaler  Inhale 2 puffs into the lungs every 6 hours as needed for Wheezing     aspirin 81 MG chewable tablet  Take 1 tablet by mouth daily     atorvastatin 40 MG tablet  Commonly known as:  LIPITOR  Take 1 tablet by mouth daily     budesonide-formoterol 160-4.5 MCG/ACT Aero  Commonly known as:  Symbicort  Inhale 2 puffs into the lungs 2 times daily     clopidogrel 75 MG tablet  Commonly known as:  PLAVIX  Take 1 tablet by mouth daily     FLUoxetine 20 MG capsule  Commonly known as:  PROZAC  Take 1 capsule by mouth daily     metoprolol succinate 50 MG extended release tablet  Commonly known as:  TOPROL XL  Take 1 tablet by mouth 2 times daily     montelukast 10 MG tablet  Commonly known as:  Singulair  Take 1 tablet by mouth nightly     omeprazole 20 MG delayed release capsule  Commonly known as:  PRILOSEC  Take 1 capsule by mouth daily     potassium chloride 20 MEQ packet  Commonly known as:  KLOR-CON  Take 20 mEq by mouth daily     sacubitril-valsartan  MG per tablet  Commonly known as:  Entresto  Take 1 tablet by mouth 2 times daily     spironolactone 25 MG tablet  Commonly known as:  ALDACTONE  Take 1 tablet by mouth daily         * This list has 2 medication(s) that are the same as other medications prescribed for you. Read the directions carefully, and ask your doctor or other care provider to review them with you.             STOP taking these medications    hydrALAZINE 50 MG tablet  Commonly known as:  APRESOLINE     isosorbide dinitrate 30 MG tablet  Commonly known as:  ISORDIL     mometasone-formoterol 100-5 MCG/ACT inhaler  Commonly known as:  Rena Francois           Where to Get Your

## 2020-03-16 ENCOUNTER — APPOINTMENT (OUTPATIENT)
Dept: GENERAL RADIOLOGY | Age: 55
DRG: 291 | End: 2020-03-16
Payer: COMMERCIAL

## 2020-03-16 ENCOUNTER — HOSPITAL ENCOUNTER (INPATIENT)
Age: 55
LOS: 4 days | Discharge: HOME | DRG: 291 | End: 2020-03-20
Attending: EMERGENCY MEDICINE | Admitting: FAMILY MEDICINE
Payer: COMMERCIAL

## 2020-03-16 ENCOUNTER — APPOINTMENT (OUTPATIENT)
Dept: CT IMAGING | Age: 55
DRG: 291 | End: 2020-03-16
Payer: COMMERCIAL

## 2020-03-16 PROBLEM — J96.01 ACUTE RESPIRATORY FAILURE WITH HYPOXIA (HCC): Status: ACTIVE | Noted: 2020-03-16

## 2020-03-16 PROBLEM — I50.23 HEART FAILURE, LEFT SYSTOLIC, ACUTE ON CHRONIC (HCC): Status: ACTIVE | Noted: 2020-03-16

## 2020-03-16 LAB
ADENOVIRUS BY PCR: NOT DETECTED
ANION GAP SERPL CALCULATED.3IONS-SCNC: 15 MMOL/L (ref 7–16)
B.E.: 0.8 MMOL/L (ref -3–3)
BASOPHILS ABSOLUTE: 0.08 E9/L (ref 0–0.2)
BASOPHILS RELATIVE PERCENT: 1.1 % (ref 0–2)
BORDETELLA PARAPERTUSSIS BY PCR: NOT DETECTED
BORDETELLA PERTUSSIS BY PCR: NOT DETECTED
BUN BLDV-MCNC: 7 MG/DL (ref 6–20)
CALCIUM SERPL-MCNC: 9.6 MG/DL (ref 8.6–10.2)
CHLAMYDOPHILIA PNEUMONIAE BY PCR: NOT DETECTED
CHLORIDE BLD-SCNC: 105 MMOL/L (ref 98–107)
CO2: 27 MMOL/L (ref 22–29)
COHB: 0.8 % (ref 0–1.5)
CORONAVIRUS 229E BY PCR: NOT DETECTED
CORONAVIRUS HKU1 BY PCR: NOT DETECTED
CORONAVIRUS NL63 BY PCR: NOT DETECTED
CORONAVIRUS OC43 BY PCR: NOT DETECTED
CREAT SERPL-MCNC: 0.7 MG/DL (ref 0.5–1)
CRITICAL: ABNORMAL
DATE ANALYZED: ABNORMAL
DATE OF COLLECTION: ABNORMAL
EOSINOPHILS ABSOLUTE: 0.06 E9/L (ref 0.05–0.5)
EOSINOPHILS RELATIVE PERCENT: 0.8 % (ref 0–6)
GFR AFRICAN AMERICAN: >60
GFR NON-AFRICAN AMERICAN: >60 ML/MIN/1.73
GLUCOSE BLD-MCNC: 111 MG/DL (ref 74–99)
HCO3: 24.8 MMOL/L (ref 22–26)
HCT VFR BLD CALC: 41.4 % (ref 34–48)
HEMOGLOBIN: 12.2 G/DL (ref 11.5–15.5)
HHB: 1.7 % (ref 0–5)
HUMAN METAPNEUMOVIRUS BY PCR: NOT DETECTED
HUMAN RHINOVIRUS/ENTEROVIRUS BY PCR: NOT DETECTED
IMMATURE GRANULOCYTES #: 0.04 E9/L
IMMATURE GRANULOCYTES %: 0.5 % (ref 0–5)
INFLUENZA A BY PCR: NOT DETECTED
INFLUENZA B BY PCR: NOT DETECTED
LAB: ABNORMAL
LYMPHOCYTES ABSOLUTE: 1.18 E9/L (ref 1.5–4)
LYMPHOCYTES RELATIVE PERCENT: 16 % (ref 20–42)
Lab: ABNORMAL
MCH RBC QN AUTO: 26.1 PG (ref 26–35)
MCHC RBC AUTO-ENTMCNC: 29.5 % (ref 32–34.5)
MCV RBC AUTO: 88.7 FL (ref 80–99.9)
METHB: 0.2 % (ref 0–1.5)
MODE: ABNORMAL
MONOCYTES ABSOLUTE: 0.83 E9/L (ref 0.1–0.95)
MONOCYTES RELATIVE PERCENT: 11.3 % (ref 2–12)
MYCOPLASMA PNEUMONIAE BY PCR: NOT DETECTED
NEUTROPHILS ABSOLUTE: 5.17 E9/L (ref 1.8–7.3)
NEUTROPHILS RELATIVE PERCENT: 70.3 % (ref 43–80)
O2 CONTENT: 18.1 ML/DL
O2 SATURATION: 98.3 % (ref 92–98.5)
O2HB: 97.3 % (ref 94–97)
OPERATOR ID: 1926
PARAINFLUENZA VIRUS 1 BY PCR: NOT DETECTED
PARAINFLUENZA VIRUS 2 BY PCR: NOT DETECTED
PARAINFLUENZA VIRUS 3 BY PCR: NOT DETECTED
PARAINFLUENZA VIRUS 4 BY PCR: NOT DETECTED
PATIENT TEMP: 37 C
PCO2: 37.2 MMHG (ref 35–45)
PDW BLD-RTO: 15.7 FL (ref 11.5–15)
PH BLOOD GAS: 7.44 (ref 7.35–7.45)
PLATELET # BLD: 487 E9/L (ref 130–450)
PMV BLD AUTO: 9.5 FL (ref 7–12)
PO2: 109.8 MMHG (ref 75–100)
POTASSIUM REFLEX MAGNESIUM: 4.3 MMOL/L (ref 3.5–5)
POTASSIUM SERPL-SCNC: 4.26 MMOL/L (ref 3.5–5)
PRO-BNP: ABNORMAL PG/ML (ref 0–125)
RBC # BLD: 4.67 E12/L (ref 3.5–5.5)
RESPIRATORY SYNCYTIAL VIRUS BY PCR: NOT DETECTED
SODIUM BLD-SCNC: 147 MMOL/L (ref 132–146)
SOURCE, BLOOD GAS: ABNORMAL
THB: 13.1 G/DL (ref 11.5–16.5)
TIME ANALYZED: 1449
TROPONIN: <0.01 NG/ML (ref 0–0.03)
TROPONIN: <0.01 NG/ML (ref 0–0.03)
WBC # BLD: 7.4 E9/L (ref 4.5–11.5)

## 2020-03-16 PROCEDURE — 0100U HC RESPIRPTHGN MULT REV TRANS & AMP PRB TECH 21 TRGT: CPT

## 2020-03-16 PROCEDURE — 71275 CT ANGIOGRAPHY CHEST: CPT

## 2020-03-16 PROCEDURE — 94640 AIRWAY INHALATION TREATMENT: CPT

## 2020-03-16 PROCEDURE — 99285 EMERGENCY DEPT VISIT HI MDM: CPT

## 2020-03-16 PROCEDURE — 82805 BLOOD GASES W/O2 SATURATION: CPT

## 2020-03-16 PROCEDURE — 36415 COLL VENOUS BLD VENIPUNCTURE: CPT

## 2020-03-16 PROCEDURE — 2500000003 HC RX 250 WO HCPCS: Performed by: STUDENT IN AN ORGANIZED HEALTH CARE EDUCATION/TRAINING PROGRAM

## 2020-03-16 PROCEDURE — 71045 X-RAY EXAM CHEST 1 VIEW: CPT

## 2020-03-16 PROCEDURE — 6360000002 HC RX W HCPCS: Performed by: INTERNAL MEDICINE

## 2020-03-16 PROCEDURE — 80048 BASIC METABOLIC PNL TOTAL CA: CPT

## 2020-03-16 PROCEDURE — 6360000004 HC RX CONTRAST MEDICATION: Performed by: RADIOLOGY

## 2020-03-16 PROCEDURE — 96374 THER/PROPH/DIAG INJ IV PUSH: CPT

## 2020-03-16 PROCEDURE — 84484 ASSAY OF TROPONIN QUANT: CPT

## 2020-03-16 PROCEDURE — 84132 ASSAY OF SERUM POTASSIUM: CPT

## 2020-03-16 PROCEDURE — 71250 CT THORAX DX C-: CPT

## 2020-03-16 PROCEDURE — 83880 ASSAY OF NATRIURETIC PEPTIDE: CPT

## 2020-03-16 PROCEDURE — 2060000000 HC ICU INTERMEDIATE R&B

## 2020-03-16 PROCEDURE — 85025 COMPLETE CBC W/AUTO DIFF WBC: CPT

## 2020-03-16 PROCEDURE — 93005 ELECTROCARDIOGRAM TRACING: CPT | Performed by: INTERNAL MEDICINE

## 2020-03-16 RX ORDER — SPIRONOLACTONE 25 MG/1
25 TABLET ORAL DAILY
Qty: 30 TABLET | Refills: 1 | OUTPATIENT
Start: 2020-03-16

## 2020-03-16 RX ORDER — BUMETANIDE 1 MG/1
2 TABLET ORAL DAILY
Status: DISCONTINUED | OUTPATIENT
Start: 2020-03-17 | End: 2020-03-17

## 2020-03-16 RX ORDER — PROMETHAZINE HYDROCHLORIDE 25 MG/1
12.5 TABLET ORAL EVERY 6 HOURS PRN
Status: CANCELLED | OUTPATIENT
Start: 2020-03-16

## 2020-03-16 RX ORDER — CLOPIDOGREL BISULFATE 75 MG/1
75 TABLET ORAL DAILY
Qty: 30 TABLET | Refills: 1 | OUTPATIENT
Start: 2020-03-16

## 2020-03-16 RX ORDER — LEVALBUTEROL INHALATION SOLUTION 1.25 MG/3ML
2.5 SOLUTION RESPIRATORY (INHALATION) ONCE
Status: DISCONTINUED | OUTPATIENT
Start: 2020-03-16 | End: 2020-03-20 | Stop reason: HOSPADM

## 2020-03-16 RX ORDER — DILTIAZEM HYDROCHLORIDE 5 MG/ML
10 INJECTION INTRAVENOUS ONCE
Status: COMPLETED | OUTPATIENT
Start: 2020-03-16 | End: 2020-03-16

## 2020-03-16 RX ORDER — LEVALBUTEROL INHALATION SOLUTION 1.25 MG/3ML
1.25 SOLUTION RESPIRATORY (INHALATION) ONCE
Status: COMPLETED | OUTPATIENT
Start: 2020-03-16 | End: 2020-03-16

## 2020-03-16 RX ORDER — CLOPIDOGREL BISULFATE 75 MG/1
75 TABLET ORAL DAILY
Status: DISCONTINUED | OUTPATIENT
Start: 2020-03-17 | End: 2020-03-19

## 2020-03-16 RX ORDER — ASPIRIN 81 MG/1
81 TABLET, CHEWABLE ORAL DAILY
Qty: 30 TABLET | Refills: 0 | OUTPATIENT
Start: 2020-03-16

## 2020-03-16 RX ORDER — BUDESONIDE AND FORMOTEROL FUMARATE DIHYDRATE 160; 4.5 UG/1; UG/1
2 AEROSOL RESPIRATORY (INHALATION) 2 TIMES DAILY
Qty: 1 INHALER | Refills: 0 | OUTPATIENT
Start: 2020-03-16

## 2020-03-16 RX ORDER — ONDANSETRON 2 MG/ML
4 INJECTION INTRAMUSCULAR; INTRAVENOUS EVERY 6 HOURS PRN
Status: CANCELLED | OUTPATIENT
Start: 2020-03-16

## 2020-03-16 RX ORDER — SODIUM CHLORIDE 0.9 % (FLUSH) 0.9 %
10 SYRINGE (ML) INJECTION PRN
Status: DISCONTINUED | OUTPATIENT
Start: 2020-03-16 | End: 2020-03-20 | Stop reason: HOSPADM

## 2020-03-16 RX ORDER — OMEPRAZOLE 20 MG/1
20 CAPSULE, DELAYED RELEASE ORAL DAILY
Qty: 30 CAPSULE | Refills: 0 | OUTPATIENT
Start: 2020-03-16

## 2020-03-16 RX ORDER — SACUBITRIL AND VALSARTAN 97; 103 MG/1; MG/1
1 TABLET, FILM COATED ORAL 2 TIMES DAILY
Qty: 60 TABLET | Refills: 1 | OUTPATIENT
Start: 2020-03-16

## 2020-03-16 RX ORDER — SPIRONOLACTONE 25 MG/1
25 TABLET ORAL DAILY
Status: DISCONTINUED | OUTPATIENT
Start: 2020-03-17 | End: 2020-03-20 | Stop reason: HOSPADM

## 2020-03-16 RX ORDER — FAMOTIDINE 20 MG/1
20 TABLET, FILM COATED ORAL 2 TIMES DAILY
Status: CANCELLED | OUTPATIENT
Start: 2020-03-16

## 2020-03-16 RX ORDER — ATORVASTATIN CALCIUM 40 MG/1
40 TABLET, FILM COATED ORAL DAILY
Status: DISCONTINUED | OUTPATIENT
Start: 2020-03-17 | End: 2020-03-20 | Stop reason: HOSPADM

## 2020-03-16 RX ORDER — ATORVASTATIN CALCIUM 40 MG/1
40 TABLET, FILM COATED ORAL DAILY
Qty: 30 TABLET | Refills: 1 | OUTPATIENT
Start: 2020-03-16

## 2020-03-16 RX ORDER — ASPIRIN 81 MG/1
81 TABLET, CHEWABLE ORAL DAILY
Status: DISCONTINUED | OUTPATIENT
Start: 2020-03-17 | End: 2020-03-20 | Stop reason: HOSPADM

## 2020-03-16 RX ORDER — BUMETANIDE 2 MG/1
2 TABLET ORAL DAILY
Status: ON HOLD | COMMUNITY
End: 2020-03-20 | Stop reason: HOSPADM

## 2020-03-16 RX ORDER — ALBUTEROL SULFATE 90 UG/1
2 AEROSOL, METERED RESPIRATORY (INHALATION) EVERY 6 HOURS PRN
Qty: 1 INHALER | Refills: 3 | OUTPATIENT
Start: 2020-03-16

## 2020-03-16 RX ORDER — ACETAMINOPHEN 325 MG/1
650 TABLET ORAL EVERY 6 HOURS PRN
Status: DISCONTINUED | OUTPATIENT
Start: 2020-03-16 | End: 2020-03-20 | Stop reason: HOSPADM

## 2020-03-16 RX ORDER — METOPROLOL SUCCINATE 25 MG/1
50 TABLET, EXTENDED RELEASE ORAL 2 TIMES DAILY
Status: DISCONTINUED | OUTPATIENT
Start: 2020-03-16 | End: 2020-03-18

## 2020-03-16 RX ORDER — LEVALBUTEROL INHALATION SOLUTION 1.25 MG/3ML
1.25 SOLUTION RESPIRATORY (INHALATION) EVERY 6 HOURS
Status: DISCONTINUED | OUTPATIENT
Start: 2020-03-16 | End: 2020-03-16

## 2020-03-16 RX ORDER — LEVALBUTEROL INHALATION SOLUTION 1.25 MG/3ML
2.5 SOLUTION RESPIRATORY (INHALATION)
Status: DISPENSED | OUTPATIENT
Start: 2020-03-16 | End: 2020-03-16

## 2020-03-16 RX ORDER — SODIUM CHLORIDE 0.9 % (FLUSH) 0.9 %
10 SYRINGE (ML) INJECTION ONCE
Status: DISCONTINUED | OUTPATIENT
Start: 2020-03-16 | End: 2020-03-20 | Stop reason: HOSPADM

## 2020-03-16 RX ORDER — MONTELUKAST SODIUM 10 MG/1
10 TABLET ORAL NIGHTLY
Qty: 30 TABLET | Refills: 1 | OUTPATIENT
Start: 2020-03-16

## 2020-03-16 RX ORDER — POLYETHYLENE GLYCOL 3350 17 G/17G
17 POWDER, FOR SOLUTION ORAL DAILY PRN
Status: DISCONTINUED | OUTPATIENT
Start: 2020-03-16 | End: 2020-03-20 | Stop reason: HOSPADM

## 2020-03-16 RX ORDER — BUDESONIDE AND FORMOTEROL FUMARATE DIHYDRATE 160; 4.5 UG/1; UG/1
2 AEROSOL RESPIRATORY (INHALATION) 2 TIMES DAILY
Status: DISCONTINUED | OUTPATIENT
Start: 2020-03-16 | End: 2020-03-20 | Stop reason: HOSPADM

## 2020-03-16 RX ORDER — FLUOXETINE HYDROCHLORIDE 20 MG/1
20 CAPSULE ORAL DAILY
Qty: 30 CAPSULE | Refills: 0 | OUTPATIENT
Start: 2020-03-16

## 2020-03-16 RX ORDER — BUMETANIDE 2 MG/1
2 TABLET ORAL DAILY
Qty: 30 TABLET | Refills: 3 | OUTPATIENT
Start: 2020-03-16

## 2020-03-16 RX ORDER — MONTELUKAST SODIUM 10 MG/1
10 TABLET ORAL NIGHTLY
Status: DISCONTINUED | OUTPATIENT
Start: 2020-03-16 | End: 2020-03-20 | Stop reason: HOSPADM

## 2020-03-16 RX ORDER — POTASSIUM CHLORIDE 1.5 G/1.77G
20 POWDER, FOR SOLUTION ORAL DAILY
Qty: 30 EACH | Refills: 1 | OUTPATIENT
Start: 2020-03-16

## 2020-03-16 RX ORDER — ACETAMINOPHEN 650 MG/1
650 SUPPOSITORY RECTAL EVERY 6 HOURS PRN
Status: DISCONTINUED | OUTPATIENT
Start: 2020-03-16 | End: 2020-03-20 | Stop reason: HOSPADM

## 2020-03-16 RX ORDER — SODIUM CHLORIDE 0.9 % (FLUSH) 0.9 %
10 SYRINGE (ML) INJECTION EVERY 12 HOURS SCHEDULED
Status: DISCONTINUED | OUTPATIENT
Start: 2020-03-16 | End: 2020-03-20 | Stop reason: HOSPADM

## 2020-03-16 RX ORDER — ALBUTEROL SULFATE 2.5 MG/3ML
2.5 SOLUTION RESPIRATORY (INHALATION) EVERY 6 HOURS PRN
Status: DISCONTINUED | OUTPATIENT
Start: 2020-03-16 | End: 2020-03-16

## 2020-03-16 RX ORDER — FUROSEMIDE 10 MG/ML
20 INJECTION INTRAMUSCULAR; INTRAVENOUS ONCE
Status: COMPLETED | OUTPATIENT
Start: 2020-03-16 | End: 2020-03-16

## 2020-03-16 RX ORDER — LEVALBUTEROL INHALATION SOLUTION 1.25 MG/3ML
1.25 SOLUTION RESPIRATORY (INHALATION) EVERY 6 HOURS PRN
Status: DISCONTINUED | OUTPATIENT
Start: 2020-03-16 | End: 2020-03-16

## 2020-03-16 RX ORDER — METOPROLOL SUCCINATE 50 MG/1
50 TABLET, EXTENDED RELEASE ORAL 2 TIMES DAILY
Qty: 60 TABLET | Refills: 1 | OUTPATIENT
Start: 2020-03-16

## 2020-03-16 RX ADMIN — FUROSEMIDE 20 MG: 10 INJECTION, SOLUTION INTRAVENOUS at 16:03

## 2020-03-16 RX ADMIN — IOPAMIDOL 60 ML: 755 INJECTION, SOLUTION INTRAVENOUS at 23:08

## 2020-03-16 RX ADMIN — LEVALBUTEROL 1.25 MG: 1.25 SOLUTION RESPIRATORY (INHALATION) at 20:05

## 2020-03-16 RX ADMIN — DILTIAZEM HYDROCHLORIDE 10 MG: 5 INJECTION INTRAVENOUS at 21:22

## 2020-03-16 ASSESSMENT — ENCOUNTER SYMPTOMS
STRIDOR: 0
CHEST TIGHTNESS: 0
SHORTNESS OF BREATH: 1
SORE THROAT: 0
GASTROINTESTINAL NEGATIVE: 1
COUGH: 1
CHOKING: 0
RHINORRHEA: 0
WHEEZING: 0

## 2020-03-16 NOTE — H&P
[facility] Resident Inpatient  History and Physical    CC: SOB    HPI: History obtained from patient. Jessie Sierra is a 48 y.o. female with a PMH of HFrEF, COPD, asthma, HTN, hx MI, CAD, s/p ICD, Hx CVA, HLD, who presents to ED for Congestive Heart Failure (RIGHT SIDED CRACKLES PER EMS)  . Patient states that she began to have SOB yesterday evening. She went to her mother's house today to use the oxygen her mother has, but felt it did not provide enough relief. She normally does not wear oxygen at home. She states that because she was at her mother's house, she did not take her medications today. Patient also reports chest pain that has been \"on and off\" for the past week, but when present is a \"sharp, steady\" pain above her lateral R breast, and that goes down her side. She reports purposely trying to cough to bring anything up to improve her breathing, but states she does not need to cough. She also reports nausea, but denies vomiting. She denies fever, chills, travel, and palpitations. She reports that she quit smoking 6 months ago, and denies recreational drug and alcohol use. She denies using contraceptives. In the ambulance, she was given nitro which she states did not help her chest pain. ED Course: The patient remained hemodynamically stable. Patient was given Lasix 20 mg. EKG Rhythm strip sinus tachycardia w/ fusion complexes; L anterior fascicular block. The significant laboratory data obtained by ED work up was Pro-BNP 14,228.     Medications   aspirin chewable tablet 81 mg (has no administration in time range)   atorvastatin (LIPITOR) tablet 40 mg (has no administration in time range)   bumetanide (BUMEX) tablet 2 mg (has no administration in time range)   metoprolol succinate (TOPROL XL) extended release tablet 50 mg ( Oral Unheld by provider 3/16/20 2003)   montelukast (SINGULAIR) tablet 10 mg (has no administration in time range)   sacubitril-valsartan (ENTRESTO)  MG per tablet 1 pack-year smoking history. She has never used smokeless tobacco. She reports current alcohol use of about 3.0 standard drinks of alcohol per week. She reports current drug use. Drug: Marijuana. Allergies: Allergies   Allergen Reactions    Orange Fruit [Citrus] Hives and Itching     ONLY allergic to oranges - NOT any other citrus fruit. NO oranges or orange juice.  Crestor [Rosuvastatin Calcium]     Loratadine     Norco [Hydrocodone-Acetaminophen] Itching    Sulfa Antibiotics     Aspirin      Itching sometime     Patient takes 81mg Aspirin daily, but is unable to take 324 mg. Home Medications:   No current facility-administered medications on file prior to encounter. Current Outpatient Medications on File Prior to Encounter   Medication Sig Dispense Refill    bumetanide (BUMEX) 2 MG tablet Take 2 mg by mouth daily       montelukast (SINGULAIR) 10 MG tablet Take 1 tablet by mouth nightly 30 tablet 1    metoprolol succinate (TOPROL XL) 50 MG extended release tablet Take 1 tablet by mouth 2 times daily 60 tablet 1    atorvastatin (LIPITOR) 40 MG tablet Take 1 tablet by mouth daily 30 tablet 1    sacubitril-valsartan (ENTRESTO)  MG per tablet Take 1 tablet by mouth 2 times daily 60 tablet 1    aspirin 81 MG chewable tablet Take 1 tablet by mouth daily 30 tablet 0    budesonide-formoterol (SYMBICORT) 160-4.5 MCG/ACT AERO Inhale 2 puffs into the lungs 2 times daily 1 Inhaler 0       ROS:  Const: No fever, chills, night sweats, no recent unexplained weight gain/loss  HEENT: No blurred vision, double vision; no URI symptoms  Resp: SOB, No cough, no sputum  Cardio: Chest pain, orthopnea, exertional dyspnea, no PND, no palpitation, no leg swelling.    GI: Nausea, No dysphagia, no reflux; no abdominal pain, no vomiting  : No dysuria, no frequency, hesitancy; no hematuria  MSK: no joint pain, no myalgia, no change in ROM  Neuro: no focal weakness, no slurred speech, no double vision, no numbness or tingling in extremities  Endo: no heat/cold intolerance, no polyphagia, polydipsia or polyuria  Hem: no increased bleeding, no bruising, no lymphadenopathy  Skin: no skin changes  Psych: no depressed mood, no suicidal ideation    PE:  Blood pressure 133/88, pulse 99, temperature 99.1 °F (37.3 °C), temperature source Temporal, resp. rate 20, last menstrual period 05/12/2014, SpO2 97 %, not currently breastfeeding. General: Alert, cooperative, no acute distress. HEENT: Normocephalic, atraumatic. Neck: Symmetrical, trachea midline. No carotid bruit. Mild JVD when pressing on RUQ  Chest: R chest tenderness to palpation; No deformity, full & symmetric excursion  Lung: Clear to auscultation bilaterally,  respirations unlabored. No rales/wheezing/rubs; some decreased airflow towards bases  Heart: Tachycardic, slightly irregular, S1 and S2 normal, no murmur, rub or gallop. Abdomen: SNTND, no masses, no organomegaly, no guarding, rebound or rigidity. Genital/Rectal: deferred  Extremities:  Extremities normal, atraumatic, no cyanosis or edema. Skin: Skin color, texture, turgor normal, no rashes or lesions  Musculoskeletal: No joint swelling, no muscle tenderness. Normal ROM in extremities.    Neurologic: Alert & Oriented    Labs:   Results for orders placed or performed during the hospital encounter of 03/16/20   Respiratory Panel, Molecular   Result Value Ref Range    Adenovirus by PCR Not Detected Not Detected    Bordetella parapertussis by PCR Not Detected Not Detected    Bordetella pertussis by PCR Not Detected Not Detected    Chlamydophilia pneumoniae by PCR Not Detected Not Detected    Coronavirus 229E by PCR Not Detected Not Detected    Coronavirus HKU1 by PCR Not Detected Not Detected    Coronavirus NL63 by PCR Not Detected Not Detected    Coronavirus OC43 by PCR Not Detected Not Detected    Human Metapneumovirus by PCR Not Detected Not Detected    Human Rhinovirus/Enterovirus by PCR Not Detected Not Detected    Influenza A by PCR Not Detected Not Detected    Influenza B by PCR Not Detected Not Detected    Mycoplasma pneumoniae by PCR Not Detected Not Detected    Parainfluenza Virus 1 by PCR Not Detected Not Detected    Parainfluenza Virus 2 by PCR Not Detected Not Detected    Parainfluenza Virus 3 by PCR Not Detected Not Detected    Parainfluenza Virus 4 by PCR Not Detected Not Detected    Respiratory Syncytial Virus by PCR Not Detected Not Detected   CBC Auto Differential   Result Value Ref Range    WBC 7.4 4.5 - 11.5 E9/L    RBC 4.67 3.50 - 5.50 E12/L    Hemoglobin 12.2 11.5 - 15.5 g/dL    Hematocrit 41.4 34.0 - 48.0 %    MCV 88.7 80.0 - 99.9 fL    MCH 26.1 26.0 - 35.0 pg    MCHC 29.5 (L) 32.0 - 34.5 %    RDW 15.7 (H) 11.5 - 15.0 fL    Platelets 397 (H) 567 - 450 E9/L    MPV 9.5 7.0 - 12.0 fL    Neutrophils % 70.3 43.0 - 80.0 %    Immature Granulocytes % 0.5 0.0 - 5.0 %    Lymphocytes % 16.0 (L) 20.0 - 42.0 %    Monocytes % 11.3 2.0 - 12.0 %    Eosinophils % 0.8 0.0 - 6.0 %    Basophils % 1.1 0.0 - 2.0 %    Neutrophils Absolute 5.17 1.80 - 7.30 E9/L    Immature Granulocytes # 0.04 E9/L    Lymphocytes Absolute 1.18 (L) 1.50 - 4.00 E9/L    Monocytes Absolute 0.83 0.10 - 0.95 E9/L    Eosinophils Absolute 0.06 0.05 - 0.50 E9/L    Basophils Absolute 0.08 0.00 - 0.20 M5/P   Basic Metabolic Panel w/ Reflex to MG   Result Value Ref Range    Sodium 147 (H) 132 - 146 mmol/L    Potassium reflex Magnesium 4.3 3.5 - 5.0 mmol/L    Chloride 105 98 - 107 mmol/L    CO2 27 22 - 29 mmol/L    Anion Gap 15 7 - 16 mmol/L    Glucose 111 (H) 74 - 99 mg/dL    BUN 7 6 - 20 mg/dL    CREATININE 0.7 0.5 - 1.0 mg/dL    GFR Non-African American >60 >=60 mL/min/1.73    GFR African American >60     Calcium 9.6 8.6 - 10.2 mg/dL   Brain Natriuretic Peptide   Result Value Ref Range    Pro-BNP 14,228 (H) 0 - 125 pg/mL   Troponin   Result Value Ref Range    Troponin <0.01 0.00 - 0.03 ng/mL   Blood Gas, Arterial   Result Value Ref Range    Date Analyzed [id number]     Time Analyzed 1449     Source: Blood Arterial     pH, Blood Gas 7.441 7.350 - 7.450    PCO2 37.2 35.0 - 45.0 mmHg    PO2 109.8 (H) 75.0 - 100.0 mmHg    HCO3 24.8 22.0 - 26.0 mmol/L    B.E. 0.8 -3.0 - 3.0 mmol/L    O2 Sat 98.3 92.0 - 98.5 %    O2Hb 97.3 (H) 94.0 - 97.0 %    COHb 0.8 0.0 - 1.5 %    MetHb 0.2 0.0 - 1.5 %    O2 Content 18.1 mL/dL    HHb 1.7 0.0 - 5.0 %    tHb (est) 13.1 11.5 - 16.5 g/dL    Potassium 4.26 3.50 - 5.00 mmol/L    Mode NC- 4 L     Date Of Collection      Time Collected      Pt Temp 37.0 C     ID [id number]     Lab [id number]     Critical(s) Notified . No Critical Values    EKG 12 Lead   Result Value Ref Range    Ventricular Rate 135 BPM    Atrial Rate 135 BPM    P-R Interval 178 ms    QRS Duration 102 ms    Q-T Interval 266 ms    QTc Calculation (Bazett) 399 ms    R Axis -88 degrees    T Axis 109 degrees       Imaging:  Xr Chest Portable    Result Date: 3/16/2020  Patient MRN: [id number] : 1965 Age:  48 years Gender: Female Order Date: 3/16/2020 1:30 PM Exam: XR CHEST PORTABLE Number of Images: 1 view Indication:   SOB SOB Comparison: 20 Findings: There is a suggestion of a right pleural effusion The heart is enlarged. The lung fields demonstrate no significant pulmonary vascular congestion or edema. The aorta is tortuous ectatic and calcified. .     Cardiomegaly Findings compatible with atherosclerotic disease of the aorta. The chest appears to be worse in the interval       Assessment and Plan  Principal Problem:    Heart failure, left systolic, acute on chronic (HCC)  Active Problems:    Hyperlipidemia    CVA (cerebral vascular accident)    COPD (chronic obstructive pulmonary disease) ([facility])    History of MI (myocardial infarction)    Essential hypertension    ICD (implantable cardioverter-defibrillator) in place    Acute respiratory failure with hypoxia ([address]  Resolved Problems:    * No resolved hospital problems.  *    Acute Respiratory failure

## 2020-03-16 NOTE — ED NOTES
Informed  of , stated we are waiting for a call back from cardiology.  Nurse to go with pt to [facility]  03/16/20 1910

## 2020-03-16 NOTE — ED NOTES
Bed: 18B-18  Expected date:   Expected time:   Means of arrival:   Comments:  EMS - CHF     Malvin Reynaga RN  03/16/20 [id number]

## 2020-03-16 NOTE — ED PROVIDER NOTES
Patient is a 61-year-old female who presented from home for shortness of breath during this morning. Patient has significant past medical history of nonischemic cardiomyopathy, HFrEF (EF 20-25%) s/p ICD, CVA, hypertension, hyperlipidemia,  Asthma, COPD. Patient states that starting this morning she had a worsening of shortness of breath and right-sided chest pain. Chest pain does not radiate but worsened with deep breathing or coughing and is reproducible on palpation. She also has cough without any sputum production. She denies having any fever, chills, wheezing, nausea, vomiting, or lower extremity swelling. She denies any recent sick contacts. She denies any recent long distance travel or recent surgery. Review of Systems   Constitutional: Negative for chills, diaphoresis, fever and unexpected weight change. HENT: Negative. Negative for congestion, rhinorrhea, sneezing and sore throat. Eyes: Negative for visual disturbance. Respiratory: Positive for cough and shortness of breath. Negative for choking, chest tightness, wheezing and stridor. Orthopnea (uses 4 pillows at night)   Cardiovascular: Positive for chest pain. Negative for palpitations and leg swelling. Gastrointestinal: Negative. Genitourinary: Negative. Musculoskeletal: Negative. Skin: Negative. Neurological: Negative. Physical Exam  Constitutional:       General: She is in acute distress. HENT:      Head: Normocephalic and atraumatic. Nose: Nose normal.      Mouth/Throat:      Mouth: Mucous membranes are moist.      Pharynx: Oropharynx is clear. Eyes:      Extraocular Movements: Extraocular movements intact. Conjunctiva/sclera: Conjunctivae normal.      Pupils: Pupils are equal, round, and reactive to light. Neck:      Musculoskeletal: Normal range of motion and neck supple. Vascular: No carotid bruit.       Comments: No JVD  Cardiovascular:      Rate and Rhythm: Regular to disposition, multiple bedside re-evaluations, IV medications, cardiac monitoring and continuous pulse oximetry    Diagnosis:  1. Acute on chronic HFrEF (heart failure with reduced ejection fraction) ([address]    2. Sinus tachycardia by electrocardiogram        Disposition:  Patient's disposition: Admit to telemetry  Patient's condition is serious.            Bryce Lozano MD  Resident  03/16/20 Adri Harris MD  Resident  03/16/20 2005

## 2020-03-17 ENCOUNTER — APPOINTMENT (OUTPATIENT)
Dept: GENERAL RADIOLOGY | Age: 55
DRG: 291 | End: 2020-03-17
Payer: COMMERCIAL

## 2020-03-17 LAB
ANION GAP SERPL CALCULATED.3IONS-SCNC: 16 MMOL/L (ref 7–16)
BUN BLDV-MCNC: 12 MG/DL (ref 6–20)
CALCIUM SERPL-MCNC: 9.4 MG/DL (ref 8.6–10.2)
CHLORIDE BLD-SCNC: 96 MMOL/L (ref 98–107)
CO2: 24 MMOL/L (ref 22–29)
CREAT SERPL-MCNC: 0.7 MG/DL (ref 0.5–1)
EKG ATRIAL RATE: 135 BPM
EKG P-R INTERVAL: 178 MS
EKG Q-T INTERVAL: 266 MS
EKG QRS DURATION: 102 MS
EKG QTC CALCULATION (BAZETT): 399 MS
EKG R AXIS: -88 DEGREES
EKG T AXIS: 109 DEGREES
EKG VENTRICULAR RATE: 135 BPM
FERRITIN: 233 NG/ML
GFR AFRICAN AMERICAN: >60
GFR NON-AFRICAN AMERICAN: >60 ML/MIN/1.73
GLUCOSE BLD-MCNC: 205 MG/DL (ref 74–99)
IRON SATURATION: 8 % (ref 15–50)
IRON: 23 MCG/DL (ref 37–145)
MAGNESIUM: 1.9 MG/DL (ref 1.6–2.6)
POTASSIUM SERPL-SCNC: 4.8 MMOL/L (ref 3.5–5)
SODIUM BLD-SCNC: 136 MMOL/L (ref 132–146)
TOTAL IRON BINDING CAPACITY: 287 MCG/DL (ref 250–450)
TRANSFERRIN: 235 MG/DL (ref 200–360)
TROPONIN: <0.01 NG/ML (ref 0–0.03)

## 2020-03-17 PROCEDURE — 99222 1ST HOSP IP/OBS MODERATE 55: CPT | Performed by: FAMILY MEDICINE

## 2020-03-17 PROCEDURE — 93005 ELECTROCARDIOGRAM TRACING: CPT | Performed by: STUDENT IN AN ORGANIZED HEALTH CARE EDUCATION/TRAINING PROGRAM

## 2020-03-17 PROCEDURE — 6360000002 HC RX W HCPCS: Performed by: STUDENT IN AN ORGANIZED HEALTH CARE EDUCATION/TRAINING PROGRAM

## 2020-03-17 PROCEDURE — 82728 ASSAY OF FERRITIN: CPT

## 2020-03-17 PROCEDURE — 83735 ASSAY OF MAGNESIUM: CPT

## 2020-03-17 PROCEDURE — 84484 ASSAY OF TROPONIN QUANT: CPT

## 2020-03-17 PROCEDURE — 36415 COLL VENOUS BLD VENIPUNCTURE: CPT

## 2020-03-17 PROCEDURE — 80048 BASIC METABOLIC PNL TOTAL CA: CPT

## 2020-03-17 PROCEDURE — 71046 X-RAY EXAM CHEST 2 VIEWS: CPT

## 2020-03-17 PROCEDURE — 83550 IRON BINDING TEST: CPT

## 2020-03-17 PROCEDURE — 83540 ASSAY OF IRON: CPT

## 2020-03-17 PROCEDURE — 6370000000 HC RX 637 (ALT 250 FOR IP): Performed by: STUDENT IN AN ORGANIZED HEALTH CARE EDUCATION/TRAINING PROGRAM

## 2020-03-17 PROCEDURE — 84466 ASSAY OF TRANSFERRIN: CPT

## 2020-03-17 PROCEDURE — 99223 1ST HOSP IP/OBS HIGH 75: CPT | Performed by: INTERNAL MEDICINE

## 2020-03-17 PROCEDURE — 2700000000 HC OXYGEN THERAPY PER DAY

## 2020-03-17 PROCEDURE — 2500000003 HC RX 250 WO HCPCS: Performed by: INTERNAL MEDICINE

## 2020-03-17 PROCEDURE — 2580000003 HC RX 258: Performed by: STUDENT IN AN ORGANIZED HEALTH CARE EDUCATION/TRAINING PROGRAM

## 2020-03-17 PROCEDURE — 2060000000 HC ICU INTERMEDIATE R&B

## 2020-03-17 RX ORDER — BUMETANIDE 0.25 MG/ML
2 INJECTION, SOLUTION INTRAMUSCULAR; INTRAVENOUS 2 TIMES DAILY
Status: DISCONTINUED | OUTPATIENT
Start: 2020-03-17 | End: 2020-03-19

## 2020-03-17 RX ADMIN — METOPROLOL SUCCINATE 50 MG: 25 TABLET, EXTENDED RELEASE ORAL at 20:56

## 2020-03-17 RX ADMIN — CLOPIDOGREL 75 MG: 75 TABLET, FILM COATED ORAL at 08:47

## 2020-03-17 RX ADMIN — MONTELUKAST SODIUM 10 MG: 10 TABLET ORAL at 20:56

## 2020-03-17 RX ADMIN — METOPROLOL SUCCINATE 50 MG: 25 TABLET, EXTENDED RELEASE ORAL at 00:12

## 2020-03-17 RX ADMIN — MONTELUKAST SODIUM 10 MG: 10 TABLET ORAL at 00:12

## 2020-03-17 RX ADMIN — SODIUM CHLORIDE, PRESERVATIVE FREE 10 ML: 5 INJECTION INTRAVENOUS at 08:47

## 2020-03-17 RX ADMIN — BUMETANIDE 2 MG: 0.25 INJECTION INTRAMUSCULAR; INTRAVENOUS at 20:55

## 2020-03-17 RX ADMIN — ACETAMINOPHEN 650 MG: 325 TABLET ORAL at 17:10

## 2020-03-17 RX ADMIN — SACUBITRIL AND VALSARTAN 1 TABLET: 97; 103 TABLET, FILM COATED ORAL at 08:47

## 2020-03-17 RX ADMIN — SPIRONOLACTONE 25 MG: 25 TABLET ORAL at 08:47

## 2020-03-17 RX ADMIN — ASPIRIN 81 MG 81 MG: 81 TABLET ORAL at 08:46

## 2020-03-17 RX ADMIN — SACUBITRIL AND VALSARTAN 1 TABLET: 97; 103 TABLET, FILM COATED ORAL at 00:12

## 2020-03-17 RX ADMIN — BUDESONIDE AND FORMOTEROL FUMARATE DIHYDRATE 2 PUFF: 160; 4.5 AEROSOL RESPIRATORY (INHALATION) at 08:47

## 2020-03-17 RX ADMIN — SODIUM CHLORIDE, PRESERVATIVE FREE 10 ML: 5 INJECTION INTRAVENOUS at 20:55

## 2020-03-17 RX ADMIN — ACETAMINOPHEN 650 MG: 325 TABLET ORAL at 01:32

## 2020-03-17 RX ADMIN — BUMETANIDE 2 MG: 1 TABLET ORAL at 08:46

## 2020-03-17 RX ADMIN — BUDESONIDE AND FORMOTEROL FUMARATE DIHYDRATE 2 PUFF: 160; 4.5 AEROSOL RESPIRATORY (INHALATION) at 00:12

## 2020-03-17 RX ADMIN — SACUBITRIL AND VALSARTAN 1 TABLET: 97; 103 TABLET, FILM COATED ORAL at 20:56

## 2020-03-17 RX ADMIN — SODIUM CHLORIDE, PRESERVATIVE FREE 10 ML: 5 INJECTION INTRAVENOUS at 00:58

## 2020-03-17 RX ADMIN — ATORVASTATIN CALCIUM 40 MG: 40 TABLET, FILM COATED ORAL at 08:46

## 2020-03-17 RX ADMIN — METOPROLOL SUCCINATE 50 MG: 25 TABLET, EXTENDED RELEASE ORAL at 08:46

## 2020-03-17 RX ADMIN — BUDESONIDE AND FORMOTEROL FUMARATE DIHYDRATE 2 PUFF: 160; 4.5 AEROSOL RESPIRATORY (INHALATION) at 20:56

## 2020-03-17 ASSESSMENT — PAIN SCALES - GENERAL
PAINLEVEL_OUTOF10: 10
PAINLEVEL_OUTOF10: 6
PAINLEVEL_OUTOF10: 10
PAINLEVEL_OUTOF10: 4

## 2020-03-17 ASSESSMENT — PAIN DESCRIPTION - LOCATION
LOCATION: BREAST
LOCATION: BREAST

## 2020-03-17 ASSESSMENT — PAIN DESCRIPTION - PAIN TYPE
TYPE: ACUTE PAIN
TYPE: ACUTE PAIN

## 2020-03-17 ASSESSMENT — PAIN DESCRIPTION - PROGRESSION
CLINICAL_PROGRESSION: NOT CHANGED
CLINICAL_PROGRESSION: NOT CHANGED

## 2020-03-17 ASSESSMENT — PAIN DESCRIPTION - ONSET
ONSET: ON-GOING
ONSET: ON-GOING

## 2020-03-17 ASSESSMENT — PAIN DESCRIPTION - FREQUENCY
FREQUENCY: CONTINUOUS
FREQUENCY: CONTINUOUS

## 2020-03-17 ASSESSMENT — PAIN - FUNCTIONAL ASSESSMENT: PAIN_FUNCTIONAL_ASSESSMENT: PREVENTS OR INTERFERES SOME ACTIVE ACTIVITIES AND ADLS

## 2020-03-17 ASSESSMENT — PAIN DESCRIPTION - ORIENTATION: ORIENTATION: RIGHT

## 2020-03-17 ASSESSMENT — PAIN DESCRIPTION - DESCRIPTORS
DESCRIPTORS: ACHING;DISCOMFORT;DULL
DESCRIPTORS: ACHING;DISCOMFORT;DULL

## 2020-03-17 NOTE — PROGRESS NOTES
[facility]  Family Medicine Attending    S: 48 y.o. female with PMHx of HFrEF, COPD, asthma, HTN, hx MI, CAD s/p ICD who presents with worsening SOB x 2 days. She reports her breathing was so bad that she required her mothers oxygen. She also reports developing pain over her lateral right breast. She denies any swelling in legs. Does endorse orthopnea. Patient was found to have pleural effusion on right. O: VS- Blood pressure 134/87, pulse 79, temperature 97.6 °F (36.4 °C), temperature source Temporal, resp. rate 21, height 5' 4\" (1.626 m), weight 106 lb 9 oz (48.3 kg), last menstrual period 05/12/2014, SpO2 99 %, not currently breastfeeding. Exam is as noted by resident with the following changes, additions or corrections:  Gen - lying in bed, NAD  Cardio - RRR, no murmur   Lungs - decreased breath sounds in the right lower lobe otherwise clear   Ext- no peripheral edema     Impressions:   Principal Problem:    Heart failure, left systolic, acute on chronic (HCC)  Active Problems:    Hyperlipidemia    CVA (cerebral vascular accident)    COPD (chronic obstructive pulmonary disease) ([facility])    History of MI (myocardial infarction)    Essential hypertension    ICD (implantable cardioverter-defibrillator) in place    Acute respiratory failure with hypoxia ([address]  Resolved Problems:    * No resolved hospital problems. *      Plan:   Acute hypoxic respiratory failure most likely from acute on chronic HFrEF. Will diurese. Check echo. Follow labs. Strict I/Os. Appreciate pulm and cardio recs. Continue inhalers      Attending Physician Statement  I have reviewed the chart, including any radiology or labs, and seen the patient with the resident(s). I personally reviewed and performed key elements of the history and exam.  I agree with the assessment, plan and orders as documented by the resident. Please refer to the resident note for additional information. Achille Cushing.  Abram

## 2020-03-17 NOTE — CONSULTS
family history have been made and are reflected in the electronic medical record.              Patient Active Problem List    Diagnosis Date Noted    Blood type AB+ 04/03/2018     Priority: High    Nonischemic cardiomyopathy (HCC)      Priority: High    ICD (implantable cardioverter-defibrillator) in place 08/23/2016     Priority: High     Subcutaneous ICD  [employer] Emblem   Implanted 8/8/2016      COPD (chronic obstructive pulmonary disease) (HCC)      Priority: Medium    Mitral regurgitation 05/10/2013     Priority: Low    Hyperlipidemia      Priority: Low    Left ovarian cyst      Priority: Low    CVA (cerebral vascular accident)      Priority: Low    Carpal tunnel syndrome on left      Priority: Low    Heart failure, left systolic, acute on chronic ([address] 03/16/2020    Acute respiratory failure with hypoxia ([address] 03/16/2020    Acute decompensated heart failure ([address] 12/26/2019    Acute pulmonary edema ([address] 12/26/2019    Coronary artery disease involving native coronary artery of native heart without angina pectoris 02/14/2018    Dyspnea 02/02/2018    Left ventricular ejection fraction of 10% to 20% 12/22/2017    Chronic systolic CHF (congestive heart failure), NYHA class 3 ([address] 07/25/2016    Non-rheumatic mitral regurgitation 07/25/2016    Essential hypertension 07/25/2016    Mass of right lobe of liver 07/27/2015     On MRI done in July of 2015, look for report it says addendum will be done      History of MI (myocardial infarction) 07/26/2015    History of irregular menstrual bleeding 05/16/2014    Anemia due to blood loss 05/16/2014     Resolved       Allergic rhinosinusitis 08/27/2013    Asthma     Tobacco abuse 05/10/2013    Suicide attempt by drug ingestion ([address] 11/27/2012           Past Medical History:   Diagnosis Date    Angina at rest [facility])     cath in 2007 showed no CAD    Asthma     Blood type AB+ 4/3/2018    CAD (coronary artery disease)     Carpal tunnel syndrome on left     CHF (congestive heart failure) (HCC)     COPD [facility] disease) [facility]     CVA (cerebral vascular accident) ([facility] 2009 and 2010. left parietal    GERD (gastroesophageal reflux disease)     HFrEF (heart failure with reduced ejection fraction) ([facility] 2020- echo- LVEF 20-25%, LA enlarged, moderate MR, mild TR, mild PH, LVDD: 6.7, RVDD: 2.2 (17- echo- LVEF 10%, stage III DD, severely dilated LA, appears L>R atrial shunt, mod TR, LVDD: 6.6,  RVDD: 2.3)    History of blood transfusion     Hyperlipidemia Diagnosed in . Dyslipidemia.  Hypertension diagnosed in     ICD (implantable cardioverter-defibrillator) in place 2018    Placed by Dr. Arti Barrett.  Left ovarian cyst     Liver lesion 7/15    Moderate mitral regurgitation 7/27/15    mild-moderate    Nonischemic cardiomyopathy ([facility]     NSTEMI (non-ST elevated myocardial infarction) ([facility] 4/10/15--adm to [id number]    Suicide attempt by drug ingestion ([facility]     attempt in  OD on ultram    Tobacco abuse            Past Surgical History:   Procedure Laterality Date    CARDIAC CATHETERIZATION  2018    Dr. Aden Davidson- Clean coronaries   [place]  2016    SICD    CARDIOVASCULAR STRESS TEST  2009 Normal      SECTION      COLONOSCOPY  10/2015    DIAGNOSTIC CARDIAC CATH LAB PROCEDURE  2007    [facility]: No significant CAD. Mild LVD with apical akinesis. EF 50%.     DIAGNOSTIC CARDIAC CATH LAB PROCEDURE  4/15    with [facility] to [facility] ob [email or web address]    ENDOSCOPY, COLON, DIAGNOSTIC      HYSTERECTOMY  10/1/15    [facility] by Dr. Parul Ren PTCA  4/10/15    at [address] [place] [address] ECHOCARDIOGRAM  3/19/13    EF 65%, mild MR    TUBAL LIGATION           Family History   Problem Relation Age of Onset    High Blood Pressure Mother     Stroke Mother     High Blood Pressure Father     Stroke Father     Heart Disease 324 mg. Outpatient Medications Marked as Taking for the 3/16/20 encounter [facility] Encounter)   Medication Sig Dispense Refill    bumetanide (BUMEX) 2 MG tablet Take 2 mg by mouth daily       montelukast (SINGULAIR) 10 MG tablet Take 1 tablet by mouth nightly 30 tablet 1    metoprolol succinate (TOPROL XL) 50 MG extended release tablet Take 1 tablet by mouth 2 times daily 60 tablet 1    atorvastatin (LIPITOR) 40 MG tablet Take 1 tablet by mouth daily 30 tablet 1    sacubitril-valsartan (ENTRESTO)  MG per tablet Take 1 tablet by mouth 2 times daily 60 tablet 1    aspirin 81 MG chewable tablet Take 1 tablet by mouth daily 30 tablet 0    budesonide-formoterol (SYMBICORT) 160-4.5 MCG/ACT AERO Inhale 2 puffs into the lungs 2 times daily 1 Inhaler 0             Review of Systems:   Cardiac: As per HPI  General: No fever, chills, rigors  Pulmonary: As per HPI  HEENT: No visual disturbances, difficult swallowing  GI: No nausea, vomiting, abdominal pain  : No dysuria or hematuria  Endocrine: No thyroid disease or diabetes  Musculoskeletal: SANCHEZ x 4, no focal motor deficits  Skin: Intact, no rashes  Neuro/Psych: No headache or seizures          Weights: Wt Readings from Last 3 Encounters:   03/17/20 106 lb 9 oz (48.3 kg)   01/16/20 100 lb (45.4 kg)   12/26/19 103 lb (46.7 kg)       Physical Examination:     /87   Pulse 79   Temp 97.6 °F (36.4 °C) (Temporal)   Resp 21   Ht 5' 4\" (1.626 m)   Wt 106 lb 9 oz (48.3 kg)   LMP 05/12/2014 (Approximate)   SpO2 99%   BMI 18.29 kg/m²   CONSTITUTIONAL: Alert and oriented times 3, no acute distress and cooperative to examination with proper mood and affect. SKIN: Skin color, texture, turgor normal. No rashes or lesions. LYMPH: no cervical nodes, no inguinal nodes  HEENT: Head is normocephalic, atraumatic. EOMI, PERRLA. NECK: jvd.   CHEST/LUNGS: chest symmetric with normal A/P diameter, normal respiratory rate and rhythm, lungs clear to

## 2020-03-17 NOTE — CONSULTS
19  Yes Carlos Navarro MD   budesonide-formoterol (SYMBICORT) 160-4.5 MCG/ACT AERO Inhale 2 puffs into the lungs 2 times daily 19  Yes Carlos Navarro MD        has a past medical history of Angina at rest [facility]), Asthma, Blood type AB+, CAD (coronary artery disease), Carpal tunnel syndrome on left, CHF (congestive heart failure) ([facility] COPD (chronic obstructive pulmonary disease) ([address] CVA (cerebral vascular accident) ([address] GERD (gastroesophageal reflux disease), HFrEF (heart failure with reduced ejection fraction) ([facility] History of blood transfusion, Hyperlipidemia, Hypertension, ICD (implantable cardioverter-defibrillator) in place, Left ovarian cyst, Liver lesion, Moderate mitral regurgitation, Nonischemic cardiomyopathy ([address] NSTEMI (non-ST elevated myocardial infarction) ([facility] Suicide attempt by drug ingestion ([facility] and Tobacco abuse.     has a past surgical history that includes cardiovascular stress test (); Tubal ligation (); Tonsillectomy;  section; transthoracic echocardiogram (3/19/13); Diagnostic Cardiac Cath Lab Procedure (2007); Diagnostic Cardiac Cath Lab Procedure (4/15); Percutaneous Transluminal Coronary Angio (4/10/15); Hysterectomy (10/1/15); Colonoscopy (10/2015); Endoscopy, colon, diagnostic; Cardiac catheterization (2018); and Cardiac defibrillator placement (2016). reports that she quit smoking about 20 months ago. Her smoking use included cigarettes. She has a 15.00 pack-year smoking history. She has never used smokeless tobacco. She reports current alcohol use of about 3.0 standard drinks of alcohol per week. She reports current drug use. Drug: Marijuana. family history includes Heart Disease in her father; High Blood Pressure in her father and mother; Pacemaker in her father; Stroke in her father and mother. Allergies Orange fruit [citrus]; Crestor [rosuvastatin calcium];  Loratadine; Norco [hydrocodone-acetaminophen]; Sulfa antibiotics; and Aspirin    ROS:  General: No fever, chills, or change in appetite  Neuro: Denies headache, focal weakness  Integumentary: no complaint or rash or pruritis  Respiratory: no cough, mucus production, or wheezing. See HPI  Cardiovascular: + forceful beating of heart, + rapid palpitations, - edema. + 3 pillow orthopnea, + atypical chest pain  GI: - change in appetite, nausea, emesis, or change in bowels. No blood in sheila  : no dysuria; has noticed a concentrated, orange colored urine  Endocrine: no heat or cold intolerance  Musculosckelatal: no complaint of arthralgias or myalgias  Psychiatric: no complaint of anxiety or depression    Vitals:    03/17/20 1054   BP: 134/87   Pulse: 79   Resp: 21   Temp: Tmax 99.1 (37.3)   SpO2: 99%         Intake/Output Summary (Last 24 hours) at 3/17/2020 1406  Last data filed at 3/17/2020 1323  Gross per 24 hour   Intake 440 ml   Output 850 ml   Net -410 ml       Exam/Objective:   WD thin female in no acute distress  Skin: warm, dry, without rash  HEENT: PERRL, face symmetric, normal moist oral mucosa, normal dental repair (few missing teeth). No neck mass, adenopathy, or thyromegaly  Chest: symmetric with decreased air entry on right - lower 1/2.  decreased resonance to percussion on right, lower 1/2  Lungs: without rales, rhonchi, or wheezes  Cardiac: normal PMI, S1, S2 normal.  No murmur or aundrea  Abdomen: scaphoid, soft, non-tender, active bowel sounds. No mass or hepatosplenomegaly  Extremities: No clubbing, cyanosis, or edema  Neuro: CN II - XII grossly intact. Alert, moves all extremities symmetrically.  Oriented to person, time place  Psych:  Normal affect and insight    CBC with Differential:    Lab Results   Component Value Date    WBC 7.4 03/16/2020    RBC 4.67 03/16/2020    HGB 12.2 03/16/2020    HCT 41.4 03/16/2020     03/16/2020    MCV 88.7 03/16/2020    MCH 26.1 03/16/2020    MCHC 29.5 03/16/2020    RDW 15.7 03/16/2020    SEGSPCT 72 03/20/2014    LYMPHOPCT 16.0 03/16/2020    MONOPCT 11.3 03/16/2020    EOSPCT 0 10/20/2010    BASOPCT 1.1 03/16/2020    MONOSABS 0.83 03/16/2020    LYMPHSABS 1.18 03/16/2020    EOSABS 0.06 03/16/2020    BASOSABS 0.08 03/16/2020     CMP:    Lab Results   Component Value Date     03/17/2020    K 4.8 03/17/2020    K 4.3 03/16/2020    CL 96 03/17/2020    CO2 24 03/17/2020    BUN 12 03/17/2020    CREATININE 0.7 03/17/2020    GFRAA >60 03/17/2020    LABGLOM >60 03/17/2020    GLUCOSE 205 03/17/2020    GLUCOSE 82 03/01/2011    PROT 7.7 01/13/2020    LABALBU 4.1 01/13/2020    LABALBU 4.2 03/01/2011    CALCIUM 9.4 03/17/2020    BILITOT 1.0 01/13/2020    ALKPHOS 115 01/13/2020    AST 19 01/13/2020    ALT 9 01/13/2020         IMPRESSION:  1. Sizable right pleural effusion - most like likely due to HFrEF and worse with discontinuation of usual medications. I am concerned about the atypical chest pain which is more pleuritic in nature although there is no other history to suggest pleurisy or infection  2. Underlying COPD/asthma - not in acute exacerbation        Plan:   1. Agree with diuresis. Follow up Pro-BNP and CXR with lateral  2. If not improving with diuresis then will need to proceed with diagnostic thoracentesis - would ideally need to be off plavix up to 5 days prior to tap unless strictly contraindicated or tap becomes urgent)  3. Symbicort 2 puffs bid  4. Continue Singulair; Xopenex if needed for wheezing, chest tightness, or shortness of breath  5. Oxygen to keep SpO2 90 to 96%    Thank you for allowing us to see Ms. Misbah Rodas in consultation with you      Dewayne Vigil MD, FACP, FCCP, FAASM

## 2020-03-17 NOTE — ED NOTES
Spoke to [employer].  Informed him patient was medicated and heart rate came down to the Derrell Elkins RN  03/16/20 0720

## 2020-03-17 NOTE — PROGRESS NOTES
[facility] Inpatient   Resident Progress Note    S:  Hospital day: 1   Brief Synopsis: Hernan Starks is a 48 y.o. female PMH of HFrEF, COPD, asthma, HTN, hx MI, CAD, s/p ICD, Hx CVA, HLD, who presented to ED w/ increased SOB and R sided chest pain. Pro BNP was >27662 (baseline <5000). CTA showed large R pleural effusion. She was on supplemental O2 in ED and was given Lasix 20 mg. Overnight/interim:    No acute events overnight    Patient denies fever, chills, and chest pain. She states her SOB is improved. Cont meds:   Scheduled meds:    aspirin  81 mg Oral Daily    atorvastatin  40 mg Oral Daily    bumetanide  2 mg Oral Daily    metoprolol succinate  50 mg Oral BID    montelukast  10 mg Oral Nightly    sacubitril-valsartan  1 tablet Oral BID    sodium chloride flush  10 mL Intravenous 2 times per day    enoxaparin  40 mg Subcutaneous Daily    budesonide-formoterol  2 puff Inhalation BID    clopidogrel  75 mg Oral Daily    spironolactone  25 mg Oral Daily    levalbuterol  2.5 mg Nebulization Once    sodium chloride flush  10 mL Intravenous Once     PRN meds: sodium chloride flush, acetaminophen **OR** acetaminophen, polyethylene glycol, perflutren lipid microspheres     I reviewed the patient's past medical and surgical history, Medications and Allergies. O:  /87   Pulse 79   Temp 97.6 °F (36.4 °C) (Temporal)   Resp 21   Ht 5' 4\" (1.626 m)   Wt 106 lb 9 oz (48.3 kg)   LMP 05/12/2014 (Approximate)   SpO2 99%   BMI 18.29 kg/m²   24 hour I&O: I/O last 3 completed shifts: In: 240 [P.O.:240]  Out: 250 [Urine:250]  I/O this shift:  In: 200 [P.O.:200]  Out: 300 [Urine:300]       GENERAL: Alert, cooperative, no acute distress. HEENT: Normocephalic, atraumatic. NECK: Supple, symmetrical, trachea midline, no cervical LAD. No JVD  CHEST: No tenderness or deformity, full & symmetric excursion  LUNG: Clear to auscultation bilaterally,  respirations unlabored.  Decreased breath sounds on R.  HEART: RRR, S1 and S2 normal, no murmur, rub or gallop. ABDOMEN: SNTND, no masses, no organomegaly, no guarding, rebound or rigidity. GENITOURINARY: Urinary cath not present   EXTREMITIES:  Extremities normal, atraumatic, no cyanosis or edema. SKIN: Skin color, texture, turgor normal, no rashes or lesions  MUSCULOSKELETAL: No joint swelling, no muscle tenderness. Normal ROM in extremities. NEUROLOGIC: Alert & Oriented      Labs:  Na/K/Cl/CO2:  136/4.8/96/24 (03/17 0215)  BUN/Cr/glu/ALT/AST/amyl/lip:  12/0.7/--/--/--/--/-- (03/17 0215)  WBC/Hgb/Hct/Plts:  7.4/12.2/41.4/487 (03/16 1353)  estimated creatinine clearance is 72 mL/min (based on SCr of 0.7 mg/dL). Other pertinent labs as noted below    Radiology:  XR CHEST STANDARD (2 VW)   Final Result   Similar-appearing volume of right pleural effusion with slightly   increasing right basilar atelectasis. CTA PULMONARY W CONTRAST   Final Result   1. No indication for an acute pulmonary emboli. 2. Presence of cardiomegaly with predominant enlargement for the left   ventricle, left atrial and right atrium. 3. Signs for right side failure with significant reflux of the IV   contrast into the hepatic venous system. Also there are signs for   left-sided heart failure. 4. Presence of moderate to large size right-sided pleural effusion   causing compression of the right lower lobe particular the right lower   lobe and right midlobe. 5. Centrilobular emphysema in the upper lobes bilaterally. 6. No compression atelectasis in the right lung is difficult to   exclude underlying pneumonia. CT CHEST WO CONTRAST   Final Result   There is a large right-sided water density pleural effusion with   associated compressive atelectasis, likely accounting for the   respiratory distress. Central peribronchial cuffing may be a   manifestation of airway inflammation or viral infection.       There is very prominent pulmonary recommendations     COPD/Asthma  -continue home singulair, symbicort, albeterol     HTN/CAD s/p ICD/HLD/Hx CVA  -continue home Toprol XL  -continue home lipitor  -continue home plavix, ASA    GI/DVT ppx: lovenox, plavix/GI prophylaxis not indicated  Diet: low sodium  Pain meds: tylenol  Antibiotics: none  Consults: cardiology, pulm  PT/OT Evaluation:   Social work: following  Disposition: telemetry      Electronically signed by Rocio Brown DO PGY-1 on 3/17/2020 at 11:44 AM  This case was discussed with attending physician: Dr. Krys Betancourt

## 2020-03-18 ENCOUNTER — APPOINTMENT (OUTPATIENT)
Dept: GENERAL RADIOLOGY | Age: 55
DRG: 291 | End: 2020-03-18
Payer: COMMERCIAL

## 2020-03-18 ENCOUNTER — APPOINTMENT (OUTPATIENT)
Dept: ULTRASOUND IMAGING | Age: 55
DRG: 291 | End: 2020-03-18
Payer: COMMERCIAL

## 2020-03-18 LAB
ALBUMIN SERPL-MCNC: 3 G/DL (ref 3.5–5.2)
ALP BLD-CCNC: 105 U/L (ref 35–104)
ALT SERPL-CCNC: 8 U/L (ref 0–32)
ANION GAP SERPL CALCULATED.3IONS-SCNC: 13 MMOL/L (ref 7–16)
AST SERPL-CCNC: 12 U/L (ref 0–31)
BILIRUB SERPL-MCNC: 0.3 MG/DL (ref 0–1.2)
BILIRUBIN DIRECT: <0.2 MG/DL (ref 0–0.3)
BILIRUBIN, INDIRECT: ABNORMAL MG/DL (ref 0–1)
BUN BLDV-MCNC: 11 MG/DL (ref 6–20)
CALCIUM SERPL-MCNC: 9 MG/DL (ref 8.6–10.2)
CHLORIDE BLD-SCNC: 98 MMOL/L (ref 98–107)
CO2: 30 MMOL/L (ref 22–29)
CREAT SERPL-MCNC: 0.8 MG/DL (ref 0.5–1)
GFR AFRICAN AMERICAN: >60
GFR NON-AFRICAN AMERICAN: >60 ML/MIN/1.73
GLUCOSE BLD-MCNC: 227 MG/DL (ref 74–99)
HCG QUALITATIVE: NEGATIVE
LV EF: 23 %
LVEF MODALITY: NORMAL
MAGNESIUM: 1.6 MG/DL (ref 1.6–2.6)
POTASSIUM SERPL-SCNC: 4 MMOL/L (ref 3.5–5)
SODIUM BLD-SCNC: 141 MMOL/L (ref 132–146)
TOTAL PROTEIN: 6.5 G/DL (ref 6.4–8.3)

## 2020-03-18 PROCEDURE — 6360000002 HC RX W HCPCS: Performed by: NURSE PRACTITIONER

## 2020-03-18 PROCEDURE — 99232 SBSQ HOSP IP/OBS MODERATE 35: CPT | Performed by: FAMILY MEDICINE

## 2020-03-18 PROCEDURE — 80076 HEPATIC FUNCTION PANEL: CPT

## 2020-03-18 PROCEDURE — 2500000003 HC RX 250 WO HCPCS: Performed by: INTERNAL MEDICINE

## 2020-03-18 PROCEDURE — 83735 ASSAY OF MAGNESIUM: CPT

## 2020-03-18 PROCEDURE — 6370000000 HC RX 637 (ALT 250 FOR IP): Performed by: NURSE PRACTITIONER

## 2020-03-18 PROCEDURE — 6370000000 HC RX 637 (ALT 250 FOR IP): Performed by: STUDENT IN AN ORGANIZED HEALTH CARE EDUCATION/TRAINING PROGRAM

## 2020-03-18 PROCEDURE — 6360000002 HC RX W HCPCS: Performed by: STUDENT IN AN ORGANIZED HEALTH CARE EDUCATION/TRAINING PROGRAM

## 2020-03-18 PROCEDURE — 71046 X-RAY EXAM CHEST 2 VIEWS: CPT

## 2020-03-18 PROCEDURE — 36415 COLL VENOUS BLD VENIPUNCTURE: CPT

## 2020-03-18 PROCEDURE — 2580000003 HC RX 258: Performed by: STUDENT IN AN ORGANIZED HEALTH CARE EDUCATION/TRAINING PROGRAM

## 2020-03-18 PROCEDURE — 84703 CHORIONIC GONADOTROPIN ASSAY: CPT

## 2020-03-18 PROCEDURE — 93306 TTE W/DOPPLER COMPLETE: CPT

## 2020-03-18 PROCEDURE — 6360000002 HC RX W HCPCS: Performed by: INTERNAL MEDICINE

## 2020-03-18 PROCEDURE — 94640 AIRWAY INHALATION TREATMENT: CPT

## 2020-03-18 PROCEDURE — 80048 BASIC METABOLIC PNL TOTAL CA: CPT

## 2020-03-18 PROCEDURE — 76705 ECHO EXAM OF ABDOMEN: CPT

## 2020-03-18 PROCEDURE — 2580000003 HC RX 258: Performed by: NURSE PRACTITIONER

## 2020-03-18 PROCEDURE — APPSS45 APP SPLIT SHARED TIME 31-45 MINUTES: Performed by: NURSE PRACTITIONER

## 2020-03-18 PROCEDURE — 2700000000 HC OXYGEN THERAPY PER DAY

## 2020-03-18 PROCEDURE — 2060000000 HC ICU INTERMEDIATE R&B

## 2020-03-18 RX ORDER — LEVALBUTEROL INHALATION SOLUTION 1.25 MG/3ML
1.25 SOLUTION RESPIRATORY (INHALATION) EVERY 8 HOURS PRN
Status: DISCONTINUED | OUTPATIENT
Start: 2020-03-18 | End: 2020-03-20 | Stop reason: HOSPADM

## 2020-03-18 RX ORDER — MAGNESIUM SULFATE IN WATER 40 MG/ML
2 INJECTION, SOLUTION INTRAVENOUS ONCE
Status: COMPLETED | OUTPATIENT
Start: 2020-03-18 | End: 2020-03-18

## 2020-03-18 RX ADMIN — SODIUM CHLORIDE 25 MG: 9 INJECTION, SOLUTION INTRAVENOUS at 13:59

## 2020-03-18 RX ADMIN — LEVALBUTEROL 1.25 MG: 1.25 SOLUTION RESPIRATORY (INHALATION) at 16:50

## 2020-03-18 RX ADMIN — SACUBITRIL AND VALSARTAN 1 TABLET: 97; 103 TABLET, FILM COATED ORAL at 11:18

## 2020-03-18 RX ADMIN — ATORVASTATIN CALCIUM 40 MG: 40 TABLET, FILM COATED ORAL at 11:19

## 2020-03-18 RX ADMIN — BUMETANIDE 2 MG: 0.25 INJECTION INTRAMUSCULAR; INTRAVENOUS at 21:04

## 2020-03-18 RX ADMIN — MONTELUKAST SODIUM 10 MG: 10 TABLET ORAL at 21:04

## 2020-03-18 RX ADMIN — BUDESONIDE AND FORMOTEROL FUMARATE DIHYDRATE 2 PUFF: 160; 4.5 AEROSOL RESPIRATORY (INHALATION) at 11:38

## 2020-03-18 RX ADMIN — ASPIRIN 81 MG 81 MG: 81 TABLET ORAL at 11:19

## 2020-03-18 RX ADMIN — MAGNESIUM SULFATE HEPTAHYDRATE 2 G: 40 INJECTION, SOLUTION INTRAVENOUS at 11:18

## 2020-03-18 RX ADMIN — SODIUM CHLORIDE, PRESERVATIVE FREE 10 ML: 5 INJECTION INTRAVENOUS at 11:39

## 2020-03-18 RX ADMIN — METOPROLOL SUCCINATE 75 MG: 50 TABLET, EXTENDED RELEASE ORAL at 11:34

## 2020-03-18 RX ADMIN — SACUBITRIL AND VALSARTAN 1 TABLET: 97; 103 TABLET, FILM COATED ORAL at 21:04

## 2020-03-18 RX ADMIN — BUDESONIDE AND FORMOTEROL FUMARATE DIHYDRATE 2 PUFF: 160; 4.5 AEROSOL RESPIRATORY (INHALATION) at 21:05

## 2020-03-18 RX ADMIN — BUMETANIDE 2 MG: 0.25 INJECTION INTRAMUSCULAR; INTRAVENOUS at 11:35

## 2020-03-18 RX ADMIN — METOPROLOL SUCCINATE 75 MG: 50 TABLET, EXTENDED RELEASE ORAL at 21:04

## 2020-03-18 RX ADMIN — SODIUM CHLORIDE 100 MG: 9 INJECTION, SOLUTION INTRAVENOUS at 14:48

## 2020-03-18 RX ADMIN — SPIRONOLACTONE 25 MG: 25 TABLET ORAL at 11:18

## 2020-03-18 RX ADMIN — SODIUM CHLORIDE, PRESERVATIVE FREE 10 ML: 5 INJECTION INTRAVENOUS at 21:05

## 2020-03-18 ASSESSMENT — EJECTION FRACTION
EF_SOURCE: 2D ECHO
EF_SOURCE: 2D ECHO
EF_VALUE: 20-25%
EF_VALUE: 20-25%

## 2020-03-18 ASSESSMENT — PAIN DESCRIPTION - ONSET: ONSET: ON-GOING

## 2020-03-18 ASSESSMENT — PAIN SCALES - GENERAL
PAINLEVEL_OUTOF10: 0
PAINLEVEL_OUTOF10: 0
PAINLEVEL_OUTOF10: 8
PAINLEVEL_OUTOF10: 0

## 2020-03-18 ASSESSMENT — PAIN DESCRIPTION - PROGRESSION
CLINICAL_PROGRESSION: NOT CHANGED
CLINICAL_PROGRESSION: NOT CHANGED

## 2020-03-18 ASSESSMENT — PAIN DESCRIPTION - LOCATION: LOCATION: RIB CAGE

## 2020-03-18 ASSESSMENT — PAIN DESCRIPTION - DESCRIPTORS: DESCRIPTORS: ACHING;DISCOMFORT

## 2020-03-18 ASSESSMENT — PAIN DESCRIPTION - ORIENTATION: ORIENTATION: RIGHT

## 2020-03-18 ASSESSMENT — PAIN DESCRIPTION - FREQUENCY: FREQUENCY: CONTINUOUS

## 2020-03-18 ASSESSMENT — PAIN DESCRIPTION - PAIN TYPE: TYPE: ACUTE PAIN

## 2020-03-18 NOTE — PLAN OF CARE
Problem: OXYGENATION/RESPIRATORY FUNCTION  Goal: Patient will maintain patent airway  3/18/2020 1824 by Nahomy Rinaldi RN  Outcome: Met This Shift  3/18/2020 1158 by Nahomy Rinaldi RN  Outcome: Met This Shift     Problem: HEMODYNAMIC STATUS  Goal: Patient has stable vital signs and fluid balance  Outcome: Met This Shift

## 2020-03-18 NOTE — PROGRESS NOTES
Patient currently off floor for test. All teaching done and literature given on previous admission, per heart failure advocate. Was enrolled in [facility] per cardiologist, but did not show for her scheduled appointment.

## 2020-03-18 NOTE — PROGRESS NOTES
DO   650 mg at 03/17/20 1710    Or    acetaminophen (TYLENOL) suppository 650 mg  650 mg Rectal Q6H PRN Sharmaine [place], DO        polyethylene glycol (GLYCOLAX) packet 17 g  17 g Oral Daily PRN Sharmaine [place], DO        enoxaparin (LOVENOX) injection 40 mg  40 mg Subcutaneous Daily Fide A Rech, DO        budesonide-formoterol (SYMBICORT) 160-4.5 MCG/ACT inhaler 2 puff  2 puff Inhalation BID Sharmaine [place], DO   2 puff at 03/18/20 1138    [Held by provider] clopidogrel (PLAVIX) tablet 75 mg  75 mg Oral Daily Fide A Rech, DO   75 mg at 03/17/20 0847    spironolactone (ALDACTONE) tablet 25 mg  25 mg Oral Daily Fide A Rech, DO   25 mg at 03/18/20 1118    levalbuterol (XOPENEX) nebulizer solution 2.5 mg  2.5 mg Nebulization Once Anna Altman MD        perflutren lipid microspheres (DEFINITY) injection 1.65 mg  1.5 mL Intravenous ONCE PRN Sharmaine [place], DO        sodium chloride flush 0.9 % injection 10 mL  10 mL Intravenous Once Malia Angelo II, MD           Objective:   BP (!) 91/55   Pulse 75   Temp 98 °F (36.7 °C) (Temporal)   Resp 18   Ht 5' 4\" (1.626 m)   Wt 100 lb 4 oz (45.5 kg)   LMP 05/12/2014 (Approximate)   SpO2 98%   BMI 17.21 kg/m²     Intake/Output Summary (Last 24 hours) at 3/18/2020 1919  Last data filed at 3/18/2020 1514  Gross per 24 hour   Intake --   Output 2100 ml   Net -2100 ml       Exam:  WD thin female in no acute distress  Skin: warm, dry, without rash  HEENT: PERRL, face symmetric, normal moist oral mucosa, normal dental repair (few missing teeth). No neck mass, adenopathy, or thyromegaly  Chest: symmetric with decreased air entry on right - lower 1/4 to 1/2.  decreased resonance to percussion on right, lower 1/4 to 1/2  Lungs: without rales, rhonchi, or wheezes  Cardiac: normal PMI, S1, S2 normal.  No murmur or aundrea  Abdomen: scaphoid, soft, non-tender, active bowel sounds. Extremities: No clubbing, cyanosis, or edema  Neuro: CN II - XII grossly intact.   Alert, moves all extremities symmetrically. Oriented to person, time place  Psych:  Normal affect    CBC with Differential:    Lab Results   Component Value Date    WBC 7.4 03/16/2020    RBC 4.67 03/16/2020    HGB 12.2 03/16/2020    HCT 41.4 03/16/2020     03/16/2020    MCV 88.7 03/16/2020    MCH 26.1 03/16/2020    MCHC 29.5 03/16/2020    RDW 15.7 03/16/2020    SEGSPCT 72 03/20/2014    LYMPHOPCT 16.0 03/16/2020    MONOPCT 11.3 03/16/2020    EOSPCT 0 10/20/2010    BASOPCT 1.1 03/16/2020    MONOSABS 0.83 03/16/2020    LYMPHSABS 1.18 03/16/2020    EOSABS 0.06 03/16/2020    BASOSABS 0.08 03/16/2020     CMP:    Lab Results   Component Value Date     03/18/2020    K 4.0 03/18/2020    K 4.3 03/16/2020    CL 98 03/18/2020    CO2 30 03/18/2020    BUN 11 03/18/2020    CREATININE 0.8 03/18/2020    GFRAA >60 03/18/2020    LABGLOM >60 03/18/2020    GLUCOSE 227 03/18/2020    GLUCOSE 82 03/01/2011    PROT 6.5 03/18/2020    LABALBU 3.0 03/18/2020    LABALBU 4.2 03/01/2011    CALCIUM 9.0 03/18/2020    BILITOT 0.3 03/18/2020    ALKPHOS 105 03/18/2020    AST 12 03/18/2020    ALT 8 03/18/2020     ABG:    Lab Results   Component Value Date    PH 7.441 03/16/2020    PCO2 37.2 03/16/2020    PO2 109.8 03/16/2020    HCO3 24.8 03/16/2020    BE 0.8 03/16/2020    O2SAT 98.3 03/16/2020     Pro BNP 14,228      CXR reviewed:   XR CHEST STANDARD (2 VW)   Final Result   Right pleural effusion and atelectasis does not appear clearly   changed. Cardiomegaly. US ABDOMEN LIMITED Specify organ? LIVER, GALLBLADDER   Final Result   Right lower the liver there is a lesion seen in a would   recommend triple phase CT of the liver for further workup. The lesion   measures 2.2 x 2.76 x 2.52 cm. Small to moderate right-sided pleural a few   . XR CHEST STANDARD (2 VW)   Final Result   Similar-appearing volume of right pleural effusion with slightly   increasing right basilar atelectasis.             CTA PULMONARY W CONTRAST   Final Result   1. No indication for an acute pulmonary emboli. 2. Presence of cardiomegaly with predominant enlargement for the left   ventricle, left atrial and right atrium. 3. Signs for right side failure with significant reflux of the IV   contrast into the hepatic venous system. Also there are signs for   left-sided heart failure. 4. Presence of moderate to large size right-sided pleural effusion   causing compression of the right lower lobe particular the right lower   lobe and right midlobe. 5. Centrilobular emphysema in the upper lobes bilaterally. 6. No compression atelectasis in the right lung is difficult to   exclude underlying pneumonia. CT CHEST WO CONTRAST   Final Result   There is a large right-sided water density pleural effusion with   associated compressive atelectasis, likely accounting for the   respiratory distress. Central peribronchial cuffing may be a   manifestation of airway inflammation or viral infection. There is very prominent cardiomegaly with a ventral subcutaneous   defibrillator on the left, and very subtle hazy density throughout the   lungs may be very mild interstitial edema. XR CHEST PORTABLE   Final Result   Cardiomegaly   Findings compatible with atherosclerotic disease of the aorta. The chest appears to be worse in the interval                  NM Cardiac Stress Test Nuclear Imaging    (Results Pending)       Impression:  1. Dyspnea, pleural effusion most likely due to CHF  2. HFrEF  3.  Mild hypoxemic respiratory failure    Principal Problem:    Heart failure, left systolic, acute on chronic (HCC)  Active Problems:    Hyperlipidemia    CVA (cerebral vascular accident)    COPD (chronic obstructive pulmonary disease) (HCC)    History of MI (myocardial infarction)    Essential hypertension    ICD (implantable cardioverter-defibrillator) in place    Acute respiratory failure with hypoxia ([address]  Resolved Problems:    * No resolved hospital problems. *      Plans:   1. Diruese and heart failure management per cardiology  2. Wean oxygen as able to maintain SpO2 90 to 96%  3. No thoracentesis at this time but would do outpatient in 2 to 3 weeks if she does not show improvement or gets worse.     Nick Hudson MD, FACP, FCCP

## 2020-03-18 NOTE — PROGRESS NOTES
[address] [facility]  Family Medicine Attending    S: 48 y.o. female with PMHx of HFrEF, COPD, asthma, HTN, hx MI, CAD s/p ICD who presents with worsening SOB x 2 days. She reports her breathing was so bad that she required her mothers oxygen. She also reports developing pain over her lateral right breast. She denies any swelling in legs. Does endorse orthopnea. Patient was found to have pleural effusion on right. Patient seen and examined today. Has intermittent chest pain and sob. Would like breathing treatments as she feels this helps with her sob. O: VS- Blood pressure 104/62, pulse 72, temperature 98.2 °F (36.8 °C), temperature source Temporal, resp. rate 18, height 5' 4\" (1.626 m), weight 100 lb 4 oz (45.5 kg), last menstrual period 05/12/2014, SpO2 98 %, not currently breastfeeding. Exam is as noted by resident with the following changes, additions or corrections:  Gen - lying in bed, NAD  Cardio - RRR, no murmur   Lungs - decreased breath sounds in the right lower lobe otherwise clear   Ext- no peripheral edema     Impressions:   Principal Problem:    Heart failure, left systolic, acute on chronic (HCC)  Active Problems:    Hyperlipidemia    CVA (cerebral vascular accident)    COPD (chronic obstructive pulmonary disease) ([facility])    History of MI (myocardial infarction)    Essential hypertension    ICD (implantable cardioverter-defibrillator) in place    Acute respiratory failure with hypoxia ([facility] [address]  Resolved Problems:    * No resolved hospital problems. *      Plan:   Acute hypoxic respiratory failure most likely from acute on chronic HFrEF. Will jodee. Echo showed EF 20-25%, global hypokinesis, severe eccentric MR directed posteriorly. Plan at this time for stress test in AM. Follow labs. Strict I/Os. Appreciate pulm and cardio recs. Continue inhalers      Attending Physician Statement  I have reviewed the chart, including any radiology or labs, and seen the patient with the resident(s).   I personally reviewed and performed key elements of the history and exam.  I agree with the assessment, plan and orders as documented by the resident. Please refer to the resident note for additional information. Sangeeta Licea.  Abram

## 2020-03-18 NOTE — PROGRESS NOTES
[facility] Inpatient   Resident Progress Note    S:  Hospital day: 2   Brief Synopsis: Christine Mills is a 48 y.o. female PMH of HFrEF, COPD, asthma, HTN, hx MI, CAD, s/p ICD, Hx CVA, HLD, who presented to ED w/ increased SOB and R sided chest pain. Pro BNP was >26028 (baseline <5000). CTA showed large R pleural effusion. She was on supplemental O2 in ED and was given Lasix 20 mg. Overnight/interim:    No acute events overnight    Patient denies fever, chills, and chest pain. She states that her SOB is improving. Cont meds:   Scheduled meds:    magnesium sulfate  2 g Intravenous Once    bumetanide  2 mg Intravenous BID    aspirin  81 mg Oral Daily    atorvastatin  40 mg Oral Daily    metoprolol succinate  50 mg Oral BID    montelukast  10 mg Oral Nightly    sacubitril-valsartan  1 tablet Oral BID    sodium chloride flush  10 mL Intravenous 2 times per day    enoxaparin  40 mg Subcutaneous Daily    budesonide-formoterol  2 puff Inhalation BID    clopidogrel  75 mg Oral Daily    spironolactone  25 mg Oral Daily    levalbuterol  2.5 mg Nebulization Once    sodium chloride flush  10 mL Intravenous Once     PRN meds: sodium chloride flush, acetaminophen **OR** acetaminophen, polyethylene glycol, perflutren lipid microspheres     I reviewed the patient's past medical and surgical history, Medications and Allergies. O:  /74   Pulse 73   Temp 98.4 °F (36.9 °C) (Temporal)   Resp 18   Ht 5' 4\" (1.626 m)   Wt 100 lb 4 oz (45.5 kg)   LMP 05/12/2014 (Approximate)   SpO2 99%   BMI 17.21 kg/m²   24 hour I&O: I/O last 3 completed shifts: In: 400 [P.O.:400]  Out: 2000 [Urine:2000]  No intake/output data recorded. GENERAL: Alert, cooperative, no acute distress. HEENT: Normocephalic, atraumatic. NECK: Supple, symmetrical, trachea midline, no cervical LAD.  No JVD  CHEST: No tenderness or deformity, full & symmetric excursion  LUNG: Clear to auscultation bilaterally, respirations unlabored. Decreased breath sounds of right lower lobe  HEART: RRR, S1 and S2 normal, no murmur, rub or gallop. GENITOURINARY: Urinary cath not present   EXTREMITIES:  Extremities normal, atraumatic, no cyanosis or edema. SKIN: Skin color, texture, turgor normal, no rashes or lesions  MUSCULOSKELETAL: No joint swelling, no muscle tenderness. Normal ROM in extremities. NEUROLOGIC: Alert & Oriented      Labs:  Na/K/Cl/CO2:  141/4.0/98/30 (03/18 0500)  BUN/Cr/glu/ALT/AST/amyl/lip:  11/0.8/--/8/12/--/-- (03/18 0500)  WBC/Hgb/Hct/Plts:  7.4/12.2/41.4/487 (03/16 1353)  CrCl cannot be calculated (Unknown ideal weight.). Other pertinent labs as noted below    Radiology:  XR CHEST STANDARD (2 VW)   Final Result   Similar-appearing volume of right pleural effusion with slightly   increasing right basilar atelectasis. CTA PULMONARY W CONTRAST   Final Result   1. No indication for an acute pulmonary emboli. 2. Presence of cardiomegaly with predominant enlargement for the left   ventricle, left atrial and right atrium. 3. Signs for right side failure with significant reflux of the IV   contrast into the hepatic venous system. Also there are signs for   left-sided heart failure. 4. Presence of moderate to large size right-sided pleural effusion   causing compression of the right lower lobe particular the right lower   lobe and right midlobe. 5. Centrilobular emphysema in the upper lobes bilaterally. 6. No compression atelectasis in the right lung is difficult to   exclude underlying pneumonia. CT CHEST WO CONTRAST   Final Result   There is a large right-sided water density pleural effusion with   associated compressive atelectasis, likely accounting for the   respiratory distress. Central peribronchial cuffing may be a   manifestation of airway inflammation or viral infection.       There is very prominent cardiomegaly with a ventral subcutaneous   defibrillator on the left, and very subtle hazy density throughout the   lungs may be very mild interstitial edema. XR CHEST PORTABLE   Final Result   Cardiomegaly   Findings compatible with atherosclerotic disease of the aorta. The chest appears to be worse in the interval                  [id number] organ? LIVER, GALLBLADDER    (Results Pending)   XR CHEST STANDARD (2 VW)    (Results Pending)       A/P:  Principal Problem:    Heart failure, left systolic, acute on chronic (HCC)  Active Problems:    Hyperlipidemia    CVA (cerebral vascular accident)    COPD (chronic obstructive pulmonary disease) ([facility])    History of MI (myocardial infarction)    Essential hypertension    ICD (implantable cardioverter-defibrillator) in place    Acute respiratory failure with hypoxia ([address]  Resolved Problems:    * No resolved hospital problems.  *    Acute Respiratory failure with hypoxia 2/2 Acute on Chronic CHF/HFrEF  - pt presented with SOB since yesterday; CXR without significant pulmonary edema; Pro-BNP 14,228, above baseline of 5000; recurrent episode  - likely precipitated by medication noncompliance  - NYHA Class III;  last echo 8/28/2018 with EF 20-25%  - home regminen includes: medications Bumex 2 mg QD, Entresto  mg BID, Aldactone; pt follows with Dr. Michael Galloway and Dr. Jayme Wolff and dismissed from EP secondary to non-compliance  - Echo today shows EF 20-25%, moderately dilated L ventricle, severely dilated L atrium, mitral valve regurgitation  - Strict I&O, daily weights, BNP's qod  - Initial EKG sinus tachycardia w/ fusion complexes, L anterior fascicular block, age undetermined septal infarct; trop  -repeat EKG shows sinus rhythm w/ occasional PVCs, L atrial enlargement, L fascicular block  -trop <0.01 x 3  - Keep K>4, Mg>2  - avoid medications NSAIDs  - daily diuretic dosing  - Cardiology following  - Continue home entresto, Bumex, spironolactone  -stress test tomorrow, npo after midnight     Sinus tachycardia-resolved  -patient pulse 145 in ED  -patient given 1 dose of diltiazem  -pulse dropped to 90s  -continue home metoprolol     Pleural Effusion  -R pleural effusion revealed on CT scan  -Pulmonary following, will possibly do diagnostic tap today if pleural infusion is not improved  -Repeat CXR 3/18 shows right pleural effusion and atelectasis not clearly changed from 3/17    Abdominal pain  - U/S liver/gallbladder shows lesion of the R lower liver, follow up with triple phase CT recommended  -no ascites visualized on U/S  -small to moderate pleural effusion on R     COPD/Asthma  -continue home singulair, symbicort, albeterol     HTN/CAD s/p ICD/HLD/Hx CVA  -continue home Toprol XL  -continue home lipitor  -continue home ASA, plavix discontinued 3/18    GI/DVT ppx: lovenox, plavix/GI prophylaxis not indicated  Diet: low sodium  Pain meds: tylenol  Antibiotics: none  Consults: cardiology, pulm  PT/OT Evaluation:   Social work: following  Disposition: telemetry      Electronically signed by Raul Porter DO PGY-1 on 3/18/2020 at 8:13 AM  This case was discussed with attending physician: Dr. Partha Sabillon

## 2020-03-18 NOTE — PROGRESS NOTES
[START ON 3/19/2020] ferric gluconate (FERRLECIT) 125 mg in sodium chloride 0.9 % 100 mL IVPB  125 mg Intravenous Once GA Casanova - CNP        bumetanide [facility]) injection 2 mg  2 mg Intravenous BID Lv Chatman MD   2 mg at 03/17/20 2055    aspirin chewable tablet 81 mg  81 mg Oral Daily Fide A Rech, DO   81 mg at 03/17/20 0846    atorvastatin (LIPITOR) tablet 40 mg  40 mg Oral Daily Fide A Rech, DO   40 mg at 03/17/20 0846    metoprolol succinate (TOPROL XL) extended release tablet 50 mg  50 mg Oral BID Fide A Rech, DO   50 mg at 03/17/20 2056    montelukast (SINGULAIR) tablet 10 mg  10 mg Oral Nightly Fide A Rech, DO   10 mg at 03/17/20 2056    sacubitril-valsartan (ENTRESTO)  MG per tablet 1 tablet  1 tablet Oral BID Yu Washington, DO   1 tablet at 03/17/20 2056    sodium chloride flush 0.9 % injection 10 mL  10 mL Intravenous 2 times per day Yu Washington, DO   10 mL at 03/17/20 2055    sodium chloride flush 0.9 % injection 10 mL  10 mL Intravenous PRN Yu Washington, DO        acetaminophen (TYLENOL) tablet 650 mg  650 mg Oral Q6H PRN Yu Washington, DO   650 mg at 03/17/20 1710    Or    acetaminophen (TYLENOL) suppository 650 mg  650 mg Rectal Q6H PRN Yu Washington, DO        polyethylene glycol (GLYCOLAX) packet 17 g  17 g Oral Daily PRN Yu Washington, DO        enoxaparin (LOVENOX) injection 40 mg  40 mg Subcutaneous Daily Fide A Rech, DO        budesonide-formoterol (SYMBICORT) 160-4.5 MCG/ACT inhaler 2 puff  2 puff Inhalation BID Fide A Rech, DO   2 puff at 03/17/20 2056    clopidogrel (PLAVIX) tablet 75 mg  75 mg Oral Daily Fide A Rech, DO   75 mg at 03/17/20 0847    spironolactone (ALDACTONE) tablet 25 mg  25 mg Oral Daily Fide A Rech, DO   25 mg at 03/17/20 0847    levalbuterol (XOPENEX) nebulizer solution 2.5 mg  2.5 mg Nebulization Once Ramírez Martinez MD        perflutren lipid microspheres (DEFINITY) injection 1.65 mg  1.5 mL Intravenous ONCE PRN Braulio ZHANG Rech, DO        sodium chloride flush 0.9 % injection 10 mL  10 mL Intravenous Once Alma Ireland MD             IMAGING:      CXR: bilateral pulmonary vascular congestion, cardiomegaly, osseous structures are intact    CTA chest:  There is a large right-sided water density pleural effusion with   associated compressive atelectasis, likely accounting for the   respiratory distress. Central peribronchial cuffing may be a   manifestation of airway inflammation or viral infection.       There is very prominent cardiomegaly with a ventral subcutaneous   defibrillator on the left, and very subtle hazy density throughout the   lungs may be very mild interstitial edema. 3/16/2020 CTA PE:  1. No indication for an acute pulmonary emboli.        2. Presence of cardiomegaly with predominant enlargement for the left   ventricle, left atrial and right atrium.       3. Signs for right side failure with significant reflux of the IV   contrast into the hepatic venous system. Also there are signs for   left-sided heart failure.       4. Presence of moderate to large size right-sided pleural effusion   causing compression of the right lower lobe particular the right lower   lobe and right midlobe.       5. Centrilobular emphysema in the upper lobes bilaterally.       6. No compression atelectasis in the right lung is difficult to   exclude underlying pneumonia. CARDIAC TESTING:      3/2018 [facility]:  Findings:  Left main: 0%  stenosis  LAD: 0. %  stenosis  Circumflex: 0. %   stenosis  RCA: Dominant.  0. %  stenosis       ECG: nsr, PVCs, LAE, lahb, nonspecific T wave changes        ASSESSMENT:  1. Acute on chronic hfref  -hypervolemic  -nyha fc IIIb  -warm  -cognitively intact  -good pulse pressure difference      2. Nonischemic CM  -last TTE August 2018  -awaiting repeat TTE  -presumed burned out hypertensive + viral      3. Systemic hypertension, controlled      4.  Poor medical literacy  -complicated by medication non

## 2020-03-18 NOTE — PLAN OF CARE
Problem: HEMODYNAMIC STATUS  Goal: Patient has stable vital signs and fluid balance  3/18/2020 0114 by Viridiana Aguiar RN  Outcome: Met This Shift     Problem: FLUID AND ELECTROLYTE IMBALANCE  Goal: Fluid and electrolyte balance are achieved/maintained  3/18/2020 0114 by Viridiana Aguiar RN  Outcome: Met This Shift

## 2020-03-19 ENCOUNTER — APPOINTMENT (OUTPATIENT)
Dept: CT IMAGING | Age: 55
DRG: 291 | End: 2020-03-19
Payer: COMMERCIAL

## 2020-03-19 ENCOUNTER — APPOINTMENT (OUTPATIENT)
Dept: NUCLEAR MEDICINE | Age: 55
DRG: 291 | End: 2020-03-19
Payer: COMMERCIAL

## 2020-03-19 ENCOUNTER — APPOINTMENT (OUTPATIENT)
Dept: NON INVASIVE DIAGNOSTICS | Age: 55
DRG: 291 | End: 2020-03-19
Payer: COMMERCIAL

## 2020-03-19 LAB
ANION GAP SERPL CALCULATED.3IONS-SCNC: 12 MMOL/L (ref 7–16)
BUN BLDV-MCNC: 9 MG/DL (ref 6–20)
C-REACTIVE PROTEIN: 5.9 MG/DL (ref 0–0.4)
CALCIUM SERPL-MCNC: 8.7 MG/DL (ref 8.6–10.2)
CHLORIDE BLD-SCNC: 97 MMOL/L (ref 98–107)
CO2: 32 MMOL/L (ref 22–29)
CREAT SERPL-MCNC: 0.7 MG/DL (ref 0.5–1)
GFR AFRICAN AMERICAN: >60
GFR NON-AFRICAN AMERICAN: >60 ML/MIN/1.73
GLUCOSE BLD-MCNC: 126 MG/DL (ref 74–99)
LV EF: 20 %
LVEF MODALITY: NORMAL
MAGNESIUM: 1.8 MG/DL (ref 1.6–2.6)
POTASSIUM SERPL-SCNC: 3.9 MMOL/L (ref 3.5–5)
PRO-BNP: 2178 PG/ML (ref 0–125)
SEDIMENTATION RATE, ERYTHROCYTE: 25 MM/HR (ref 0–20)
SODIUM BLD-SCNC: 141 MMOL/L (ref 132–146)

## 2020-03-19 PROCEDURE — 74175 CTA ABDOMEN W/CONTRAST: CPT

## 2020-03-19 PROCEDURE — 93016 CV STRESS TEST SUPVJ ONLY: CPT | Performed by: INTERNAL MEDICINE

## 2020-03-19 PROCEDURE — 99232 SBSQ HOSP IP/OBS MODERATE 35: CPT | Performed by: FAMILY MEDICINE

## 2020-03-19 PROCEDURE — 6370000000 HC RX 637 (ALT 250 FOR IP): Performed by: NURSE PRACTITIONER

## 2020-03-19 PROCEDURE — 6360000002 HC RX W HCPCS: Performed by: NURSE PRACTITIONER

## 2020-03-19 PROCEDURE — 99233 SBSQ HOSP IP/OBS HIGH 50: CPT | Performed by: NURSE PRACTITIONER

## 2020-03-19 PROCEDURE — 93018 CV STRESS TEST I&R ONLY: CPT | Performed by: INTERNAL MEDICINE

## 2020-03-19 PROCEDURE — 85651 RBC SED RATE NONAUTOMATED: CPT

## 2020-03-19 PROCEDURE — 6360000004 HC RX CONTRAST MEDICATION: Performed by: RADIOLOGY

## 2020-03-19 PROCEDURE — 36415 COLL VENOUS BLD VENIPUNCTURE: CPT

## 2020-03-19 PROCEDURE — 86140 C-REACTIVE PROTEIN: CPT

## 2020-03-19 PROCEDURE — 2700000000 HC OXYGEN THERAPY PER DAY

## 2020-03-19 PROCEDURE — 2580000003 HC RX 258: Performed by: STUDENT IN AN ORGANIZED HEALTH CARE EDUCATION/TRAINING PROGRAM

## 2020-03-19 PROCEDURE — 83735 ASSAY OF MAGNESIUM: CPT

## 2020-03-19 PROCEDURE — 83880 ASSAY OF NATRIURETIC PEPTIDE: CPT

## 2020-03-19 PROCEDURE — 2580000003 HC RX 258: Performed by: NURSE PRACTITIONER

## 2020-03-19 PROCEDURE — 2060000000 HC ICU INTERMEDIATE R&B

## 2020-03-19 PROCEDURE — 93017 CV STRESS TEST TRACING ONLY: CPT

## 2020-03-19 PROCEDURE — A9500 TC99M SESTAMIBI: HCPCS | Performed by: RADIOLOGY

## 2020-03-19 PROCEDURE — 80048 BASIC METABOLIC PNL TOTAL CA: CPT

## 2020-03-19 PROCEDURE — 3430000000 HC RX DIAGNOSTIC RADIOPHARMACEUTICAL: Performed by: RADIOLOGY

## 2020-03-19 PROCEDURE — 78452 HT MUSCLE IMAGE SPECT MULT: CPT

## 2020-03-19 PROCEDURE — 6370000000 HC RX 637 (ALT 250 FOR IP): Performed by: STUDENT IN AN ORGANIZED HEALTH CARE EDUCATION/TRAINING PROGRAM

## 2020-03-19 RX ORDER — SODIUM CHLORIDE 0.9 % (FLUSH) 0.9 %
10 SYRINGE (ML) INJECTION
Status: ACTIVE | OUTPATIENT
Start: 2020-03-19 | End: 2020-03-19

## 2020-03-19 RX ORDER — BUMETANIDE 1 MG/1
2 TABLET ORAL DAILY
Status: DISCONTINUED | OUTPATIENT
Start: 2020-03-19 | End: 2020-03-20 | Stop reason: HOSPADM

## 2020-03-19 RX ORDER — BUMETANIDE 0.25 MG/ML
2 INJECTION, SOLUTION INTRAMUSCULAR; INTRAVENOUS DAILY
Status: DISCONTINUED | OUTPATIENT
Start: 2020-03-20 | End: 2020-03-19

## 2020-03-19 RX ADMIN — BUDESONIDE AND FORMOTEROL FUMARATE DIHYDRATE 2 PUFF: 160; 4.5 AEROSOL RESPIRATORY (INHALATION) at 12:07

## 2020-03-19 RX ADMIN — SACUBITRIL AND VALSARTAN 1 TABLET: 97; 103 TABLET, FILM COATED ORAL at 20:34

## 2020-03-19 RX ADMIN — MONTELUKAST SODIUM 10 MG: 10 TABLET ORAL at 20:34

## 2020-03-19 RX ADMIN — SODIUM CHLORIDE, PRESERVATIVE FREE 10 ML: 5 INJECTION INTRAVENOUS at 20:35

## 2020-03-19 RX ADMIN — ATORVASTATIN CALCIUM 40 MG: 40 TABLET, FILM COATED ORAL at 12:05

## 2020-03-19 RX ADMIN — Medication 35 MILLICURIE: at 09:44

## 2020-03-19 RX ADMIN — ASPIRIN 81 MG 81 MG: 81 TABLET ORAL at 12:06

## 2020-03-19 RX ADMIN — IOPAMIDOL 100 ML: 755 INJECTION, SOLUTION INTRAVENOUS at 11:26

## 2020-03-19 RX ADMIN — Medication 12 MILLICURIE: at 08:02

## 2020-03-19 RX ADMIN — SACUBITRIL AND VALSARTAN 1 TABLET: 97; 103 TABLET, FILM COATED ORAL at 12:05

## 2020-03-19 RX ADMIN — SODIUM CHLORIDE 125 MG: 9 INJECTION, SOLUTION INTRAVENOUS at 12:09

## 2020-03-19 RX ADMIN — BUMETANIDE 2 MG: 1 TABLET ORAL at 12:38

## 2020-03-19 RX ADMIN — REGADENOSON 0.4 MG: 0.08 INJECTION, SOLUTION INTRAVENOUS at 09:45

## 2020-03-19 RX ADMIN — SODIUM CHLORIDE, PRESERVATIVE FREE 10 ML: 5 INJECTION INTRAVENOUS at 12:10

## 2020-03-19 RX ADMIN — SPIRONOLACTONE 25 MG: 25 TABLET ORAL at 12:05

## 2020-03-19 RX ADMIN — METOPROLOL SUCCINATE 75 MG: 50 TABLET, EXTENDED RELEASE ORAL at 12:06

## 2020-03-19 ASSESSMENT — PAIN SCALES - GENERAL
PAINLEVEL_OUTOF10: 0

## 2020-03-19 NOTE — PROGRESS NOTES
Subcutaneous Daily [place] Flak[place], DO        budesonide-formoterol (SYMBICORT) 160-4.5 MCG/ACT inhaler 2 puff  2 puff Inhalation BID [place], DO   2 puff at 03/19/20 1207    spironolactone (ALDACTONE) tablet 25 mg  25 mg Oral Daily Fide VICENTE Calero, DO   25 mg at 03/19/20 1205    levalbuterol (XOPENEX) nebulizer solution 2.5 mg  2.5 mg Nebulization Once Ted Vo MD        perflutren lipid microspheres (DEFINITY) injection 1.65 mg  1.5 mL Intravenous ONCE PRN [place], DO        sodium chloride flush 0.9 % injection 10 mL  10 mL Intravenous Once Sukhdev Rodríguez II, MD           Objective:   BP (!) 95/55   Pulse 73   Temp 97.6 °F (36.4 °C) (Temporal)   Resp 16   Ht 5' 4\" (1.626 m)   Wt 104 lb 3.2 oz (47.3 kg)   LMP 05/12/2014 (Approximate)   SpO2 93%   BMI 17.89 kg/m²   No intake or output data in the 24 hours ending 03/19/20 1607    Exam:  WD thin female in no acute distress  Skin: warm, dry, without rash  HEENT: PERRL, face symmetric, normal moist oral mucosa. No neck mass, adenopathy, or thyromegaly  Chest: symmetric with decreased air entry on right - lower 1/3.  decreased resonance to percussion on right, lower 1/3  Lungs: without rales, rhonchi, or wheezes  Cardiac:  S1, S2 regular. No murmur or aundrea  Abdomen: scaphoid, soft, non-tender, active bowel sounds. Extremities: No clubbing, cyanosis, or edema  Neuro: CN II - XII grossly intact. Alert, moves all extremities symmetrically.  Oriented to person, time place  Psych:  Normal affect    CBC with Differential:    Lab Results   Component Value Date    WBC 7.4 03/16/2020    RBC 4.67 03/16/2020    HGB 12.2 03/16/2020    HCT 41.4 03/16/2020     03/16/2020    MCV 88.7 03/16/2020    MCH 26.1 03/16/2020    MCHC 29.5 03/16/2020    RDW 15.7 03/16/2020    SEGSPCT 72 03/20/2014    LYMPHOPCT 16.0 03/16/2020    MONOPCT 11.3 03/16/2020    EOSPCT 0 10/20/2010    BASOPCT 1.1 03/16/2020    MONOSABS 0.83 03/16/2020    LYMPHSABS 1.18 03/16/2020 pleural a few   . XR CHEST STANDARD (2 VW)   Final Result   Similar-appearing volume of right pleural effusion with slightly   increasing right basilar atelectasis. CTA PULMONARY W CONTRAST   Final Result   1. No indication for an acute pulmonary emboli. 2. Presence of cardiomegaly with predominant enlargement for the left   ventricle, left atrial and right atrium. 3. Signs for right side failure with significant reflux of the IV   contrast into the hepatic venous system. Also there are signs for   left-sided heart failure. 4. Presence of moderate to large size right-sided pleural effusion   causing compression of the right lower lobe particular the right lower   lobe and right midlobe. 5. Centrilobular emphysema in the upper lobes bilaterally. 6. No compression atelectasis in the right lung is difficult to   exclude underlying pneumonia. CT CHEST WO CONTRAST   Final Result   There is a large right-sided water density pleural effusion with   associated compressive atelectasis, likely accounting for the   respiratory distress. Central peribronchial cuffing may be a   manifestation of airway inflammation or viral infection. There is very prominent cardiomegaly with a ventral subcutaneous   defibrillator on the left, and very subtle hazy density throughout the   lungs may be very mild interstitial edema. XR CHEST PORTABLE   Final Result   Cardiomegaly   Findings compatible with atherosclerotic disease of the aorta. The chest appears to be worse in the interval                      Impression:  1. Pleural effusion most likely due to CHF - clinically improved with diuretics  2. HFrEF  3.  Mild hypoxemic respiratory failure - resolved    Principal Problem:    Heart failure, left systolic, acute on chronic (HCC)  Active Problems:    Hyperlipidemia    CVA (cerebral vascular accident)    COPD (chronic obstructive pulmonary disease) ([address] History of MI (myocardial infarction)    Essential hypertension    ICD (implantable cardioverter-defibrillator) in place    Acute respiratory failure with hypoxia ([address]  Resolved Problems:    * No resolved hospital problems. *      Plans:   1. Dirueses and heart failure management per cardiology  2. Oxygen SpO2 90 to 96%  3. No thoracentesis at this time but would do outpatient in 2 to 3 weeks if she does not show improvement or gets worse.     Nellie Veronica MD, FACP, FCCP

## 2020-03-19 NOTE — PROGRESS NOTES
Attending Physician Statement  I have reviewed the chart, including any radiology or labs, and seen the patient with the resident(s). I personally reviewed and performed key elements of the history and exam.  I agree with the assessment, plan and orders as documented by the resident. Please refer to the resident note for additional information. Carmine Lanes. [place]

## 2020-03-19 NOTE — PROGRESS NOTES
Patients spo2 level on RA for an hour was 95%. Patients spo2 while walking around 300ft, was 93% on RA. Patient did not report SOB.

## 2020-03-19 NOTE — CARE COORDINATION
3/19/2020 Care Coordination: Spoke with the patient at bedside. Plan remains home at discharge. Family will provide transportation at discharge. Will follow.  Henry Rodriguez RN CM

## 2020-03-19 NOTE — PROGRESS NOTES
4, no lower extremity edema  Neurologic: No focal motor deficits apparent, normal mood and affect  Peripheral Pulses: Intact posterior tibial pulses bilaterally        Laboratory Tests:    Lab Results   Component Value Date    CREATININE 0.7 03/19/2020    BUN 9 03/19/2020     03/19/2020    K 3.9 03/19/2020    CL 97 (L) 03/19/2020    CO2 32 (H) 03/19/2020     Lab Results   Component Value Date    MG 1.8 03/19/2020     Lab Results   Component Value Date    WBC 7.4 03/16/2020    HGB 12.2 03/16/2020    HCT 41.4 03/16/2020    MCV 88.7 03/16/2020     (H) 03/16/2020     Lab Results   Component Value Date    CKTOTAL 58 04/19/2014    CKMB 0.9 04/19/2014    TROPONINI <0.01 03/17/2020     Lab Results   Component Value Date    INR 1.2 08/26/2018    INR 1.2 04/04/2018    INR 1.2 08/09/2016    PROTIME 13.3 (H) 08/26/2018    PROTIME 13.5 (H) 04/04/2018    PROTIME 13.2 (H) 08/09/2016     Lab Results   Component Value Date    ALT 8 03/18/2020    AST 12 03/18/2020    ALKPHOS 105 (H) 03/18/2020    BILITOT 0.3 03/18/2020     Lab Results   Component Value Date    TSH 2.860 01/14/2020     Lab Results   Component Value Date    LABA1C 5.5 08/28/2018     Lab Results   Component Value Date    CHOL 216 (H) 01/15/2020    CHOL 192 08/28/2018    CHOL 246 (H) 06/24/2017     Lab Results   Component Value Date    TRIG 92 01/15/2020    TRIG 76 08/28/2018    TRIG 51 06/24/2017     Lab Results   Component Value Date    HDL 50 01/15/2020    HDL 50 08/28/2018    HDL 44 04/06/2018     Lab Results   Component Value Date    LDLCALC 148 (H) 01/15/2020    LDLCALC 127 (H) 08/28/2018    LDLCALC 121 (H) 04/06/2018     Lab Results   Component Value Date    LABVLDL 18 01/15/2020    LABVLDL 15 08/28/2018    LABVLDL 15 04/06/2018       Current Medications:  Current Facility-Administered Medications   Medication Dose Route Frequency Provider Last Rate Last Dose    technetium sestamibi (CARDIOLITE) injection 35 millicurie  35 millicurie Intravenous ONCE PRN Tashia Cao MD        ferric gluconate (FERRLECIT) 125 mg in sodium chloride 0.9 % 100 mL IVPB  125 mg Intravenous Once GA Frye CNP        regadenoson [facility]) injection 0.4 mg  0.4 mg Intravenous ONCE PRN GA Frye CNP        metoprolol succinate (TOPROL XL) extended release tablet 75 mg  75 mg Oral BID GA Frye CNP   75 mg at 03/18/20 2104    levalbuterol (Artist Bough) nebulizer solution 1.25 mg  1.25 mg Nebulization Q8H PRN Juan Soares MD   1.25 mg at 03/18/20 1650    bumetanide (BUMEX) injection 2 mg  2 mg Intravenous BID Juan J Melendez MD   2 mg at 03/18/20 2104    aspirin chewable tablet 81 mg  81 mg Oral Daily Fide A Rech, DO   81 mg at 03/18/20 1119    atorvastatin (LIPITOR) tablet 40 mg  40 mg Oral Daily Fide A Rech, DO   40 mg at 03/18/20 1119    montelukast (SINGULAIR) tablet 10 mg  10 mg Oral Nightly Fide A Rech, DO   10 mg at 03/18/20 2104    sacubitril-valsartan (ENTRESTO)  MG per tablet 1 tablet  1 tablet Oral BID Brandt Dearth, DO   1 tablet at 03/18/20 2104    sodium chloride flush 0.9 % injection 10 mL  10 mL Intravenous 2 times per day Brandt Dearth, DO   10 mL at 03/18/20 2105    sodium chloride flush 0.9 % injection 10 mL  10 mL Intravenous PRN Brandt Dearth, DO        acetaminophen (TYLENOL) tablet 650 mg  650 mg Oral Q6H PRN Brandt Dearth, DO   650 mg at 03/17/20 1710    Or    acetaminophen (TYLENOL) suppository 650 mg  650 mg Rectal Q6H PRN Brandt Dearth, DO        polyethylene glycol (GLYCOLAX) packet 17 g  17 g Oral Daily PRN Brandt Dearth, DO        enoxaparin (LOVENOX) injection 40 mg  40 mg Subcutaneous Daily Fide A Rech, DO        budesonide-formoterol (SYMBICORT) 160-4.5 MCG/ACT inhaler 2 puff  2 puff Inhalation BID Brandt Cooper DO   2 puff at 03/18/20 7569    [Held by provider] clopidogrel (PLAVIX) tablet 75 mg  75 mg Oral Daily Fide Calero DO   75 mg at 03/17/20 0847    spironolactone (ALDACTONE) tablet 25 mg  25 mg Oral Daily Fide Calero, DO   25 mg at 03/18/20 1118    levalbuterol (XOPENEX) nebulizer solution 2.5 mg  2.5 mg Nebulization Once Teresa Oneal MD        perflutren lipid microspheres (DEFINITY) injection 1.65 mg  1.5 mL Intravenous ONCE PRN Brandt Cooper DO        sodium chloride flush 0.9 % injection 10 mL  10 mL Intravenous Once Daily Joyner MD             IMAGING:      CXR: bilateral pulmonary vascular congestion, cardiomegaly, osseous structures are intact    CTA chest:  There is a large right-sided water density pleural effusion with   associated compressive atelectasis, likely accounting for the   respiratory distress. Central peribronchial cuffing may be a   manifestation of airway inflammation or viral infection.       There is very prominent cardiomegaly with a ventral subcutaneous   defibrillator on the left, and very subtle hazy density throughout the   lungs may be very mild interstitial edema. 3/16/2020 CTA PE:  1. No indication for an acute pulmonary emboli.        2. Presence of cardiomegaly with predominant enlargement for the left   ventricle, left atrial and right atrium.       3. Signs for right side failure with significant reflux of the IV   contrast into the hepatic venous system. Also there are signs for   left-sided heart failure.       4. Presence of moderate to large size right-sided pleural effusion   causing compression of the right lower lobe particular the right lower   lobe and right midlobe.       5. Centrilobular emphysema in the upper lobes bilaterally.       6. No compression atelectasis in the right lung is difficult to   exclude underlying pneumonia. Abdominal US (3/18/2020)  Impression  Right lower the liver there is a lesion seen in a would  recommend triple phase CT of the liver for further workup. The lesion  measures 2.2 x 2.76 x 2.52 cm. Small to moderate right-sided pleural a few  .     CARDIAC TESTING:      3/2018 [facility]:  Findings:  Left main: 0%  stenosis  LAD: 0. %  stenosis  Circumflex: 0. %   stenosis  RCA: Dominant.  0. %  stenosis       ECG: nsr, PVCs, LAE, lahb, nonspecific T wave changes    TTE (3/18/2020, Dr. Lesly Contreras)   Summary   Left ventricle is moderate-severely dilated. LVEDD 6.8 cm. LV systolic function is severely reduced. Ejection fraction is visually estimated at 20-25%. Abnormal diastolic function, indeterminate grade. There is severe global hypokinesis. Normal left ventricular wall thickness. Abnormal LV septal motion consistent with conduction disorder. Mildly dilated right ventricle. Right ventricle global systolic function is mildly reduced. The left atrium is massively dilated. Severe eccentric mitral regurgitation, directed posteriorly. Mild tricuspid regurgitation. Mild pulmonic regurgitation. ASSESSMENT:  1. Acute on chronic hfref  -hypervolemic  -nyha fc IIIb  -warm  -cognitively intact  -good pulse pressure difference      2. Nonischemic CM  -last TTE August 2018  -awaiting repeat TTE  -presumed burned out hypertensive + viral      3. Systemic hypertension, controlled      4. Poor medical literacy  -complicated by medication non adherence      5. Subcutaneous ICD      6. R pleural effusion  -versus PNA  -negative viral panels  -no sputum production, afebrile  -hopeful effusion improves with IV diuretics      7. Iron Deficiency  -ferrlecit protocol: Class IIb recommendation for intravenous iron replacement in patients with NYHA class II and III HF and iron deficiency (ferritin <100 ng/ml or 100-300 ng/ml if transferrin saturation <20%), to improve functional status and QoL. PLAN:  1. For NM stress today  2. Decrease bumex   3. Continue Entresto, spironolactone and toprol. 4. Continue to titrate GDMT as able  5. Monitor daily weights, I/O, BMP and BNP  6. Unsure why she is on plavix (CVA was ruled out and she has only  of small OM1). Discontinued yesterday. 7. Appreciate pulmonary recommendations  8. Sodium restrictions  9. HF nurse navigator, meds to beds, close FU with all physicians involved, ie once a month to ensure med prescriptions  10. Tobacco cessation  11. Gait team      Above case discussed with Dr. Tonia Pena    Thank you for allowing us to participate in the care of this patient. We will follow as medical course develops. Do not hesitate to contact us with further questions. Ollie KOROMA-TD  Heart Failure and Pulmonary Hypertension  [facility].

## 2020-03-20 VITALS
SYSTOLIC BLOOD PRESSURE: 94 MMHG | HEIGHT: 64 IN | DIASTOLIC BLOOD PRESSURE: 62 MMHG | BODY MASS INDEX: 17 KG/M2 | RESPIRATION RATE: 20 BRPM | HEART RATE: 69 BPM | TEMPERATURE: 98.1 F | WEIGHT: 99.6 LBS | OXYGEN SATURATION: 93 %

## 2020-03-20 PROBLEM — J96.01 ACUTE RESPIRATORY FAILURE WITH HYPOXIA (HCC): Status: RESOLVED | Noted: 2020-03-16 | Resolved: 2020-03-20

## 2020-03-20 LAB
ANION GAP SERPL CALCULATED.3IONS-SCNC: 11 MMOL/L (ref 7–16)
BUN BLDV-MCNC: 11 MG/DL (ref 6–20)
CALCIUM SERPL-MCNC: 9.1 MG/DL (ref 8.6–10.2)
CHLORIDE BLD-SCNC: 95 MMOL/L (ref 98–107)
CO2: 32 MMOL/L (ref 22–29)
CREAT SERPL-MCNC: 0.8 MG/DL (ref 0.5–1)
EKG ATRIAL RATE: 75 BPM
EKG P AXIS: 60 DEGREES
EKG P-R INTERVAL: 196 MS
EKG Q-T INTERVAL: 442 MS
EKG QRS DURATION: 104 MS
EKG QTC CALCULATION (BAZETT): 493 MS
EKG R AXIS: -84 DEGREES
EKG T AXIS: -2 DEGREES
EKG VENTRICULAR RATE: 75 BPM
GFR AFRICAN AMERICAN: >60
GFR NON-AFRICAN AMERICAN: >60 ML/MIN/1.73
GLUCOSE BLD-MCNC: 115 MG/DL (ref 74–99)
MAGNESIUM: 1.9 MG/DL (ref 1.6–2.6)
POTASSIUM SERPL-SCNC: 3.9 MMOL/L (ref 3.5–5)
SODIUM BLD-SCNC: 138 MMOL/L (ref 132–146)

## 2020-03-20 PROCEDURE — 99239 HOSP IP/OBS DSCHRG MGMT >30: CPT | Performed by: FAMILY MEDICINE

## 2020-03-20 PROCEDURE — 6370000000 HC RX 637 (ALT 250 FOR IP): Performed by: STUDENT IN AN ORGANIZED HEALTH CARE EDUCATION/TRAINING PROGRAM

## 2020-03-20 PROCEDURE — 2580000003 HC RX 258: Performed by: STUDENT IN AN ORGANIZED HEALTH CARE EDUCATION/TRAINING PROGRAM

## 2020-03-20 PROCEDURE — 36415 COLL VENOUS BLD VENIPUNCTURE: CPT

## 2020-03-20 PROCEDURE — 80048 BASIC METABOLIC PNL TOTAL CA: CPT

## 2020-03-20 PROCEDURE — 93010 ELECTROCARDIOGRAM REPORT: CPT | Performed by: FAMILY MEDICINE

## 2020-03-20 PROCEDURE — 83735 ASSAY OF MAGNESIUM: CPT

## 2020-03-20 RX ORDER — SPIRONOLACTONE 25 MG/1
25 TABLET ORAL DAILY
Qty: 30 TABLET | Refills: 1 | Status: SHIPPED | OUTPATIENT
Start: 2020-03-20 | End: 2020-03-23 | Stop reason: SDUPTHER

## 2020-03-20 RX ORDER — BUMETANIDE 2 MG/1
2 TABLET ORAL DAILY
Qty: 30 TABLET | Refills: 3 | Status: SHIPPED | OUTPATIENT
Start: 2020-03-21 | End: 2020-03-23 | Stop reason: SDUPTHER

## 2020-03-20 RX ORDER — METOPROLOL SUCCINATE 50 MG/1
50 TABLET, EXTENDED RELEASE ORAL DAILY
Qty: 60 TABLET | Refills: 1 | Status: SHIPPED | OUTPATIENT
Start: 2020-03-20 | End: 2020-03-23

## 2020-03-20 RX ADMIN — ATORVASTATIN CALCIUM 40 MG: 40 TABLET, FILM COATED ORAL at 10:26

## 2020-03-20 RX ADMIN — SPIRONOLACTONE 25 MG: 25 TABLET ORAL at 10:26

## 2020-03-20 RX ADMIN — BUDESONIDE AND FORMOTEROL FUMARATE DIHYDRATE 2 PUFF: 160; 4.5 AEROSOL RESPIRATORY (INHALATION) at 10:27

## 2020-03-20 RX ADMIN — SACUBITRIL AND VALSARTAN 1 TABLET: 97; 103 TABLET, FILM COATED ORAL at 10:26

## 2020-03-20 RX ADMIN — SODIUM CHLORIDE, PRESERVATIVE FREE 10 ML: 5 INJECTION INTRAVENOUS at 10:26

## 2020-03-20 RX ADMIN — ASPIRIN 81 MG 81 MG: 81 TABLET ORAL at 10:26

## 2020-03-20 RX ADMIN — BUMETANIDE 2 MG: 1 TABLET ORAL at 10:26

## 2020-03-20 ASSESSMENT — PAIN SCALES - GENERAL: PAINLEVEL_OUTOF10: 0

## 2020-03-20 NOTE — CARE COORDINATION
3/20/2020 Care Coordination: spoke with patient. Pt states she is being discharged home today. Family will provide transportation home. States her brother will be picking her up and will take her to the pharmacy and help her pay for her medications.   Julius Williamson RN CM

## 2020-03-20 NOTE — PLAN OF CARE
Problem: OXYGENATION/RESPIRATORY FUNCTION  Goal: Patient will maintain patent airway  Outcome: Met This Shift  Goal: Patient will achieve/maintain normal respiratory rate/effort  Description: Respiratory rate and effort will be within normal limits for the patient  Outcome: Met This Shift     Problem: FLUID AND ELECTROLYTE IMBALANCE  Goal: Fluid and electrolyte balance are achieved/maintained  Outcome: Met This Shift

## 2020-03-20 NOTE — PROGRESS NOTES
auscultation B/L, air flow heard in all lobes  HEART: RRR, S1 and S2 normal, systolic murmur, rub or gallop. GENITOURINARY: Urinary cath not present   EXTREMITIES:  Extremities normal, atraumatic, no cyanosis or edema. SKIN: Skin color, texture, turgor normal, no rashes or lesions  MUSCULOSKELETAL: No joint swelling, no muscle tenderness. Normal ROM in extremities. NEUROLOGIC: Alert & Oriented      Labs:  Na/K/Cl/CO2:  138/3.9/95/32 (03/20 8748)  BUN/Cr/glu/ALT/AST/amyl/lip:  11/0.8/--/--/--/--/-- (03/20 9412)     CrCl cannot be calculated (Unknown ideal weight.). Other pertinent labs as noted below    Radiology:  CTA TRIPHASIC LIVER   Final Result   Stable liver lesions from November 3, 2017 are consistent with   hemangiomas, the largest lesion in the anterior right lobe of the   liver having actually diminished in size slightly, now measuring 2.2   cm maximum diameter. Very prominent cardiomegaly with increased right heart pressure   showing reflux into the hepatic veins is documented. There is a right-sided pleural effusion with atelectasis of the right   lower lung. There is diffuse fatty change of the liver. Small cortical cysts of   the left kidney are incidentally noted. NM Cardiac Stress Test Nuclear Imaging   Final Result   1. No reversible perfusion defect   2. Ejection fraction is 20 %. 3. Dilated LV. Severe global hypokinesis. XR CHEST STANDARD (2 VW)   Final Result   Right pleural effusion and atelectasis does not appear clearly   changed. Cardiomegaly. US ABDOMEN LIMITED Specify organ? LIVER, GALLBLADDER   Final Result   Right lower the liver there is a lesion seen in a would   recommend triple phase CT of the liver for further workup. The lesion   measures 2.2 x 2.76 x 2.52 cm. Small to moderate right-sided pleural a few   .                               XR CHEST STANDARD (2 VW)   Final Result   Similar-appearing volume of right pleural effusion with slightly   increasing right basilar atelectasis. CTA PULMONARY W CONTRAST   Final Result   1. No indication for an acute pulmonary emboli. 2. Presence of cardiomegaly with predominant enlargement for the left   ventricle, left atrial and right atrium. 3. Signs for right side failure with significant reflux of the IV   contrast into the hepatic venous system. Also there are signs for   left-sided heart failure. 4. Presence of moderate to large size right-sided pleural effusion   causing compression of the right lower lobe particular the right lower   lobe and right midlobe. 5. Centrilobular emphysema in the upper lobes bilaterally. 6. No compression atelectasis in the right lung is difficult to   exclude underlying pneumonia. CT CHEST WO CONTRAST   Final Result   There is a large right-sided water density pleural effusion with   associated compressive atelectasis, likely accounting for the   respiratory distress. Central peribronchial cuffing may be a   manifestation of airway inflammation or viral infection. There is very prominent cardiomegaly with a ventral subcutaneous   defibrillator on the left, and very subtle hazy density throughout the   lungs may be very mild interstitial edema. XR CHEST PORTABLE   Final Result   Cardiomegaly   Findings compatible with atherosclerotic disease of the aorta. The chest appears to be worse in the interval                      A/P:  Principal Problem:    Heart failure, left systolic, acute on chronic (HCC)  Active Problems:    Hyperlipidemia    CVA (cerebral vascular accident)    COPD (chronic obstructive pulmonary disease) ([facility])    History of MI (myocardial infarction)    Essential hypertension    ICD (implantable cardioverter-defibrillator) in place    Acute respiratory failure with hypoxia ([address]  Resolved Problems:    * No resolved hospital problems.  *    Acute Respiratory failure with hypoxia 2/2 Acute on Chronic largest lesion having decreased slightly since 2017; diffuse fatty change of the liver     COPD/Asthma  -continue home singulair, symbicort, albeterol     HTN/CAD s/p ICD/HLD/Hx CVA  -home metoprolol increased from 50 mg BID to 75 mg BID 3/18--held 3/20  -continue home lipitor  -continue home ASA, plavix discontinued 3/18    GI/DVT ppx: lovenox, plavix/GI prophylaxis not indicated  Diet: low sodium  Pain meds: tylenol  Antibiotics: none  Consults: cardiology, pulm, obgyn  PT/OT Evaluation:   Social work: following  Disposition: possible discharge      Electronically signed by Yuriy Craig DO PGY-1 on 3/20/2020 at 6:56 AM  This case was discussed with attending physician: Dr. Giana Feliciano

## 2020-03-20 NOTE — PROGRESS NOTES
[facility]  Family Medicine Attending    S: 48 y.o. female with PMHx of HFrEF, COPD, asthma, HTN, hx MI, CAD s/p ICD who presents with worsening SOB x 2 days. She reports her breathing was so bad that she required her mothers oxygen. She also reports developing pain over her lateral right breast. She denies any swelling in legs. Does endorse orthopnea. Patient was found to have pleural effusion on right. Patient seen and examined today. No further chest pain today. Is feeling well. Would like to be discharged. O: VS- Blood pressure 94/62, pulse 69, temperature 98.1 °F (36.7 °C), temperature source Oral, resp. rate 20, height 5' 4\" (1.626 m), weight 99 lb 9.6 oz (45.2 kg), last menstrual period 05/12/2014, SpO2 93 %, not currently breastfeeding. Exam is as noted by resident with the following changes, additions or corrections:  Gen - lying in bed, NAD  Cardio - RRR, no murmur   Lungs - decreased breath sounds in the right lower lobe otherwise clear   Ext- no peripheral edema     Impressions:   Principal Problem:    Heart failure, left systolic, acute on chronic (HCC)  Active Problems:    Hyperlipidemia    CVA (cerebral vascular accident)    COPD (chronic obstructive pulmonary disease) ([facility])    History of MI (myocardial infarction)    Essential hypertension    ICD (implantable cardioverter-defibrillator) in place  Resolved Problems:    Acute respiratory failure with hypoxia ([facility] [address]      Plan:   Acute hypoxic respiratory failure most likely from acute on chronic HFrEF. Will diurese. Echo showed EF 20-25%, global hypokinesis, severe eccentric MR directed posteriorly. Stress test negative for reversible defect. Triphasic CT showed hemangioma. Transitioning to PO diuretics- continues to do well. D/c today. Follow labs. Strict I/Os. Appreciate pulm and cardio recs. Continue inhalers . Will need outpatient follow up with cards and pulm. Decrease metoprolol to 50mg bid due to hypotension.

## 2020-03-21 ENCOUNTER — CARE COORDINATION (OUTPATIENT)
Dept: CASE MANAGEMENT | Age: 55
End: 2020-03-21

## 2020-03-21 NOTE — CARE COORDINATION
Patient contacted regarding recent discharge and COVID-19 risk   Care Transition Nurse/ Ambulatory Care Manager lm requesting call back.

## 2020-03-22 NOTE — DISCHARGE SUMMARY
for the left   ventricle, left atrial and right atrium. 3. Signs for right side failure with significant reflux of the IV   contrast into the hepatic venous system. Also there are signs for   left-sided heart failure. 4. Presence of moderate to large size right-sided pleural effusion   causing compression of the right lower lobe particular the right lower   lobe and right midlobe. 5. Centrilobular emphysema in the upper lobes bilaterally. 6. No compression atelectasis in the right lung is difficult to   exclude underlying pneumonia. CT CHEST WO CONTRAST   Final Result   There is a large right-sided water density pleural effusion with   associated compressive atelectasis, likely accounting for the   respiratory distress. Central peribronchial cuffing may be a   manifestation of airway inflammation or viral infection. There is very prominent cardiomegaly with a ventral subcutaneous   defibrillator on the left, and very subtle hazy density throughout the   lungs may be very mild interstitial edema. XR CHEST PORTABLE   Final Result   Cardiomegaly   Findings compatible with atherosclerotic disease of the aorta. The chest appears to be worse in the interval                        Treatments:   cardiac meds: metoprolol, diltiazem, furosemide and bumex and respiratory therapy: O2 and albuterol/atropine nebulizer    Discharge Exam:  GENERAL: Alert, cooperative, no acute distress. HEENT: Normocephalic, atraumatic. NECK: Symmetrical, trachea midline,. CHEST: No tenderness or deformity, full & symmetric excursion  LUNG: Clear to auscultation B/L, air flow heard in all lobes  HEART: RRR, S1 and S2 normal, systolic murmur, rub or gallop. GENITOURINARY: Urinary cath not present   EXTREMITIES:  Extremities normal, atraumatic, no cyanosis or edema. SKIN: Skin color, texture, turgor normal, no rashes or lesions  MUSCULOSKELETAL: No joint swelling, no muscle tenderness.  Normal ROM in

## 2020-03-23 ENCOUNTER — CARE COORDINATION (OUTPATIENT)
Dept: CASE MANAGEMENT | Age: 55
End: 2020-03-23

## 2020-03-23 RX ORDER — METOPROLOL SUCCINATE 50 MG/1
50 TABLET, EXTENDED RELEASE ORAL DAILY
Qty: 30 TABLET | Refills: 3 | Status: SHIPPED
Start: 2020-03-23 | End: 2020-04-15

## 2020-03-23 RX ORDER — BUDESONIDE AND FORMOTEROL FUMARATE DIHYDRATE 160; 4.5 UG/1; UG/1
2 AEROSOL RESPIRATORY (INHALATION) 2 TIMES DAILY
Qty: 1 INHALER | Refills: 0 | Status: SHIPPED
Start: 2020-03-23 | End: 2020-12-04 | Stop reason: SDUPTHER

## 2020-03-23 RX ORDER — BUMETANIDE 2 MG/1
2 TABLET ORAL DAILY
Qty: 30 TABLET | Refills: 3 | Status: SHIPPED
Start: 2020-03-23 | End: 2020-09-04 | Stop reason: SDUPTHER

## 2020-03-23 RX ORDER — ASPIRIN 81 MG/1
81 TABLET, CHEWABLE ORAL DAILY
Qty: 30 TABLET | Refills: 0 | Status: SHIPPED
Start: 2020-03-23 | End: 2020-09-08 | Stop reason: SDUPTHER

## 2020-03-23 RX ORDER — SPIRONOLACTONE 25 MG/1
25 TABLET ORAL DAILY
Qty: 30 TABLET | Refills: 1 | Status: SHIPPED
Start: 2020-03-23 | End: 2020-09-04 | Stop reason: SDUPTHER

## 2020-03-23 RX ORDER — SACUBITRIL AND VALSARTAN 97; 103 MG/1; MG/1
1 TABLET, FILM COATED ORAL 2 TIMES DAILY
Qty: 60 TABLET | Refills: 1 | Status: SHIPPED
Start: 2020-03-23 | End: 2020-09-04 | Stop reason: SDUPTHER

## 2020-03-23 RX ORDER — ATORVASTATIN CALCIUM 40 MG/1
40 TABLET, FILM COATED ORAL DAILY
Qty: 30 TABLET | Refills: 1 | Status: SHIPPED
Start: 2020-03-23 | End: 2020-09-08 | Stop reason: SDUPTHER

## 2020-03-23 RX ORDER — MONTELUKAST SODIUM 10 MG/1
10 TABLET ORAL NIGHTLY
Qty: 30 TABLET | Refills: 1 | Status: SHIPPED
Start: 2020-03-23 | End: 2020-12-04 | Stop reason: SDUPTHER

## 2020-03-30 ENCOUNTER — TELEPHONE (OUTPATIENT)
Dept: CARDIOLOGY CLINIC | Age: 55
End: 2020-03-30

## 2020-04-03 ENCOUNTER — CARE COORDINATION (OUTPATIENT)
Dept: CASE MANAGEMENT | Age: 55
End: 2020-04-03

## 2020-04-15 ENCOUNTER — VIRTUAL VISIT (OUTPATIENT)
Dept: CARDIOLOGY CLINIC | Age: 55
End: 2020-04-15
Payer: COMMERCIAL

## 2020-04-15 PROCEDURE — 3017F COLORECTAL CA SCREEN DOC REV: CPT | Performed by: INTERNAL MEDICINE

## 2020-04-15 PROCEDURE — G8427 DOCREV CUR MEDS BY ELIG CLIN: HCPCS | Performed by: INTERNAL MEDICINE

## 2020-04-15 PROCEDURE — 99213 OFFICE O/P EST LOW 20 MIN: CPT | Performed by: INTERNAL MEDICINE

## 2020-04-15 PROCEDURE — 1036F TOBACCO NON-USER: CPT | Performed by: INTERNAL MEDICINE

## 2020-04-15 PROCEDURE — G8419 CALC BMI OUT NRM PARAM NOF/U: HCPCS | Performed by: INTERNAL MEDICINE

## 2020-04-15 PROCEDURE — 1111F DSCHRG MED/CURRENT MED MERGE: CPT | Performed by: INTERNAL MEDICINE

## 2020-04-15 RX ORDER — METOPROLOL SUCCINATE 100 MG/1
100 TABLET, EXTENDED RELEASE ORAL DAILY
Qty: 90 TABLET | Refills: 3 | Status: SHIPPED
Start: 2020-04-15 | End: 2020-04-16 | Stop reason: SDUPTHER

## 2020-04-15 NOTE — PROGRESS NOTES
[facility]  Follow Up Visit        Reason for Visit: heart failure        Referring Physician:  Primary Cardiologist:         History of Present Illness:   Mrs. Ember Cotton is a 50 year  [place] female with a history of CAD, ischemic cardiomyopathy with ICD placement, HFrEF, severe MR, SVT, left parietal CVA in 2009, hypertension, hyperlipidemia, tobacco abuse, asthma / COPD, GERD and medication noncompliance.     orthopnea, PND. Denies nocturnal cough or hemoptysis. She denies abdominal distention and bloating. Denies early satiety, anorexia/change in appetite, unintentional weight loss. She does NOT have lower extremity edema. She denies exertional lightheadedness. She reports occasional palpitations especially when exerting herself. Denies syncope or near syncope.      Review of systems is negative for chest pain, pressure, discomfort. When ambulating on an incline, he/she reports/denies leg claudication. History is negative for neurological symptoms including transient loss of vision, asymmetric weakness, aphasia, dysphasia, numbness, tingling.        Past medical, surgical, family and social histories have been reviewed. Any changes in the past medical history, social history or family history have been made and are reflected in the electronic medical record.          Patient Active Problem List    Diagnosis Date Noted    Blood type AB+ 04/03/2018     Priority: High    Nonischemic cardiomyopathy (HCC)      Priority: High    ICD (implantable cardioverter-defibrillator) in place 08/23/2016     Priority: High     Subcutaneous ICD  [employer] Emblem   Implanted 8/8/2016      COPD (chronic obstructive pulmonary disease) (HCC)      Priority: Medium    Mitral regurgitation 05/10/2013     Priority: Low    Hyperlipidemia      Priority: Low    Left ovarian cyst      Priority: Low    CVA (cerebral vascular accident)      Priority: Low    Carpal tunnel syndrome on HPI  HEENT: No visual disturbances, difficult swallowing  GI: No nausea, vomiting, abdominal pain  : No dysuria or hematuria  Endocrine: No thyroid disease or diabetes  Musculoskeletal: SANCHEZ x 4, no focal motor deficits  Skin: Intact, no rashes  Neuro/Psych: No headache or seizures          Weights: Wt Readings from Last 3 Encounters:   03/20/20 99 lb 9.6 oz (45.2 kg)   01/16/20 100 lb (45.4 kg)   12/26/19 103 lb (46.7 kg)             Physical Examination:       [facility] 05/20/2014     CONSTITUTIONAL: Alert and oriented times 3, no acute distress and cooperative to examination with proper mood and affect. SKIN: Skin color, texture, turgor normal. No rashes or lesions. LYMPH: no cervical nodes, no inguinal nodes  HEENT: Head is normocephalic, atraumatic. EOMI, PERRLA. NECK: no JVD  CHEST/LUNGS: chest symmetric with normal A/P diameter, normal respiratory rate and rhythm, lungs clear to auscultation without wheezes, rales or rhonchi. No accessory muscle use. Scars None   CARDIOVASCULAR: S1S2, II/VI holosystolic at apex. No gallops. ABDOMEN: Normal shape. No and Laparoscopic scar(s) present. Normal bowel sounds. No bruits. soft, nondistended, no masses or organomegaly. no evidence of hernia. Percussion: Normal without hepatosplenomegally. Tenderness: absent. RECTAL: deferred, not clinically indicated  NEUROLOGIC: There are no focalizing motor or sensory deficits. CN II-XII are grossly intact. [facility] EXTREMITIES: no cyanosis, no clubbing and no edema, warm, well perfused. All the following diagnostics were personally reviewed and interpreted by me. Labs:  Serum electrophoresis 4/6 -   Negative    Urine electrophoresis 4/9 - negative       4/4/2018 LHC:  Findings:  Left main: 0%  stenosis  LAD: 0. %  stenosis  Circumflex: 0. %   stenosis  RCA: Dominant.  0. %  stenosis  Ao: 112/62.            4/4/2018 CT head WO contrast:  1. No CT evidence of acute intracranial abnormality, as questioned.  If   there is diastolic   dysfunction, which correlates with very poor prognosis.   Apically tethered mitral valve leaflets with posterior leaflet   restriction. Severe mitral regurgitation. Eccentric posteriorly directed   jet.     Trace pericardial effusion, no tamponade physiology.     RV measurements biased by such significant LV dilatation with septal shift   into the RV. Severe RV dysfunction. RVSP is 50 mm Hg consistent with   pulmonary hypertension.     Moderate tricuspid regurgitation, central regurgitant jet.      Findings communicated with the floor; prior to this time patient signed   out of the hospital against medical advice.        [facility] (2007) No obstructive CAD. EF 50%.              B. [facility] (04/2015, [address]) Unsuccessful angioplasty to 1st OM branch due to small size of this branch (EF was 40-45% with moderate lateral wall hypokinesis, left main normal, LAD normal, left circumflex normal, OM1 had a distal 99% lesion, and RCA dominant and normal)     [facility] 2018: no angiographic coronary disease.                  C. Most recent Lexiscan stress test (03/10/2016) Abnormal study with a prior myocardial infarction of the lateral wall with no significant reversible ischemia and left ventricle dilated stress and rest, severe wall motion abnormality                     D. Echo (06/09/2016) EF 25%. Stage II DD. Forrestine Gato mildly dilated. Mild MR. Mild TR. Systolic pressure 25 mmHg. Apical calcification with no definite thrombus. Mild LAE.             3/16/2020 Pharmacological stress test -  FINDINGS:   LV is dilated. Perfusion images demonstrate no reversible perfusion defect. Severe global hypokinesis. The end diastolic volume is 808 ml. The end systolic volume is 882 ml. The estimated ejection fraction is 20 %.         Impression   1. No reversible perfusion defect   2. Ejection fraction is 20 %. 3. Dilated LV. Severe global hypokinesis.        3/16/2020 TTE -   Summary   Left ventricle is moderate-severely you are retaining fluid in anyway just like you did before then take an extra dose of your water pill (furosemide/Lasix OR bumetanide/Bumex) every day until you lose the weight or feel better. If you notice that you have taken more than 3 extra doses in 1 week then please call and let us know. If at any time you feel that you are retaining fluid, your medications are not working, or you feel ill in anyway, then please call us for either same day appointment or the next day, and for instructions. Our goal is to keep you out of the emergency room and the hospital and we have ways to do it. You just need to call us in a timely manner. If you become sick for other reasons, and notice that you are not urinating as much, the urine is very dark, you have significant diarrhea or vomiting, then please DO NOT take your water pill and CALL US immediately. Continue to weigh yourself on a regular basis. 3. Return visit w Divya Roman NP in 3 months. Needs to see advanced hf specialist 1-2 years, more or less frequent depending on stability and other issues        I spent > 10 minutes on the phone with her. Sidney Villeda M.D. [facility]  Heart Failure and Pulmonary Hypertension  Mobile [phone]      [place] Monie is a 48 y.o. female evaluated via telephone on 4/15/2020. Consent:  She and/or health care decision maker is aware that that she may receive a bill for this telephone service, depending on her insurance coverage, and has provided verbal consent to proceed: Yes      Documentation:  I communicated with the patient and/or health care decision maker about nonischemic CM, chronic HFrEF. Details of this discussion including any medical advice provided: see above      I affirm this is a Patient Initiated Episode with an Established Patient who has not had a related appointment within my department in the past 7 days or scheduled within the next 24 hours.     Total Time: minutes: 11-20 minutes    Note: not billable if this call serves to triage the patient into an appointment for the relevant concern      Espinoza Barnes

## 2020-04-15 NOTE — PATIENT INSTRUCTIONS
1. Increase metoprolol to 100 mg daily. Eventually needs to be at 200 mg as tolerated. If persistent NYHA FC III then needs hydralazine and nitrites      2. Would have low threshold for repeat CLAUDIA and then potential refer for MitraClip evaluation after COVID-19 pandemic if otherwise hemodynamically stable and not warranting anything more advanced at this point      3. Diet should sodium restricted to 2-4 grams a day. Again watch your daily weight trends and if you gain water weight please follow below instructions. If you gain 3-5 pounds in 2-3 days OR notice that you are retaining fluid in anyway just like you did before then take an extra dose of your water pill (furosemide/Lasix OR bumetanide/Bumex) every day until you lose the weight or feel better. If you notice that you have taken more than 3 extra doses in 1 week then please call and let us know. If at any time you feel that you are retaining fluid, your medications are not working, or you feel ill in anyway, then please call us for either same day appointment or the next day, and for instructions. Our goal is to keep you out of the emergency room and the hospital and we have ways to do it. You just need to call us in a timely manner. If you become sick for other reasons, and notice that you are not urinating as much, the urine is very dark, you have significant diarrhea or vomiting, then please DO NOT take your water pill and CALL US immediately. Continue to weigh yourself on a regular basis. 3. Return visit cinthya Aceves NP in 3 months.  Needs to see advanced hf specialist 1-2 years, more or less frequent depending on stability and other issues

## 2020-04-16 ENCOUNTER — TELEPHONE (OUTPATIENT)
Dept: CARDIOLOGY CLINIC | Age: 55
End: 2020-04-16

## 2020-04-16 RX ORDER — METOPROLOL SUCCINATE 100 MG/1
100 TABLET, EXTENDED RELEASE ORAL DAILY
Qty: 90 TABLET | Refills: 3 | Status: SHIPPED
Start: 2020-04-16 | End: 2020-09-04 | Stop reason: SDUPTHER

## 2020-04-16 NOTE — TELEPHONE ENCOUNTER
Spoke with patient and reviewed instructions from virtual visit with Dr. Johanne Ireland. 1. Increase metoprolol to 100 mg daily. Eventually needs to be at 200 mg as tolerated. If persistent NYHA FC III then needs hydralazine and nitrites        2. Would have low threshold for repeat CLAUDIA and then potential refer for MitraClip evaluation after COVID-19 pandemic if otherwise hemodynamically stable and not warranting anything more advanced at this point        3. Diet should sodium restricted to 2-4 grams a day. Again watch your daily weight trends and if you gain water weight please follow below instructions.     If you gain 3-5 pounds in 2-3 days OR notice that you are retaining fluid in anyway just like you did before then take an extra dose of your water pill (furosemide/Lasix OR bumetanide/Bumex) every day until you lose the weight or feel better.      If you notice that you have taken more than 3 extra doses in 1 week then please call and let us know    4. Return visit w Cannon Lesch NP in 3 months - 7/16/20 @ 10:30am    5. Needs to see advanced hf specialist 1-2 years, more or less frequent depending on stability and other issues      Patient had no additional questions. Wanted Metoprolol sent to [facility] on [address]. Advised her I would send in Rx. Also will mail AVS to her. Patient verbally understood instructions.

## 2020-09-04 ENCOUNTER — OFFICE VISIT (OUTPATIENT)
Dept: CARDIOLOGY CLINIC | Age: 55
End: 2020-09-04
Payer: COMMERCIAL

## 2020-09-04 VITALS
BODY MASS INDEX: 17.42 KG/M2 | RESPIRATION RATE: 18 BRPM | HEART RATE: 77 BPM | SYSTOLIC BLOOD PRESSURE: 102 MMHG | HEIGHT: 64 IN | DIASTOLIC BLOOD PRESSURE: 60 MMHG | WEIGHT: 102 LBS

## 2020-09-04 PROCEDURE — 3017F COLORECTAL CA SCREEN DOC REV: CPT | Performed by: INTERNAL MEDICINE

## 2020-09-04 PROCEDURE — 4004F PT TOBACCO SCREEN RCVD TLK: CPT | Performed by: INTERNAL MEDICINE

## 2020-09-04 PROCEDURE — 3023F SPIROM DOC REV: CPT | Performed by: INTERNAL MEDICINE

## 2020-09-04 PROCEDURE — 93000 ELECTROCARDIOGRAM COMPLETE: CPT | Performed by: INTERNAL MEDICINE

## 2020-09-04 PROCEDURE — G8926 SPIRO NO PERF OR DOC: HCPCS | Performed by: INTERNAL MEDICINE

## 2020-09-04 PROCEDURE — G8427 DOCREV CUR MEDS BY ELIG CLIN: HCPCS | Performed by: INTERNAL MEDICINE

## 2020-09-04 PROCEDURE — 99214 OFFICE O/P EST MOD 30 MIN: CPT | Performed by: INTERNAL MEDICINE

## 2020-09-04 PROCEDURE — G8419 CALC BMI OUT NRM PARAM NOF/U: HCPCS | Performed by: INTERNAL MEDICINE

## 2020-09-04 RX ORDER — METOPROLOL SUCCINATE 100 MG/1
100 TABLET, EXTENDED RELEASE ORAL DAILY
Qty: 30 TABLET | Refills: 6 | Status: SHIPPED
Start: 2020-09-04 | End: 2020-12-04 | Stop reason: SDUPTHER

## 2020-09-04 RX ORDER — SPIRONOLACTONE 25 MG/1
25 TABLET ORAL DAILY
Qty: 30 TABLET | Refills: 6 | Status: SHIPPED
Start: 2020-09-04 | End: 2020-12-04 | Stop reason: SDUPTHER

## 2020-09-04 RX ORDER — SACUBITRIL AND VALSARTAN 97; 103 MG/1; MG/1
1 TABLET, FILM COATED ORAL 2 TIMES DAILY
Qty: 60 TABLET | Refills: 6 | Status: SHIPPED
Start: 2020-09-04 | End: 2020-12-04 | Stop reason: SDUPTHER

## 2020-09-04 RX ORDER — BUMETANIDE 2 MG/1
2 TABLET ORAL DAILY
Qty: 30 TABLET | Refills: 6 | Status: SHIPPED
Start: 2020-09-04 | End: 2020-12-04 | Stop reason: SDUPTHER

## 2020-09-04 NOTE — PROGRESS NOTES
OFFICE VISIT     PRIMARY CARE PHYSICIAN:      Bernie Cooper MD       ALLERGIES / SENSITIVITIES:        Allergies   Allergen Reactions    Orange Fruit [Citrus] Hives and Itching     ONLY allergic to oranges - NOT any other citrus fruit. NO oranges or orange juice.  Crestor [Rosuvastatin Calcium]     Loratadine     Norco [Hydrocodone-Acetaminophen] Itching    Sulfa Antibiotics     Aspirin      Itching sometime     Patient takes 81mg Aspirin daily, but is unable to take 324 mg. REVIEWED MEDICATIONS:        Current Outpatient Medications:     sacubitril-valsartan (ENTRESTO)  MG per tablet, Take 1 tablet by mouth 2 times daily, Disp: 60 tablet, Rfl: 6    metoprolol succinate (TOPROL XL) 100 MG extended release tablet, Take 1 tablet by mouth daily, Disp: 30 tablet, Rfl: 6    bumetanide (BUMEX) 2 MG tablet, Take 1 tablet by mouth daily, Disp: 30 tablet, Rfl: 6    spironolactone (ALDACTONE) 25 MG tablet, Take 1 tablet by mouth daily, Disp: 30 tablet, Rfl: 6    aspirin 81 MG chewable tablet, Take 1 tablet by mouth daily, Disp: 30 tablet, Rfl: 0    atorvastatin (LIPITOR) 40 MG tablet, Take 1 tablet by mouth daily, Disp: 30 tablet, Rfl: 1    budesonide-formoterol (SYMBICORT) 160-4.5 MCG/ACT AERO, Inhale 2 puffs into the lungs 2 times daily, Disp: 1 Inhaler, Rfl: 0    montelukast (SINGULAIR) 10 MG tablet, Take 1 tablet by mouth nightly, Disp: 30 tablet, Rfl: 1      S: REASON FOR VISIT:       Chief Complaint   Patient presents with    Congestive Heart Failure     Overdue-Patient complains of fatigue.  Cardiomyopathy          History of Present Illness:       Office Visit for follow up of CMP, VHD   50 yr pt with CMP, VHD came for f/u viist   I saw her in 2018, seen by Dr Claritza Cruz.  chest pain, in the morning, at rest, sharp, no radiation, no associated Sx, took Entresto, pain releived   No hospitalizations or surgeries since last visit   Patient is compliant with all medications   Andrew any exertional chest pain   C/o mild short of breath, chronic, No pND or orthopnea   No palpitations, dizzy or syncope. Active at home   Try to watch diet          Past Medical History:   Diagnosis Date    Angina at rest [facility])     cath in  showed no CAD    Asthma     Blood type AB+ 4/3/2018    CAD (coronary artery disease)     Carpal tunnel syndrome on left     [facility]     COPD [facility] obstructive [facility] disease) [facility]     CVA (cerebral vascular accident) ([facility] 2009 and 2010. left parietal    GERD (gastroesophageal reflux disease)     HFrEF (heart failure with reduced ejection fraction) ([facility] 2020- echo- LVEF 20-25%, LA enlarged, moderate MR, mild TR, mild PH, LVDD: 6.7, RVDD: 2.2 (17- echo- LVEF 10%, stage III DD, severely dilated LA, appears L>R atrial shunt, mod TR, LVDD: 6.6,  RVDD: 2.3)    History of blood transfusion     Hyperlipidemia Diagnosed in . Dyslipidemia.  Hypertension diagnosed in     ICD (implantable cardioverter-defibrillator) in place 2018    Placed by Dr. Fawn Gary.  Left ovarian cyst     Liver lesion 7/15    Moderate mitral regurgitation 7/27/15    mild-moderate    Nonischemic cardiomyopathy [facility]     NSTEMI (non-ST elevated myocardial infarction) ([facility] 4/10/15--[facility]    Suicide attempt by drug ingestion ([facility]     attempt in  OD on ultram    Tobacco abuse             Past Surgical History:   Procedure Laterality Date    CARDIAC CATHETERIZATION  2018    Dr. Adrian Gomez- Clean coronaries   Gracie Chew  2016    SICD    CARDIOVASCULAR STRESS TEST  2009 Normal      SECTION      COLONOSCOPY  10/2015    DIAGNOSTIC CARDIAC CATH LAB PROCEDURE  2007    [facility]: No significant CAD. Mild LVD with apical akinesis. EF 50%.     DIAGNOSTIC CARDIAC CATH LAB PROCEDURE  4/15    with PTCA to 1st ob diana Torri@[email or web address]    ENDOSCOPY, COLON, Thyroid not palpable. No elevated JVD, No carotid bruit. Chest:   Normal configuration, non tender. Sub-Q ICD-Left   Lungs:   Clear to auscultation bilaterally, few scattered rhonchi. Cardiovascular:  Regular rhythm, 2/6 systolic murmur, No S3,  no palpable thrills,    Abdomen:  Soft, Bowel sounds normal, no pulsatile abdominal aorta, no palpable masses. Extremities:  No edema. Distal pulses palpable. No cyanosis, no clubbing. Skin:   Good turgor, warm and dry, no cyanosis. Musculoskeletal: No joint swelling or deformity. Neuro:   Cranial nerves grossly intact; No focal neurologic deficit. Psych:   Alert, good mood and effect. REVIEW OF DIAGNOSTIC TESTS:        Electrocardiogram: Reviewed     Left Heart Cath: 4/4/18 (normal coronaries). Stress 3/2020  Impression    1. No reversible perfusion defect    2. Ejection fraction is 20 %. 3. Dilated LV. Severe global hypokinesis. Echo 3/2020    Left ventricle is moderate-severely dilated. LVEDD 6.8 cm. LV systolic function is severely reduced. Ejection fraction is visually estimated at 20-25%. Abnormal diastolic function, indeterminate grade. There is severe global hypokinesis. Normal left ventricular wall thickness. Abnormal LV septal motion consistent with conduction disorder. Mildly dilated right ventricle. Right ventricle global systolic function is mildly reduced. The left atrium is massively dilated. Severe eccentric mitral regurgitation, directed posteriorly. Mild tricuspid regurgitation. Mild pulmonic regurgitation. A/P:   ASSESSMENT / PLAN:    Neptali Sullivan was seen today for congestive heart failure and cardiomyopathy.     Diagnoses and all orders for this visit:    Chronic systolic CHF (congestive heart failure), NYHA class 3 (HCC) - EF 20-25%; s/p ICD, Continue BB, Entresto, Aldactone  -     EKG 12 Lead    Essential hypertension - Controlled    Other chest pain - Normal cath 2018    Nonrheumatic mitral valve regurgitation, Severe, - Refer to Dr Ricardo Citizen for [address] [facility] - Dr Oscar Aguilar     ICD (implantable cardioverter-defibrillator) in place-Left Sub-Q ICD in 2016 - Recommend to folow up with Guanakito Starkey    VHD (valvular heart disease) - MR, TR, SD     Pulmonary hypertension: Stable     History of CVA (cerebrovascular accident)    Non compliance with follow-ups - Compliance discussed    Preventive Cardiology: Low cholesterol diet, regular exercise as tolerate, and gradual weight loss discussed. Monitor BP and heart rates. Above recommendations discussed with her. All questions answered about cardiac diagnoses and cardiac medications. Continue current medications. Compliance with medications and f/u with all physicians discussed. Risk factor modification based on risk profile discussed. Call if any exertional chest pain, short of breath, dizzy or palpitations   Follow up in 6 months or earlier if needed.          [facility]  [address] [facility], [place], [address] [facility]  [phone]

## 2020-09-08 RX ORDER — ATORVASTATIN CALCIUM 40 MG/1
40 TABLET, FILM COATED ORAL DAILY
Qty: 30 TABLET | Refills: 5 | Status: SHIPPED
Start: 2020-09-08 | End: 2020-12-04 | Stop reason: SDUPTHER

## 2020-09-08 RX ORDER — ASPIRIN 81 MG/1
81 TABLET, CHEWABLE ORAL DAILY
Qty: 30 TABLET | Refills: 5 | Status: SHIPPED
Start: 2020-09-08 | End: 2020-12-04 | Stop reason: SDUPTHER

## 2020-09-11 ENCOUNTER — OFFICE VISIT (OUTPATIENT)
Dept: CARDIOLOGY CLINIC | Age: 55
End: 2020-09-11
Payer: COMMERCIAL

## 2020-09-11 VITALS
BODY MASS INDEX: 17.79 KG/M2 | DIASTOLIC BLOOD PRESSURE: 68 MMHG | HEART RATE: 75 BPM | RESPIRATION RATE: 18 BRPM | WEIGHT: 104.2 LBS | HEIGHT: 64 IN | SYSTOLIC BLOOD PRESSURE: 136 MMHG

## 2020-09-11 PROCEDURE — 99215 OFFICE O/P EST HI 40 MIN: CPT | Performed by: INTERNAL MEDICINE

## 2020-09-11 NOTE — PROGRESS NOTES
[facility]   [facility] [place]   Clinic Note     Date:9/11/20   Patient Marta Spears  YOB: 1965  Age: 50 y.o. Primary Care Provider: Johanny Blake MD    Subjective     This is a pleasant 53-year-old -American female referred by Dr. Cynthia Eaton. She is a very difficult historian. She was admitted for CHF 3 times within the past 12 months (December 2019, January and March 2020). She was seen last by  in April 2020. She has been optimized medically and she was referred for consideration of mitral clip at that time. When pressed for symptoms she reports dyspnea with little activity. She has no angina and no palpitations. She has not had any recent ICD shocks. She has not had any syncope or presyncope. She has no lower extremity edema but has orthopnea. She reports compliance with her medications       A focused history review includes:   1. Chronic systolic heart failure due to nonischemic cardiomyopathy (most recent coronary angiogram in March 2018 without CAD)  1. Normal perfusion on MPS in March 2020  2. Last seen by Dr. Sary Mcarthur in 4/2020 who recommended CLAUDIA and MitraClip work-up  2. Mitral regurgitation  3. IRBBB, LAFB  4. History of SVT  5. Hypertension  6. Subcutaneous ICD for primary prevention  7. pulmonary hypertension  8. History of stroke in 2009  9. Smoking  10. Noncompliance    Past History    Past Medical History:         Diagnosis Date    [facility]ina at rest [facility])     cath in 2007 showed no CAD    Asthma     Blood type AB+ 4/3/2018    CAD (coronary artery disease)     Carpal tunnel syndrome on left     CHF (congestive heart failure) (HCC)     COPD (chronic obstructive pulmonary disease) ([address]     CVA (cerebral vascular accident) ([address] 12/2009 and 12/2010.     left parietal    GERD (gastroesophageal reflux disease)     HFrEF (heart failure with reduced ejection fraction) ([address] 01/14/2020 8/26/19- echo- LVEF 20-25%, LA enlarged, moderate MR, mild TR, mild PH, LVDD: 6.7, RVDD: 2.2 (12/9/17- echo- LVEF 10%, stage III DD, severely dilated LA, appears L>R atrial shunt, mod TR, LVDD: 6.6,  RVDD: 2.3)    History of blood transfusion     Hyperlipidemia Diagnosed in 2007. Dyslipidemia.  Hypertension diagnosed in 2007    ICD (implantable cardioverter-defibrillator) in place 4/5/2018    Placed by Dr. Dora Strickland.     Left ovarian cyst     Liver lesion 7/15    Moderate mitral regurgitation 7/27/15    mild-moderate    Nonischemic cardiomyopathy ([address]     NSTEMI (non-ST elevated myocardial infarction) [address] 4/10/15--[facility] to [facility]    Suicide attempt by drug ingestion ([facility]     attempt in 2007 OD on ultram    Tobacco abuse           Social History:    Social History     Tobacco History     Smoking Status  Current Some Day Smoker Last attempt to quit  7/1/2018 Smoking Frequency  0.25 packs/day for 30 years (7.5 pk yrs) Smoking Tobacco Type  Cigarettes    Smokeless Tobacco Use  Never Used          Alcohol History     Alcohol Use Status  Yes Drinks/Week  3 Cans of beer, 0 Standard drinks or equivalent per week Amount  3.0 standard drinks of alcohol/wk Comment  caffeine use: coffee 1 cup daily          Drug Use     Drug Use Status  Yes Types  Marijuana Comment  last used 8/2018          Sexual Activity     Sexually Active  Not Asked                    Family History:       Problem Relation Age of Onset    High Blood Pressure Mother     Stroke Mother     High Blood Pressure Father     Stroke Father     Heart Disease Father     Pacemaker Father          Review of Systems   General ROS: No weight loss fevers or chills  Psychological ROS: No new depression or anxiety or altered mood  Ophthalmic ROS: No new vision changes or diplopia  Respiratory ROS: No cough, wheezing, shortness of breath, or hemoptysis  Cardiovascular ROS: See HPI  Gastrointestinal ROS: No nausea, vomiting, constipation, diarrhea, hematemesis, melena, or hematochezia  Genito-Urinary ROS: No dysuria, hematuria, or new incontinence  Musculoskeletal ROS: No new muscle pain, joint pain, joint swelling, or back pain  Neurological ROS: No new numbness or paresthesias, no focal weakness, no altered speech, no new memory loss  Dermatological ROS: No new rashes, no pruritus, no skin masses        Medications     Current Outpatient Medications   Medication Sig Dispense Refill    aspirin 81 MG chewable tablet Take 1 tablet by mouth daily 30 tablet 5    atorvastatin (LIPITOR) 40 MG tablet Take 1 tablet by mouth daily 30 tablet 5    sacubitril-valsartan (ENTRESTO)  MG per tablet Take 1 tablet by mouth 2 times daily 60 tablet 6    metoprolol succinate (TOPROL XL) 100 MG extended release tablet Take 1 tablet by mouth daily 30 tablet 6    bumetanide (BUMEX) 2 MG tablet Take 1 tablet by mouth daily 30 tablet 6    spironolactone (ALDACTONE) 25 MG tablet Take 1 tablet by mouth daily 30 tablet 6    budesonide-formoterol (SYMBICORT) 160-4.5 MCG/ACT AERO Inhale 2 puffs into the lungs 2 times daily 1 Inhaler 0    montelukast (SINGULAIR) 10 MG tablet Take 1 tablet by mouth nightly 30 tablet 1     No current facility-administered medications for this visit. Physical Examination      /68   Pulse 75   Resp 18   Ht 5' 4\" (1.626 m)   Wt 104 lb 3.2 oz (47.3 kg)   LMP 05/20/2014   BMI 17.89 kg/m²     General: No acute distress, appears as stated age, nonicteric  Head: Atraumatic, no gross abnormalities or bruises  Neck: Supple and nontender, no carotid bruits, no JVP  Lungs: Clear to auscultation bilaterally, no wheezes, rales, or rhonchi  Heart: Regular rate and rhythm.  2/6 holosystolic murmur over the apex is audible  Abdomen: Soft, nontender, nondistended, normal bowel sounds  Extremities: No obvious deformities, no cyanosis, no edema  Neurological: Alert and oriented x3, EOMI, moving all extremities x4  Psychological: Normal mood and affect, cooperative  Skin: Color, texture, and turgor normal for age          Labs/Imaging/Diagnostics     Lab Results   Component Value Date     03/20/2020    K 3.9 03/20/2020    K 4.3 03/16/2020    CL 95 03/20/2020    CO2 32 03/20/2020    BUN 11 03/20/2020    CREATININE 0.8 03/20/2020    GLUCOSE 115 03/20/2020    GLUCOSE 82 03/01/2011    CALCIUM 9.1 03/20/2020        CrCl cannot be calculated (Patient's most recent lab result is older than the maximum 120 days allowed. ). Lab Results   Component Value Date    WBC 7.4 03/16/2020    HGB 12.2 03/16/2020    HCT 41.4 03/16/2020    MCV 88.7 03/16/2020     (H) 03/16/2020       Lab Results   Component Value Date    ALT 8 03/18/2020    AST 12 03/18/2020    ALKPHOS 105 (H) 03/18/2020    BILITOT 0.3 03/18/2020       Lab Results   Component Value Date    LABALBU 3.0 (L) 03/18/2020       Lab Results   Component Value Date    CHOL 216 (H) 01/15/2020    CHOL 192 08/28/2018    CHOL 246 (H) 06/24/2017     Lab Results   Component Value Date    TRIG 92 01/15/2020    TRIG 76 08/28/2018    TRIG 51 06/24/2017     Lab Results   Component Value Date    HDL 50 01/15/2020    HDL 50 08/28/2018    HDL 44 04/06/2018     Lab Results   Component Value Date    LDLCALC 148 (H) 01/15/2020    LDLCALC 127 (H) 08/28/2018    LDLCALC 121 (H) 04/06/2018     Lab Results   Component Value Date    LABVLDL 18 01/15/2020    LABVLDL 15 08/28/2018    LABVLDL 15 04/06/2018     No results found for: [facility]    Lab Results   Component Value Date    CKTOTAL 58 04/19/2014    CKMB 0.9 04/19/2014    TROPONINI <0.01 03/17/2020       Lab Results   Component Value Date    BNP 54 10/19/2010         Lab Results   Component Value Date    LABA1C 5.5 08/28/2018     Results for Aj Sanders (MRN [id number]) as of 9/11/2020 12:29   Ref.  Range 1/13/2020 19:35 1/15/2020 08:37 3/16/2020 13:54 3/19/2020 04:48   Pro-BNP Latest Ref Range: 0 - 125 pg/mL 16,201 (H) 1,488 (H) 14,228 (H) 2,178 (H)       Pertinent Cardiovascular Studies:  EKG: September 4, 2020. Personally reviewed  Normal sinus rhythm, biatrial enlargement  Incomplete right bundle branch block  Left anterior fascicular block. Personally reviewed her TTE dated March 2020:  Dilated LV (VALENTINA 6.4, ESD 6.1), LVEF 25%  Normal RV size with mildly reduced systolic function  Severe LA enlargement   Severe mitral regurgitation primarily due to leaflet tethering with an element of anterior leaflet prolapse  Mild TR  Trileaflet aortic valve with mild sclerosis and no significant stenosis or regurgitation  trivial pericardial effusion adjacent to the LV and small adjacent to the RA       Assessment and Plan: This is a 17-year-old -American female with a history noted above. She was medically optimized for chronic systolic heart failure by Dr. Balwinder Wilson and previously Dr. Jazmine Poole. She continues to have dyspnea on exertion functional class III despite being euvolemic on exam and on maximal medical therapy. She was noted to have severe mitral regurgitation on her TTE in March 2020. I discussed with her the rationale for transcatheter [id number] mitral valve repair with MitraClip to improve symptoms and functional capacity and decreased heart failure hospitalizations and perhaps mortality. I reviewed the risks of the procedure including death, MI, stroke, device embolization, bleeding etc.  I explained the alternatives which are continuing medical therapy with ongoing symptoms and the likelihood of worsening symptoms eventually with her history of recurrent heart failure hospitalizations and severe cardiomyopathy and valve disease. She wishes to proceed with work-up for MitraClip. Diagnosis Orders   1. Chronic systolic CHF (congestive heart failure), NYHA class 3 ([address]     2. ICD (implantable cardioverter-defibrillator) in place     3. Severe mitral regurgitation     4. Essential hypertension     5.  Nonischemic cardiomyopathy ([address] · Continue current medical therapy which is optimized  · She wishes to be seen by [place] in [place] (rather than [place]) To follow-up her S ICD. I made a referral to Dr. Yvon Fletcher  · MitraClip protocol CLAUDIA to be performed by Dr. Paul Solomon including 3D imaging. If she has amenable anatomy then we will schedule the procedure after finishing work-up.  Follow up with me in 3 months    We appreciate the opportunity to participate in Her care!       Becki Valencia MD  Interventional Cardiology/Structural Heart Disease  Cell: [phone]

## 2020-09-14 ENCOUNTER — TELEPHONE (OUTPATIENT)
Dept: CARDIOLOGY CLINIC | Age: 55
End: 2020-09-14

## 2020-09-29 ENCOUNTER — TELEPHONE (OUTPATIENT)
Dept: CARDIOLOGY CLINIC | Age: 55
End: 2020-09-29

## 2020-09-29 NOTE — TELEPHONE ENCOUNTER
spoke with patient regarding her CLAUDIA that is scheduled for 10/02/2020 and she stated she could not have it that day and she did not get her COVID test done and has no ride to get it. She wants this to be reschedule for the week of 10/19/2020 at the [facility] hospital with Dr. Danyelle Chiang.

## 2020-10-15 ENCOUNTER — HOSPITAL ENCOUNTER (OUTPATIENT)
Age: 55
Discharge: HOME | End: 2020-10-17
Payer: COMMERCIAL

## 2020-10-15 PROCEDURE — U0003 INFECTIOUS AGENT DETECTION BY NUCLEIC ACID (DNA OR RNA); SEVERE ACUTE RESPIRATORY SYNDROME CORONAVIRUS 2 (SARS-COV-2) (CORONAVIRUS DISEASE [COVID-19]), AMPLIFIED PROBE TECHNIQUE, MAKING USE OF HIGH THROUGHPUT TECHNOLOGIES AS DESCRIBED BY CMS-2020-01-R: HCPCS

## 2020-10-17 LAB
SARS-COV-2: NOT DETECTED
SOURCE: NORMAL

## 2020-10-19 ENCOUNTER — TELEPHONE (OUTPATIENT)
Dept: NON INVASIVE DIAGNOSTICS | Age: 55
End: 2020-10-19

## 2020-10-20 ENCOUNTER — ANESTHESIA EVENT (OUTPATIENT)
Dept: CARDIAC CATH/INVASIVE PROCEDURES | Age: 55
End: 2020-10-20

## 2020-10-20 ENCOUNTER — ANESTHESIA (OUTPATIENT)
Dept: CARDIAC CATH/INVASIVE PROCEDURES | Age: 55
End: 2020-10-20

## 2020-10-20 ENCOUNTER — HOSPITAL ENCOUNTER (OUTPATIENT)
Dept: CARDIAC CATH/INVASIVE PROCEDURES | Age: 55
Discharge: HOME | End: 2020-10-20
Payer: COMMERCIAL

## 2020-10-20 VITALS
TEMPERATURE: 97.8 F | OXYGEN SATURATION: 95 % | HEART RATE: 105 BPM | DIASTOLIC BLOOD PRESSURE: 60 MMHG | HEIGHT: 64 IN | WEIGHT: 105 LBS | BODY MASS INDEX: 17.93 KG/M2 | SYSTOLIC BLOOD PRESSURE: 104 MMHG | RESPIRATION RATE: 20 BRPM

## 2020-10-20 VITALS
OXYGEN SATURATION: 96 % | RESPIRATION RATE: 33 BRPM | SYSTOLIC BLOOD PRESSURE: 116 MMHG | DIASTOLIC BLOOD PRESSURE: 68 MMHG

## 2020-10-20 LAB
BASOPHILS ABSOLUTE: 0.08 E9/L (ref 0–0.2)
BASOPHILS RELATIVE PERCENT: 1.3 % (ref 0–2)
EOSINOPHILS ABSOLUTE: 0.3 E9/L (ref 0.05–0.5)
EOSINOPHILS RELATIVE PERCENT: 5 % (ref 0–6)
HCT VFR BLD CALC: 44.6 % (ref 34–48)
HEMOGLOBIN: 14.6 G/DL (ref 11.5–15.5)
IMMATURE GRANULOCYTES #: 0.01 E9/L
IMMATURE GRANULOCYTES %: 0.2 % (ref 0–5)
INR BLD: 1
LV EF: 28 %
LVEF MODALITY: NORMAL
LYMPHOCYTES ABSOLUTE: 1.92 E9/L (ref 1.5–4)
LYMPHOCYTES RELATIVE PERCENT: 32 % (ref 20–42)
MCH RBC QN AUTO: 28.8 PG (ref 26–35)
MCHC RBC AUTO-ENTMCNC: 32.7 % (ref 32–34.5)
MCV RBC AUTO: 88 FL (ref 80–99.9)
MONOCYTES ABSOLUTE: 0.53 E9/L (ref 0.1–0.95)
MONOCYTES RELATIVE PERCENT: 8.8 % (ref 2–12)
NEUTROPHILS ABSOLUTE: 3.16 E9/L (ref 1.8–7.3)
NEUTROPHILS RELATIVE PERCENT: 52.7 % (ref 43–80)
PDW BLD-RTO: 14.5 FL (ref 11.5–15)
PLATELET # BLD: 318 E9/L (ref 130–450)
PMV BLD AUTO: 10.4 FL (ref 7–12)
PROTHROMBIN TIME: 11.2 SEC (ref 9.3–12.4)
RBC # BLD: 5.07 E12/L (ref 3.5–5.5)
WBC # BLD: 6 E9/L (ref 4.5–11.5)

## 2020-10-20 PROCEDURE — 36415 COLL VENOUS BLD VENIPUNCTURE: CPT

## 2020-10-20 PROCEDURE — 2580000003 HC RX 258: Performed by: INTERNAL MEDICINE

## 2020-10-20 PROCEDURE — 3700000000 HC ANESTHESIA ATTENDED CARE

## 2020-10-20 PROCEDURE — 93312 ECHO TRANSESOPHAGEAL: CPT

## 2020-10-20 PROCEDURE — 93321 DOPPLER ECHO F-UP/LMTD STD: CPT

## 2020-10-20 PROCEDURE — 85025 COMPLETE CBC W/AUTO DIFF WBC: CPT

## 2020-10-20 PROCEDURE — 2709999900 HC NON-CHARGEABLE SUPPLY

## 2020-10-20 PROCEDURE — 6360000002 HC RX W HCPCS: Performed by: NURSE ANESTHETIST, CERTIFIED REGISTERED

## 2020-10-20 PROCEDURE — 93325 DOPPLER ECHO COLOR FLOW MAPG: CPT

## 2020-10-20 PROCEDURE — 2580000003 HC RX 258: Performed by: NURSE ANESTHETIST, CERTIFIED REGISTERED

## 2020-10-20 PROCEDURE — 3700000001 HC ADD 15 MINUTES (ANESTHESIA)

## 2020-10-20 PROCEDURE — 85610 PROTHROMBIN TIME: CPT

## 2020-10-20 RX ORDER — SODIUM CHLORIDE 0.9 % (FLUSH) 0.9 %
10 SYRINGE (ML) INJECTION PRN
Status: DISCONTINUED | OUTPATIENT
Start: 2020-10-20 | End: 2020-10-21 | Stop reason: HOSPADM

## 2020-10-20 RX ORDER — SODIUM CHLORIDE 9 MG/ML
INJECTION, SOLUTION INTRAVENOUS CONTINUOUS PRN
Status: DISCONTINUED | OUTPATIENT
Start: 2020-10-20 | End: 2020-10-20 | Stop reason: SDUPTHER

## 2020-10-20 RX ORDER — PROPOFOL 10 MG/ML
INJECTION, EMULSION INTRAVENOUS PRN
Status: DISCONTINUED | OUTPATIENT
Start: 2020-10-20 | End: 2020-10-20 | Stop reason: SDUPTHER

## 2020-10-20 RX ORDER — HYDRALAZINE HYDROCHLORIDE 20 MG/ML
INJECTION INTRAMUSCULAR; INTRAVENOUS PRN
Status: DISCONTINUED | OUTPATIENT
Start: 2020-10-20 | End: 2020-10-20 | Stop reason: SDUPTHER

## 2020-10-20 RX ORDER — SODIUM CHLORIDE 0.9 % (FLUSH) 0.9 %
10 SYRINGE (ML) INJECTION EVERY 12 HOURS SCHEDULED
Status: DISCONTINUED | OUTPATIENT
Start: 2020-10-20 | End: 2020-10-21 | Stop reason: HOSPADM

## 2020-10-20 RX ORDER — SODIUM CHLORIDE 9 MG/ML
INJECTION, SOLUTION INTRAVENOUS ONCE
Status: COMPLETED | OUTPATIENT
Start: 2020-10-20 | End: 2020-10-20

## 2020-10-20 RX ADMIN — SODIUM CHLORIDE: 9 INJECTION, SOLUTION INTRAVENOUS at 09:35

## 2020-10-20 RX ADMIN — SODIUM CHLORIDE: 9 INJECTION, SOLUTION INTRAVENOUS at 12:07

## 2020-10-20 RX ADMIN — HYDRALAZINE HYDROCHLORIDE 20 MG: 20 INJECTION INTRAMUSCULAR; INTRAVENOUS at 12:29

## 2020-10-20 RX ADMIN — PROPOFOL 300 MG: 10 INJECTION, EMULSION INTRAVENOUS at 12:13

## 2020-10-20 ASSESSMENT — LIFESTYLE VARIABLES: SMOKING_STATUS: 1

## 2020-10-20 ASSESSMENT — ENCOUNTER SYMPTOMS: SHORTNESS OF BREATH: 1

## 2020-10-20 NOTE — H&P
History and Physicals:        Date of Consultation: 10/20/2020   C/C: Here for CLAUDIA to evaluate MR    HISTORY OF PRESENT ILLNESS:   This is a pleasant 82-year-old -American female referred by Dr. Nighat Osuna. She is a very difficult historian.     She was admitted for CHF 3 times within the past 12 months (December 2019, January and March 2020). She was seen last by  in April 2020. She has been optimized medically and she was referred for consideration of mitral clip at that time. When pressed for symptoms she reports dyspnea with little activity. She has no angina and no palpitations. She has not had any recent ICD shocks. She has not had any syncope or presyncope. She has no lower extremity edema but has orthopnea. She reports compliance with her medications      Please note: past medical records were reviewed per electronic medical record (EMR) - see detailed reports under Past Medical/ Surgical History. Past Medical History:    Past Medical History:   Diagnosis Date    [facility] rest [facility])     cath in 2007 showed no CAD    Asthma     Blood type AB+ 4/3/2018    CAD (coronary artery disease)     Carpal tunnel syndrome on left     CHF (congestive heart failure) (HCC)     [facility]) [facility]     CVA (cerebral vascular accident) ([facility] 12/2009 and 12/2010. left parietal    GERD (gastroesophageal reflux disease)     HFrEF (heart failure with reduced ejection fraction) ([facility] 01/14/2020 8/26/19- echo- LVEF 20-25%, LA enlarged, moderate MR, mild TR, mild PH, LVDD: 6.7, RVDD: 2.2 (12/9/17- echo- LVEF 10%, stage III DD, severely dilated LA, appears L>R atrial shunt, mod TR, LVDD: 6.6,  RVDD: 2.3)    History of blood transfusion     Hyperlipidemia Diagnosed in 2007. Dyslipidemia.  Hypertension diagnosed in 2007    ICD (implantable cardioverter-defibrillator) in place 4/5/2018    Placed by Dr. Shanon Mendez.     Left ovarian cyst     Liver lesion 7/15    Moderate mitral regurgitation 7/27/15    mild-moderate    Nonischemic cardiomyopathy [facility]     NSTEMI (non-ST elevated myocardial infarction) ([facility] 4/10/15--adm to [facility]    Suicide attempt by drug ingestion ([facility]     attempt in  OD on ultram    Tobacco abuse        Past Surgical History:    Past Surgical History:   Procedure Laterality Date    CARDIAC CATHETERIZATION  2018    Dr. Gregg Gomez- Clean coronaries   Jazz Rodarte  2016    SICD    CARDIOVASCULAR STRESS TEST  2009 Normal      SECTION      COLONOSCOPY  10/2015    DIAGNOSTIC CARDIAC CATH LAB PROCEDURE  2007    [facility]: No significant CAD. Mild LVD with apical akinesis. EF 50%.  DIAGNOSTIC CARDIAC CATH LAB PROCEDURE  4/15    with [facility] to [facility] ob [email or web address]    ENDOSCOPY, COLON, DIAGNOSTIC      HYSTERECTOMY  10/1/15    at [facility] by Dr. Gregg Zuleta PTCA  4/10/15    at [address] [place] [address] ECHOCARDIOGRAM  3/19/13    EF 65%, mild MR    TUBAL LIGATION         Medications Prior to admit:  Prior to Admission medications    Medication Sig Start Date End Date Taking?  Authorizing Provider   atorvastatin (LIPITOR) 40 MG tablet Take 1 tablet by mouth daily 20  Yes Britt Garcia MD   sacubitril-valsartan (ENTRESTO)  MG per tablet Take 1 tablet by mouth 2 times daily 20  Yes Britt Garcia MD   metoprolol succinate (TOPROL XL) 100 MG extended release tablet Take 1 tablet by mouth daily 20  Yes Britt Garcia MD   bumetanide (BUMEX) 2 MG tablet Take 1 tablet by mouth daily 20  Yes Britt Garcia MD   spironolactone (ALDACTONE) 25 MG tablet Take 1 tablet by mouth daily 20  Yes Britt Garcia MD   budesonide-formoterol [facility]) 160-4.5 MCG/ACT AERO Inhale 2 puffs into the lungs 2 times daily 3/23/20  Yes Fide Calero DO   aspirin 81 MG chewable tablet Take 1 tablet by mouth daily 20   Britt Garcia MD montelukast (SINGULAIR) 10 MG tablet Take 1 tablet by mouth nightly 3/23/20   Ami Durham DO       Current Medications:    No current facility-administered medications for this encounter. Allergies:  Orange fruit [citrus]; Crestor [rosuvastatin calcium];  Loratadine; Norco [hydrocodone-acetaminophen]; Sulfa antibiotics; and Aspirin    Social History:    Social History     Socioeconomic History    Marital status: Legally      Spouse name: Not on file    Number of children: Not on file    Years of education: Not on file    Highest education level: Not on file   Occupational History    Occupation: disability   Social Needs    Financial resource strain: Not on file    Food insecurity     Worry: Not on file     Inability: Not on file   [employer] needs     Medical: Not on file     Non-medical: Not on file   Tobacco Use    Smoking status: Current Some Day Smoker     Packs/day: 0.25     Years: 30.00     Pack years: 7.50     Types: Cigarettes     Last attempt to quit: 2018     Years since quittin.3    Smokeless tobacco: Current User     Types: Snuff   Substance and Sexual Activity    Alcohol use: Not Currently     Alcohol/week: 3.0 standard drinks     Types: 3 Cans of beer per week     Comment: caffeine use: coffee 1 cup daily    Drug use: Yes     Types: Marijuana     Comment: last used 2018    Sexual activity: Not on file   Lifestyle    Physical activity     Days per week: Not on file     Minutes per session: Not on file    Stress: Not on file   Relationships    Social connections     Talks on phone: Not on file     Gets together: Not on file     Attends Mandaeism service: Not on file     Active member of club or organization: Not on file     Attends meetings of clubs or organizations: Not on file     Relationship status: Not on file    Intimate partner violence     Fear of current or ex partner: Not on file     Emotionally abused: Not on file     Physically abused: Not on file     Forced sexual activity: Not on file   Other Topics Concern    Not on file   Social History Narrative    Drinks 1-2 cups of coffee daily       Family History:   Family History   Problem Relation Age of Onset    High Blood Pressure Mother     Stroke Mother     High Blood Pressure Father     Stroke Father     Heart Disease Father     Pacemaker Father        REVIEW OF SYSTEMS:     · Constitutional: Denies fatigue, fevers, chills or night sweats  · Eyes: Denies visual changes or drainage  · ENT: Denies headaches or hearing loss. No mouth sores or sore throat. No epistaxis   · Cardiovascular: Denies chest pain, pressure or palpitations. No lower extremity swelling. · Respiratory: Denies JOHANSEN, cough, orthopnea or PND. No hemoptysis   · Gastrointestinal: Denies hematemesis or anorexia. No hematochezia or melena    · Genitourinary: Denies urgency, dysuria or hematuria. · Musculoskeletal: Denies gait disturbance, weakness or joint complaints  · Integumentary: Denies rash, hives or pruritis   · Neurological: Denies dizziness, headaches or seizures. No numbness or tingling  · Psychiatric: Denies anxiety or depression. · Endocrine: Denies temperature intolerance. No recent weight change. .  · Hematologic/Lymphatic: Denies abnormal bruising or bleeding. No swollen lymph nodes    PHYSICAL EXAM:   /60   Pulse 105   Temp 97.8 °F (36.6 °C)   Resp 20   Ht 5' 4\" (1.626 m)   Wt 105 lb (47.6 kg)   LMP 05/20/2014   SpO2 95%   BMI 18.02 kg/m²   CONST:  Well developed, well nourished who appears of stated age. Awake, alert and cooperative. No apparent distress. HEENT:   Head- Normocephalic, atraumatic   Eyes- Conjunctivae pink, anicteric  Throat- Oral mucosa pink and moist  Neck-  No stridor, trachea midline, no jugular venous distention. No carotid bruit. CHEST: Chest symmetrical and non-tender to palpation.  No accessory muscle use or intercostal retractions  RESPIRATORY: Lung sounds - clear throughout Chronic systolic CHF (congestive heart failure), NYHA class 3 (HCC)      2. ICD (implantable cardioverter-defibrillator) in place      3. Severe mitral regurgitation      4. Essential hypertension      5.  Nonischemic cardiomyopathy [facility])       Plan:  CLAUDIA      Electronically signed by Jason Martinez MD on 10/20/2020 at 12:45 PM

## 2020-10-20 NOTE — ANESTHESIA PRE PROCEDURE
Department of Anesthesiology  Preprocedure Note       Name:  Dnay Phelps   Age:  50 y.o.  :  1965                                          MRN:  [id number]         Date:  10/20/2020      Surgeon: * Surgery not found *    Procedure:     Medications prior to admission:   Prior to Admission medications    Medication Sig Start Date End Date Taking?  Authorizing Provider   aspirin 81 MG chewable tablet Take 1 tablet by mouth daily 20   Marj Snowden MD   atorvastatin (LIPITOR) 40 MG tablet Take 1 tablet by mouth daily 20   Marj Snowden MD   sacubitril-valsartan (ENTRESTO)  MG per tablet Take 1 tablet by mouth 2 times daily 20   Marj Snowden MD   metoprolol succinate (TOPROL XL) 100 MG extended release tablet Take 1 tablet by mouth daily 20   Marj Snowden MD   bumetanide (BUMEX) 2 MG tablet Take 1 tablet by mouth daily 20   Marj Snowden MD   spironolactone (ALDACTONE) 25 MG tablet Take 1 tablet by mouth daily 20   Marj Snowden MD   budesonide-formoterol [facility]) 160-4.5 MCG/ACT AERO Inhale 2 puffs into the lungs 2 times daily 3/23/20   Mónica Calero DO   montelukast (SINGULAIR) 10 MG tablet Take 1 tablet by mouth nightly 3/23/20   Rajni Valentine DO       Current medications:    Current Outpatient Medications   Medication Sig Dispense Refill    aspirin 81 MG chewable tablet Take 1 tablet by mouth daily 30 tablet 5    atorvastatin (LIPITOR) 40 MG tablet Take 1 tablet by mouth daily 30 tablet 5    sacubitril-valsartan (ENTRESTO)  MG per tablet Take 1 tablet by mouth 2 times daily 60 tablet 6    metoprolol succinate (TOPROL XL) 100 MG extended release tablet Take 1 tablet by mouth daily 30 tablet 6    bumetanide (BUMEX) 2 MG tablet Take 1 tablet by mouth daily 30 tablet 6    spironolactone (ALDACTONE) 25 MG tablet Take 1 tablet by mouth daily 30 tablet 6    budesonide-formoterol (SYMBICORT) 160-4.5 MCG/ACT AERO Inhale 2 puffs into the lungs 2 times daily 1 Inhaler 0    montelukast (SINGULAIR) 10 MG tablet Take 1 tablet by mouth nightly 30 tablet 1     Current Facility-Administered Medications   Medication Dose Route Frequency Provider Last Rate Last Dose    0.9 % sodium chloride infusion   Intravenous Once Deanna James MD           Allergies: Allergies   Allergen Reactions    Orange Fruit [Citrus] Hives and Itching     ONLY allergic to oranges - NOT any other citrus fruit. NO oranges or orange juice.  Crestor [Rosuvastatin Calcium]     Loratadine     Norco [Hydrocodone-Acetaminophen] Itching    Sulfa Antibiotics     Aspirin      Itching sometime     Patient takes 81mg Aspirin daily, but is unable to take 324 mg.         Problem List:    Patient Active Problem List   Diagnosis Code    Hyperlipidemia E78.5    Left ovarian cyst N83.202    CVA (cerebral vascular accident) I63.9    COPD (chronic obstructive pulmonary disease) ([facility] J44.9    Carpal tunnel syndrome on left G56.02    Suicide attempt by drug ingestion ([facility] T50.902A    Severe mitral regurgitation I34.0    Tobacco abuse Z72.0    Asthma J45.909    Allergic rhinosinusitis J30.9    History of irregular menstrual bleeding Z87.42    Anemia due to blood loss D50.0    History of MI (myocardial infarction) I25.2    Mass of right lobe of liver R16.0    Chronic systolic CHF (congestive heart failure), NYHA class 3 (HCC) I50.22    Non-rheumatic mitral regurgitation I34.0    Essential hypertension I10    ICD (implantable cardioverter-defibrillator) in place Z95.810    Left ventricular ejection fraction of 10% to 20% R09.89    Dyspnea R06.00    Coronary artery disease involving native coronary artery of native heart without angina pectoris I25.10    Nonischemic cardiomyopathy (HCC) I42.8    Blood type AB+ Z67.30    Acute decompensated heart failure (HCC) I50.9    Acute pulmonary edema (HCC) J81.0    Heart failure, left systolic, acute on chronic (HCC) I50.23       Past Medical History:        Diagnosis Date    Angina at rest [facility])     cath in  showed no CAD    Asthma     Blood type AB+ 4/3/2018    CAD (coronary artery disease)     Carpal tunnel syndrome on left     CH[facility]congestive heart failure) [facility]     COPD (chronic obstructive pulmonary disease) [facility]     [facility] 2009 and 2010. left parietal    GERD (gastroesophageal reflux disease)     HFrEF (heart failure with reduced ejection fraction) ([facility] 2020- echo- LVEF 20-25%, LA enlarged, moderate MR, mild TR, mild PH, LVDD: 6.7, RVDD: 2.2 (17- echo- LVEF 10%, stage III DD, severely dilated LA, appears L>R atrial shunt, mod TR, LVDD: 6.6,  RVDD: 2.3)    History of blood transfusion     Hyperlipidemia Diagnosed in . Dyslipidemia.  Hypertension diagnosed in     ICD (implantable cardioverter-defibrillator) in place 2018    Placed by Dr. Thea Monterroso.  Left ovarian cyst     Liver lesion 7/15    Moderate mitral regurgitation 7/27/15    mild-moderate    Nonischemic cardiomyopathy ([facility]     NSTEMI (non-ST elevated myocardial infarction) [facility] 4/10/15--[facility]    Suicide attempt by drug ingestion ([facility]     attempt in  OD on ultram    Tobacco abuse        Past Surgical History:        Procedure Laterality Date    CARDIAC CATHETERIZATION  2018    Dr. Derick Hernandez- [facility]    S[facility]    CARDIOVASCULAR STRESS TEST  2009 Normal      SECTION      COLONOSCOPY  10/2015    DIAGNOSTIC CARDIAC CATH LAB PROCEDURE  2007    [facility]: No significant CAD. Mild LVD with apical akinesis. EF 50%.     DIAGNOSTIC CARDIAC CATH LAB PROCEDURE  4/15    with PTCA to [facility] ob diana [email or web address]    ENDOSCOPY, COLON, DIAGNOSTIC      HYSTERECTOMY  10/1/15    at [facility] by Dr. Michael Zambrano PTCA  4/10/15    at [address] [place] [address] ECHOCARDIOGRAM  3/19/13 EF 65%, mild MR    TUBAL LIGATION         Social History:    Social History     Tobacco Use    Smoking status: Current Some Day Smoker     Packs/day: 0.25     Years: 30.00     Pack years: 7.50     Types: Cigarettes     Last attempt to quit: 2018     Years since quittin.3    Smokeless tobacco: Current User     Types: Snuff   Substance Use Topics    Alcohol use: Not Currently     Alcohol/week: 3.0 standard drinks     Types: 3 Cans of beer per week     Comment: caffeine use: coffee 1 cup daily                                Ready to quit: Not Answered  Counseling given: Not Answered      Vital Signs (Current): There were no vitals filed for this visit. BP Readings from Last 3 Encounters:   20 136/68   20 102/60   20 133/78       NPO Status:                                                                                 BMI:   Wt Readings from Last 3 Encounters:   20 104 lb 3.2 oz (47.3 kg)   20 102 lb (46.3 kg)   20 105 lb (47.6 kg)     There is no height or weight on file to calculate BMI.    CBC:   Lab Results   Component Value Date    WBC 7.4 2020    RBC 4.67 2020    HGB 12.2 2020    HCT 41.4 2020    MCV 88.7 2020    RDW 15.7 2020     2020       CMP:   Lab Results   Component Value Date     2020    K 3.9 2020    K 4.3 2020    CL 95 2020    CO2 32 2020    BUN 11 2020    CREATININE 0.8 2020    GFRAA >60 2020    LABGLOM >60 2020    GLUCOSE 115 2020    GLUCOSE 82 2011    PROT 6.5 2020    CALCIUM 9.1 2020    BILITOT 0.3 2020    ALKPHOS 105 2020    AST 12 2020    ALT 8 2020       POC Tests: No results for input(s): POCGLU, POCNA, POCK, POCCL, POCBUN, POCHEMO, POCHCT in the last 72 hours.     Coags:   Lab Results   Component Value Date    PROTIME 13.3 2018    PROTIME 11.8 12/28/2010    INR 1.2 08/26/2018    APTT 26.6 08/27/2018       HCG (If Applicable):   Lab Results   Component Value Date    PREGTESTUR negative 07/12/2015        ABGs: No results found for: PHART, PO2ART, SWA4NYR, MEG5FUR, BEART, P2RSYOUQ     Type & Screen (If Applicable):  No results found for: LABABO, LABRH    Drug/Infectious Status (If Applicable):  No results found for: HIV, HEPCAB    COVID-19 Screening (If Applicable):   Lab Results   Component Value Date    COVID19 Not Detected 10/15/2020         Anesthesia Evaluation  Patient summary reviewed and Nursing notes reviewed no history of anesthetic complications:   Airway: Mallampati: I  TM distance: >3 FB   Neck ROM: full  Mouth opening: > = 3 FB Dental: normal exam         Pulmonary: breath sounds clear to auscultation  (+) COPD:  shortness of breath:  asthma: current smoker                           Cardiovascular:    (+) hypertension:, angina:, pacemaker: AICD, past MI:, CAD:, CHF: systolic, hyperlipidemia      ECG reviewed  Rhythm: regular  Rate: normal  Echocardiogram reviewed  Stress test reviewed  Cleared by cardiology           ROS comment: Nonischemic cardiomyopathy    Stress test March 16, 2020    1. No reversible perfusion defect   2. Ejection fraction is 20 %. 3. Dilated LV. Severe global hypokinesis.           Echo March 16, 2020    Summary   Left ventricle is moderate-severely dilated. LVEDD 6.8 cm. LV systolic function is severely reduced. Ejection fraction is visually estimated at 20-25%. Abnormal diastolic function, indeterminate grade. There is severe global hypokinesis. Normal left ventricular wall thickness. Abnormal LV septal motion consistent with conduction disorder. Mildly dilated right ventricle. Right ventricle global systolic function is mildly reduced. The left atrium is massively dilated. Severe eccentric mitral regurgitation, directed posteriorly. Mild tricuspid regurgitation. Mild pulmonic regurgitation. Neuro/Psych:   (+) CVA:,             GI/Hepatic/Renal:   (+) GERD:,           Endo/Other: Negative Endo/Other ROS                    Abdominal:           Vascular: negative vascular ROS. Anesthesia Plan      MAC     ASA 4       Induction: intravenous. Anesthetic plan and risks discussed with patient. Plan discussed with CRNA.                   Lamont Garcia MD   10/20/2020

## 2020-10-20 NOTE — PROCEDURES
Preprocedure diagnosis: Severe MR  Procedures: CLAUDIA      Primary procedure  Written informed consent was obtained, the CLADUIA was performed under stable fasting condition. The patient was set up for monitoring of surface EKG and pulse oximetry continuously. Blood pressure was monitored and with automatic cuff measurements. Supplemental oxygen was administered. The procedure was performed under anesthesia by anesthesiology. .     Result: Mod to severe MR    Complication: None    Patient was monitored until awake and vitals remained stable. Monica Tomlinson M.D.   [id number] [facility]

## 2020-10-28 ENCOUNTER — TELEPHONE (OUTPATIENT)
Dept: CARDIOLOGY CLINIC | Age: 55
End: 2020-10-28

## 2020-10-28 NOTE — TELEPHONE ENCOUNTER
----- Message from Leticia Gomez MD sent at 10/23/2020 11:20 AM EDT -----  CLAUDIA was done yesterday by Dr. Delfina Harrington. She has at least 3+ primarily functional MR. She is medically optimized, previously seen by Dr. nAne Marie Henley. I had spoken to her about MitraClip in the office and she was agreeable. I think we can proceed with the rest of the work-up and schedule her for the OR.   Thanks

## 2020-12-04 ENCOUNTER — TELEPHONE (OUTPATIENT)
Dept: FAMILY MEDICINE CLINIC | Age: 55
End: 2020-12-04

## 2020-12-04 ENCOUNTER — HOSPITAL ENCOUNTER (EMERGENCY)
Age: 55
Discharge: LEFT BEFORE DISCHARGE | End: 2020-12-04
Attending: EMERGENCY MEDICINE
Payer: COMMERCIAL

## 2020-12-04 ENCOUNTER — TELEPHONE (OUTPATIENT)
Dept: ADMINISTRATIVE | Age: 55
End: 2020-12-04

## 2020-12-04 VITALS
TEMPERATURE: 98 F | HEIGHT: 64 IN | RESPIRATION RATE: 16 BRPM | BODY MASS INDEX: 18.27 KG/M2 | OXYGEN SATURATION: 98 % | SYSTOLIC BLOOD PRESSURE: 159 MMHG | HEART RATE: 65 BPM | WEIGHT: 107 LBS | DIASTOLIC BLOOD PRESSURE: 98 MMHG

## 2020-12-04 PROCEDURE — 99284 EMERGENCY DEPT VISIT MOD MDM: CPT

## 2020-12-04 PROCEDURE — 93005 ELECTROCARDIOGRAM TRACING: CPT | Performed by: EMERGENCY MEDICINE

## 2020-12-04 RX ORDER — SACUBITRIL AND VALSARTAN 97; 103 MG/1; MG/1
1 TABLET, FILM COATED ORAL 2 TIMES DAILY
Qty: 180 TABLET | Refills: 2 | Status: SHIPPED
Start: 2020-12-04 | End: 2021-01-13 | Stop reason: SDUPTHER

## 2020-12-04 RX ORDER — SPIRONOLACTONE 25 MG/1
25 TABLET ORAL DAILY
Qty: 90 TABLET | Refills: 2 | Status: SHIPPED
Start: 2020-12-04 | End: 2021-01-13 | Stop reason: SDUPTHER

## 2020-12-04 RX ORDER — ATORVASTATIN CALCIUM 40 MG/1
40 TABLET, FILM COATED ORAL DAILY
Qty: 30 TABLET | Refills: 5 | Status: CANCELLED | OUTPATIENT
Start: 2020-12-04

## 2020-12-04 RX ORDER — BUMETANIDE 2 MG/1
2 TABLET ORAL DAILY
Qty: 90 TABLET | Refills: 3 | Status: SHIPPED
Start: 2020-12-04 | End: 2021-01-13 | Stop reason: SDUPTHER

## 2020-12-04 RX ORDER — METOPROLOL SUCCINATE 100 MG/1
100 TABLET, EXTENDED RELEASE ORAL DAILY
Qty: 30 TABLET | Refills: 6 | Status: CANCELLED | OUTPATIENT
Start: 2020-12-04

## 2020-12-04 RX ORDER — SACUBITRIL AND VALSARTAN 97; 103 MG/1; MG/1
1 TABLET, FILM COATED ORAL 2 TIMES DAILY
Qty: 60 TABLET | Refills: 6 | Status: CANCELLED | OUTPATIENT
Start: 2020-12-04

## 2020-12-04 RX ORDER — BUMETANIDE 2 MG/1
2 TABLET ORAL DAILY
Qty: 30 TABLET | Refills: 6 | Status: CANCELLED | OUTPATIENT
Start: 2020-12-04

## 2020-12-04 RX ORDER — MONTELUKAST SODIUM 10 MG/1
10 TABLET ORAL NIGHTLY
Qty: 30 TABLET | Refills: 1 | Status: CANCELLED | OUTPATIENT
Start: 2020-12-04

## 2020-12-04 RX ORDER — METOPROLOL SUCCINATE 100 MG/1
100 TABLET, EXTENDED RELEASE ORAL DAILY
Qty: 90 TABLET | Refills: 2 | Status: SHIPPED
Start: 2020-12-04 | End: 2021-01-13 | Stop reason: SDUPTHER

## 2020-12-04 RX ORDER — BUDESONIDE AND FORMOTEROL FUMARATE DIHYDRATE 160; 4.5 UG/1; UG/1
2 AEROSOL RESPIRATORY (INHALATION) 2 TIMES DAILY
Qty: 6 INHALER | Refills: 3 | Status: SHIPPED
Start: 2020-12-04 | End: 2021-01-13 | Stop reason: SDUPTHER

## 2020-12-04 RX ORDER — SPIRONOLACTONE 25 MG/1
25 TABLET ORAL DAILY
Qty: 30 TABLET | Refills: 6 | Status: CANCELLED | OUTPATIENT
Start: 2020-12-04

## 2020-12-04 RX ORDER — ATORVASTATIN CALCIUM 40 MG/1
40 TABLET, FILM COATED ORAL DAILY
Qty: 90 TABLET | Refills: 2 | Status: SHIPPED
Start: 2020-12-04 | End: 2021-01-13 | Stop reason: SDUPTHER

## 2020-12-04 RX ORDER — MONTELUKAST SODIUM 10 MG/1
10 TABLET ORAL NIGHTLY
Qty: 90 TABLET | Refills: 2 | Status: SHIPPED
Start: 2020-12-04 | End: 2021-01-13 | Stop reason: SDUPTHER

## 2020-12-04 RX ORDER — BUDESONIDE AND FORMOTEROL FUMARATE DIHYDRATE 160; 4.5 UG/1; UG/1
2 AEROSOL RESPIRATORY (INHALATION) 2 TIMES DAILY
Qty: 1 INHALER | Refills: 0 | Status: CANCELLED | OUTPATIENT
Start: 2020-12-04

## 2020-12-04 RX ORDER — ASPIRIN 81 MG/1
81 TABLET, CHEWABLE ORAL DAILY
Qty: 30 TABLET | Refills: 5 | Status: CANCELLED | OUTPATIENT
Start: 2020-12-04

## 2020-12-04 RX ORDER — ASPIRIN 81 MG/1
81 TABLET, CHEWABLE ORAL DAILY
Qty: 90 TABLET | Refills: 2 | Status: SHIPPED
Start: 2020-12-04 | End: 2021-01-13 | Stop reason: SDUPTHER

## 2020-12-04 NOTE — ED NOTES
Pt's phone called at this time, this RN left a message instructing pt to return to ED to have IV removed      Tootie Dunlap RN  12/04/20 [id number]

## 2020-12-04 NOTE — ED NOTES
Bed: 24  Expected date: 12/4/20  Expected time:   Means of arrival: [facility]  Comments:  ROSARIO Phillip RN  12/04/20 [id number]

## 2020-12-04 NOTE — TELEPHONE ENCOUNTER
Thanks we will refill it you can cancel it from this encounter as epic does not let me sign those electronically being different offices.

## 2020-12-07 LAB
EKG ATRIAL RATE: 74 BPM
EKG P AXIS: 65 DEGREES
EKG P-R INTERVAL: 214 MS
EKG Q-T INTERVAL: 414 MS
EKG QRS DURATION: 114 MS
EKG QTC CALCULATION (BAZETT): 459 MS
EKG R AXIS: -77 DEGREES
EKG T AXIS: 84 DEGREES
EKG VENTRICULAR RATE: 74 BPM

## 2020-12-07 PROCEDURE — 93010 ELECTROCARDIOGRAM REPORT: CPT | Performed by: INTERNAL MEDICINE

## 2020-12-18 ENCOUNTER — TELEPHONE (OUTPATIENT)
Dept: CARDIOLOGY CLINIC | Age: 55
End: 2020-12-18

## 2020-12-18 NOTE — TELEPHONE ENCOUNTER
BOBBY Guzman, RN             Please call patient to schedule office visit with Dr. Ashleigh Santos per his request. Thanks!             Left voice message with apt date for Dr Ashleigh Santos and mailed apt     Will have Tariq call again to verify she got message and confirm apt (next week when she is back in office)

## 2020-12-23 NOTE — TELEPHONE ENCOUNTER
Called pt and got her voicemail as well. Left a message and let her know about the letter that was sent to her too.

## 2021-01-07 NOTE — TELEPHONE ENCOUNTER
No show. Mailed letter with request to contact office to set visit. DR [facility] request for f/u        Tariq, please call her next week to set visit.      Mo Marcano RN

## 2021-01-20 ENCOUNTER — OFFICE VISIT (OUTPATIENT)
Dept: FAMILY MEDICINE CLINIC | Age: 56
End: 2021-01-20
Payer: COMMERCIAL

## 2021-01-20 VITALS
WEIGHT: 102 LBS | TEMPERATURE: 96.9 F | DIASTOLIC BLOOD PRESSURE: 73 MMHG | HEART RATE: 52 BPM | SYSTOLIC BLOOD PRESSURE: 140 MMHG | OXYGEN SATURATION: 97 % | BODY MASS INDEX: 17.51 KG/M2

## 2021-01-20 DIAGNOSIS — E66.9 OBESITY, UNSPECIFIED CLASSIFICATION, UNSPECIFIED OBESITY TYPE, UNSPECIFIED WHETHER SERIOUS COMORBIDITY PRESENT: ICD-10-CM

## 2021-01-20 DIAGNOSIS — R52 ACHES: Primary | ICD-10-CM

## 2021-01-20 DIAGNOSIS — Z12.39 ENCOUNTER FOR SCREENING FOR MALIGNANT NEOPLASM OF BREAST, UNSPECIFIED SCREENING MODALITY: ICD-10-CM

## 2021-01-20 DIAGNOSIS — Z86.39 HISTORY OF VITAMIN D DEFICIENCY: ICD-10-CM

## 2021-01-20 DIAGNOSIS — K59.00 CONSTIPATION, UNSPECIFIED CONSTIPATION TYPE: ICD-10-CM

## 2021-01-20 DIAGNOSIS — R52 ACHES: ICD-10-CM

## 2021-01-20 DIAGNOSIS — R10.9 ABDOMINAL PAIN, UNSPECIFIED ABDOMINAL LOCATION: ICD-10-CM

## 2021-01-20 DIAGNOSIS — Z86.79 HISTORY OF HEART FAILURE: ICD-10-CM

## 2021-01-20 DIAGNOSIS — H91.90 HEARING DIFFICULTY, UNSPECIFIED LATERALITY: Primary | ICD-10-CM

## 2021-01-20 DIAGNOSIS — Z23 NEEDS FLU SHOT: ICD-10-CM

## 2021-01-20 LAB
HCT VFR BLD CALC: 48.4 % (ref 34–48)
HEMOGLOBIN: 15.3 G/DL (ref 11.5–15.5)
MCH RBC QN AUTO: 28.1 PG (ref 26–35)
MCHC RBC AUTO-ENTMCNC: 31.6 % (ref 32–34.5)
MCV RBC AUTO: 89 FL (ref 80–99.9)
PDW BLD-RTO: 13.2 FL (ref 11.5–15)
PLATELET # BLD: 519 E9/L (ref 130–450)
PMV BLD AUTO: 9.8 FL (ref 7–12)
RBC # BLD: 5.44 E12/L (ref 3.5–5.5)
WBC # BLD: 6.8 E9/L (ref 4.5–11.5)

## 2021-01-20 PROCEDURE — 3017F COLORECTAL CA SCREEN DOC REV: CPT | Performed by: FAMILY MEDICINE

## 2021-01-20 PROCEDURE — G8482 FLU IMMUNIZE ORDER/ADMIN: HCPCS | Performed by: FAMILY MEDICINE

## 2021-01-20 PROCEDURE — 99212 OFFICE O/P EST SF 10 MIN: CPT | Performed by: STUDENT IN AN ORGANIZED HEALTH CARE EDUCATION/TRAINING PROGRAM

## 2021-01-20 PROCEDURE — 90686 IIV4 VACC NO PRSV 0.5 ML IM: CPT

## 2021-01-20 PROCEDURE — 99213 OFFICE O/P EST LOW 20 MIN: CPT | Performed by: STUDENT IN AN ORGANIZED HEALTH CARE EDUCATION/TRAINING PROGRAM

## 2021-01-20 PROCEDURE — 36415 COLL VENOUS BLD VENIPUNCTURE: CPT | Performed by: FAMILY MEDICINE

## 2021-01-20 PROCEDURE — G8427 DOCREV CUR MEDS BY ELIG CLIN: HCPCS | Performed by: FAMILY MEDICINE

## 2021-01-20 PROCEDURE — G8419 CALC BMI OUT NRM PARAM NOF/U: HCPCS | Performed by: FAMILY MEDICINE

## 2021-01-20 PROCEDURE — 4004F PT TOBACCO SCREEN RCVD TLK: CPT | Performed by: FAMILY MEDICINE

## 2021-01-20 PROCEDURE — 6360000002 HC RX W HCPCS

## 2021-01-20 PROCEDURE — G0008 ADMIN INFLUENZA VIRUS VAC: HCPCS

## 2021-01-20 RX ORDER — POLYETHYLENE GLYCOL 3350 17 G/17G
17 POWDER, FOR SOLUTION ORAL DAILY
Qty: 510 G | Refills: 0 | Status: SHIPPED | OUTPATIENT
Start: 2021-01-20 | End: 2021-02-19

## 2021-01-20 RX ORDER — ACETAMINOPHEN 500 MG
500 TABLET ORAL 2 TIMES DAILY PRN
Qty: 120 TABLET | Refills: 0 | Status: SHIPPED
Start: 2021-01-20 | End: 2022-05-18 | Stop reason: SDUPTHER

## 2021-01-20 ASSESSMENT — ENCOUNTER SYMPTOMS
CONSTIPATION: 1
BACK PAIN: 1
EYE ITCHING: 0
NAUSEA: 0
BLOOD IN STOOL: 0
SORE THROAT: 0
CHEST TIGHTNESS: 0
ABDOMINAL PAIN: 1
SHORTNESS OF BREATH: 0
DIARRHEA: 0
EYE DISCHARGE: 0
ABDOMINAL DISTENTION: 0
COLOR CHANGE: 0

## 2021-01-20 ASSESSMENT — PATIENT HEALTH QUESTIONNAIRE - PHQ9
SUM OF ALL RESPONSES TO PHQ9 QUESTIONS 1 & 2: 0
SUM OF ALL RESPONSES TO PHQ QUESTIONS 1-9: 0
1. LITTLE INTEREST OR PLEASURE IN DOING THINGS: 0

## 2021-01-20 NOTE — PROGRESS NOTES
CC:  Abdominal Pain/Aches in lower extremities    HPI:  50 y.o. woman presents for:    PMHx of CHF(25-30%), CVA    Abdominal Pain  Cramping of abdomen  Cramping is relieved by a bowel movement  Bowel movement every 2-3 days  Also has days with extreme constipation  Stool is small pellet-like amount  Associated with nausea, takes pepto-bismo with moderate relief of symptoms  Appetite loss 1 year  5 lb weight loss in one year  Social: .25 pack per day/30 years    Aches in thighs  Remote history of sickle cell trait  Cold weather triggers the aches in thighs  5/10 at rest achy  10/10 on some days  Tried getting in tub with hot salts, with mild relief  FHx of sickle cell trait  No fevers, chills, swelling in joints, no erythema.     ESR: 25 (March 2020)        Patient Active Problem List    Diagnosis Date Noted    Heart failure, left systolic, acute on chronic ([address] 03/16/2020    Acute decompensated heart failure ([address] 12/26/2019    Acute pulmonary edema ([address] 12/26/2019    Blood type AB+ 04/03/2018    Nonischemic cardiomyopathy ([address]     Coronary artery disease involving native coronary artery of native heart without angina pectoris 02/14/2018    Dyspnea 02/02/2018    Left ventricular ejection fraction of 10% to 20% 12/22/2017    ICD (implantable cardioverter-defibrillator) in place 08/23/2016    Chronic systolic CHF (congestive heart failure), NYHA class 3 ([address] 07/25/2016    Non-rheumatic mitral regurgitation 07/25/2016    Essential hypertension 07/25/2016    Mass of right lobe of liver 07/27/2015    History of MI (myocardial infarction) 07/26/2015    History of irregular menstrual bleeding 05/16/2014    Anemia due to blood loss 05/16/2014    Allergic rhinosinusitis 08/27/2013    Asthma     Severe mitral regurgitation 05/10/2013    Tobacco abuse 05/10/2013    Suicide attempt by drug ingestion ([address] 11/27/2012    Hyperlipidemia     Left ovarian cyst     CVA (cerebral vascular accident)  COPD (chronic obstructive pulmonary disease) (HCC)     Carpal tunnel syndrome on left        Past Medical History:   Diagnosis Date    Angina at rest [facility])     cath in 2007 showed no CAD    Asthma     Blood type AB+ 4/3/2018    CAD (coronary artery disease)     Carpal tunnel syndrome on left     CHF (congestive heart failure) (HCC)     [facility]     CVA (cerebral vascular accident) ([facility] 12/2009 and 12/2010. left parietal    GERD (gastroesophageal reflux disease)     HFrEF (heart failure with reduced ejection fraction) ([facility] 01/14/2020 8/26/19- echo- LVEF 20-25%, LA enlarged, moderate MR, mild TR, mild PH, LVDD: 6.7, RVDD: 2.2 (12/9/17- echo- LVEF 10%, stage III DD, severely dilated LA, appears L>R atrial shunt, mod TR, LVDD: 6.6,  RVDD: 2.3)    History of blood transfusion     Hyperlipidemia Diagnosed in 2007. Dyslipidemia.  Hypertension diagnosed in 2007    ICD (implantable cardioverter-defibrillator) in place 4/5/2018    Placed by Dr. Ellie Shrestha.     Left ovarian cyst     Liver lesion 7/15    Moderate mitral regurgitation 7/27/15    mild-moderate    Nonischemic cardiomyopathy [facility]     NSTEMI (non-ST elevated myocardial infarction) ([facility] 4/10/15--[facility]    Suicide attempt by drug ingestion ([facility]     attempt in 2007 OD on ultram    Tobacco abuse        Current Outpatient Medications on File Prior to Visit   Medication Sig Dispense Refill    aspirin 81 MG chewable tablet Take 1 tablet by mouth daily 90 tablet 2    atorvastatin (LIPITOR) 40 MG tablet Take 1 tablet by mouth daily 90 tablet 2    sacubitril-valsartan (ENTRESTO)  MG per tablet Take 1 tablet by mouth 2 times daily 180 tablet 2    metoprolol succinate (TOPROL XL) 100 MG extended release tablet Take 1 tablet by mouth daily 90 tablet 2    bumetanide (BUMEX) 2 MG tablet Take 1 tablet by mouth daily 90 tablet 3  spironolactone (ALDACTONE) 25 MG tablet Take 1 tablet by mouth daily 90 tablet 2    budesonide-formoterol (SYMBICORT) 160-4.5 MCG/ACT AERO Inhale 2 puffs into the lungs 2 times daily 6 Inhaler 3    montelukast (SINGULAIR) 10 MG tablet Take 1 tablet by mouth nightly 90 tablet 2     No current facility-administered medications on file prior to visit. Allergies   Allergen Reactions    Orange Fruit [Citrus] Hives and Itching     ONLY allergic to oranges - NOT any other citrus fruit. NO oranges or orange juice.  Crestor [Rosuvastatin Calcium]     Loratadine     Norco [Hydrocodone-Acetaminophen] Itching    Sulfa Antibiotics     Aspirin      Itching sometime     Patient takes 81mg Aspirin daily, but is unable to take 324 mg. Family History   Problem Relation Age of Onset    High Blood Pressure Mother     Stroke Mother     High Blood Pressure Father     Stroke Father     Heart Disease Father     Pacemaker Father        Past Surgical History:   Procedure Laterality Date    CARDIAC CATHETERIZATION  2018    Dr. Sai Engle- Clean coronaries   [facility]  2016    SICD    CARDIOVASCULAR STRESS TEST  2009 Normal      SECTION      COLONOSCOPY  10/2015    DIAGNOSTIC CARDIAC CATH LAB PROCEDURE  2007    [facility]: No significant CAD. Mild LVD with apical akinesis. EF 50%.     DIAGNOSTIC CARDIAC CATH LAB PROCEDURE  4/15    with PTCA to 1st ob [email or web address]    ENDOSCOPY, COLON, DIAGNOSTIC      HYSTERECTOMY  10/1/15    at [facility] by Dr. Rudy Alonso PTCA  4/10/15    at [address] [place] [address] ECHOCARDIOGRAM  3/19/13    EF 65%, mild MR    TUBAL LIGATION         Social History     Tobacco Use    Smoking status: Current Some Day Smoker     Packs/day: 0.25     Years: 30.00     Pack years: 7.50     Types: Cigarettes     Last attempt to quit: 2018     Years since quittin.5    Smokeless tobacco: Current User Types: Snuff   Substance Use Topics    Alcohol use: Not Currently     Alcohol/week: 3.0 standard drinks     Types: 3 Cans of beer per week     Comment: caffeine use: coffee 1 cup daily    Drug use: Yes     Types: Marijuana     Comment: last used 8/2018       ROS:    Review of Systems   Constitutional: Positive for appetite change. Negative for activity change, chills and fever. HENT: Negative for congestion and sore throat. Eyes: Negative for discharge and itching. Respiratory: Negative for chest tightness and shortness of breath. Cardiovascular: Negative for chest pain and leg swelling. Gastrointestinal: Positive for abdominal pain and constipation. Negative for abdominal distention, blood in stool, diarrhea and nausea. Genitourinary: Negative for difficulty urinating and dysuria. Musculoskeletal: Positive for back pain and myalgias. Negative for gait problem and neck pain. Skin: Negative for color change and pallor. Neurological: Negative for dizziness, numbness and headaches. Psychiatric/Behavioral: Negative for agitation and confusion. Objective:    VS:  Blood pressure (!) 140/73, pulse 52, temperature 96.9 °F (36.1 °C), temperature source Temporal, weight 102 lb (46.3 kg), last menstrual period 05/20/2014, SpO2 97 %, not currently breastfeeding. Physical Exam   Constitutional: She appears well-developed. Cardiovascular: Normal rate, regular rhythm and intact distal pulses. Exam reveals no gallop and no friction rub. No murmur heard. Pulmonary/Chest: Effort normal and breath sounds normal. She has no wheezes. She has no rales. Abdominal: Soft. Bowel sounds are normal. She exhibits no distension. There is no abdominal tenderness. Neurological: She is alert. Skin: Skin is warm and dry. Vitals reviewed. Assessment:   Diagnosis Orders   1.  Aches  CBC    COMPREHENSIVE METABOLIC PANEL    LIPID PANEL    Vitamin D 25 Hydroxy    TSH    SEDIMENTATION RATE    CK acetaminophen (TYLENOL) 500 MG tablet   2. Abdominal pain, unspecified abdominal location  polyethylene glycol (GLYCOLAX) 17 GM/SCOOP powder    LIPID PANEL    TSH    [address], MD, General Surgery, [facility]   3. Constipation, unspecified constipation type  polyethylene glycol (GLYCOLAX) 17 GM/SCOOP powder    LIPID PANEL    TSH    Charo Duque MD, General Surgery, [facility]   4. Needs flu shot  INFLUENZA, QUADV, 3 YRS AND OLDER, IM PF, PREFILL SYR OR SDV, 0.5ML (AFLURIA QUADV, PF)   5. Encounter for screening for malignant neoplasm of breast, unspecified screening modality  LEONARDO DIGITAL DIAGNOSTIC W OR WO CAD BILATERAL       Plans:  As above. RTO     Please see Patient Instructions for further counseling and information given. Advised to please be adherent to the treatment plans discussed today, and please call with any questions or concerns, letting the office know of any reasons that the plans may not be followed. The risks of untreated conditions include worsening illness, injury, disability, and possibly, death. Please call if symptoms change in any way, worsen, or fail to completely resolve, as this could necessitate a change to treatment plans. Patient and/or caregiver expressed understanding. Indications and proper use of medication(s) reviewed. Potential side-effects and risks of medication(s) also explained. Patient and/or caregiver was instructed to call if any new symptoms develop prior to next visit. Health risk factors discussed and addressed.

## 2021-01-20 NOTE — PROGRESS NOTES
61-year-old woman who presents for bilateral thigh pain, abdominal pain    Abdominal painworsening over past 1 year. Gets constipation, cramping, nausea. Some relief of the pain with having a bowel movement. Stools are pellet-like. No blood in the stool, no black stools. Smokes cigarettes. No known family history of colon cancer appetite has been poor for more than a year. 5 pound weight loss    Bilateral thigh painworse over the last 3 months. Was told she had the sickle cell trait as a child, has not been tested. Cold weather aggravates the pain. It is achy 5 out of 10 at rest, 10 out of 10 on days when it is really bad. No alleviating factors  FAVIAN done more than a year ago, was negative for PAD    Agreeable to a flu shot and a mammogram      Blood pressure (!) 140/73, pulse 52, temperature 96.9 °F (36.1 °C), temperature source Temporal, weight 102 lb (46.3 kg), last menstrual period 05/20/2014, SpO2 97 %, not currently breastfeeding. HEENT WNL     Heart regular    Lungs clear    abd non-tender      bilateral quadriceps tender to palpation, no swelling no erythema range of motion intact strength intact no edema    Pulses intact     Abdominal painGeneral surgery referral, CBC, CMP, polyethylene glycol, information given on increasing fiber and water intake. TSH    Bilateral thigh painexercise recommended, increasing weight,. Work-up with ESR, TSH, CK. No findings at this time that would suggest radiologic investigation. Flu shot and mammogram    Attending Physician Statement  I have discussed the case, including pertinent history and exam findings with the resident. I agree with the documented assessment and plan.

## 2021-01-20 NOTE — PATIENT INSTRUCTIONS
· Schedule time each day for a bowel movement. A daily routine may help. Take your time having your bowel movement. · Support your feet with a small step stool when you sit on the toilet. This helps flex your hips and places your pelvis in a squatting position. · Your doctor may recommend an over-the-counter laxative to relieve your constipation. Examples are Milk of Magnesia and MiraLax. Read and follow all instructions on the label. Do not use laxatives on a long-term basis. When should you call for help? Call your doctor now or seek immediate medical care if:    · You have new or worse belly pain.     · You have new or worse nausea or vomiting.     · You have blood in your stools. Watch closely for changes in your health, and be sure to contact your doctor if:    · Your constipation is getting worse.     · You do not get better as expected. Where can you learn more? Go to https://159.compeClipik.docplanner. org and sign in to your [employer] account. Enter 21 [phone] in the [employer] box to learn more about \"Constipation: Care Instructions. \"     If you do not have an account, please click on the \"Sign Up Now\" link. Current as of: June 26, 2019               Content Version: 12.6  © 6777-8129 [employer]. Care instructions adapted under license by [facility] ([facility]). If you have questions about a medical condition or this instruction, always ask your healthcare professional. [place] [address] any warranty or liability for your use of this information.

## 2021-01-21 LAB
ALBUMIN SERPL-MCNC: 4.6 G/DL (ref 3.5–5.2)
ALP BLD-CCNC: 99 U/L (ref 35–104)
ALT SERPL-CCNC: 13 U/L (ref 0–32)
ANION GAP SERPL CALCULATED.3IONS-SCNC: 13 MMOL/L (ref 7–16)
AST SERPL-CCNC: 27 U/L (ref 0–31)
BILIRUB SERPL-MCNC: 0.3 MG/DL (ref 0–1.2)
BUN BLDV-MCNC: 14 MG/DL (ref 6–20)
CALCIUM SERPL-MCNC: 10.4 MG/DL (ref 8.6–10.2)
CHLORIDE BLD-SCNC: 99 MMOL/L (ref 98–107)
CHOLESTEROL, TOTAL: 254 MG/DL (ref 0–199)
CO2: 30 MMOL/L (ref 22–29)
CREAT SERPL-MCNC: 0.8 MG/DL (ref 0.5–1)
GFR AFRICAN AMERICAN: >60
GFR NON-AFRICAN AMERICAN: >60 ML/MIN/1.73
GLUCOSE BLD-MCNC: 108 MG/DL (ref 74–99)
HDLC SERPL-MCNC: 76 MG/DL
LDL CHOLESTEROL CALCULATED: 162 MG/DL (ref 0–99)
POTASSIUM SERPL-SCNC: 5 MMOL/L (ref 3.5–5)
SEDIMENTATION RATE, ERYTHROCYTE: 25 MM/HR (ref 0–20)
SODIUM BLD-SCNC: 142 MMOL/L (ref 132–146)
TOTAL CK: 79 U/L (ref 20–180)
TOTAL PROTEIN: 8 G/DL (ref 6.4–8.3)
TRIGL SERPL-MCNC: 81 MG/DL (ref 0–149)
TSH SERPL DL<=0.05 MIU/L-ACNC: 0.87 UIU/ML (ref 0.27–4.2)
VITAMIN D 25-HYDROXY: 11 NG/ML (ref 30–100)
VLDLC SERPL CALC-MCNC: 16 MG/DL

## 2021-01-22 DIAGNOSIS — Z12.31 ENCOUNTER FOR SCREENING MAMMOGRAM FOR MALIGNANT NEOPLASM OF BREAST: Primary | ICD-10-CM

## 2021-01-27 ENCOUNTER — TELEPHONE (OUTPATIENT)
Dept: SURGERY | Age: 56
End: 2021-01-27

## 2021-01-27 NOTE — TELEPHONE ENCOUNTER
2nd attempt to make an appt. For a referral from Dr. Joie Ovalle. Patient stated she cannot come to any of the three office locations because no transportation. She stated she would get in touch with Dr. Joie Ovalle or her PCP to see if she can get a doctor that is closer to her.     Electronically signed by Kandy Ruiz on 1/27/21 at 12:53 PM EST

## 2021-02-18 DIAGNOSIS — E55.9 VITAMIN D DEFICIENCY: Primary | ICD-10-CM

## 2021-02-18 RX ORDER — ERGOCALCIFEROL (VITAMIN D2) 10 MCG
800 TABLET ORAL DAILY
Qty: 90 TABLET | Refills: 1 | Status: SHIPPED
Start: 2021-02-18 | End: 2022-05-18 | Stop reason: SDUPTHER

## 2021-04-30 ENCOUNTER — FOLLOWUP TELEPHONE ENCOUNTER (OUTPATIENT)
Dept: AUDIOLOGY | Age: 56
End: 2021-04-30

## 2021-06-13 ENCOUNTER — HOSPITAL ENCOUNTER (OUTPATIENT)
Age: 56
Setting detail: OBSERVATION
Discharge: HOME | End: 2021-06-14
Attending: EMERGENCY MEDICINE | Admitting: FAMILY MEDICINE
Payer: COMMERCIAL

## 2021-06-13 DIAGNOSIS — R55 SYNCOPE AND COLLAPSE: Primary | ICD-10-CM

## 2021-06-13 PROCEDURE — 99283 EMERGENCY DEPT VISIT LOW MDM: CPT

## 2021-06-13 ASSESSMENT — PAIN DESCRIPTION - FREQUENCY: FREQUENCY: CONTINUOUS

## 2021-06-13 ASSESSMENT — PAIN DESCRIPTION - PAIN TYPE: TYPE: ACUTE PAIN

## 2021-06-13 ASSESSMENT — PAIN DESCRIPTION - DESCRIPTORS: DESCRIPTORS: ACHING

## 2021-06-13 ASSESSMENT — PAIN SCALES - WONG BAKER: WONGBAKER_NUMERICALRESPONSE: 2

## 2021-06-13 ASSESSMENT — PAIN DESCRIPTION - LOCATION: LOCATION: ABDOMEN

## 2021-06-14 ENCOUNTER — APPOINTMENT (OUTPATIENT)
Dept: GENERAL RADIOLOGY | Age: 56
End: 2021-06-14
Payer: COMMERCIAL

## 2021-06-14 ENCOUNTER — APPOINTMENT (OUTPATIENT)
Dept: CT IMAGING | Age: 56
End: 2021-06-14
Payer: COMMERCIAL

## 2021-06-14 VITALS
OXYGEN SATURATION: 97 % | HEART RATE: 56 BPM | BODY MASS INDEX: 17.42 KG/M2 | HEIGHT: 64 IN | WEIGHT: 102 LBS | DIASTOLIC BLOOD PRESSURE: 78 MMHG | TEMPERATURE: 98.5 F | SYSTOLIC BLOOD PRESSURE: 127 MMHG | RESPIRATION RATE: 16 BRPM

## 2021-06-14 PROBLEM — R55 SYNCOPE AND COLLAPSE: Status: ACTIVE | Noted: 2021-06-14

## 2021-06-14 PROBLEM — R55 SYNCOPE AND COLLAPSE: Status: RESOLVED | Noted: 2021-06-14 | Resolved: 2021-06-14

## 2021-06-14 LAB
ALBUMIN SERPL-MCNC: 4.3 G/DL (ref 3.5–5.2)
ALP BLD-CCNC: 106 U/L (ref 35–104)
ALT SERPL-CCNC: 12 U/L (ref 0–32)
AMPHETAMINE SCREEN, URINE: NOT DETECTED
ANION GAP SERPL CALCULATED.3IONS-SCNC: 12 MMOL/L (ref 7–16)
ANION GAP SERPL CALCULATED.3IONS-SCNC: 12 MMOL/L (ref 7–16)
AST SERPL-CCNC: 20 U/L (ref 0–31)
BARBITURATE SCREEN URINE: NOT DETECTED
BASOPHILS ABSOLUTE: 0.09 E9/L (ref 0–0.2)
BASOPHILS RELATIVE PERCENT: 0.8 % (ref 0–2)
BENZODIAZEPINE SCREEN, URINE: NOT DETECTED
BILIRUB SERPL-MCNC: <0.2 MG/DL (ref 0–1.2)
BUN BLDV-MCNC: 22 MG/DL (ref 6–20)
BUN BLDV-MCNC: 22 MG/DL (ref 6–20)
CALCIUM SERPL-MCNC: 9.6 MG/DL (ref 8.6–10.2)
CALCIUM SERPL-MCNC: 9.8 MG/DL (ref 8.6–10.2)
CANNABINOID SCREEN URINE: POSITIVE
CHLORIDE BLD-SCNC: 98 MMOL/L (ref 98–107)
CHLORIDE BLD-SCNC: 99 MMOL/L (ref 98–107)
CO2: 26 MMOL/L (ref 22–29)
CO2: 27 MMOL/L (ref 22–29)
COCAINE METABOLITE SCREEN URINE: NOT DETECTED
CREAT SERPL-MCNC: 0.8 MG/DL (ref 0.5–1)
CREAT SERPL-MCNC: 0.9 MG/DL (ref 0.5–1)
EOSINOPHILS ABSOLUTE: 0.38 E9/L (ref 0.05–0.5)
EOSINOPHILS RELATIVE PERCENT: 3.6 % (ref 0–6)
FENTANYL SCREEN, URINE: NOT DETECTED
GFR AFRICAN AMERICAN: >60
GFR AFRICAN AMERICAN: >60
GFR NON-AFRICAN AMERICAN: >60 ML/MIN/1.73
GFR NON-AFRICAN AMERICAN: >60 ML/MIN/1.73
GLUCOSE BLD-MCNC: 112 MG/DL (ref 74–99)
GLUCOSE BLD-MCNC: 131 MG/DL (ref 74–99)
HBA1C MFR BLD: 5.5 % (ref 4–5.6)
HCT VFR BLD CALC: 43.8 % (ref 34–48)
HEMOGLOBIN: 14.5 G/DL (ref 11.5–15.5)
IMMATURE GRANULOCYTES #: 0.06 E9/L
IMMATURE GRANULOCYTES %: 0.6 % (ref 0–5)
LACTIC ACID: 1.2 MMOL/L (ref 0.5–2.2)
LIPASE: 16 U/L (ref 13–60)
LYMPHOCYTES ABSOLUTE: 2.04 E9/L (ref 1.5–4)
LYMPHOCYTES RELATIVE PERCENT: 19.1 % (ref 20–42)
Lab: ABNORMAL
MCH RBC QN AUTO: 28.7 PG (ref 26–35)
MCHC RBC AUTO-ENTMCNC: 33.1 % (ref 32–34.5)
MCV RBC AUTO: 86.7 FL (ref 80–99.9)
METHADONE SCREEN, URINE: NOT DETECTED
MONOCYTES ABSOLUTE: 0.77 E9/L (ref 0.1–0.95)
MONOCYTES RELATIVE PERCENT: 7.2 % (ref 2–12)
NEUTROPHILS ABSOLUTE: 7.36 E9/L (ref 1.8–7.3)
NEUTROPHILS RELATIVE PERCENT: 68.7 % (ref 43–80)
OPIATE SCREEN URINE: NOT DETECTED
OXYCODONE URINE: NOT DETECTED
PDW BLD-RTO: 13.8 FL (ref 11.5–15)
PHENCYCLIDINE SCREEN URINE: NOT DETECTED
PLATELET # BLD: 291 E9/L (ref 130–450)
PMV BLD AUTO: 9.7 FL (ref 7–12)
POTASSIUM REFLEX MAGNESIUM: 3.6 MMOL/L (ref 3.5–5)
POTASSIUM REFLEX MAGNESIUM: 3.7 MMOL/L (ref 3.5–5)
RBC # BLD: 5.05 E12/L (ref 3.5–5.5)
REASON FOR REJECTION: NORMAL
REJECTED TEST: NORMAL
SODIUM BLD-SCNC: 136 MMOL/L (ref 132–146)
SODIUM BLD-SCNC: 138 MMOL/L (ref 132–146)
TOTAL PROTEIN: 7.1 G/DL (ref 6.4–8.3)
TROPONIN, HIGH SENSITIVITY: 7 NG/L (ref 0–9)
WBC # BLD: 10.7 E9/L (ref 4.5–11.5)

## 2021-06-14 PROCEDURE — 80307 DRUG TEST PRSMV CHEM ANLYZR: CPT

## 2021-06-14 PROCEDURE — 93005 ELECTROCARDIOGRAM TRACING: CPT | Performed by: EMERGENCY MEDICINE

## 2021-06-14 PROCEDURE — 84484 ASSAY OF TROPONIN QUANT: CPT

## 2021-06-14 PROCEDURE — 80048 BASIC METABOLIC PNL TOTAL CA: CPT

## 2021-06-14 PROCEDURE — 83605 ASSAY OF LACTIC ACID: CPT

## 2021-06-14 PROCEDURE — G0378 HOSPITAL OBSERVATION PER HR: HCPCS

## 2021-06-14 PROCEDURE — 6360000002 HC RX W HCPCS: Performed by: FAMILY MEDICINE

## 2021-06-14 PROCEDURE — 2580000003 HC RX 258: Performed by: FAMILY MEDICINE

## 2021-06-14 PROCEDURE — 80053 COMPREHEN METABOLIC PANEL: CPT

## 2021-06-14 PROCEDURE — 99215 OFFICE O/P EST HI 40 MIN: CPT | Performed by: INTERNAL MEDICINE

## 2021-06-14 PROCEDURE — 71045 X-RAY EXAM CHEST 1 VIEW: CPT

## 2021-06-14 PROCEDURE — 72125 CT NECK SPINE W/O DYE: CPT

## 2021-06-14 PROCEDURE — 99222 1ST HOSP IP/OBS MODERATE 55: CPT | Performed by: FAMILY MEDICINE

## 2021-06-14 PROCEDURE — 6370000000 HC RX 637 (ALT 250 FOR IP): Performed by: FAMILY MEDICINE

## 2021-06-14 PROCEDURE — 83690 ASSAY OF LIPASE: CPT

## 2021-06-14 PROCEDURE — 70450 CT HEAD/BRAIN W/O DYE: CPT

## 2021-06-14 PROCEDURE — 85025 COMPLETE CBC W/AUTO DIFF WBC: CPT

## 2021-06-14 PROCEDURE — 83036 HEMOGLOBIN GLYCOSYLATED A1C: CPT

## 2021-06-14 RX ORDER — ASPIRIN 81 MG/1
81 TABLET, CHEWABLE ORAL DAILY
Status: DISCONTINUED | OUTPATIENT
Start: 2021-06-14 | End: 2021-06-14 | Stop reason: HOSPADM

## 2021-06-14 RX ORDER — METOPROLOL SUCCINATE 50 MG/1
100 TABLET, EXTENDED RELEASE ORAL DAILY
Status: DISCONTINUED | OUTPATIENT
Start: 2021-06-14 | End: 2021-06-14 | Stop reason: HOSPADM

## 2021-06-14 RX ORDER — SODIUM CHLORIDE 0.9 % (FLUSH) 0.9 %
5-40 SYRINGE (ML) INJECTION PRN
Status: DISCONTINUED | OUTPATIENT
Start: 2021-06-14 | End: 2021-06-14 | Stop reason: HOSPADM

## 2021-06-14 RX ORDER — MONTELUKAST SODIUM 10 MG/1
10 TABLET ORAL NIGHTLY
Status: DISCONTINUED | OUTPATIENT
Start: 2021-06-14 | End: 2021-06-14 | Stop reason: HOSPADM

## 2021-06-14 RX ORDER — BUMETANIDE 1 MG/1
2 TABLET ORAL DAILY
Status: DISCONTINUED | OUTPATIENT
Start: 2021-06-14 | End: 2021-06-14 | Stop reason: HOSPADM

## 2021-06-14 RX ORDER — SODIUM CHLORIDE 0.9 % (FLUSH) 0.9 %
5-40 SYRINGE (ML) INJECTION EVERY 12 HOURS SCHEDULED
Status: DISCONTINUED | OUTPATIENT
Start: 2021-06-14 | End: 2021-06-14 | Stop reason: HOSPADM

## 2021-06-14 RX ORDER — BUDESONIDE AND FORMOTEROL FUMARATE DIHYDRATE 160; 4.5 UG/1; UG/1
2 AEROSOL RESPIRATORY (INHALATION) 2 TIMES DAILY
Status: DISCONTINUED | OUTPATIENT
Start: 2021-06-14 | End: 2021-06-14 | Stop reason: SDUPTHER

## 2021-06-14 RX ORDER — ARFORMOTEROL TARTRATE 15 UG/2ML
15 SOLUTION RESPIRATORY (INHALATION) 2 TIMES DAILY
Status: DISCONTINUED | OUTPATIENT
Start: 2021-06-14 | End: 2021-06-14 | Stop reason: HOSPADM

## 2021-06-14 RX ORDER — BUDESONIDE 0.5 MG/2ML
0.5 INHALANT ORAL 2 TIMES DAILY
Status: DISCONTINUED | OUTPATIENT
Start: 2021-06-14 | End: 2021-06-14 | Stop reason: HOSPADM

## 2021-06-14 RX ORDER — POLYETHYLENE GLYCOL 3350 17 G/17G
17 POWDER, FOR SOLUTION ORAL DAILY PRN
Status: DISCONTINUED | OUTPATIENT
Start: 2021-06-14 | End: 2021-06-14 | Stop reason: HOSPADM

## 2021-06-14 RX ORDER — NICOTINE POLACRILEX 4 MG
15 LOZENGE BUCCAL PRN
Status: DISCONTINUED | OUTPATIENT
Start: 2021-06-14 | End: 2021-06-14 | Stop reason: HOSPADM

## 2021-06-14 RX ORDER — SPIRONOLACTONE 25 MG/1
25 TABLET ORAL DAILY
Status: DISCONTINUED | OUTPATIENT
Start: 2021-06-14 | End: 2021-06-14 | Stop reason: HOSPADM

## 2021-06-14 RX ORDER — IPRATROPIUM BROMIDE AND ALBUTEROL SULFATE 2.5; .5 MG/3ML; MG/3ML
1 SOLUTION RESPIRATORY (INHALATION) EVERY 4 HOURS PRN
Status: DISCONTINUED | OUTPATIENT
Start: 2021-06-14 | End: 2021-06-14 | Stop reason: HOSPADM

## 2021-06-14 RX ORDER — DEXTROSE MONOHYDRATE 50 MG/ML
100 INJECTION, SOLUTION INTRAVENOUS PRN
Status: DISCONTINUED | OUTPATIENT
Start: 2021-06-14 | End: 2021-06-14 | Stop reason: HOSPADM

## 2021-06-14 RX ORDER — ONDANSETRON 4 MG/1
4 TABLET, ORALLY DISINTEGRATING ORAL EVERY 8 HOURS PRN
Status: DISCONTINUED | OUTPATIENT
Start: 2021-06-14 | End: 2021-06-14 | Stop reason: HOSPADM

## 2021-06-14 RX ORDER — SODIUM CHLORIDE 9 MG/ML
25 INJECTION, SOLUTION INTRAVENOUS PRN
Status: DISCONTINUED | OUTPATIENT
Start: 2021-06-14 | End: 2021-06-14 | Stop reason: HOSPADM

## 2021-06-14 RX ORDER — DEXTROSE MONOHYDRATE 25 G/50ML
12.5 INJECTION, SOLUTION INTRAVENOUS PRN
Status: DISCONTINUED | OUTPATIENT
Start: 2021-06-14 | End: 2021-06-14 | Stop reason: HOSPADM

## 2021-06-14 RX ORDER — OMEGA-3S/DHA/EPA/FISH OIL/D3 300MG-1000
800 CAPSULE ORAL DAILY
Status: DISCONTINUED | OUTPATIENT
Start: 2021-06-14 | End: 2021-06-14 | Stop reason: HOSPADM

## 2021-06-14 RX ORDER — ATORVASTATIN CALCIUM 40 MG/1
40 TABLET, FILM COATED ORAL DAILY
Status: DISCONTINUED | OUTPATIENT
Start: 2021-06-14 | End: 2021-06-14 | Stop reason: HOSPADM

## 2021-06-14 RX ORDER — ONDANSETRON 2 MG/ML
4 INJECTION INTRAMUSCULAR; INTRAVENOUS EVERY 6 HOURS PRN
Status: DISCONTINUED | OUTPATIENT
Start: 2021-06-14 | End: 2021-06-14 | Stop reason: HOSPADM

## 2021-06-14 RX ADMIN — CHOLECALCIFEROL TAB 10 MCG (400 UNIT) 800 UNITS: 10 TAB at 08:51

## 2021-06-14 RX ADMIN — SODIUM CHLORIDE, PRESERVATIVE FREE 10 ML: 5 INJECTION INTRAVENOUS at 08:53

## 2021-06-14 RX ADMIN — ATORVASTATIN CALCIUM 40 MG: 40 TABLET, FILM COATED ORAL at 11:24

## 2021-06-14 RX ADMIN — ASPIRIN 81 MG CHEWABLE TABLET 81 MG: 81 TABLET CHEWABLE at 08:51

## 2021-06-14 RX ADMIN — SPIRONOLACTONE 25 MG: 25 TABLET ORAL at 08:51

## 2021-06-14 RX ADMIN — SACUBITRIL AND VALSARTAN 1 TABLET: 97; 103 TABLET, FILM COATED ORAL at 08:51

## 2021-06-14 ASSESSMENT — ENCOUNTER SYMPTOMS
CONSTIPATION: 0
VOMITING: 0
ABDOMINAL PAIN: 0
DIARRHEA: 0
SHORTNESS OF BREATH: 0
SORE THROAT: 0
COUGH: 0
RHINORRHEA: 0
WHEEZING: 0
NAUSEA: 0

## 2021-06-14 ASSESSMENT — PAIN SCALES - WONG BAKER: WONGBAKER_NUMERICALRESPONSE: 0

## 2021-06-14 NOTE — H&P
[facility]  Family [facility] Residency Program  History and Physical    Patient:  Mendy Bhatia 50 y.o. female MRN: [id number]     Date of Service: 6/14/2021    Hospital Day: 2      Chief complaint: had concerns including Loss of Consciousness (patient went to restroom and became dizzy and \"blacked out. \" Patient hit her head on the wall. Patient states she has been dizzy x 1 week. ). History of Present Illness   The patient is a 50 y.o. female with PMHx of HFrEF, COPD, asthma, HTN, hx MI, CAD s/p ICD who presents today following an episode of syncope at home. Per patient she states that she got up to go to the bathroom with her grandson who needed his face wash, after washing his face she stood up from the toilet and started experiencing lightheadedness, fogginess, diaphoresis, feeling hot and dark vision. Patient states that she tried to walk herself to the couch, along the way she lost consciousness and fell on the floor. She denies any chest pain, shortness of breath or palpitations before, during or after the episode. She is not sure whether she hit her head during her fall. States that she has been recently experiencing lightheadedness when standing up but not as severe as today. Also notes about a week ago she had a similar episode but did not lose consciousness at the time. ED course:  Patient remained hemodynamically stable. Labs: Unremarkable. EKG: Sinus bradycardia with possible left anterior fascicular block. CXR: Significant improvement in aeration of the right base.   CT head: No acute intracranial abnormality  CT cervical spine: No acute abnormality of the cervical spine      Medications - No data to display      Past Medical History:      Diagnosis Date    Angina at rest [facility])     cath in 2007 showed no CAD    Asthma     Blood type AB+ 04/03/2018    CAD (coronary artery disease)     Carpal tunnel syndrome on left     [facility]     [facility]) [facility]     COPD (chronic obstructive pulmonary disease) ([facility]     CVA (cerebral vascular accident) ([facility] [facility] 2009 and 2010. left parietal    GERD (gastroesophageal reflux disease)     HFrEF (heart failure with reduced ejection fraction) ([facility] [address] [facility] 2020- echo- LVEF 20-25%, LA enlarged, moderate MR, mild TR, mild PH, LVDD: 6.7, RVDD: 2.2 (17- echo- LVEF 10%, stage III DD, severely dilated LA, appears L>R atrial shunt, mod TR, LVDD: 6.6,  RVDD: 2.3)    History of blood transfusion     Hyperlipidemia Diagnosed in . Dyslipidemia.  Hypertension diagnosed in     ICD (implantable cardioverter-defibrillator) in place 2018    Placed by Dr. Kanu Colby.  Left ovarian cyst     Liver lesion 2015    Moderate mitral regurgitation 2015    mild-moderate    Nonischemic cardiomyopathy ([facility]     NSTEMI (non-ST elevated myocardial infarction) ([facility] [address] 4/10/15--adm to [facility]    Suicide attempt by drug ingestion ([facility] [address]     attempt in  OD on ultram    Tobacco abuse     Vitamin D deficiency        Past Surgical History:        Procedure Laterality Date    CARDIAC CATHETERIZATION  2018    Dr. Parth Middleton- Clean coronaries   Teresa Ramos  2016    SICD    CARDIOVASCULAR STRESS TEST  2009 Normal      SECTION      COLONOSCOPY  10/2015    DIAGNOSTIC CARDIAC CATH LAB PROCEDURE  2007    [facility]: No significant CAD. Mild LVD with apical akinesis. EF 50%.  DIAGNOSTIC CARDIAC CATH LAB PROCEDURE  4/15    with PTCA to [facility] ob [email or web address]    ENDOSCOPY, COLON, DIAGNOSTIC      HYSTERECTOMY  10/1/15    at [facility] by Dr. Fabio Garrett PTCA  4/10/15    at [address] [place] [address] ECHOCARDIOGRAM  3/19/13    EF 65%, mild MR    TUBAL LIGATION         Medications Prior to Admission:    Prior to Admission medications    Medication Sig Start Date End Date Taking?  Authorizing Provider Ergocalciferol (VITAMIN D2) 10 MCG (400 UNIT) TABS Take 800 Units by mouth daily 2/18/21 5/19/21  Mitchell Zavaleta MD   acetaminophen (TYLENOL) 500 MG tablet Take 1 tablet by mouth 2 times daily as needed for Pain 1/20/21   Mitchell Zavaleta MD   aspirin 81 MG chewable tablet Take 1 tablet by mouth daily 1/13/21   Mitchell Zavaleta MD   atorvastatin (LIPITOR) 40 MG tablet Take 1 tablet by mouth daily 1/13/21   Mitchell Zavaleta MD   sacubitril-valsartan (ENTRESTO)  MG per tablet Take 1 tablet by mouth 2 times daily 1/13/21   Mitchell Zavaleta MD   metoprolol succinate (TOPROL XL) 100 MG extended release tablet Take 1 tablet by mouth daily 1/13/21   Mitchell Zavaleta MD   bumetanide (BUMEX) 2 MG tablet Take 1 tablet by mouth daily 1/13/21   Mitchell Zavaleta MD   spironolactone (ALDACTONE) 25 MG tablet Take 1 tablet by mouth daily 1/13/21   Mitchell Zavaleta MD   budesonide-formoterol (SYMBICORT) 160-4.5 MCG/ACT AERO Inhale 2 puffs into the lungs 2 times daily 1/13/21   Mitchell Zavaleta MD   montelukast (SINGULAIR) 10 MG tablet Take 1 tablet by mouth nightly 1/13/21   Mitchell Zavaleta MD       Allergies:  Orange fruit [citrus], Crestor [rosuvastatin calcium], Loratadine, Norco [hydrocodone-acetaminophen], Sulfa antibiotics, and Aspirin    Social History:   TOBACCO:   reports that she has been smoking cigarettes. She has a 7.50 pack-year smoking history. Her smokeless tobacco use includes snuff. ETOH:   reports previous alcohol use of about 3.0 standard drinks of alcohol per week. Family History:       Problem Relation Age of Onset    High Blood Pressure Mother     Stroke Mother     High Blood Pressure Father     Stroke Father     Heart Disease Father     Pacemaker Father        REVIEW OF SYSTEMS:    Review of Systems   Constitutional: Negative for chills, fatigue and fever. HENT: Negative for congestion, rhinorrhea and sore throat. Eyes: Negative for visual disturbance.    Respiratory: Negative for cough, shortness of breath and wheezing. Cardiovascular: Negative for chest pain, palpitations and leg swelling. Gastrointestinal: Negative for abdominal pain, constipation, diarrhea, nausea and vomiting. Endocrine: Negative for polydipsia and polyuria. Musculoskeletal: Negative for neck pain and neck stiffness. Neurological: Positive for dizziness, syncope, light-headedness, numbness and headaches. Negative for weakness. Physical Exam   Vitals: /66   Pulse 65   Temp 96.9 °F (36.1 °C)   Resp 18   Ht 5' 4\" (1.626 m)   Wt 102 lb (46.3 kg)   LMP 05/20/2014   SpO2 94%   BMI 17.51 kg/m²     Physical Exam  Constitutional:       General: She is not in acute distress. Appearance: Normal appearance. HENT:      Head: Normocephalic and atraumatic. Cardiovascular:      Rate and Rhythm: Regular rhythm. Bradycardia present. Pulses: Normal pulses. Heart sounds: Normal heart sounds. No murmur heard. Pulmonary:      Effort: Pulmonary effort is normal. No respiratory distress. Breath sounds: Normal breath sounds. No wheezing. Abdominal:      General: Abdomen is flat. Bowel sounds are normal. There is no distension. Palpations: Abdomen is soft. Tenderness: There is no abdominal tenderness. Musculoskeletal:      Right lower leg: No edema. Left lower leg: No edema. Skin:     Capillary Refill: Capillary refill takes less than 2 seconds. Neurological:      General: No focal deficit present. Mental Status: She is alert and oriented to person, place, and time. Mental status is at baseline. Cranial Nerves: No cranial nerve deficit. Sensory: No sensory deficit. Motor: No weakness.          Labs and Imaging Studies   Basic Labs  CBC:   Recent Labs     06/14/21  0014   WBC 10.7   RBC 5.05   HGB 14.5   HCT 43.8   MCV 86.7   MCH 28.7   MCHC 33.1   RDW 13.8      MPV 9.7       BMP:    Recent Labs     06/14/21  0152      K 3.6   CL 98 orbits demonstrate no acute abnormality. SINUSES: Mild mucosal thickening and tiny retention cyst or polyp in the left maxillary sinus. Trace mucosal thickening scattered in the ethmoid sinuses. Mastoid air cells are clear. SOFT TISSUES/SKULL:  No acute abnormality of the visualized skull or soft tissues. Opacity in the left external auditory canal likely represents retained cerumen but could be correlated clinically. No acute intracranial abnormality. MRI would be useful if symptoms persist.     CT CERVICAL SPINE WO CONTRAST    Result Date: 6/14/2021  EXAMINATION: CT OF THE CERVICAL SPINE WITHOUT CONTRAST 6/14/2021 1:22 am TECHNIQUE: CT of the cervical spine was performed without the administration of intravenous contrast. Multiplanar reformatted images are provided for review. Dose modulation, iterative reconstruction, and/or weight based adjustment of the mA/kV was utilized to reduce the radiation dose to as low as reasonably achievable. COMPARISON: None. HISTORY: ORDERING SYSTEM PROVIDED HISTORY: neck pain FINDINGS: BONES/ALIGNMENT: There is no acute fracture or traumatic malalignment. DEGENERATIVE CHANGES: No significant degenerative changes. SOFT TISSUES: There is no prevertebral soft tissue swelling. No acute abnormality of the cervical spine. XR CHEST PORTABLE    Result Date: 6/14/2021  EXAMINATION: ONE XRAY VIEW OF THE CHEST 6/14/2021 12:12 am COMPARISON: 03/18/2020 HISTORY: ORDERING SYSTEM PROVIDED HISTORY: syncope TECHNOLOGIST PROVIDED HISTORY: Reason for exam:->syncope What reading provider will be dictating this exam?->CRC FINDINGS: There are multiple metallic densities from the patient's clothing overlying portions of the chest.  Left-sided implantable pacer/defibrillator again identified. Cardiac silhouette is near the upper limits of normal.  This appears unchanged. No pneumothorax.   Significant improvement in aeration of the right lung with near complete resolution of effusion and improved atelectasis or infiltrate. No other significant change. Significant improvement in aeration of the right base as detailed above. Continued follow-up recommended. EKG: sinus bradycardia, left anterior fascicular block. .    Resident's Assessment and Plan     Hamilton Rivas is a 50 y.o. female    Active Problems:    Syncope and collapse  Resolved Problems:    * No resolved hospital problems. *    <Neurologic>  Hx CVA  -Currently on aspirin    <Cardiovascular>  Syncope  Intermittent bradycardia  -Likely from orthostatic hypotension vs bradycardia  -EKG: Sinus bradycardia, left anterior fascicular block  -CT head: No acute intracranial abnormalities  -Telemetry monitoring  -Hold metoprolol 100 mg and Bumex 2 mg daily for now  -Orthostatic BPs  -Consult cardiology (Dr. Willian Quarles follows outpatient)  -Consider EP consult  -Interrogate ICD [facility] [employer] placed by Dr. Ellis Callahan)      HTN  CAD s/p ICD   HFrEF  -ICD inserted 8/2016 by Dr. Chalmers Snellen  -Metoprolol 100 mg daily, Entresto twice daily  -CLAUDIA: (10/2020)-EF 25 to 30%.   -Home Bumex 2 mg daily (hold)    <Pulmonary>  COPD  -Symbicort twice daily, singular 10 mg nightly  -Continue home meds    <Gastrointestinal>    <Infectious Disease>    <Endocrine>    <Genitourinary/Renal>    <Hematology/Oncology>      <Dermatologic/Musculoskeletal>    <Behavioral Health>      PT/OT evaluation:  DVT prophylaxis/ GI prophylaxis: Lovenox  Disposition: home +/- home health / Hue Roy / Isac Ayers / Bony Holguin MD, PGY-2   Attending physician: Dr. Jacki Paget

## 2021-06-14 NOTE — PROGRESS NOTES
[facility] Inpatient   Resident Progress Note    S:  Hospital day: 0   Brief Synopsis: Doyle Latham is a 50 y.o. female with PMHx of HFrEF, COPD, asthma, HTN, hx MI, CAD s/p ICD, who presented to ED after syncopal episode. Orthostatics were found to be positive in ED. Awaiting interrogation of ICD    Overnight/interim:   ·     Patient seen and examined at bedside this morning. She denies any current dizziness or lightheadedness. Cont meds:    sodium chloride      dextrose       Scheduled meds:    aspirin  81 mg Oral Daily    atorvastatin  40 mg Oral Daily    [Held by provider] bumetanide  2 mg Oral Daily    vitamin D3  800 Units Oral Daily    [Held by provider] metoprolol succinate  100 mg Oral Daily    montelukast  10 mg Oral Nightly    sacubitril-valsartan  1 tablet Oral BID    spironolactone  25 mg Oral Daily    sodium chloride flush  5-40 mL Intravenous 2 times per day    enoxaparin  40 mg Subcutaneous Daily    budesonide  0.5 mg Nebulization BID    And    Arformoterol Tartrate  15 mcg Nebulization BID     PRN meds: sodium chloride flush, sodium chloride, ondansetron **OR** ondansetron, polyethylene glycol, glucose, dextrose, glucagon (rDNA), dextrose, ipratropium-albuterol     I reviewed the patient's past medical and surgical history, Medications and Allergies. O:  /62   Pulse 66   Temp 98.5 °F (36.9 °C) (Temporal)   Resp 16   Ht 5' 4\" (1.626 m)   Wt 102 lb (46.3 kg)   LMP 05/20/2014   SpO2 95%   BMI 17.51 kg/m²   24 hour I&O: No intake/output data recorded. No intake/output data recorded. GENERAL: Alert, cooperative, no acute distress. HEENT: Normocephalic, atraumatic. EOM's intact, no pallor or icterus.    NECK: Symmetrical, trachea midline  CHEST: No tenderness or deformity, full & symmetric excursion  LUNG: Clear to auscultation bilaterally, respirations unlabored; scattered wheezes, rhonchi appreciated  HEART: RRR, S1 and S2 normal, no murmur, rub or gallop. ABDOMEN: SNTND, no masses, no organomegaly, no guarding, rebound or rigidity. GENITOURINARY: Urinary cath not present   EXTREMITIES:  Extremities normal, atraumatic, no cyanosis or edema. SKIN: Skin color, texture, turgor normal, no rashes or lesions  MUSCULOSKELETAL: No joint swelling, no muscle tenderness. Normal ROM in extremities. NEUROLOGIC: Alert & Oriented x3      Labs:  Na/K/Cl/CO2:  138/3.7/99/27 (06/14 9231)  BUN/Cr/glu/ALT/AST/amyl/lip:  22/0.8/--/--/--/--/-- (06/14 0520)  WBC/Hgb/Hct/Plts:  10.7/14.5/43.8/291 (06/14 0014)  estimated creatinine clearance is 59 mL/min (based on SCr of 0.8 mg/dL). Other pertinent labs as noted below    Radiology:  CT HEAD WO CONTRAST   Final Result   No acute intracranial abnormality. MRI would be useful if symptoms persist.         CT CERVICAL SPINE WO CONTRAST   Final Result   No acute abnormality of the cervical spine. XR CHEST PORTABLE   Final Result   Significant improvement in aeration of the right base as detailed above. Continued follow-up recommended. A/P:  Principal Problem:    Syncope and collapse  Active Problems:    Hyperlipidemia    CVA (cerebral vascular accident)    COPD (chronic obstructive pulmonary disease) (HCC)    Severe mitral regurgitation    Asthma    History of MI (myocardial infarction)    Chronic systolic CHF (congestive heart failure), NYHA class 3 ([address]    Essential hypertension    ICD (implantable cardioverter-defibrillator) in place  Resolved Problems:    * No resolved hospital problems.  *       Syncope  Intermittent bradycardia  -Likely from orthostatic hypotension vs bradycardia  -EKG: Sinus bradycardia, left anterior fascicular block  -CT head: No acute intracranial abnormalities  -Telemetry monitoring  -Hold metoprolol 100 mg and Bumex 2 mg daily for now  -Orthostatic BPs  -Consult cardiology (Dr. Yari Mariano follows outpatient)  -Consider EP consult  -Interrogate ICD ([address] placed by  Lilliam)    Hx CVA  -Currently on aspirin     HTN  CAD s/p ICD, Hx MI  HFrEF, severe mitral regurg  -ICD inserted 8/2016 by Dr. Paz Wise  -Metoprolol 100 mg daily (held), Entresto twice daily, spironolactone daily  -CLAUDIA: (10/2020)-EF 25 to 30%  -Home Bumex 2 mg daily (hold)     COPD/Asthma  -Symbicort twice daily, singular 10 mg nightly  -Continue home meds  -duonebs prn  -wheeze, rhonchi diffusely appreciated on exam today, CXR not concerning, continue to monitor       GI/DVT ppx: lovenox  Diet: adult low carb, cardiac  Pain meds: none   Antibiotics: none  Consults: cardiology  PT/OT Evaluation:   Social work:   Disposition: telemetry      Electronically signed by Warren Valdes DO PGY-2 on 6/14/2021 at 10:58 AM  This case was discussed with attending physician: Dr. FRIEDMAN [facility]

## 2021-06-14 NOTE — CONSULTS
Inpatient Cardiology Consultation      Reason for Consult:  Syncope    Consulting Physician: Dr Papa Gifford    Requesting Physician:  Dr Nighat Bolton    Date of Consultation: 2021    HISTORY OF PRESENT ILLNESS: 51 yo AAF known to Dr Syed Pro last seen 2020. Seen by Dr Papa Gifford for possible mitral Clip 2021. PMH: HTN, NICMP, s/p Subcutaneous ICD, Hx SVT, CVA's, VHD, LAFB/ILBBB, PHTN, tobacco, VHD (being worked up for mitral clip), medical non compliance, asthma/COPD, ETOH abuse, and marijuana. Was in bathroom cleaning off her nephews face, needed to move her bowels became diaphoretic , had stomach pains, vomited, felt weak and passed out. Was not incontinent of stool or urine. After awakening felt like she needed to move her bowels. [facility]-ED 2021 + Orthostatics. HR 50-60's. Diuretic and BB held on admission. Na 138, K+ 3.7, Bun/Cr 22/0.8, lactic acid 1.2, troponin 7, Alk phos 106, CBC WNL. CT head and cervical spine nothing acute. CXR no CHF or infiltrate. Patient admits to drinking a 24 ounce beer and smoking marijuana prior to syncopal episode. She also admits to drinking on Saturday night. Denies any CP , orthopnea, PND or SOB. Please note: past medical records were reviewed per electronic medical record (EMR) - see detailed reports under Past Medical/ Surgical History. Past Medical History:    1. 15 pack years continued smoking  2. Crestor urticaria  3. ASA higher than 81 urticaria  4. HTN  5. HLD  6. COPD  7. Asthma  8. Marijuana use  9. ETOH abuse  10.  [facility]: normal coronaries  11. Depression with suicide attempt in  with indigestion of Ultram  12. L carpal tunnel syndrome  13. Tubal ligation, , tonsillectomy, hysterectomy  14. Anemai with history of blood transfusions  15. GERD  16. 2009 CVA of L parietal lobe patient reports a total of 3 CVA\"s with R sided weakness, hearing loss and speech problems.    17. Lexiscan MPS 3/2016: Abnormal study with a prior myocardial infarction of the lateral wall, significant reversible ischemia, and left ventricular dilated at rest and stress. 18. 6/9/2016 TTE: EF 25%. Stage II DD. Mildly dilated with moderate MR and mild TR, systolic pressure 25 mmHg, apical calcifications, no definite thrombus, and mild left atrial enlargement. 19. 8/2016 BS Sub-Q ICD Dr Patt Lennox  20. 12/2017 SVT  21. 12/2017 TTE: Severely dilated left atrium with LUZ MARINA 59 mL/m2,appears to be possible left-to-right intra-atrial shunt demonstrated with color Doppler on image 0065, 0066, 0067. Eccentric hypertrophy. Severely  22. dilated LV. Severe LV systolic dysfunction. Visually estimated LVEF is  23. 10%. Severe global hypokinesis with regional variation. Grade 3  24. restrictive diastolic dysfunction which correlates with very poor  25. prognosis. Apically tethered mitral valve leaflets with posterior leaflet  26. restriction. Severe MR. Eccentric posteriorly directed jet. Trace  27. pericardial effusion. No tamponade physiology. RV measurements based by such significant LV dilatation with septal shift into the RV. Severe RV  28. dysfunction. RVSP is 50 mmHg consistent with pulmonary hypertension. 29. Moderate tricuspid regurgitation. Central regurgitant jet. 27. 3/28/2018 [facility] Dr Darryle Bellis: Cardiogenic shock. Severely low Pa sat  31. Elevated biventricular filling pressure.  is 0.38. Loki is 1.32. PVR 12 CHARLES--> admitted placed on Amiodarone, Milrinone, and Bumex gtts  32. [facility] 4/4/2018 [facility] Dr Raya Render: Normal coronaries  33. NYHA FC III  34. 8/2018 seen by Dr Darío Danielson as inpatient for HFrEF (had ran out of her medications 3 weeks prior to admission)  35. 8/2018 CTA small distal LMCA branck occlusion and CTP showed small area of prolonged transit time in the left frontal area s/p tPA. 36. 8/27/20218 TTE with bubble Dr Rhina Tucker; Agitated saline injection showed no right to left intracardiac shunt. No intracardiac mass.  Left ventricle mildly dilated, 67.cm. Severe global hypokinesis, EF 20-25%. Mild LVH. Left atrium is enlarged. Increased LA volume index. Interatrial septum not well visualized, can not rule out left to right shunt. Mild right ventricular hypokinesis. Moderate mitral regurgitation, jet radius 0.4cm. No mitral valve prolapse. Normal aortic valve structure and function. Normal tricuspid valve structure. There is mild tricuspid regurgitation. Mild pulmonary hypertension, RVSP 39mmHg. Normal aortic root and ascending aorta. Small pericardial effusion without tamponade. Pericardium appears normal. Compared to prior Echo from 12/2017  37. 3/16/2020 TTE Dr Glendia Goldberg: Left ventricle is moderate-severely dilated. LVEDD 6.8 cm. LV systolic function is severely reduced. Ejection fraction is visually estimated at 20-25%. Abnormal diastolic function, indeterminate grade. There is severe global hypokinesis. Normal left ventricular wall thickness. Abnormal LV septal motion consistent with conduction disorder. Mildly dilated right ventricle. Right ventricle global systolic function is mildly reduced. The left atrium is massively dilated. Severe eccentric mitral regurgitation, directed posteriorly. Mild tricuspid regurgitation. Mild pulmonic regurgitation. 38. 3/16/2020 Lexiscan MPS: Non ischemic EF 20%. Dilated LV.   39. 10/20/2020 [facility] Dr Alpesh Polanco: Left ventricle is severely enlarged. Ejection fraction is visually estimated at 25-30%. No regional wall motion abnormalities seen. Normal left ventricle wall thickness. Mildly enlarged right ventricle cavity. Right ventricle global systolic function is mildly reduced. Tethering of both anterior and posterior leaflets noted with a tenting height of 1.2 cm. Anterior leaflet length 1.5 cm and posterior leaflet length 1.0 cm. Moderate-to-severe MR with centrally directed jet. Marline type 3b. PISA radius 0.9 cm, ERO 0.2 cm2, no PV flow reversal. No hemodynamically significant aortic stenosis is present.  Unable to BID  spironolactone (ALDACTONE) tablet 25 mg, 25 mg, Oral, Daily  sodium chloride flush 0.9 % injection 5-40 mL, 5-40 mL, Intravenous, 2 times per day  sodium chloride flush 0.9 % injection 5-40 mL, 5-40 mL, Intravenous, PRN  0.9 % sodium chloride infusion, 25 mL, Intravenous, PRN  enoxaparin (LOVENOX) injection 40 mg, 40 mg, Subcutaneous, Daily  ondansetron (ZOFRAN-ODT) disintegrating tablet 4 mg, 4 mg, Oral, Q8H PRN **OR** ondansetron (ZOFRAN) injection 4 mg, 4 mg, Intravenous, Q6H PRN  polyethylene glycol (GLYCOLAX) packet 17 g, 17 g, Oral, Daily PRN  budesonide (PULMICORT) nebulizer suspension 500 mcg, 0.5 mg, Nebulization, BID **AND** Arformoterol Tartrate (BROVANA) nebulizer solution 15 mcg, 15 mcg, Nebulization, BID  glucose (GLUTOSE) 40 % oral gel 15 g, 15 g, Oral, PRN  dextrose 50 % IV solution, 12.5 g, Intravenous, PRN  glucagon (rDNA) injection 1 mg, 1 mg, Intramuscular, PRN  dextrose 5 % solution, 100 mL/hr, Intravenous, PRN  ipratropium-albuterol (DUONEB) nebulizer solution 1 ampule, 1 ampule, Inhalation, Q4H PRN    Allergies:  Orange fruit [citrus], Crestor [rosuvastatin calcium], Loratadine, Norco [hydrocodone-acetaminophen], Sulfa antibiotics, and Aspirin    Social History:    15 pack years  ETOH abuse up to 2 24 ounce beers a day  Illicit Drugs: Marijuana  Activity: Lives with daughter and nephew in 2 story duplex. On SSI. Does not drive. Code Status: Full Code      Family History: Mother HTN and CVA  Sister Open heart in her 42's  Father CABG, CVA,  and PPM      REVIEW OF SYSTEMS:     · Constitutional: Denies fatigue, fevers, chills or night sweats  · Eyes: Denies visual changes or drainage  · ENT: Denies headaches or hearing loss. No mouth sores or sore throat. No epistaxis   · Cardiovascular: Denies chest pain, pressure or palpitations. No lower extremity swelling. · Respiratory: Denies JOHANSEN, cough, orthopnea or PND. No hemoptysis   · Gastrointestinal: Denies hematemesis or anorexia.  No Intake 0 ml   Output    Net 0 ml       Labs:   CBC:   Recent Labs     06/14/21  0014   WBC 10.7   HGB 14.5   HCT 43.8        BMP:   Recent Labs     06/14/21  0152 06/14/21  0548    138   K 3.6 3.7   CO2 26 27   BUN 22* 22*   CREATININE 0.9 0.8   LABGLOM >60 >60   CALCIUM 9.6 9.8     TFT:   Lab Results   Component Value Date    TSH 0.867 01/20/2021      HgA1c:   Lab Results   Component Value Date    LABA1C 5.5 08/28/2018     CARDIAC ENZYMES:  Recent Labs     06/14/21  0152   TROPHS 7     FASTING LIPID PANEL:  Lab Results   Component Value Date    CHOL 254 01/20/2021    HDL 76 01/20/2021    LDLCALC 162 01/20/2021    TRIG 81 01/20/2021     LIVER PROFILE:  Recent Labs     06/14/21 0152   AST 20   ALT 12   LABALBU 4.3     ASSESSMENT:  1. Vasovagal syncope in the setting of defecation, recent ETOH and marijuana use. Positive orthostatics BB and Diurectic placed on hold by primary service. 2. Chronic HFrEF-compensated  3. Sub-Q ICD interrogated: no VT/VF  4. Severe MR  5. HTN  6. NICMP  7. Intermittent SB  8. Tobacco abuse  9. ETOH and marijuana use 6/13/2021    PLAN:  1. No cardiac testing planned  2. Encourage oral hydration  3. ETOH and tobacco cessation stressed to patient  4. Cardiology to sign off    Electronically signed by JANET Hendrix on 6/14/2021 at 11:17 AM       PHYSICIAN ADDENDUM:  I independently interviewed and examined the patient. I have reviewed the above documentation completed by the ANDREI. Please see my additional contributions to the HPI, physical exam, and assessment / medical decision making. I independently reviewed the HPI, ROS, PMH, PSH, [facility], , and medications independently and agree with above documentation.     I discussed the assessment and plan with the patient/family at the bedside as well as the bedside nurse and the primary team.    Qamar Hubbard MD, [facility] [place]  Interventional Cardiology/Structural Heart Disease  Office: [phone]

## 2021-06-14 NOTE — PROGRESS NOTES
[address] [place] [address] Attending    S: 50 y.o. female admitted because of an episode at home, when arising from washing her grandson, she experienced lightheadedness, fogginess, diaphoresis, and dark vision. She then tried walking to the couch but apparently fell and lost consciousness. She had no chest pain or shortness of breath or palpitations she does not know if she struck her head during the fall. In the recent past she did have some similar episodes without syncope. Complex previous history as noted below    O: VS- Blood pressure 110/73, pulse 57, temperature 98.5 °F (36.9 °C), temperature source Temporal, resp. rate 16, height 5' 4\" (1.626 m), weight 102 lb (46.3 kg), last menstrual period 05/20/2014, SpO2 96 %, not currently breastfeeding. Exam is as noted by resident. Currently in no acute distress. No chest pain or shortness of breath while sitting in bed.   HEENT unremarkable  Neck supple, no bruit, no palpable thyroid abnormalities  Heart regular rhythm, no murmur or gallop  Lungs clear to auscultation anteriorly and posteriorly  Abdomen supple, no masses, tenderness or organomegaly  No leg tenderness    Orthostatics were positive with a drop of 20 mmHg    Bradycardia and left anterior fascicular block      Impressions:   Principal Problem:    Syncope and collapse  Active Problems:    Hyperlipidemia    CVA (cerebral vascular accident)    COPD (chronic obstructive pulmonary disease) (HCC)    Severe mitral regurgitation    Asthma    History of MI (myocardial infarction)    Chronic systolic CHF (congestive heart failure), NYHA class 3 ([facility]    Essential hypertension    ICD (implantable cardioverter-defibrillator) in place since 2016        Plan:   Holding chronotropically negative antihypertensives  Cardiology consulted  EP to interrogate  Breathing treatments  Medications as ordered     Attending Physician Statement  I have reviewed the chart, including any radiology or labs, and seen the patient with the resident(s). I personally reviewed and performed key elements of the history and exam.  I agree with the assessment, plan and orders as documented by the resident. Please refer to the resident note for additional information.       Jolie Posada MD

## 2021-06-14 NOTE — ED PROVIDER NOTES
HPI:  6/13/21, Time: 11:46 PM EDT         Ayaka Capone is a 50 y.o. female presenting to the ED for syncopal event, beginning a short time ago. The complaint has been persistent, moderate in severity, and worsened by nothing. Patient reporting that she was feeling dizzy lightheaded. She did pass out today she was in the bathroom attending to her grandson who had issues with his eye. Patient reports that she got up and walked and fell and passed out she did strike her head she did vomit afterwards. She reports she did pass out about a week ago but did not seek treatment. Patient reporting no chest pains or shortness of breath she does report some mild upper abdominal pain. She does have history of COPD history of hypertension hyperlipidemia she has a history of AICD. Patient reporting no leg pain or swelling she reports no hip pain there is no back pain. She does report mild neck pain    ROS:   Pertinent positives and negatives are stated within HPI, all other systems reviewed and are negative.  --------------------------------------------- PAST HISTORY ---------------------------------------------  Past Medical History:  has a past medical history of Angina at rest [facility]), Asthma, Blood type AB+, CAD (coronary artery disease), Carpal tunnel syndrome on left, CHF (congestive heart failure) ([address] CHF (congestive heart failure) ([address] COPD (chronic obstructive pulmonary disease) ([address] CVA (cerebral vascular accident) ([address] GERD (gastroesophageal reflux disease), HFrEF (heart failure with reduced ejection fraction) ([address] History of blood transfusion, Hyperlipidemia, Hypertension, ICD (implantable cardioverter-defibrillator) in place, Left ovarian cyst, Liver lesion, Moderate mitral regurgitation, Nonischemic cardiomyopathy ([address] NSTEMI (non-ST elevated myocardial infarction) ([address] Suicide attempt by drug ingestion ([facility] Tobacco abuse, and Vitamin D deficiency.     Past Surgical History:  has a past surgical history that includes cardiovascular stress test (); Tubal ligation (); Tonsillectomy;  section; transthoracic echocardiogram (3/19/13); Diagnostic Cardiac Cath Lab Procedure (2007); Diagnostic Cardiac Cath Lab Procedure (4/15); Percutaneous Transluminal Coronary Angio (4/10/15); Hysterectomy (10/1/15); Colonoscopy (10/2015); Endoscopy, colon, diagnostic; Cardiac catheterization (2018); and Cardiac defibrillator placement (2016). Social History:  reports that she has been smoking cigarettes. She has a 7.50 pack-year smoking history. Her smokeless tobacco use includes snuff. She reports previous alcohol use of about 3.0 standard drinks of alcohol per week. She reports current drug use. Drug: Marijuana. Family History: family history includes Heart Disease in her father; High Blood Pressure in her father and mother; Pacemaker in her father; Stroke in her father and mother. The patients home medications have been reviewed. Allergies: Orange fruit [citrus], Crestor [rosuvastatin calcium], Loratadine, Norco [hydrocodone-acetaminophen], Sulfa antibiotics, and Aspirin    ---------------------------------------------------PHYSICAL EXAM--------------------------------------    Constitutional/General: Alert and oriented x3, well appearing, non toxic in NAD  Head: Normocephalic and atraumatic  Eyes: PERRL, EOMI  Mouth: Oropharynx clear, handling secretions, no trismus  Neck: Supple, full ROM, non tender to palpation in the midline, no stridor, no crepitus, no meningeal signs  Pulmonary: Lungs clear to auscultation bilaterally, no wheezes, rales, or rhonchi. Not in respiratory distress  Cardiovascular:  Regular rate. Regular rhythm. No murmurs, gallops, or rubs. 2+ distal pulses  Chest: no chest wall tenderness  Abdomen: Soft. Tender upper abdomen. Non distended. +BS. No rebound, guarding, or rigidity. No pulsatile masses appreciated.   Musculoskeletal: Moves all extremities x 4. Warm and well perfused, no clubbing, cyanosis, or edema. Capillary refill <3 seconds  Skin: warm and dry. No rashes. Neurologic: GCS 15, CN 2-12 grossly intact, no focal deficits, symmetric strength 5/5 in the upper and lower extremities bilaterally  Psych: Normal Affect    -------------------------------------------------- RESULTS -------------------------------------------------  I have personally reviewed all laboratory and imaging results for this patient. Results are listed below.      LABS:  Results for orders placed or performed during the hospital encounter of 06/13/21   CBC auto differential   Result Value Ref Range    WBC 10.7 4.5 - 11.5 E9/L    RBC 5.05 3.50 - 5.50 E12/L    Hemoglobin 14.5 11.5 - 15.5 g/dL    Hematocrit 43.8 34.0 - 48.0 %    MCV 86.7 80.0 - 99.9 fL    MCH 28.7 26.0 - 35.0 pg    MCHC 33.1 32.0 - 34.5 %    RDW 13.8 11.5 - 15.0 fL    Platelets 199 133 - 956 E9/L    MPV 9.7 7.0 - 12.0 fL    Neutrophils % 68.7 43.0 - 80.0 %    Immature Granulocytes % 0.6 0.0 - 5.0 %    Lymphocytes % 19.1 (L) 20.0 - 42.0 %    Monocytes % 7.2 2.0 - 12.0 %    Eosinophils % 3.6 0.0 - 6.0 %    Basophils % 0.8 0.0 - 2.0 %    Neutrophils Absolute 7.36 (H) 1.80 - 7.30 E9/L    Immature Granulocytes # 0.06 E9/L    Lymphocytes Absolute 2.04 1.50 - 4.00 E9/L    Monocytes Absolute 0.77 0.10 - 0.95 E9/L    Eosinophils Absolute 0.38 0.05 - 0.50 E9/L    Basophils Absolute 0.09 0.00 - 0.20 E9/L   Lactic Acid, Plasma   Result Value Ref Range    Lactic Acid 1.2 0.5 - 2.2 mmol/L   SPECIMEN REJECTION   Result Value Ref Range    Rejected Test CMP/LIP/TRP5     Reason for Rejection see below    Troponin   Result Value Ref Range    Troponin, High Sensitivity 7 0 - 9 ng/L   Lipase   Result Value Ref Range    Lipase 16 13 - 60 U/L   Comprehensive Metabolic Panel w/ Reflex to MG   Result Value Ref Range    Sodium 136 132 - 146 mmol/L    Potassium reflex Magnesium 3.6 3.5 - 5.0 mmol/L    Chloride 98 98 - 107 mmol/L CO2 26 22 - 29 mmol/L    Anion Gap 12 7 - 16 mmol/L    Glucose 131 (H) 74 - 99 mg/dL    BUN 22 (H) 6 - 20 mg/dL    CREATININE 0.9 0.5 - 1.0 mg/dL    GFR Non-African American >60 >=60 mL/min/1.73    GFR African American >60     Calcium 9.6 8.6 - 10.2 mg/dL    Total Protein 7.1 6.4 - 8.3 g/dL    Albumin 4.3 3.5 - 5.2 g/dL    Total Bilirubin <0.2 0.0 - 1.2 mg/dL    Alkaline Phosphatase 106 (H) 35 - 104 U/L    ALT 12 0 - 32 U/L    AST 20 0 - 31 U/L   EKG 12 Lead   Result Value Ref Range    Ventricular Rate 55 BPM    Atrial Rate 55 BPM    P-R Interval 208 ms    QRS Duration 108 ms    Q-T Interval 484 ms    QTc Calculation (Bazett) 463 ms    P Axis -29 degrees    R Axis -81 degrees    T Axis 66 degrees       RADIOLOGY:  Interpreted by Radiologist.  CT HEAD WO CONTRAST   Final Result   No acute intracranial abnormality. MRI would be useful if symptoms persist.         CT CERVICAL SPINE WO CONTRAST   Final Result   No acute abnormality of the cervical spine. XR CHEST PORTABLE   Final Result   Significant improvement in aeration of the right base as detailed above. Continued follow-up recommended. EKG:  This EKG is signed and interpreted by me. Rate: 55  Rhythm: Sinus  Interpretation: non-specific EKG  Comparison: stable as compared to patient's most recent EKG        ------------------------- NURSING NOTES AND VITALS REVIEWED ---------------------------   The nursing notes within the ED encounter and vital signs as below have been reviewed by myself. /66   Pulse 65   Temp 96.9 °F (36.1 °C)   Resp 18   Ht 5' 4\" (1.626 m)   Wt 102 lb (46.3 kg)   LMP 05/20/2014   SpO2 94%   BMI 17.51 kg/m²   Oxygen Saturation Interpretation: Normal    The patients available past medical records and past encounters were reviewed.         ------------------------------ ED COURSE/MEDICAL DECISION MAKING----------------------  Medications - No data to display          Medical Decision Making:   Patient presenting here because of syncopal episode. She reports she had a syncopal episode about 1 week ago presented here because another episode. Patient does have history coronary disease history of heart failure history of defibrillator. Patient is awake alert oriented x3. Patient EKG noted and essentially unchanged. Chest x-ray and CT reviewed. Plan will be to admit to monitored bed for further evaluation and treatment     Re-Evaluations:             Re-evaluation. Patients symptoms show no change  Patient reevaluated and unchanged. Patient on cardiac monitor sinus rhythm. Patient was reevaluated around 2:30 AM. Patient in no distress reporting no pain requesting something to drink  Consultations:               Family practice will be consulted  Critical Care: This patient's ED course included: a personal history and physicial eaxmination    This patient has been closely monitored during their ED course. Counseling: The emergency provider has spoken with the patient and discussed todays results, in addition to providing specific details for the plan of care and counseling regarding the diagnosis and prognosis. Questions are answered at this time and they are agreeable with the plan.       --------------------------------- IMPRESSION AND DISPOSITION ---------------------------------    IMPRESSION  1. Syncope and collapse        DISPOSITION  Disposition: Admit to telemetry  Patient condition is stable        NOTE: This report was transcribed using voice recognition software.  Every effort was made to ensure accuracy; however, inadvertent computerized transcription errors may be present          Kimberly Recio MD  06/13/21 [address], [place]  06/14/21 [facility] Chuck Delgado MD  06/14/21 [id number]

## 2021-06-14 NOTE — ED NOTES
Unable to interrogate pacemaker located on left side  Spoke to [id number] which said takes a specific machine to interrogate d/t located on patients side  They were calling a local rep to come interrogate  Charge nurse and MD notified      Keegan Quarles RN  06/14/21 2514

## 2021-06-15 LAB
EKG ATRIAL RATE: 55 BPM
EKG P AXIS: -29 DEGREES
EKG P-R INTERVAL: 208 MS
EKG Q-T INTERVAL: 484 MS
EKG QRS DURATION: 108 MS
EKG QTC CALCULATION (BAZETT): 463 MS
EKG R AXIS: -81 DEGREES
EKG T AXIS: 66 DEGREES
EKG VENTRICULAR RATE: 55 BPM

## 2021-06-22 NOTE — PROGRESS NOTES
Outpatient Cardiology - Office Visit Follow-up    Date of Consultation: 7/1/2021    HISTORY OF PRESENT ILLNESS:   Patient is a 49year old AAF who is known to Dr. Daren Bajwa. She is here today for hospital follow-up. Patient states she has been doing well since hospital discharge. She denies any cardiac symptoms me at this time, including chest discomfort at rest or on exertion, shortness of breath at rest or on exertion, nausea, emesis, diaphoresis, dizziness, palpitations, near-syncope or syncope. She denies paroxysmal nocturnal dyspnea, orthopnea or peripheral edema. She admits to not taking her morning medications thus far today, and notes her blood pressure is likely elevated for that reason. She seems to have poor medical literacy during discussion. She admits to non-compliance with taking her Bumex every day as prescribed, but notes of attempting compliance with the majority of her cardiac medications. No significant weight gain noted since hospital discharge (weight 102 lbs June 14 --> 103 lbs in the office today). Please note: past medical records were reviewed per electronic medical record (EMR) - see detailed reports under Past Medical/ Surgical History. PAST MEDICAL HISTORY:    1. Non-ischemic cardiomyopathy. ?Combined ischemic/non-ischemic --> Unsuccessful angioplasty to 99% distal thrombo-occlusion of OM branch with noted EF 40-45% on left heart cath in 2015 by Dr. Eldon Hernandez at [facility]. 2. Chronic heart failure with reduced ejection fraction. 3. RV dilation with associated systolic dysfunction. 4. Coronary artery disease. Treated medically as noted by [facility] in 2015.  5. Status-post ICD placement in 2016.  6. Valvular heart disease. 7. IRBBB/LAFB on EKG. 8. History of SVT. 9. Hypertension. 10. Hyperlipidemia, on statin therapy. 11. History of CVA (left parietal). 12. Tobacco abuse. 13. Medical non-compliance. 14. Carotid artery disease. 15. COPD/Asthma.   16. History of hospital/ED admissions and patient leaving AMA. 17. Alcohol use. 18. Marijuana abuse. 23. Hospitalized June 2021 for syncope. Noted to have drank beer and used marijuana prior to the episode. Positive orthostatics. Thought to be vasovagal in etiology in setting of defecation, ETOH and drug use. No inpatient cardiac testing planned. She was discharged home on June 14 on the following cardiac medications: ASA 81 mg daily, Lipitor 40 mg daily, Bumex 2 mg daily, Entresto 97/103 mg BID, Toprol- mg daily, Aldactone 25 mg daily. CARDIAC TESTING    CLAUDIA (Dr. Gretchen Alvarez, 10/2020)   Summary   Technically difficult study, patient was coughing constantly during the   study. Left ventricle is severely enlarged . Ejection fraction is visually estimated at 25-30%. No regional wall motion abnormalities seen. Normal left ventricle wall thickness. Mildly enlarged right ventricle cavity. Right ventricle global systolic function is mildly reduced . Tethering of both anterior and posterior leaflets noted with a tenting   height of 1.2 cm. Anterior leaflet length 1.5 cm and posterior leaflet   length 1.0 cm. Moderate-to-severe mitral regurgitation with centrally directed jet. Marline type 3b. PISA radius 0.9 cm, ERO 0.2 cm2, no PV flow reversal.   No hemodynamically significant aortic stenosis is present. Unable to estimate PA systolic pressure. No evidence for hemodynamically significant pericardial effusion. Lexiscan Stress Test (3/2020)  FINDINGS:  LV is dilated. Perfusion images demonstrate no reversible perfusion defect. Severe global hypokinesis. The end diastolic volume is 852 ml. The end systolic volume is 978 ml. The estimated ejection fraction is 20 %. Impression  1. No reversible perfusion defect  2. Ejection fraction is 20 %. 3. Dilated LV. Severe global hypokinesis. TTE (Dr. Earline Moreira, 3/2020)   Summary   Left ventricle is moderate-severely dilated. LVEDD 6.8 cm.    LV systolic function is severely reduced. Ejection fraction is visually estimated at 20-25%. Abnormal diastolic function, indeterminate grade. There is severe global hypokinesis. Normal left ventricular wall thickness. Abnormal LV septal motion consistent with conduction disorder. Mildly dilated right ventricle. Right ventricle global systolic function is mildly reduced. The left atrium is massively dilated. Severe eccentric mitral regurgitation, directed posteriorly. Mild tricuspid regurgitation. Mild pulmonic regurgitation. 4/4/2018 [facility] (Dr. Terra Mejia)  Findings:  Left main: 0%  stenosis  LAD: 0. %  stenosis  Circumflex: 0. %   stenosis  RCA: Dominant.  0. %  stenosis  Ao: 112/62.      4/3/2018 US carotid  Peak systolic and end diastolic velocities, ICA/CCA ratios and   antegrade vertebral artery flow as above. See above for details fo the   examination and below for internal carotid artery interpretation   guidelines. 1. Elevated peak systolic velocity in the left proximal internal   carotid artery falling within the estimated luminal stenosis range of   50-69%. 2. Scattered areas of mild to moderate calcified and noncalcified   plaque left carotid arterial systems, most prominent proximal distal   left internal carotid artery. 3. Right carotid arterial system is not evaluated secondary to   ultrasound technician been unable to access secondary to bandaging.          4/3/2018 bilateral FAVIAN  Normal ankle arm index    Echocardiogram (Dr. Ghazal Andino, 12/2017):    Severely dilated left atrium, with LUZ MARINA 59 ml/m2. Appears to be possible   left to right inter atrial shunt demosntrated with color doppler on image   0065, [id number]., 1398.   Eccentric hypertrophy. Severely dilated left ventricle. Severe left   ventricular systolic function. Visually estimated LVEF is 10%. Severe   global hypokinesis with regional variation.  Grade 3 restrictive diastolic   dysfunction, which correlates with very poor prognosis.   Apically tethered mitral valve leaflets with posterior leaflet   restriction. Severe mitral regurgitation. Eccentric posteriorly directed   jet. Trace pericardial effusion, no tamponade physiology.   RV measurements biased by such significant LV dilatation with septal shift   into the RV. Severe RV dysfunction. RVSP is 50 mm Hg consistent with   pulmonary hypertension.   Moderate tricuspid regurgitation, central regurgitant jet.   Findings communicated with the floor; prior to this time patient signed   out of the hospital against medical advice. [facility] (2015, [facility] Dr. Gallo Man)   Unsuccessful angioplasty to 1st OM branch (99% thrombo-occlusion) due to small size of this branch (EF was 40-45% with moderate lateral wall hypokinesis, left main normal, LAD normal, left circumflex normal, OM1 had a distal 99% lesion, and RCA dominant and normal)     [facility] ()   No obstructive CAD. EF 50%.               PAST SURGICAL HISTORY:    Past Surgical History:   Procedure Laterality Date    CARDIAC CATHETERIZATION  2018    Dr. Jeevan Quiroz- Clean coronaries    CARDIAC DEFIBRILLATOR PLACEMENT  2016    SICD    CARDIOVASCULAR STRESS TEST  2009 Normal      SECTION      COLONOSCOPY  10/2015    DIAGNOSTIC CARDIAC CATH LAB PROCEDURE  2007    [facility]: No significant CAD. Mild LVD with apical akinesis. EF 50%.  DIAGNOSTIC CARDIAC CATH LAB PROCEDURE  4/15    with PTCA to 1st ob [email or web address]    ENDOSCOPY, COLON, DIAGNOSTIC      HYSTERECTOMY  10/1/15    at [facility] by Dr. Leela Ortega PTCA  4/10/15    at [address] [place] [address] ECHOCARDIOGRAM  3/19/13    EF 65%, mild MR    TUBAL LIGATION         HOME MEDICATIONS:  Prior to Admission medications    Medication Sig Start Date End Date Taking?  Authorizing Provider   acetaminophen (TYLENOL) 500 MG tablet Take 1 tablet by mouth 2 times daily as needed for Pain 21  Yes Jerri Stokes MD   aspirin 81 MG chewable tablet Take 1 tablet by mouth daily 1/13/21  Yes Maya Thurman MD   atorvastatin (LIPITOR) 40 MG tablet Take 1 tablet by mouth daily 1/13/21  Yes Maya Thurman MD   sacubitril-valsartan (ENTRESTO)  MG per tablet Take 1 tablet by mouth 2 times daily 1/13/21  Yes Maya Thurman MD   metoprolol succinate (TOPROL XL) 100 MG extended release tablet Take 1 tablet by mouth daily 1/13/21  Yes Maya Thurman MD   bumetanide (BUMEX) 2 MG tablet Take 1 tablet by mouth daily 1/13/21  Yes Maya Thurman MD   spironolactone (ALDACTONE) 25 MG tablet Take 1 tablet by mouth daily 1/13/21  Yes Maya Thurman MD   budesonide-formoterol (SYMBICORT) 160-4.5 MCG/ACT AERO Inhale 2 puffs into the lungs 2 times daily 1/13/21  Yes Maya Thurman MD   montelukast (SINGULAIR) 10 MG tablet Take 1 tablet by mouth nightly 1/13/21  Yes Maya Thurman MD   Ergocalciferol (VITAMIN D2) 10 MCG (400 UNIT) TABS Take 800 Units by mouth daily 2/18/21 5/19/21  Maya Thurman MD       CURRENT MEDICATIONS:      Current Outpatient Medications:     acetaminophen (TYLENOL) 500 MG tablet, Take 1 tablet by mouth 2 times daily as needed for Pain, Disp: 120 tablet, Rfl: 0    aspirin 81 MG chewable tablet, Take 1 tablet by mouth daily, Disp: 90 tablet, Rfl: 2    atorvastatin (LIPITOR) 40 MG tablet, Take 1 tablet by mouth daily, Disp: 90 tablet, Rfl: 2    sacubitril-valsartan (ENTRESTO)  MG per tablet, Take 1 tablet by mouth 2 times daily, Disp: 180 tablet, Rfl: 2    metoprolol succinate (TOPROL XL) 100 MG extended release tablet, Take 1 tablet by mouth daily, Disp: 90 tablet, Rfl: 2    bumetanide (BUMEX) 2 MG tablet, Take 1 tablet by mouth daily, Disp: 90 tablet, Rfl: 3    spironolactone (ALDACTONE) 25 MG tablet, Take 1 tablet by mouth daily, Disp: 90 tablet, Rfl: 2    budesonide-formoterol (SYMBICORT) 160-4.5 MCG/ACT AERO, Inhale 2 puffs into the lungs 2 times daily, Disp: 6 Inhaler, Rfl: 3    montelukast (SINGULAIR) 10 MG Member of Clubs or Organizations:     Attends Club or Organization Meetings:     Marital Status:    Intimate Partner Violence:     Fear of Current or Ex-Partner:     Emotionally Abused:     Physically Abused:     Sexually Abused:        FAMILY HISTORY:   Family History   Problem Relation Age of Onset    High Blood Pressure Mother     Stroke Mother     High Blood Pressure Father     Stroke Father     Heart Disease Father     Pacemaker Father          REVIEW OF SYSTEMS:     · Constitutional: Denies fevers, chills, night sweats, and generalized fatigue. Denies significant weight loss or weight gain. · HEENT: Denies headaches, nose bleeds, rhinorrhea, sore throat. Denies blurred vision. Denies dysphagia, odynophagia. · Musculoskeletal: Denies falls, pain to BLE with ambulation. Denies muscle weakness. · Neurological: Denies dizziness and lightheadedness, numbness and tingling. Denies focal neurological deficits. · Cardiovascular: Denies chest pain, palpitations, diaphoresis. Denies near syncope, syncope. Denies PND, orthopnea, peripheral edema. · Respiratory: Denies shortness of breath at rest or with exertion. Denies cough, hemoptysis. · Gastrointestinal: Denies abdominal pain, nausea/vomiting, diarrhea and constipation, black/bloody, and tarry stools. · Genitourinary: Denies dysuria and hematuria. · Hematologic: Denies excessive bruising or bleeding. · Endocrine: Denies excessive thirst. Denies intolerance to hot and cold. PHYSICAL EXAM:   BP (!) 152/78   Pulse 60   Resp 16   Ht 5' 4\" (1.626 m)   Wt 103 lb 9.6 oz (47 kg)   LMP 05/20/2014   SpO2 98%   BMI 17.78 kg/m²   CONST:  Well developed, well nourished AAF who appears stated age. Awake, alert, cooperative, no apparent distress. HEENT:   Head- Normocephalic, atraumatic. Eyes- Conjunctivae pink, anicteric. Neck-  No stridor, trachea midline, no apparent jugular venous distention.    CHEST: Chest symmetrical and non-tender to palpation. No accessory muscle use or intercostal retractions. RESPIRATORY: Lung sounds - clear throughout fields. Diminished. CARDIOVASCULAR:     No noted carotid bruit. Heart Ausculation- Regular rate and rhythm, 2/6 systolic murmur apex. PV: No lower extremity edema. Pedal pulses palpable, no clubbing or cyanosis. ABDOMEN: Soft, non-tender to light palpation. Bowel sounds present. MS: Good muscle strength and tone. No atrophy or abnormal movements. : Deferred  SKIN: Warm and dry. NEURO / PSYCH: Oriented to person, place and time. Speech clear and appropriate. Follows all commands. Pleasant affect. DATA:    EKG 7/1/2021:  As noted in cardiology tab      Labs:   HgA1c:   Lab Results   Component Value Date    LABA1C 5.5 06/14/2021     FASTING LIPID PANEL:  Lab Results   Component Value Date    CHOL 254 01/20/2021    HDL 76 01/20/2021    LDLCALC 162 01/20/2021    TRIG 81 01/20/2021       ASSESSMENT:  1. Chronic heart failure with reduced ejection fraction. Appears euvolemic on exam today. ACC stage C/NYHA class III. Most recent EF 25 to 30% on CLAUDIA in October 2020.  2. ?Combined ischemic/non-ischemic cardiomyopathy. Unsuccessful angioplasty to 99% distal thrombo-occlusion of OM branch with noted EF 40-45% on left heart cath in 2015 by Dr. Archana Reese at [facility].   3. Dilated RV with systolic dysfunction. 4. Coronary artery disease by left heart catheterization in 2015 at [facility] (status post unsuccessful angioplasty denying of present distal thromboocclusion of OM branch) -- treated medically. Repeat left heart catheterization in April 2018 revealing patent left main, patent LAD, patent left circumflex and patent dominant RCA. Lexiscan stress test in March 2020 non-ischemic. Clinically stable. 5. Moderate to severe MR with centrally directed jet and tethering of both the anterior and posterior leaflets on CLAUDIA October 2020.  6. Status-post ICD placement 2016.   Non-compliant with follow-up. 7. IRBBB/LAFB on EKG. 8. Hypertension, uncontrolled today due to medication non-compliance. 9. Hyperlipidemia, on statin therapy. 10. History of SVT. 11. History of left parietal CVA. 12. Carotid artery disease. 13. Marijuana abuse, alcohol abuse. 14. Tobacco abuse. 15. Chronic obstructive pulmonary disease/asthma. 16. Longstanding medical non-compliance. PLAN:   1. Patient agreeable to start following with CHF clinic at this time. We will send a new referral.  2. Referral to EP due to history of ICD placement/device management -- she wishes to stay in the [facility] and has been referred to Dr. Andres Soares in the past.   3. Patient needs follow-up appointment with Dr. Doron Post regarding her valvular heart disease and need for possible intervention. 4. I advised the patient on the significance of compliance with both her medications and follow-up. She expressed understanding. I discussed with the patient that she should be taking her Bumex as prescribed every day. She again expressed understanding. 5. Check magnesium level, CMP and BNP. 6. Follow-up in two months with Perry Johns or Dr. Vee Avalos, or sooner if needed. The patient's current medication list, allergies, problem list, recent labs and diagnostic testing were reviewed at today's visit.       Sharyne Burkitt, [address] [place] [address], [place] [id number] Cardiology    Electronically signed by Antoinette Muro PA-C on 7/1/2021 at 1:11 PM

## 2021-07-01 ENCOUNTER — OFFICE VISIT (OUTPATIENT)
Dept: CARDIOLOGY CLINIC | Age: 56
End: 2021-07-01
Payer: COMMERCIAL

## 2021-07-01 ENCOUNTER — HOSPITAL ENCOUNTER (OUTPATIENT)
Age: 56
Discharge: HOME | End: 2021-07-01
Payer: COMMERCIAL

## 2021-07-01 VITALS
OXYGEN SATURATION: 98 % | BODY MASS INDEX: 17.69 KG/M2 | HEART RATE: 60 BPM | RESPIRATION RATE: 16 BRPM | DIASTOLIC BLOOD PRESSURE: 78 MMHG | HEIGHT: 64 IN | WEIGHT: 103.6 LBS | SYSTOLIC BLOOD PRESSURE: 152 MMHG

## 2021-07-01 DIAGNOSIS — I50.20 HFREF (HEART FAILURE WITH REDUCED EJECTION FRACTION) (HCC): Primary | ICD-10-CM

## 2021-07-01 DIAGNOSIS — I50.20 HFREF (HEART FAILURE WITH REDUCED EJECTION FRACTION) (HCC): ICD-10-CM

## 2021-07-01 LAB
ALBUMIN SERPL-MCNC: 5 G/DL (ref 3.5–5.2)
ALP BLD-CCNC: 106 U/L (ref 35–104)
ALT SERPL-CCNC: 11 U/L (ref 0–32)
ANION GAP SERPL CALCULATED.3IONS-SCNC: 15 MMOL/L (ref 7–16)
AST SERPL-CCNC: 23 U/L (ref 0–31)
BILIRUB SERPL-MCNC: <0.2 MG/DL (ref 0–1.2)
BUN BLDV-MCNC: 12 MG/DL (ref 6–20)
CALCIUM SERPL-MCNC: 10.1 MG/DL (ref 8.6–10.2)
CHLORIDE BLD-SCNC: 103 MMOL/L (ref 98–107)
CO2: 25 MMOL/L (ref 22–29)
CREAT SERPL-MCNC: 0.8 MG/DL (ref 0.5–1)
GFR AFRICAN AMERICAN: >60
GFR NON-AFRICAN AMERICAN: >60 ML/MIN/1.73
GLUCOSE BLD-MCNC: 94 MG/DL (ref 74–99)
MAGNESIUM: 2.1 MG/DL (ref 1.6–2.6)
POTASSIUM SERPL-SCNC: 3.7 MMOL/L (ref 3.5–5)
PRO-BNP: 750 PG/ML (ref 0–125)
SODIUM BLD-SCNC: 143 MMOL/L (ref 132–146)
TOTAL PROTEIN: 8.1 G/DL (ref 6.4–8.3)

## 2021-07-01 PROCEDURE — 3017F COLORECTAL CA SCREEN DOC REV: CPT | Performed by: PHYSICIAN ASSISTANT

## 2021-07-01 PROCEDURE — 4004F PT TOBACCO SCREEN RCVD TLK: CPT | Performed by: PHYSICIAN ASSISTANT

## 2021-07-01 PROCEDURE — 83880 ASSAY OF NATRIURETIC PEPTIDE: CPT

## 2021-07-01 PROCEDURE — 93000 ELECTROCARDIOGRAM COMPLETE: CPT | Performed by: INTERNAL MEDICINE

## 2021-07-01 PROCEDURE — 83735 ASSAY OF MAGNESIUM: CPT

## 2021-07-01 PROCEDURE — G8419 CALC BMI OUT NRM PARAM NOF/U: HCPCS | Performed by: PHYSICIAN ASSISTANT

## 2021-07-01 PROCEDURE — 99214 OFFICE O/P EST MOD 30 MIN: CPT | Performed by: PHYSICIAN ASSISTANT

## 2021-07-01 PROCEDURE — 36415 COLL VENOUS BLD VENIPUNCTURE: CPT

## 2021-07-01 PROCEDURE — 80053 COMPREHEN METABOLIC PANEL: CPT

## 2021-07-01 PROCEDURE — G8427 DOCREV CUR MEDS BY ELIG CLIN: HCPCS | Performed by: PHYSICIAN ASSISTANT

## 2021-07-01 RX ORDER — NITROGLYCERIN 0.4 MG/1
0.4 TABLET SUBLINGUAL EVERY 5 MIN PRN
Qty: 25 TABLET | Refills: 1 | Status: SHIPPED
Start: 2021-07-01 | End: 2022-05-18 | Stop reason: SDUPTHER

## 2021-07-14 NOTE — PROGRESS NOTES
[facility] RESIDENCY PROGRAM  DATE OF VISIT : 2021    Patient : Sky Santos   Age : 49 y.o.  : 1965   MRN : [id number]   ______________________________________________________________________    Chief Complaint :   Chief Complaint   Patient presents with   Sukhdev Guillaume    ED Follow-up       HPI : Sky Santos is 49 y.o. female who presented to the clinic today for hosp f/u. Patient recently admitted in  for 18 hours for syncopal episode. Cardiology was consulted, no cardiac imaging or testing obtained during hospitalization and signed off. Patient did follow-up with cardiology , referred to EP as well as CHF clinic. Patient no showed for appointment on  with heart failure clinic, states due to weather. Encourage patient to follow-up appropriately. Patient has been taking her Bumex. Does continue to endorse some dizziness intermittently, no further syncopal episodes. No chest pain or shortness of breath. No weight gain. Patient with ongoing GI upset and nausea throughout the day. Patient states that she takes pills on empty stomach and then eats. Denies any heartburn, belching, bitter taste in mouth. Due for colonoscopy. Patient states that lactose-containing foods tend to bother her. Not on PPI. Patient reports poor nutritional status secondary to this, unable to gain weight. Past Medical History :  Past Medical History:   Diagnosis Date    Angina at rest [facility])     cath in  showed no CAD    Asthma     Blood type AB+ 2018    CAD (coronary artery disease)     Carpal tunnel syndrome on left     CHF (congestive heart failure) (HCC)     CHF (congestive heart failure) (HCC)     COPD (chronic obstructive pulmonary disease) ([address]     CVA (cerebral vascular accident) ([facility] 2009 and 2010.     left parietal    GERD (gastroesophageal reflux disease)     HFrEF (heart failure with reduced ejection fraction) ([facility] 2020- echo- LVEF 20-25%, LA enlarged, moderate MR, mild TR, mild PH, LVDD: 6.7, RVDD: 2.2 (17- echo- LVEF 10%, stage III DD, severely dilated LA, appears L>R atrial shunt, mod TR, LVDD: 6.6,  RVDD: 2.3)    History of blood transfusion     Hyperlipidemia Diagnosed in . Dyslipidemia.  Hypertension diagnosed in     ICD (implantable cardioverter-defibrillator) in place 2018    Placed by Dr. Mirza Rivera.  Left ovarian cyst     Liver lesion 2015    Moderate mitral regurgitation 2015    mild-moderate    Nonischemic cardiomyopathy ([facility]     NSTEMI (non-ST elevated myocardial infarction) ([facility] 4/10/15--adm to [facility]    Suicide attempt by drug ingestion ([facility]     attempt in  OD on ultram    Tobacco abuse     Vitamin D deficiency      Past Surgical History:   Procedure Laterality Date    CARDIAC CATHETERIZATION  2018    Dr. Tangela Cash- Clean coronaries   [facility]  2016    SICD    CARDIOVASCULAR STRESS TEST  2009 Normal      SECTION      COLONOSCOPY  10/2015    DIAGNOSTIC CARDIAC CATH LAB PROCEDURE  2007    [facility]: No significant CAD. Mild LVD with apical akinesis. EF 50%.  DIAGNOSTIC CARDIAC CATH LAB PROCEDURE  4/15    with PTCA to 1st ob [email or web address]    ENDOSCOPY, COLON, DIAGNOSTIC      HYSTERECTOMY  10/1/15    at [facility] by Dr. Kalin Lazaro PTCA  4/10/15    at [address] [place] [address] ECHOCARDIOGRAM  3/19/13    EF 65%, mild MR    TUBAL LIGATION           Review of Systems :    ROS - Per HPI   ______________________________________________________________________    Physical Exam :    Vitals: BP (!) 142/80 (Site: Left Upper Arm, Position: Sitting, Cuff Size: Medium Adult)   Pulse 73   Temp 97.4 °F (36.3 °C) (Temporal)   Ht 5' 4\" (1.626 m)   Wt 103 lb (46.7 kg)   LMP 2014   SpO2 97%   BMI 17.68 kg/m²   GENERAL: Alert, cooperative, no acute distress.   CHEST: No tenderness or deformity, full & symmetric excursion  LUNG: Clear to auscultation bilaterally,  respirations unlabored. No rales/wheezing/rubs  HEART: RRR, S1 and S2 normal, no murmur, rub or gallop. DP pulses 2/4  ABDOMEN: SNTND, no masses, no organomegaly, no guarding, rebound or rigidity. EXTREMITIES:  Extremities normal, atraumatic, no cyanosis or edema. Distal pulses equal bilaterally    ______________________________________________________________________    Assessment & Plan :     Diagnosis Orders   1. Essential hypertension  Blood Pressure KIT   2. Chronic systolic CHF (congestive heart failure), NYHA class 3 ([address]     3. Chronic obstructive pulmonary disease, unspecified COPD type ([address]     4. Poor nutrition  Nutritional Supplements (BOOST HIGH PROTEIN) LIQD   5. Gastroesophageal reflux disease, unspecified whether esophagitis present  Mercedez Allen MD, General Surgery, [place]    pantoprazole (PROTONIX) 20 MG tablet   6. Colon cancer screening  Lorrie Siddiqi MD, General Surgery, [place]       heart failure reduced ejection fraction: Continue to follow-up with cardiology and electrophysiology. Encourage patient to reschedule with heart failure clinic. Weigh self daily and call either here or cardiology if gained significant weight in short amount of time. GI upset: Concern for undiagnosed and untreated GERD based on symptoms and tenderness to palpation. Will trial Protonix 20 mg daily. Also advised patient to eat first and then take pills rather than taking pills on empty stomach. Verbalized understanding. Referral to general surgery for screening colonoscopy and GERD     Return in about 4 weeks (around 8/13/2021) for assess response to protonix .         Padilla Ruth MD PGY-3    Discussed with: Dr. Shaun Atkins

## 2021-07-16 ENCOUNTER — OFFICE VISIT (OUTPATIENT)
Dept: FAMILY MEDICINE CLINIC | Age: 56
End: 2021-07-16
Payer: COMMERCIAL

## 2021-07-16 VITALS
WEIGHT: 103 LBS | HEIGHT: 64 IN | TEMPERATURE: 97.4 F | DIASTOLIC BLOOD PRESSURE: 80 MMHG | BODY MASS INDEX: 17.58 KG/M2 | OXYGEN SATURATION: 97 % | SYSTOLIC BLOOD PRESSURE: 142 MMHG | HEART RATE: 73 BPM

## 2021-07-16 DIAGNOSIS — J44.9 CHRONIC OBSTRUCTIVE PULMONARY DISEASE, UNSPECIFIED COPD TYPE (HCC): ICD-10-CM

## 2021-07-16 DIAGNOSIS — E63.9 POOR NUTRITION: ICD-10-CM

## 2021-07-16 DIAGNOSIS — Z12.11 COLON CANCER SCREENING: ICD-10-CM

## 2021-07-16 DIAGNOSIS — I10 ESSENTIAL HYPERTENSION: Primary | ICD-10-CM

## 2021-07-16 DIAGNOSIS — I50.22 CHRONIC SYSTOLIC CHF (CONGESTIVE HEART FAILURE), NYHA CLASS 3 (HCC): ICD-10-CM

## 2021-07-16 DIAGNOSIS — K21.9 GASTROESOPHAGEAL REFLUX DISEASE, UNSPECIFIED WHETHER ESOPHAGITIS PRESENT: ICD-10-CM

## 2021-07-16 PROCEDURE — G8427 DOCREV CUR MEDS BY ELIG CLIN: HCPCS | Performed by: FAMILY MEDICINE

## 2021-07-16 PROCEDURE — 3023F SPIROM DOC REV: CPT | Performed by: FAMILY MEDICINE

## 2021-07-16 PROCEDURE — 99212 OFFICE O/P EST SF 10 MIN: CPT | Performed by: FAMILY MEDICINE

## 2021-07-16 PROCEDURE — 3017F COLORECTAL CA SCREEN DOC REV: CPT | Performed by: FAMILY MEDICINE

## 2021-07-16 PROCEDURE — G8926 SPIRO NO PERF OR DOC: HCPCS | Performed by: FAMILY MEDICINE

## 2021-07-16 PROCEDURE — 4004F PT TOBACCO SCREEN RCVD TLK: CPT | Performed by: FAMILY MEDICINE

## 2021-07-16 PROCEDURE — G8419 CALC BMI OUT NRM PARAM NOF/U: HCPCS | Performed by: FAMILY MEDICINE

## 2021-07-16 PROCEDURE — 99213 OFFICE O/P EST LOW 20 MIN: CPT | Performed by: FAMILY MEDICINE

## 2021-07-16 RX ORDER — LACTOSE-REDUCED FOOD 0.06G-1/ML
1 LIQUID (ML) ORAL 3 TIMES DAILY
Qty: 90 CAN | Refills: 2 | Status: SHIPPED
Start: 2021-07-16 | End: 2022-05-18 | Stop reason: SDUPTHER

## 2021-07-16 RX ORDER — BLOOD PRESSURE TEST KIT
1 KIT MISCELLANEOUS 2 TIMES DAILY
Qty: 1 KIT | Refills: 0 | Status: SHIPPED
Start: 2021-07-16 | End: 2022-10-27

## 2021-07-16 RX ORDER — PANTOPRAZOLE SODIUM 20 MG/1
20 TABLET, DELAYED RELEASE ORAL DAILY
Qty: 30 TABLET | Refills: 0 | Status: SHIPPED
Start: 2021-07-16 | End: 2022-05-18 | Stop reason: SDUPTHER

## 2021-07-16 ASSESSMENT — LIFESTYLE VARIABLES
HOW OFTEN DO YOU HAVE A DRINK CONTAINING ALCOHOL: 2-3 TIMES A WEEK
HOW MANY STANDARD DRINKS CONTAINING ALCOHOL DO YOU HAVE ON A TYPICAL DAY: 3 OR 4

## 2021-07-16 NOTE — PROGRESS NOTES
80-year-old female with history of heart failure reduced ejection fraction, hypertension, COPD here for hospital follow-up. Patient recently admitted in June for 18 hours for syncopal episode. Cardiology was consulted, no cardiac imaging or testing obtained during hospitalization and signed off. Patient did follow-up with cardiology 7/1, referred to EP as well as CHF clinic. Patient no showed for appointment on 7/9 with heart failure clinic, states due to weather. Encourage patient to follow-up appropriately. Patient has been taking her Bumex. Does continue to endorse some dizziness intermittently, no further syncopal episodes. No chest pain or shortness of breath. No weight gain. Patient with ongoing GI upset and nausea throughout the day. Patient states that she takes pills on empty stomach and then eats. Denies any heartburn, belching, bitter taste in mouth. Due for colonoscopy. Patient states that lactose-containing foods tend to bother her. Not on PPI. Patient reports poor nutritional status secondary to this, unable to gain weight. Blood pressure (!) 142/80, pulse 73, temperature 97.4 °F (36.3 °C), temperature source Temporal, height 5' 4\" (1.626 m), weight 103 lb (46.7 kg), last menstrual period 05/20/2014, SpO2 97 %, not currently breastfeeding. HEENT WNL     Heart regular    Lungs clear    abd tender to palpation over the epigastrium      no edema    Pulses intact       Assessment and plan  heart failure reduced ejection fraction: Continue to follow-up with cardiology and electrophysiology. Encourage patient to reschedule with heart failure clinic. Weigh self daily and call either here or cardiology if gained significant weight in short amount of time. GI upset: Concern for undiagnosed and untreated GERD based on symptoms and tenderness to palpation. Will trial Protonix 20 mg daily. Also advised patient to eat first and then take pills rather than taking pills on empty stomach. Verbalized understanding. Referral to general surgery for screening colonoscopy and GERD    return to clinic in 4 weeks to assess response to Protonix    Attending Physician Statement  I have discussed the case, including pertinent history and exam findings with the resident. I agree with the documented assessment and plan.

## 2021-07-27 ENCOUNTER — TELEPHONE (OUTPATIENT)
Dept: SURGERY | Age: 56
End: 2021-07-27

## 2021-07-27 NOTE — TELEPHONE ENCOUNTER
MA contacted patient to schedule appointment for GERD consult based on referral by Dr Du Cox. Patient scheduled for appointment on 8/23/2021 @ 2:00pm with Dr Bronwyn Avila in [facility]. Patient informed of date, time, location, and what to bring to appointment. Appointment letter mailed to patient.      Electronically signed by Nikolas Brumfield on 7/27/21 at 4:22 PM EDT

## 2021-07-28 ENCOUNTER — TELEPHONE (OUTPATIENT)
Dept: SURGERY | Age: 56
End: 2021-07-28

## 2021-10-04 ENCOUNTER — TELEPHONE (OUTPATIENT)
Dept: FAMILY MEDICINE CLINIC | Age: 56
End: 2021-10-04

## 2021-10-04 NOTE — TELEPHONE ENCOUNTER
Patient no showed 10/4/21 with Dr. Nova Nagy. Attempted to contact patient to reschedule the appointment, left message. No show letter sent.     Electronically signed by Manju Barboza MA on 10/4/2021 at 1:06 PM

## 2022-05-06 ENCOUNTER — TELEPHONE (OUTPATIENT)
Dept: FAMILY MEDICINE CLINIC | Age: 57
End: 2022-05-06

## 2022-05-06 NOTE — TELEPHONE ENCOUNTER
Called patient back. SHe did not answer. Message left informing her that dental Isabell Leaven come to our practice every Tuesday. Requested a call back if she has questions or would like scheduled for the Dental van.

## 2022-05-06 NOTE — TELEPHONE ENCOUNTER
----- Message from Yessi Merritt sent at 5/6/2022  9:54 AM EDT -----  Subject: Message to Provider    QUESTIONS  Information for Provider? pt would like a call back from office due pt   wanted to know if the dental mobile Edda Zaidi comes to that location?  ---------------------------------------------------------------------------  --------------  [address]  What is the best way for the office to contact you? OK to leave message on   voicemail  Preferred Call Back Phone Number? [phone]  ---------------------------------------------------------------------------  --------------  SCRIPT ANSWERS  Relationship to Patient?  Self

## 2022-05-18 ENCOUNTER — OFFICE VISIT (OUTPATIENT)
Dept: FAMILY MEDICINE CLINIC | Age: 57
End: 2022-05-18
Payer: COMMERCIAL

## 2022-05-18 VITALS
SYSTOLIC BLOOD PRESSURE: 132 MMHG | DIASTOLIC BLOOD PRESSURE: 70 MMHG | HEIGHT: 64 IN | HEART RATE: 66 BPM | RESPIRATION RATE: 18 BRPM | OXYGEN SATURATION: 97 % | TEMPERATURE: 97.5 F | BODY MASS INDEX: 18.95 KG/M2 | WEIGHT: 111 LBS

## 2022-05-18 DIAGNOSIS — I10 ESSENTIAL HYPERTENSION: Chronic | ICD-10-CM

## 2022-05-18 DIAGNOSIS — L29.8 OTHER PRURITUS: ICD-10-CM

## 2022-05-18 DIAGNOSIS — R52 ACHES: ICD-10-CM

## 2022-05-18 DIAGNOSIS — Z12.11 ENCOUNTER FOR SCREENING COLONOSCOPY: ICD-10-CM

## 2022-05-18 DIAGNOSIS — J45.909 PERSISTENT ASTHMA WITHOUT COMPLICATION, UNSPECIFIED ASTHMA SEVERITY: ICD-10-CM

## 2022-05-18 DIAGNOSIS — I50.22 CHRONIC SYSTOLIC CHF (CONGESTIVE HEART FAILURE), NYHA CLASS 3 (HCC): ICD-10-CM

## 2022-05-18 DIAGNOSIS — K21.9 GASTROESOPHAGEAL REFLUX DISEASE, UNSPECIFIED WHETHER ESOPHAGITIS PRESENT: ICD-10-CM

## 2022-05-18 DIAGNOSIS — J44.9 CHRONIC OBSTRUCTIVE PULMONARY DISEASE, UNSPECIFIED COPD TYPE (HCC): Primary | ICD-10-CM

## 2022-05-18 DIAGNOSIS — E55.9 VITAMIN D DEFICIENCY: ICD-10-CM

## 2022-05-18 DIAGNOSIS — Z12.31 ENCOUNTER FOR SCREENING MAMMOGRAM FOR BREAST CANCER: ICD-10-CM

## 2022-05-18 DIAGNOSIS — H93.12 LEFT-SIDED TINNITUS: ICD-10-CM

## 2022-05-18 DIAGNOSIS — J30.2 SEASONAL ALLERGIES: ICD-10-CM

## 2022-05-18 DIAGNOSIS — E63.9 POOR NUTRITION: ICD-10-CM

## 2022-05-18 DIAGNOSIS — Z76.0 MEDICATION REFILL: ICD-10-CM

## 2022-05-18 LAB
ANION GAP SERPL CALCULATED.3IONS-SCNC: 16 MMOL/L (ref 7–16)
BUN BLDV-MCNC: 25 MG/DL (ref 6–20)
CALCIUM SERPL-MCNC: 10.2 MG/DL (ref 8.6–10.2)
CHLORIDE BLD-SCNC: 103 MMOL/L (ref 98–107)
CHOLESTEROL, TOTAL: 234 MG/DL (ref 0–199)
CO2: 22 MMOL/L (ref 22–29)
CREAT SERPL-MCNC: 0.9 MG/DL (ref 0.5–1)
GFR AFRICAN AMERICAN: >60
GFR NON-AFRICAN AMERICAN: >60 ML/MIN/1.73
GLUCOSE BLD-MCNC: 119 MG/DL (ref 74–99)
HDLC SERPL-MCNC: 62 MG/DL
LDL CHOLESTEROL CALCULATED: 147 MG/DL (ref 0–99)
POTASSIUM SERPL-SCNC: 5.5 MMOL/L (ref 3.5–5)
SODIUM BLD-SCNC: 141 MMOL/L (ref 132–146)
TRIGL SERPL-MCNC: 126 MG/DL (ref 0–149)
VLDLC SERPL CALC-MCNC: 25 MG/DL

## 2022-05-18 PROCEDURE — 3023F SPIROM DOC REV: CPT | Performed by: FAMILY MEDICINE

## 2022-05-18 PROCEDURE — 3017F COLORECTAL CA SCREEN DOC REV: CPT | Performed by: FAMILY MEDICINE

## 2022-05-18 PROCEDURE — 99212 OFFICE O/P EST SF 10 MIN: CPT | Performed by: FAMILY MEDICINE

## 2022-05-18 PROCEDURE — 99213 OFFICE O/P EST LOW 20 MIN: CPT | Performed by: FAMILY MEDICINE

## 2022-05-18 PROCEDURE — G8427 DOCREV CUR MEDS BY ELIG CLIN: HCPCS | Performed by: FAMILY MEDICINE

## 2022-05-18 PROCEDURE — G8420 CALC BMI NORM PARAMETERS: HCPCS | Performed by: FAMILY MEDICINE

## 2022-05-18 PROCEDURE — 4004F PT TOBACCO SCREEN RCVD TLK: CPT | Performed by: FAMILY MEDICINE

## 2022-05-18 PROCEDURE — 36415 COLL VENOUS BLD VENIPUNCTURE: CPT | Performed by: FAMILY MEDICINE

## 2022-05-18 RX ORDER — ALBUTEROL SULFATE 90 UG/1
2 AEROSOL, METERED RESPIRATORY (INHALATION) 4 TIMES DAILY PRN
Qty: 18 G | Refills: 5 | Status: SHIPPED | OUTPATIENT
Start: 2022-05-18

## 2022-05-18 RX ORDER — ERGOCALCIFEROL (VITAMIN D2) 10 MCG
800 TABLET ORAL DAILY
Qty: 90 TABLET | Refills: 1 | Status: SHIPPED
Start: 2022-05-18 | End: 2022-10-27

## 2022-05-18 RX ORDER — ASPIRIN 81 MG/1
81 TABLET, CHEWABLE ORAL DAILY
Qty: 90 TABLET | Refills: 2 | Status: SHIPPED | OUTPATIENT
Start: 2022-05-18

## 2022-05-18 RX ORDER — SACUBITRIL AND VALSARTAN 97; 103 MG/1; MG/1
1 TABLET, FILM COATED ORAL 2 TIMES DAILY
Qty: 180 TABLET | Refills: 2 | Status: SHIPPED | OUTPATIENT
Start: 2022-05-18

## 2022-05-18 RX ORDER — BUDESONIDE AND FORMOTEROL FUMARATE DIHYDRATE 160; 4.5 UG/1; UG/1
2 AEROSOL RESPIRATORY (INHALATION) 2 TIMES DAILY
Qty: 6 EACH | Refills: 2 | Status: SHIPPED | OUTPATIENT
Start: 2022-05-18

## 2022-05-18 RX ORDER — CETIRIZINE HYDROCHLORIDE 10 MG/1
10 TABLET ORAL DAILY
Qty: 30 TABLET | Refills: 5 | Status: SHIPPED | OUTPATIENT
Start: 2022-05-18

## 2022-05-18 RX ORDER — LACTOSE-REDUCED FOOD 0.06G-1/ML
1 LIQUID (ML) ORAL 3 TIMES DAILY
Qty: 90 EACH | Refills: 2 | Status: SHIPPED
Start: 2022-05-18 | End: 2022-10-27

## 2022-05-18 RX ORDER — TIOTROPIUM BROMIDE 18 UG/1
18 CAPSULE ORAL; RESPIRATORY (INHALATION) DAILY
Qty: 90 CAPSULE | Refills: 1 | Status: SHIPPED | OUTPATIENT
Start: 2022-05-18

## 2022-05-18 RX ORDER — SPIRONOLACTONE 25 MG/1
25 TABLET ORAL DAILY
Qty: 90 TABLET | Refills: 2 | Status: ON HOLD
Start: 2022-05-18 | End: 2022-11-03 | Stop reason: HOSPADM

## 2022-05-18 RX ORDER — ACETAMINOPHEN 500 MG
500 TABLET ORAL 2 TIMES DAILY PRN
Qty: 120 TABLET | Refills: 0 | Status: ON HOLD
Start: 2022-05-18 | End: 2022-11-03 | Stop reason: HOSPADM

## 2022-05-18 RX ORDER — ATORVASTATIN CALCIUM 40 MG/1
40 TABLET, FILM COATED ORAL DAILY
Qty: 90 TABLET | Refills: 2 | Status: SHIPPED | OUTPATIENT
Start: 2022-05-18

## 2022-05-18 RX ORDER — PANTOPRAZOLE SODIUM 20 MG/1
20 TABLET, DELAYED RELEASE ORAL DAILY
Qty: 30 TABLET | Refills: 0 | Status: SHIPPED | OUTPATIENT
Start: 2022-05-18

## 2022-05-18 RX ORDER — METOPROLOL SUCCINATE 100 MG/1
100 TABLET, EXTENDED RELEASE ORAL DAILY
Qty: 90 TABLET | Refills: 2 | Status: SHIPPED | OUTPATIENT
Start: 2022-05-18

## 2022-05-18 RX ORDER — NITROGLYCERIN 0.4 MG/1
0.4 TABLET SUBLINGUAL EVERY 5 MIN PRN
Qty: 25 TABLET | Refills: 1 | Status: SHIPPED | OUTPATIENT
Start: 2022-05-18

## 2022-05-18 RX ORDER — BUMETANIDE 2 MG/1
2 TABLET ORAL DAILY
Qty: 90 TABLET | Refills: 3 | Status: ON HOLD
Start: 2022-05-18 | End: 2022-11-03 | Stop reason: SDUPTHER

## 2022-05-18 RX ORDER — MONTELUKAST SODIUM 10 MG/1
10 TABLET ORAL NIGHTLY
Qty: 90 TABLET | Refills: 2 | Status: SHIPPED | OUTPATIENT
Start: 2022-05-18

## 2022-05-18 SDOH — ECONOMIC STABILITY: FOOD INSECURITY: WITHIN THE PAST 12 MONTHS, THE FOOD YOU BOUGHT JUST DIDN'T LAST AND YOU DIDN'T HAVE MONEY TO GET MORE.: NEVER TRUE

## 2022-05-18 SDOH — ECONOMIC STABILITY: FOOD INSECURITY: WITHIN THE PAST 12 MONTHS, YOU WORRIED THAT YOUR FOOD WOULD RUN OUT BEFORE YOU GOT MONEY TO BUY MORE.: NEVER TRUE

## 2022-05-18 ASSESSMENT — PATIENT HEALTH QUESTIONNAIRE - PHQ9
SUM OF ALL RESPONSES TO PHQ QUESTIONS 1-9: 2
1. LITTLE INTEREST OR PLEASURE IN DOING THINGS: 1
SUM OF ALL RESPONSES TO PHQ9 QUESTIONS 1 & 2: 2
2. FEELING DOWN, DEPRESSED OR HOPELESS: 1
SUM OF ALL RESPONSES TO PHQ QUESTIONS 1-9: 2

## 2022-05-18 ASSESSMENT — SOCIAL DETERMINANTS OF HEALTH (SDOH): HOW HARD IS IT FOR YOU TO PAY FOR THE VERY BASICS LIKE FOOD, HOUSING, MEDICAL CARE, AND HEATING?: NOT HARD AT ALL

## 2022-05-18 NOTE — PROGRESS NOTES
[address] [facility] RESIDENCY PROGRAM  DATE OF VISIT : 2022    Patient : Zaria Temple   Age : 49 y.o.  : 1965   MRN : [id number]   ______________________________________________________________________    Chief Complaint :   Chief Complaint   Patient presents with    COPD    Congestive Heart Failure    Hypertension       HPI : Zaria Temple is 49 y.o. female who presented to the clinic today for above chief complaint. Hypertension:  Medications include metoprolol 100 mg daily, lactone 25 mg daily, Entresto twice daily. Reports compliance without side effects. Blood pressure well controlled at this visit. No chest pain, shortness of breath, headaches. COPD:  Patient reports compliance with Symbicort and Singulair. She reports getting of her head cold at this time. Continues with sinus pressure, drainage, runny nose, does have allergies that are usually worse this time of year. Not currently taking anything besides Benadryl for allergies. Cannot tolerate Flonase. Cough is dry and persistent in nature. No sputum or hemoptysis. Not currently on LAMA. Heart failure:  Reports compliance with Entresto and Bumex daily. Reports no lower extremity edema or orthopnea. She was to follow-up with cardiology in 2021 but did not show for that appointment. No follow-up since then. She reports no chest pain, exertional dyspnea. Left-sided tinnitus:  Reports ongoing ringing sensation in the left ear that has been present for many months. Some decreased hearing in that ear as well. Requesting evaluation with ENT.     Past Medical History :  Past Medical History:   Diagnosis Date    [facility] at rest [facility]     cath in  showed no CAD    Asthma     Blood type AB+ 2018    CAD (coronary artery disease)     Carpal tunnel syndrome on left     CHF (congestive heart failure) (HCC)     CHF (congestive heart failure) (HCC)     [facility]     CVA (cerebral vascular accident) ([facility] 2009 and 2010. left parietal    GERD (gastroesophageal reflux disease)     HFrEF (heart failure with reduced ejection fraction) ([facility] 2020- echo- LVEF 20-25%, LA enlarged, moderate MR, mild TR, mild PH, LVDD: 6.7, RVDD: 2.2 (17- echo- LVEF 10%, stage III DD, severely dilated LA, appears L>R atrial shunt, mod TR, LVDD: 6.6,  RVDD: 2.3)    History of blood transfusion     Hyperlipidemia Diagnosed in . Dyslipidemia.  Hypertension diagnosed in     ICD (implantable cardioverter-defibrillator) in place 2018    Placed by Dr. Chris Barnes.  Left ovarian cyst     Liver lesion 2015    Moderate mitral regurgitation 2015    mild-moderate    Nonischemic cardiomyopathy [facility]     NSTEMI (non-ST elevated myocardial infarction) [facility] 4/10/15--[facility]    Suicide attempt by drug ingestion ([facility]     attempt in  OD on ultram    Tobacco abuse     Vitamin D deficiency      Past Surgical History:   Procedure Laterality Date    CARDIAC CATHETERIZATION  2018    Dr. Spence Child- Clean coronaries   Broderick Rooney  2016    SICD    CARDIOVASCULAR STRESS TEST  2009 Normal      SECTION      COLONOSCOPY  10/2015    DIAGNOSTIC CARDIAC CATH LAB PROCEDURE  2007    [facility]: No significant CAD. Mild LVD with apical akinesis. EF 50%.     DIAGNOSTIC CARDIAC CATH LAB PROCEDURE  4/15    with PTCA to [facility] ob diana [email or web address]    ENDOSCOPY, COLON, DIAGNOSTIC      HYSTERECTOMY  10/1/15    at [facility] by Dr. Sarahi Ferguson PTCA  4/10/15    at [address] [place] [address] ECHOCARDIOGRAM  3/19/13    EF 65%, mild MR    TUBAL LIGATION           Review of Systems :    ROS - Per HPI   ______________________________________________________________________    Physical Exam :    Wt Readings from Last 3 Encounters:   22 111 lb (50.3 kg)   21 103 lb (46.7 kg)   07/01/21 103 lb 9.6 oz (47 kg)       BMI Readings from Last 3 Encounters:   05/18/22 19.05 kg/m²   07/16/21 17.68 kg/m²   07/01/21 17.78 kg/m²   ]      Vitals: /70 (Site: Left Upper Arm, Position: Sitting, Cuff Size: Small Adult)   Pulse 66   Temp 97.5 °F (36.4 °C) (Temporal)   Resp 18   Ht 5' 4\" (1.626 m)   Wt 111 lb (50.3 kg)   LMP 05/20/2014   SpO2 97%   BMI 19.05 kg/m²     Physical Exam  Constitutional:       Appearance: Normal appearance. HENT:      Head: Normocephalic and atraumatic. Ears:      Comments: Cerumen partially occluding bilateral ears     Mouth/Throat:      Mouth: Mucous membranes are moist.      Pharynx: No oropharyngeal exudate or posterior oropharyngeal erythema. Eyes:      Extraocular Movements: Extraocular movements intact. Pupils: Pupils are equal, round, and reactive to light. Cardiovascular:      Rate and Rhythm: Normal rate and regular rhythm. Heart sounds: No murmur heard. Pulmonary:      Comments: On room air, speaking in full sentences, no respiratory distress, scattered inspiratory and expiratory wheezes  Abdominal:      General: Abdomen is flat. Palpations: Abdomen is soft. Tenderness: There is no abdominal tenderness. There is no guarding. Musculoskeletal:      Cervical back: Normal range of motion and neck supple. Right lower leg: No edema. Left lower leg: No edema. Neurological:      Mental Status: She is alert.         ______________________________________________________________________    Assessment & Plan :     Diagnosis Orders   1. Chronic obstructive pulmonary disease, unspecified COPD type [facility]  sacubitril-valsartan (ENTRESTO)  MG per tablet    budesonide-formoterol (SYMBICORT) 160-4.5 MCG/ACT AERO    montelukast (SINGULAIR) 10 MG tablet    tiotropium (SPIRIVA HANDIHALER) 18 MCG inhalation capsule    albuterol sulfate HFA (VENTOLIN HFA) 108 (90 Base) MCG/ACT inhaler   2.  Poor nutrition Nutritional Supplements (BOOST HIGH PROTEIN) LIQD   3. Gastroesophageal reflux disease, unspecified whether esophagitis present  pantoprazole (PROTONIX) 20 MG tablet   4. Aches  acetaminophen (TYLENOL) 500 MG tablet   5. Essential hypertension  aspirin 81 MG chewable tablet    atorvastatin (LIPITOR) 40 MG tablet    metoprolol succinate (TOPROL XL) 100 MG extended release tablet    Basic Metabolic Panel   6. Persistent asthma without complication, unspecified asthma severity  sacubitril-valsartan (ENTRESTO)  MG per tablet    budesonide-formoterol (SYMBICORT) 160-4.5 MCG/ACT AERO   7. Chronic systolic CHF (congestive heart failure), NYHA class 3 ([facility])  bumetanide (BUMEX) 2 MG tablet    spironolactone (ALDACTONE) 25 MG tablet    LIPID PANEL   8. Medication refill  spironolactone (ALDACTONE) 25 MG tablet   9. Vitamin D deficiency  Ergocalciferol (VITAMIN D2) 10 MCG (400 UNIT) TABS   10. Other pruritus  cetirizine (ZYRTEC) 10 MG tablet   11. Seasonal allergies  cetirizine (ZYRTEC) 10 MG tablet   12. Left-sided tinnitus  Charo Ocampo., DO, Otolaryngology, [place]   13. Encounter for screening mammogram for breast cancer  LEONARDO DIGITAL SCREEN BILATERAL PER PROTOCOL   14. Encounter for screening colonoscopy  Constantine Dove MD, General Surgery, [place]       COPD: Overall stable though with increased cough, will initiate Spiriva to complete triple therapy. Also order albuterol as needed. Trial Zyrtec for allergic symptoms, offered Flonase the patient declined states that she cannot tolerate. Follow symptoms. Heart failure: Stable well-controlled this time, continue present management, follow-up with cardiology, patient reports she is going to make appointment with them after today  Hypertension: Stable well-controlled, continue present management, refills provided  Left-sided tinnitus: Etiology unclear, possibly related to hearing loss.   Will refer to ENT for further evaluation recommendation. Health maintenance: Surgery referral for colonoscopy, mammogram ordered, labs as ordered      Follow up:  Return in about 4 weeks (around 6/15/2022) for follow up new medication.       Romana Cuba MD PGY-3    Discussed with: Dr. Melly Mcneill

## 2022-05-18 NOTE — PROGRESS NOTES
S: 49 y.o. female presents today for:     HTN-stable on current medication  CHF-stable, using Bumex and Entresto, due for repeat with cardiology  COPD-no shortness of breath, breathing is stable does have a Chronic cough, compliant with Symbicort does not have albuterol, not on Spiriva    O: VS: /70 (Site: Left Upper Arm, Position: Sitting, Cuff Size: Small Adult)   Pulse 66   Temp 97.5 °F (36.4 °C) (Temporal)   Resp 18   Ht 5' 4\" (1.626 m)   Wt 111 lb (50.3 kg)   LMP 05/20/2014   SpO2 97%   BMI 19.05 kg/m²   AAO/NAD, appropriate affect for mood  CV:  RRR, no murmur  Resp: CTAB  Ext- DPP-B, no clubbing, cyanosis, edema    Impression/Plan:   1) HTN-  Stable, continue medications as prescribed. Labs  2) RFI-yjolin-ej with cardiology  3) COPD-current Spiriva  4) - mammo    Attending Physician Statement  I have discussed the case, including pertinent history and exam findings with the resident. I agree with the documented assessment and plan.       Dianne Mckeon, DO

## 2022-05-23 NOTE — RESULT ENCOUNTER NOTE
Please place order patient is coming this Wednesday 5/25/22 for blood draw. Advised to hold  spironolactone  has not been holding it  as she did not understand the voice mail.

## 2022-05-25 DIAGNOSIS — E87.5 HYPERKALEMIA: Primary | ICD-10-CM

## 2022-06-28 ENCOUNTER — HOSPITAL ENCOUNTER (EMERGENCY)
Age: 57
Discharge: LEFT BEFORE DISCHARGE | End: 2022-06-28

## 2022-06-28 ENCOUNTER — OFFICE VISIT (OUTPATIENT)
Dept: FAMILY MEDICINE CLINIC | Age: 57
End: 2022-06-28
Payer: COMMERCIAL

## 2022-06-28 VITALS — HEART RATE: 73 BPM | OXYGEN SATURATION: 98 % | RESPIRATION RATE: 16 BRPM | TEMPERATURE: 98.6 F

## 2022-06-28 VITALS
WEIGHT: 115 LBS | BODY MASS INDEX: 19.63 KG/M2 | HEIGHT: 64 IN | SYSTOLIC BLOOD PRESSURE: 145 MMHG | RESPIRATION RATE: 18 BRPM | HEART RATE: 73 BPM | TEMPERATURE: 98.3 F | DIASTOLIC BLOOD PRESSURE: 70 MMHG | OXYGEN SATURATION: 97 %

## 2022-06-28 DIAGNOSIS — R07.9 CHEST PAIN, UNSPECIFIED TYPE: Primary | ICD-10-CM

## 2022-06-28 PROBLEM — R09.89 LEFT VENTRICULAR EJECTION FRACTION OF 10% TO 20%: Status: RESOLVED | Noted: 2017-12-22 | Resolved: 2022-06-28

## 2022-06-28 PROCEDURE — 93005 ELECTROCARDIOGRAM TRACING: CPT | Performed by: STUDENT IN AN ORGANIZED HEALTH CARE EDUCATION/TRAINING PROGRAM

## 2022-06-28 PROCEDURE — G8420 CALC BMI NORM PARAMETERS: HCPCS | Performed by: FAMILY MEDICINE

## 2022-06-28 PROCEDURE — 3017F COLORECTAL CA SCREEN DOC REV: CPT | Performed by: FAMILY MEDICINE

## 2022-06-28 PROCEDURE — 93010 ELECTROCARDIOGRAM REPORT: CPT | Performed by: STUDENT IN AN ORGANIZED HEALTH CARE EDUCATION/TRAINING PROGRAM

## 2022-06-28 PROCEDURE — 4004F PT TOBACCO SCREEN RCVD TLK: CPT | Performed by: FAMILY MEDICINE

## 2022-06-28 PROCEDURE — G8427 DOCREV CUR MEDS BY ELIG CLIN: HCPCS | Performed by: FAMILY MEDICINE

## 2022-06-28 PROCEDURE — 99213 OFFICE O/P EST LOW 20 MIN: CPT | Performed by: STUDENT IN AN ORGANIZED HEALTH CARE EDUCATION/TRAINING PROGRAM

## 2022-06-28 ASSESSMENT — ENCOUNTER SYMPTOMS
CONSTIPATION: 0
SHORTNESS OF BREATH: 0
DIARRHEA: 0
COUGH: 0
ABDOMINAL PAIN: 0
NAUSEA: 0
VOMITING: 0

## 2022-06-28 NOTE — ASSESSMENT & PLAN NOTE
EF -   /2022.   Bumex 2mg  Entresto  Spironolactone 25  Metoprolol Succ 100     Dry Weight  Baseline / Dry BNP  F/u with Cardio:

## 2022-06-28 NOTE — PROGRESS NOTES
Attending Physician Statement    S:   Chief Complaint   Patient presents with    1 Month Follow-Up     New medication follow up      57/F who presents to the clinic today for follow-up of COPD/allergies. COPD/Allergies -  Recently started on Spiriva and Zyrtec with improvement of her symptoms. She is compliant with her medications with no reported side effects. Chest pain - Symptoms have been present for months. Described as intermittent substernal pressure that lasts for seconds but it is not exacerbated by exertion. HFrEF last echo demonstrated EF of 20 to 25%. O: Blood pressure (!) 145/70, pulse 73, temperature 98.3 °F (36.8 °C), temperature source Temporal, resp. rate 18, height 5' 4\" (1.626 m), weight 115 lb (52.2 kg), last menstrual period 05/20/2014, SpO2 97 %, not currently breastfeeding. Exam:   Heart - RRR   Lungs - clear  A: Chest Pain, COPD/Allergies  P:  EKG unchanged from previous tracings but still abnormal, givene risk factors recommend proceeding to ED for delta troponins   Encourage follow up with   Follow-up as ordered    I have discussed the case, including pertinent history and exam findings with the resident. I agree with the documented assessment and plan.

## 2022-06-28 NOTE — PROGRESS NOTES
[facility] RESIDENCY PROGRAM  DATE OF VISIT : 2022    Patient : Mounika Griffin   Age : 56 y.o.  : 1965   MRN : [id number]   ______________________________________________________________________    Chief Complaint :   Chief Complaint   Patient presents with    1 Month Follow-Up     New medication follow up       HPI : Mounika rGiffin is 56 y.o. female who presented to the clinic today for 1 month follow up for COPD and allergies. Recently started on Spiriva and Zyrtec. States these medications have been helping her symptoms. Denies any side effects. Currently endorsing chest pain. Pain is chronic. Has not worsened. It is intermittent and substernal, last for 10 seconds. Improves with deep breaths and nitroglycerin. No exacerbating factors. H/o MI, does not feel like that. Not associated with nausea, vomiting, palpitations, radiating to left arm. Occasionally associated with shortness of breath. Denies drug and alcohol use. Smokes tobacco, but unsure how much she smokes. Past Medical History :  Past Medical History:   Diagnosis Date    Angina at rest [facility])     cath in  showed no CAD    Asthma     Blood type AB+ 2018    CAD (coronary artery disease)     Carpal tunnel syndrome on left     CHF (congestive heart failure) (HCC)     [facility]) [facility]     COPD (ch[facility]) [facility]     [facility] 2009 and 2010. left parietal    GERD (gastroesophageal reflux disease)     HFrEF (heart failure with reduced ejection fraction) ([facility] 2020- echo- LVEF 20-25%, LA enlarged, moderate MR, mild TR, mild PH, LVDD: 6.7, RVDD: 2.2 (17- echo- LVEF 10%, stage III DD, severely dilated LA, appears L>R atrial shunt, mod TR, LVDD: 6.6,  RVDD: 2.3)    History of blood transfusion     Hyperlipidemia Diagnosed in . Dyslipidemia.     Hypertension diagnosed in     ICD (implantable cardioverter-defibrillator) in place 2018    Placed by Dr. Dajuan Murry.  Left ovarian cyst     Liver lesion 2015    Moderate mitral regurgitation 2015    mild-moderate    [facility]ischemic [facility]     NSTEMI (non-ST elevated myocardial infarction) ([facility] 4/10/15--adm to [facility] [address]    Suicide attempt by drug ingestion ([facility]     attempt in  OD on ultram    Tobacco abuse     Vitamin D deficiency      Past Surgical History:   Procedure Laterality Date    CARDIAC CATHETERIZATION  2018    Dr. Ilana Ariza- Clean coronaries   Nelida Shetty  2016    SICD    CARDIOVASCULAR STRESS TEST  2009 Normal      SECTION      COLONOSCOPY  10/2015    DIAGNOSTIC CARDIAC CATH LAB PROCEDURE  2007    [facility]: No significant CAD. Mild LVD with apical akinesis. EF 50%.  DIAGNOSTIC CARDIAC CATH LAB PROCEDURE  4/15    with PTCA to [facility] ob [email or web address]    ENDOSCOPY, COLON, DIAGNOSTIC      HYSTERECTOMY (CERVIX STATUS UNKNOWN)  10/1/15    at [facility] by Dr. Smitha Bailey PTCA  4/10/15    at [address] [place] [address] ECHOCARDIOGRAM  3/19/13    EF 65%, mild MR    TUBAL LIGATION         Allergies : Allergies   Allergen Reactions    Orange Fruit [Citrus] Hives and Itching     ONLY allergic to oranges - NOT any other citrus fruit. NO oranges or orange juice.  Crestor [Rosuvastatin Calcium]     Loratadine     Norco [Hydrocodone-Acetaminophen] Itching    Sulfa Antibiotics     Aspirin      Itching sometime     Patient takes 81mg Aspirin daily, but is unable to take 324 mg.         Medication List :    Current Outpatient Medications   Medication Sig Dispense Refill    Nutritional Supplements (BOOST HIGH PROTEIN) LIQD Take 1 Units by mouth 3 times daily 90 each 2    pantoprazole (PROTONIX) 20 MG tablet Take 1 tablet by mouth daily 30 tablet 0    nitroGLYCERIN (NITROSTAT) 0.4 MG SL tablet Place 1 tablet under the tongue every 5 minutes as needed for Chest pain 25 tablet 1    acetaminophen (TYLENOL) 500 MG tablet Take 1 tablet by mouth 2 times daily as needed for Pain 120 tablet 0    aspirin 81 MG chewable tablet Take 1 tablet by mouth daily 90 tablet 2    atorvastatin (LIPITOR) 40 MG tablet Take 1 tablet by mouth daily 90 tablet 2    sacubitril-valsartan (ENTRESTO)  MG per tablet Take 1 tablet by mouth 2 times daily 180 tablet 2    metoprolol succinate (TOPROL XL) 100 MG extended release tablet Take 1 tablet by mouth daily 90 tablet 2    bumetanide (BUMEX) 2 MG tablet Take 1 tablet by mouth daily 90 tablet 3    spironolactone (ALDACTONE) 25 MG tablet Take 1 tablet by mouth daily 90 tablet 2    budesonide-formoterol (SYMBICORT) 160-4.5 MCG/ACT AERO Inhale 2 puffs into the lungs 2 times daily 6 each 2    montelukast (SINGULAIR) 10 MG tablet Take 1 tablet by mouth nightly 90 tablet 2    Ergocalciferol (VITAMIN D2) 10 MCG (400 UNIT) TABS Take 800 Units by mouth daily 90 tablet 1    cetirizine (ZYRTEC) 10 MG tablet Take 1 tablet by mouth daily 30 tablet 5    tiotropium (SPIRIVA HANDIHALER) 18 MCG inhalation capsule Inhale 1 capsule into the lungs daily 90 capsule 1    albuterol sulfate HFA (VENTOLIN HFA) 108 (90 Base) MCG/ACT inhaler Inhale 2 puffs into the lungs 4 times daily as needed for Wheezing 18 g 5    Blood Pressure KIT 1 Units by Does not apply route 2 times daily 1 kit 0     No current facility-administered medications for this visit. Review of Systems :  Review of Systems   Constitutional: Negative for chills and fever. HENT: Positive for tinnitus. Difficulty hearing when I am speaking to her   Respiratory: Negative for cough and shortness of breath. Cardiovascular: Positive for chest pain. Negative for palpitations and leg swelling. Gastrointestinal: Negative for abdominal pain, constipation, diarrhea, nausea and vomiting.    Endocrine: Positive for cold intolerance and would like to go home first to turn off her stove and then go to the ED. Discussed risks of not going to ED. Patient expressed understanding and stated she will go after she returns home. Return to Office: Return in about 4 weeks (around 7/26/2022) for HTN.     Eun Guzmán MD  Case discussed with Dr. Alanis Cullen

## 2022-09-13 ENCOUNTER — PROCEDURE VISIT (OUTPATIENT)
Dept: AUDIOLOGY | Age: 57
End: 2022-09-13
Payer: COMMERCIAL

## 2022-09-13 ENCOUNTER — OFFICE VISIT (OUTPATIENT)
Dept: ENT CLINIC | Age: 57
End: 2022-09-13
Payer: COMMERCIAL

## 2022-09-13 VITALS
HEART RATE: 76 BPM | DIASTOLIC BLOOD PRESSURE: 86 MMHG | HEIGHT: 64 IN | BODY MASS INDEX: 19.63 KG/M2 | SYSTOLIC BLOOD PRESSURE: 148 MMHG | WEIGHT: 115 LBS

## 2022-09-13 DIAGNOSIS — H93.12 TINNITUS OF LEFT EAR: Primary | ICD-10-CM

## 2022-09-13 DIAGNOSIS — H91.93 BILATERAL HEARING LOSS, UNSPECIFIED HEARING LOSS TYPE: ICD-10-CM

## 2022-09-13 DIAGNOSIS — H61.22 IMPACTED CERUMEN OF LEFT EAR: ICD-10-CM

## 2022-09-13 PROCEDURE — 4004F PT TOBACCO SCREEN RCVD TLK: CPT | Performed by: NURSE PRACTITIONER

## 2022-09-13 PROCEDURE — 92557 COMPREHENSIVE HEARING TEST: CPT | Performed by: AUDIOLOGIST

## 2022-09-13 PROCEDURE — 99203 OFFICE O/P NEW LOW 30 MIN: CPT | Performed by: NURSE PRACTITIONER

## 2022-09-13 PROCEDURE — G8420 CALC BMI NORM PARAMETERS: HCPCS | Performed by: NURSE PRACTITIONER

## 2022-09-13 PROCEDURE — 92588 EVOKED AUDITORY TST COMPLETE: CPT | Performed by: AUDIOLOGIST

## 2022-09-13 PROCEDURE — 92567 TYMPANOMETRY: CPT | Performed by: AUDIOLOGIST

## 2022-09-13 PROCEDURE — G8427 DOCREV CUR MEDS BY ELIG CLIN: HCPCS | Performed by: NURSE PRACTITIONER

## 2022-09-13 PROCEDURE — 3017F COLORECTAL CA SCREEN DOC REV: CPT | Performed by: NURSE PRACTITIONER

## 2022-09-13 PROCEDURE — 69210 REMOVE IMPACTED EAR WAX UNI: CPT | Performed by: NURSE PRACTITIONER

## 2022-09-13 ASSESSMENT — ENCOUNTER SYMPTOMS
SHORTNESS OF BREATH: 0
RESPIRATORY NEGATIVE: 1
EYES NEGATIVE: 1
STRIDOR: 0

## 2022-09-13 NOTE — PROGRESS NOTES
[facility]  Dr. Ángela Nix. HAYDEN Evans Ms.Ed. New Consult       Patient Name:  Hilary Gonzales  :  1965     CHIEF C/O:    Chief Complaint   Patient presents with    New Patient     Pt states left ear ringing, constant. Ringing makes it hard to hear. HISTORY OBTAINED FROM:  patient    HISTORY OF PRESENT ILLNESS:       Radha Bustos is a 56y.o. year old female, here today for hearing loss and ringing in left ear. Symptoms started in  after having her first stroke  Symptoms have worsened with subsequent strokes, 5 in total  Hearing loss and loud ringing in the left ear  Denies issues with the right ear  States that the ringing drowns out most other sounds in the left ear  Dose complain of intermittent dizziness  Patient states that she needs people to face her when speaking so that she can try and read their lips  No pain or pressure  Is able to sleep  States she is able to distract herself from the ringing at times   Does have family hx of hearing loss - father  No hx of persistent noise exposure  No hx of recurrent ear infections or previous ear surgeries. Past Medical History:   Diagnosis Date    Angina at rest [facility])     cath in  showed no CAD    Asthma     Blood type AB+ 2018    CAD (coronary artery disease)     Carpal tunnel syndrome on left     CHF (congestive heart failure) (HCC)     CHF (congestive heart failure) (HCC)     COPD (chronic obstructive pulmonary disease) ([facility]     CVA (cerebral vascular accident) ([facility] 2009 and 2010. left parietal    GERD (gastroesophageal reflux disease)     HFrEF (heart failure with reduced ejection fraction) ([facility] 2020- echo- LVEF 20-25%, LA enlarged, moderate MR, mild TR, mild PH, LVDD: 6.7, RVDD: 2.2 (17- echo- LVEF 10%, stage III DD, severely dilated LA, appears L>R atrial shunt, mod TR, LVDD: 6.6,  RVDD: 2.3)    History of blood transfusion     Hyperlipidemia Diagnosed in . Dyslipidemia. Hypertension diagnosed in     ICD (implantable cardioverter-defibrillator) in place 2018    Placed by Dr. Carmina Nieto. Left ovarian cyst     Liver lesion 2015    Moderate mitral regurgitation 2015    mild-moderate    Nonischemic cardiomyopathy (HCC)     NSTEMI (non-ST elevated myocardial infarction) ([facility] 4/10/15--adm to [facility]    Suicide attempt by drug ingestion ([facility]     attempt in  OD on ultram    Tobacco abuse     Vitamin D deficiency      Past Surgical History:   Procedure Laterality Date    CARDIAC CATHETERIZATION  2018    Dr. Mckeon Reap- Clean coronaries    Mazariegos Byangel  2016    SICD    CARDIOVASCULAR STRESS TEST  2009 Normal      SECTION      COLONOSCOPY  10/2015    DIAGNOSTIC CARDIAC CATH LAB PROCEDURE  2007    [facility]: No significant CAD. Mild LVD with apical akinesis. EF 50%.     DIAGNOSTIC CARDIAC CATH LAB PROCEDURE  4/15    with PTCA to [facility] ob [email or web address]    ENDOSCOPY, COLON, DIAGNOSTIC      HYSTERECTOMY (CERVIX STATUS UNKNOWN)  10/1/15    at [facility] by Dr. Holly Jensen    PTCA  4/10/15    at [address] [place] [address] ECHOCARDIOGRAM  3/19/13    EF 65%, mild MR    TUBAL LIGATION         Current Outpatient Medications:     Nutritional Supplements (BOOST HIGH PROTEIN) LIQD, Take 1 Units by mouth 3 times daily, Disp: 90 each, Rfl: 2    pantoprazole (PROTONIX) 20 MG tablet, Take 1 tablet by mouth daily, Disp: 30 tablet, Rfl: 0    nitroGLYCERIN (NITROSTAT) 0.4 MG SL tablet, Place 1 tablet under the tongue every 5 minutes as needed for Chest pain, Disp: 25 tablet, Rfl: 1    acetaminophen (TYLENOL) 500 MG tablet, Take 1 tablet by mouth 2 times daily as needed for Pain, Disp: 120 tablet, Rfl: 0    aspirin 81 MG chewable tablet, Take 1 tablet by mouth daily, Disp: 90 tablet, Rfl: 2    atorvastatin (LIPITOR) 40 MG tablet, Take 1 tablet by mouth daily, Disp: 90 tablet, Rfl: 2    sacubitril-valsartan (ENTRESTO)  MG per tablet, Take 1 tablet by mouth 2 times daily, Disp: 180 tablet, Rfl: 2    metoprolol succinate (TOPROL XL) 100 MG extended release tablet, Take 1 tablet by mouth daily, Disp: 90 tablet, Rfl: 2    bumetanide (BUMEX) 2 MG tablet, Take 1 tablet by mouth daily, Disp: 90 tablet, Rfl: 3    spironolactone (ALDACTONE) 25 MG tablet, Take 1 tablet by mouth daily, Disp: 90 tablet, Rfl: 2    budesonide-formoterol (SYMBICORT) 160-4.5 MCG/ACT AERO, Inhale 2 puffs into the lungs 2 times daily, Disp: 6 each, Rfl: 2    montelukast (SINGULAIR) 10 MG tablet, Take 1 tablet by mouth nightly, Disp: 90 tablet, Rfl: 2    cetirizine (ZYRTEC) 10 MG tablet, Take 1 tablet by mouth daily, Disp: 30 tablet, Rfl: 5    tiotropium (SPIRIVA HANDIHALER) 18 MCG inhalation capsule, Inhale 1 capsule into the lungs daily, Disp: 90 capsule, Rfl: 1    albuterol sulfate HFA (VENTOLIN HFA) 108 (90 Base) MCG/ACT inhaler, Inhale 2 puffs into the lungs 4 times daily as needed for Wheezing, Disp: 18 g, Rfl: 5    Blood Pressure KIT, 1 Units by Does not apply route 2 times daily, Disp: 1 kit, Rfl: 0    Ergocalciferol (VITAMIN D2) 10 MCG (400 UNIT) TABS, Take 800 Units by mouth daily, Disp: 90 tablet, Rfl: 1  Orange fruit [citrus], Crestor [rosuvastatin calcium], Loratadine, Norco [hydrocodone-acetaminophen], Sulfa antibiotics, and Aspirin  Social History     Tobacco Use    Smoking status: Every Day     Packs/day: 0.50     Years: 30.00     Pack years: 15.00     Types: Cigarettes     Last attempt to quit: 2018     Years since quittin.2    Smokeless tobacco: Current     Types: Snuff    Tobacco comments:     currently 3 cig/day, 21. Vaping Use    Vaping Use: Never used   Substance Use Topics    Alcohol use:  Yes     Alcohol/week: 14.0 standard drinks     Types: 14 Cans of beer per week    Drug use: Yes     Frequency: 3.0 times per week     Types: Marijuana Eva Kingsley)     Family History   Problem Relation Age of Onset    High Blood Pressure Mother     Stroke Mother High Blood Pressure Father     Stroke Father     Heart Disease Father     Pacemaker Father        Review of Systems   Constitutional: Negative. Negative for activity change and appetite change. HENT:  Positive for hearing loss and tinnitus. Negative for ear discharge and ear pain. Eyes: Negative. Respiratory: Negative. Negative for shortness of breath and stridor. Cardiovascular: Negative. Negative for chest pain and palpitations. Endocrine: Negative. Musculoskeletal: Negative. Skin: Negative. Neurological: Negative. Negative for dizziness. Hematological: Negative. Psychiatric/Behavioral: Negative. BP (!) 148/86 (Site: Left Upper Arm, Position: Sitting, Cuff Size: Medium Adult)   Pulse 76   Ht 5' 4\" (1.626 m)   Wt 115 lb (52.2 kg)   LMP 05/20/2014   BMI 19.74 kg/m²   Physical Exam  Constitutional:       Appearance: Normal appearance. HENT:      Head: Normocephalic. Right Ear: Tympanic membrane, ear canal and external ear normal. Decreased hearing noted. Left Ear: Tympanic membrane, ear canal and external ear normal. Decreased hearing noted. There is impacted cerumen. Nose: Nose normal. No rhinorrhea. Right Turbinates: Not pale. Left Turbinates: Not pale. Mouth/Throat:      Lips: Pink. Mouth: Mucous membranes are moist.      Pharynx: Oropharynx is clear. Eyes:      Conjunctiva/sclera: Conjunctivae normal.      Pupils: Pupils are equal, round, and reactive to light. Cardiovascular:      Rate and Rhythm: Normal rate and regular rhythm. Pulses: Normal pulses. Pulmonary:      Effort: Pulmonary effort is normal. No respiratory distress. Breath sounds: No stridor. Musculoskeletal:         General: Normal range of motion. Cervical back: Normal range of motion. No rigidity. No muscular tenderness. Skin:     General: Skin is warm and dry. Neurological:      General: No focal deficit present.       Mental Status: She is alert and oriented to person, place, and time. Psychiatric:         Mood and Affect: Mood normal.         Behavior: Behavior normal.         Thought Content: Thought content normal.         Judgment: Judgment normal.         IMPRESSION/PLAN:    Cerumen removal     Auditory canal(s) left ear completely obstructed with cerumen. Cerumen was gently removed using soft plastic curette, gentle suction. Tympanic membranes are intact following the procedure. Auditory canals appear normal.      Vivian Kimble was seen today for new patient. Diagnoses and all orders for this visit:    Tinnitus of left ear    Bilateral hearing loss, unspecified hearing loss type    Impacted cerumen of left ear  -     IA REMOVAL IMPACTED CERUMEN INSTRUMENTATION UNILAT    Patient seen and examined today for complaint of left ear tinnitus and hearing loss. Upon exam patient is found to have a complete cerumen impaction that was removed without difficulty. Audiology attempted to perform services which patient states she was unable to tolerate due to the loud tinnitus in her left ear distracting her from the testing. OAE's were attempted and found to be negative with minimal response bilaterally. After further discussion the audiologist recommends an ABR and ASSR in the future for a more formal audio evaluation. It is discussed with the patient that her hearing loss and tinnitus may be secondary to her significant history of CVAs. She will follow-up in approximately 2 weeks upon completion of her audio testing. She is instructed to call with any new or worsening of symptoms prior to her next appointment.       Emelia Deleon, MSN, FNP-C  [facility], Nose and Throat    The information contained in this note has been dictated using drug and medical speech recognition software and may contain errors

## 2022-09-13 NOTE — PROGRESS NOTES
This patient was referred for audiometric and tympanometric testing by JOVITA Sevilla due to tinnitus, left ear, that makes patient unable to understand conversation, in any situation. Patient is unable to understand conversation and stated this began when she had multiple CVAs, starting in 2008. Pure tone air conduction audiometry was attempted several times, but patient would not consistently respond to stimuli. Reliability was fair. Speech reception thresholds were in poor agreement with the pure tone averages, bilaterally. Speech discrimination scores were 56%, right ear at 75dBHL and 4%, left ear at 75dBHL. Tympanometry revealed normal middle ear peak pressure and compliance, bilaterally. Ipsilateral acoustic reflexes were absent, bilaterally at 1000Hz. Distortion Product Otoacoustic Emission (DPOAE) testing was performed bilaterally. On the right, cochlear responses were obtained at 5000 and 8000Hz. On the left, a cochlear response was obtained at [address]. The results were reviewed with the patient. Recommend ABR/ASSR testing, in order obtain objective audiology test results.     Rodrigo Jesus CCC/VICENTE  Audiologist  [id number]  NPI#:  [id number]      Electronically signed by Bri Bland on 9/13/2022 at 4:12 PM

## 2022-09-27 ENCOUNTER — OFFICE VISIT (OUTPATIENT)
Dept: ENT CLINIC | Age: 57
End: 2022-09-27
Payer: COMMERCIAL

## 2022-09-27 ENCOUNTER — PROCEDURE VISIT (OUTPATIENT)
Dept: AUDIOLOGY | Age: 57
End: 2022-09-27
Payer: COMMERCIAL

## 2022-09-27 VITALS — WEIGHT: 115 LBS | BODY MASS INDEX: 19.63 KG/M2 | HEIGHT: 64 IN

## 2022-09-27 DIAGNOSIS — H91.93 BILATERAL HEARING LOSS, UNSPECIFIED HEARING LOSS TYPE: Primary | ICD-10-CM

## 2022-09-27 DIAGNOSIS — H93.12 TINNITUS OF LEFT EAR: Primary | ICD-10-CM

## 2022-09-27 DIAGNOSIS — H91.93 PROFOUND HEARING LOSS OF BOTH EARS: ICD-10-CM

## 2022-09-27 DIAGNOSIS — H93.12 TINNITUS OF LEFT EAR: ICD-10-CM

## 2022-09-27 DIAGNOSIS — H91.93 BILATERAL HEARING LOSS, UNSPECIFIED HEARING LOSS TYPE: ICD-10-CM

## 2022-09-27 PROCEDURE — G8420 CALC BMI NORM PARAMETERS: HCPCS | Performed by: NURSE PRACTITIONER

## 2022-09-27 PROCEDURE — 4004F PT TOBACCO SCREEN RCVD TLK: CPT | Performed by: NURSE PRACTITIONER

## 2022-09-27 PROCEDURE — G8427 DOCREV CUR MEDS BY ELIG CLIN: HCPCS | Performed by: NURSE PRACTITIONER

## 2022-09-27 PROCEDURE — 92652 AEP THRSHLD EST MLT FREQ I&R: CPT | Performed by: AUDIOLOGIST

## 2022-09-27 PROCEDURE — 3017F COLORECTAL CA SCREEN DOC REV: CPT | Performed by: NURSE PRACTITIONER

## 2022-09-27 PROCEDURE — 99213 OFFICE O/P EST LOW 20 MIN: CPT | Performed by: NURSE PRACTITIONER

## 2022-09-27 ASSESSMENT — ENCOUNTER SYMPTOMS
SHORTNESS OF BREATH: 0
STRIDOR: 0
EYES NEGATIVE: 1
RESPIRATORY NEGATIVE: 1

## 2022-09-27 NOTE — PROGRESS NOTES
BRAINSTEM AUDITORY EVOKED RESPONSE/AUDITORY STEADY STATE RESPONSE    This patient was referred for a Brainstem Auditory Evoked Response (CYNTHIA) test by JOVITA Aguirre due to history of unilateral tinnitus, following five strokes. THE CYNTHIA test was performed to assess vestibulocochlear nerve and brainstem function. Electrodes were used in a two channel recording with a horizontal electrode montage. Ipsilateral recordings were presented using a click stimulus through insert phones at a slow and fast rate. A click stimulus was presented at 47.2 click/second and 23.9 click/second at an intensity level of 80 dBnHL. On the right, wave V was identified at both rates. On the left, no waveforms were identified. ASSR testing was also completed. Results are listed below:    RIGHT:   Frequency Estimated threshold (dB eHL)   500 Hz 35    1000 Hz >60    2000 Hz 65   4000 Hz >60     LEFT:  Frequency Estimated threshold (dB eHL)   500 Hz 60   1000 Hz >60   2000 Hz >60   4000 Hz >60     Correction factors applied. Results are consistent with at least a mild to moderately severe hearing loss, on the right and at least a moderately severe hearing loss, on the left. Results reviewed with Bri Canseco [facility]  [address] [facility] [place] [id number]  Electronically signed by Bri Mukherjee on 9/27/2022 at 9:27 AM

## 2022-10-26 ENCOUNTER — HOSPITAL ENCOUNTER (INPATIENT)
Age: 57
LOS: 2 days | Discharge: PSYCHIATRIC OR SUBSTANCE USE FACILITY | DRG: 918 | End: 2022-10-28
Attending: EMERGENCY MEDICINE | Admitting: FAMILY MEDICINE
Payer: COMMERCIAL

## 2022-10-26 ENCOUNTER — HOSPITAL ENCOUNTER (EMERGENCY)
Age: 57
Discharge: LEFT BEFORE DISCHARGE | DRG: 918 | End: 2022-10-26
Attending: EMERGENCY MEDICINE
Payer: COMMERCIAL

## 2022-10-26 VITALS
TEMPERATURE: 98.7 F | WEIGHT: 115 LBS | SYSTOLIC BLOOD PRESSURE: 171 MMHG | BODY MASS INDEX: 19.16 KG/M2 | HEIGHT: 65 IN | RESPIRATION RATE: 16 BRPM | HEART RATE: 59 BPM | OXYGEN SATURATION: 97 % | DIASTOLIC BLOOD PRESSURE: 94 MMHG

## 2022-10-26 DIAGNOSIS — T39.1X4A ACETAMINOPHEN OVERDOSE OF UNDETERMINED INTENT, INITIAL ENCOUNTER: Primary | ICD-10-CM

## 2022-10-26 DIAGNOSIS — T14.91XA SUICIDE ATTEMPT (HCC): ICD-10-CM

## 2022-10-26 DIAGNOSIS — T39.1X2A INTENTIONAL ACETAMINOPHEN OVERDOSE, INITIAL ENCOUNTER (HCC): Primary | ICD-10-CM

## 2022-10-26 LAB
ACETAMINOPHEN LEVEL: 138.6 MCG/ML (ref 10–30)
ACETAMINOPHEN LEVEL: 211.9 MCG/ML (ref 10–30)
ALBUMIN SERPL-MCNC: 4.5 G/DL (ref 3.5–5.2)
ALBUMIN SERPL-MCNC: 4.6 G/DL (ref 3.5–5.2)
ALP BLD-CCNC: 92 U/L (ref 35–104)
ALP BLD-CCNC: 97 U/L (ref 35–104)
ALT SERPL-CCNC: 8 U/L (ref 0–32)
ALT SERPL-CCNC: 9 U/L (ref 0–32)
AMMONIA: 10 UMOL/L (ref 11–51)
AMPHETAMINE SCREEN, URINE: NOT DETECTED
ANION GAP SERPL CALCULATED.3IONS-SCNC: 12 MMOL/L (ref 7–16)
ANION GAP SERPL CALCULATED.3IONS-SCNC: 13 MMOL/L (ref 7–16)
APTT: 33.8 SEC (ref 24.5–35.1)
AST SERPL-CCNC: 15 U/L (ref 0–31)
AST SERPL-CCNC: 15 U/L (ref 0–31)
BARBITURATE SCREEN URINE: NOT DETECTED
BENZODIAZEPINE SCREEN, URINE: NOT DETECTED
BILIRUB SERPL-MCNC: 0.2 MG/DL (ref 0–1.2)
BILIRUB SERPL-MCNC: 0.3 MG/DL (ref 0–1.2)
BUN BLDV-MCNC: 8 MG/DL (ref 6–20)
BUN BLDV-MCNC: 9 MG/DL (ref 6–20)
CALCIUM SERPL-MCNC: 10.3 MG/DL (ref 8.6–10.2)
CALCIUM SERPL-MCNC: 10.5 MG/DL (ref 8.6–10.2)
CANNABINOID SCREEN URINE: POSITIVE
CHLORIDE BLD-SCNC: 104 MMOL/L (ref 98–107)
CHLORIDE BLD-SCNC: 106 MMOL/L (ref 98–107)
CO2: 27 MMOL/L (ref 22–29)
CO2: 27 MMOL/L (ref 22–29)
COCAINE METABOLITE SCREEN URINE: NOT DETECTED
CREAT SERPL-MCNC: 0.6 MG/DL (ref 0.5–1)
CREAT SERPL-MCNC: 0.8 MG/DL (ref 0.5–1)
ETHANOL: 111 MG/DL (ref 0–0.08)
ETHANOL: 172 MG/DL (ref 0–0.08)
FENTANYL SCREEN, URINE: NOT DETECTED
GFR SERPL CREATININE-BSD FRML MDRD: >60 ML/MIN/1.73
GFR SERPL CREATININE-BSD FRML MDRD: >60 ML/MIN/1.73
GLUCOSE BLD-MCNC: 95 MG/DL (ref 74–99)
GLUCOSE BLD-MCNC: 99 MG/DL (ref 74–99)
INR BLD: 1.1
INR BLD: 1.1
LACTIC ACID: 2.3 MMOL/L (ref 0.5–2.2)
Lab: ABNORMAL
METHADONE SCREEN, URINE: NOT DETECTED
OPIATE SCREEN URINE: NOT DETECTED
OXYCODONE URINE: NOT DETECTED
PHENCYCLIDINE SCREEN URINE: NOT DETECTED
POTASSIUM REFLEX MAGNESIUM: 5 MMOL/L (ref 3.5–5)
POTASSIUM SERPL-SCNC: 4.3 MMOL/L (ref 3.5–5)
PROTHROMBIN TIME: 12.4 SEC (ref 9.3–12.4)
PROTHROMBIN TIME: 12.4 SEC (ref 9.3–12.4)
SALICYLATE, SERUM: <0.3 MG/DL (ref 0–30)
SALICYLATE, SERUM: <0.3 MG/DL (ref 0–30)
SODIUM BLD-SCNC: 144 MMOL/L (ref 132–146)
SODIUM BLD-SCNC: 145 MMOL/L (ref 132–146)
TOTAL PROTEIN: 7.3 G/DL (ref 6.4–8.3)
TOTAL PROTEIN: 7.6 G/DL (ref 6.4–8.3)
TRICYCLIC ANTIDEPRESSANTS SCREEN SERUM: NEGATIVE NG/ML
TRICYCLIC ANTIDEPRESSANTS SCREEN SERUM: NEGATIVE NG/ML

## 2022-10-26 PROCEDURE — 2000000000 HC ICU R&B

## 2022-10-26 PROCEDURE — 85610 PROTHROMBIN TIME: CPT

## 2022-10-26 PROCEDURE — 80179 DRUG ASSAY SALICYLATE: CPT

## 2022-10-26 PROCEDURE — 82077 ASSAY SPEC XCP UR&BREATH IA: CPT

## 2022-10-26 PROCEDURE — 80053 COMPREHEN METABOLIC PANEL: CPT

## 2022-10-26 PROCEDURE — 80307 DRUG TEST PRSMV CHEM ANLYZR: CPT

## 2022-10-26 PROCEDURE — 82140 ASSAY OF AMMONIA: CPT

## 2022-10-26 PROCEDURE — 99285 EMERGENCY DEPT VISIT HI MDM: CPT

## 2022-10-26 PROCEDURE — 2580000003 HC RX 258: Performed by: EMERGENCY MEDICINE

## 2022-10-26 PROCEDURE — 83605 ASSAY OF LACTIC ACID: CPT

## 2022-10-26 PROCEDURE — 80143 DRUG ASSAY ACETAMINOPHEN: CPT

## 2022-10-26 PROCEDURE — 93005 ELECTROCARDIOGRAM TRACING: CPT | Performed by: EMERGENCY MEDICINE

## 2022-10-26 PROCEDURE — 6360000002 HC RX W HCPCS: Performed by: EMERGENCY MEDICINE

## 2022-10-26 PROCEDURE — 85730 THROMBOPLASTIN TIME PARTIAL: CPT

## 2022-10-26 RX ADMIN — ACETYLCYSTEINE 7840 MG: 200 INJECTION, SOLUTION INTRAVENOUS at 23:58

## 2022-10-26 ASSESSMENT — LIFESTYLE VARIABLES: HOW OFTEN DO YOU HAVE A DRINK CONTAINING ALCOHOL: 2-3 TIMES A WEEK

## 2022-10-26 NOTE — ED PROVIDER NOTES
Department of Emergency Medicine   ED  Provider Note  Admit Date/RoomTime: 10/26/2022  6:15 PM  ED Room: [facility]          History of Present Illness:  10/26/22, Time: 7:46 PM EDT  Chief Complaint   Patient presents with    Other     Patient called ambulance for self, was found by police in the fetal position not speaking, with a bottle of tylenol in her hand. Vitals were all stable, patient will only arouse to painful stimuli. Tawanda Bravo is a 56 y.o. female presenting to the ED for psychiatric evaluation. Patient called EMS. She stated that she has been in a lot of pain. She said she took a bunch of Tylenol. Took a handful of her prescribed Tylenol. She said was a few months prior to arrival.  When asked if she did this to herself, she stated she is just tired of the pain. Review of Systems:   Pertinent positives and negatives are stated within HPI, all other systems reviewed and are negative.        --------------------------------------------- PAST HISTORY ---------------------------------------------  Past Medical History:  has a past medical history of Angina at rest [facility]), Asthma, Blood type AB+, CAD (coronary artery disease), Carpal tunnel syndrome on left, CHF (congestive heart failure) ([address] CHF (congestive heart failure) ([address] COPD (chronic obstructive pulmonary disease) ([address] CVA (cerebral vascular accident) ([address] GERD (gastroesophageal reflux disease), HFrEF (heart failure with reduced ejection fraction) ([address] History of blood transfusion, Hyperlipidemia, Hypertension, ICD (implantable cardioverter-defibrillator) in place, Left ovarian cyst, Liver lesion, Moderate mitral regurgitation, Nonischemic cardiomyopathy ([address] NSTEMI (non-ST elevated myocardial infarction) ([address] Suicide attempt by drug ingestion ([address] Tobacco abuse, and Vitamin D deficiency. Past Surgical History:  has a past surgical history that includes cardiovascular stress test (2009);  Tubal ligation (); Tonsillectomy;  section; transthoracic echocardiogram (3/19/13); Diagnostic Cardiac Cath Lab Procedure (2007); Diagnostic Cardiac Cath Lab Procedure (4/15); Percutaneous Transluminal Coronary Angio (4/10/15); Hysterectomy (10/1/15); Colonoscopy (10/2015); Endoscopy, colon, diagnostic; Cardiac catheterization (2018); and Cardiac defibrillator placement (2016). Social History:  reports that she has been smoking cigarettes. She has a 15.00 pack-year smoking history. Her smokeless tobacco use includes snuff. She reports current alcohol use of about 14.0 standard drinks per week. She reports current drug use. Frequency: 3.00 times per week. Drug: Marijuana Misti Mg). Family History: family history includes Heart Disease in her father; High Blood Pressure in her father and mother; Pacemaker in her father; Stroke in her father and mother. . Unless otherwise noted, family history is non contributory    The patients home medications have been reviewed. Allergies: Orange fruit [citrus], Crestor [rosuvastatin calcium], Loratadine, Norco [hydrocodone-acetaminophen], Sulfa antibiotics, and Aspirin        ---------------------------------------------------PHYSICAL EXAM--------------------------------------    Constitutional/General: Alert and oriented x3  Head: Normocephalic and atraumatic  Eyes: PERRL, EOMI, sclera non icteric  Mouth: Oropharynx clear, handling secretions, no trismus, no asymmetry of the posterior oropharynx or uvular edema  Neck: Supple, full ROM, no stridor, no meningeal signs  Respiratory: Lungs clear to auscultation bilaterally, no wheezes, rales, or rhonchi. Not in respiratory distress  Cardiovascular:  Regular rate. Regular rhythm. 2+ distal pulses. Equal extremity pulses. Chest: No chest wall tenderness  GI:  Abdomen Soft, Non tender, Non distended. No rebound, guarding, or rigidity. No pulsatile masses. Musculoskeletal: Moves all extremities x 4.  Warm and well perfused, no clubbing, cyanosis, or edema. Capillary refill <3 seconds  Integument: skin warm and dry. No rashes. Neurologic: GCS 15, no focal deficits, symmetric strength 5/5 in the upper and lower extremities bilaterally  Psychiatric: Normal Affect          -------------------------------------------------- RESULTS -------------------------------------------------  I have personally reviewed all laboratory and imaging results for this patient. Results are listed below.      LABS: (Lab results interpreted by me)  Results for orders placed or performed during the hospital encounter of 10/26/22   Comprehensive Metabolic Panel   Result Value Ref Range    Sodium 144 132 - 146 mmol/L    Potassium 4.3 3.5 - 5.0 mmol/L    Chloride 104 98 - 107 mmol/L    CO2 27 22 - 29 mmol/L    Anion Gap 13 7 - 16 mmol/L    Glucose 95 74 - 99 mg/dL    BUN 8 6 - 20 mg/dL    Creatinine 0.8 0.5 - 1.0 mg/dL    Est, Glom Filt Rate >60 >=60 mL/min/1.73    Calcium 10.3 (H) 8.6 - 10.2 mg/dL    Total Protein 7.6 6.4 - 8.3 g/dL    Albumin 4.6 3.5 - 5.2 g/dL    Total Bilirubin 0.3 0.0 - 1.2 mg/dL    Alkaline Phosphatase 97 35 - 104 U/L    ALT 8 0 - 32 U/L    AST 15 0 - 31 U/L   Serum Drug Screen   Result Value Ref Range    Ethanol Lvl 172 mg/dL    Acetaminophen Level 138.6 (H) 10.0 - 37.3 mcg/mL    Salicylate, Serum <1.6 0.0 - 30.0 mg/dL    TCA Scrn NEGATIVE Cutoff:300 ng/mL   Urine Drug Screen   Result Value Ref Range    Amphetamine Screen, Urine NOT DETECTED Negative <1000 ng/mL    Barbiturate Screen, Ur NOT DETECTED Negative < 200 ng/mL    Benzodiazepine Screen, Urine NOT DETECTED Negative < 200 ng/mL    Cannabinoid Scrn, Ur POSITIVE (A) Negative < 50ng/mL    Cocaine Metabolite Screen, Urine NOT DETECTED Negative < 300 ng/mL    Opiate Scrn, Ur NOT DETECTED Negative < 300ng/mL    PCP Screen, Urine NOT DETECTED Negative < 25 ng/mL    Methadone Screen, Urine NOT DETECTED Negative <300 ng/mL    Oxycodone Urine NOT DETECTED Negative <100 ng/mL    FENTANYL SCREEN, URINE NOT DETECTED Negative <1 ng/mL    Drug Screen Comment: see below    Lactic Acid   Result Value Ref Range    Lactic Acid 2.3 (H) 0.5 - 2.2 mmol/L   Ammonia   Result Value Ref Range    Ammonia 10.0 (L) 11.0 - 51.0 umol/L   APTT   Result Value Ref Range    aPTT 33.8 24.5 - 35.1 sec   Protime-INR   Result Value Ref Range    Protime 12.4 9.3 - 12.4 sec    INR 1.1    ,       RADIOLOGY:  Interpreted by Radiologist unless otherwise specified  No orders to display         EKG Interpretation  Interpreted by emergency department physician, Dr. Fabien Orosco         ------------------------- NURSING NOTES AND VITALS REVIEWED ---------------------------   The nursing notes within the ED encounter and vital signs as below have been reviewed by myself  BP (!) 171/94   Pulse 59   Temp 98.7 °F (37.1 °C) (Oral)   Resp 16   Ht 5' 5\" (1.651 m)   Wt 115 lb (52.2 kg)   LMP 05/20/2014   SpO2 97%   BMI 19.14 kg/m²     Oxygen Saturation Interpretation: Normal    The patients available past medical records and past encounters were reviewed. ------------------------------ ED COURSE/MEDICAL DECISION MAKING----------------------  Medications - No data to display           Medical Decision Making:    Patient was pink slipped on arrival.  Constant observation was ordered. During her evaluation, I was told the patient had eloped. Apparently her daughter presented to the ER, escorted the patient out. When attempting to stop the patient and her daughter, they ran to the daughter's  car and drove off. The IV was still in.  Given the fact that she intentionally overdosed, her elevated Tylenol level, the Police Department was notified as the patient is a threat to herself. I was notified after the patient had eloped and escaped with her daughter. Counseling:    The emergency provider has spoken with the patient and discussed todays results, in addition to providing specific details for the plan of care and counseling regarding the diagnosis and prognosis. Questions are answered at this time and they are agreeable with the plan.       --------------------------------- IMPRESSION AND DISPOSITION ---------------------------------    IMPRESSION  1. Intentional acetaminophen overdose, initial encounter ([facility]    2. Suicide attempt ([facility]        DISPOSITION  Disposition: Other Disposition: Eloped  Patient condition is serious        NOTE: This report was transcribed using voice recognition software.  Every effort was made to ensure accuracy; however, inadvertent computerized transcription errors may be present       Gary Fan MD  10/26/22 1954

## 2022-10-26 NOTE — ED NOTES
Pt eloped with daughter. Pt had an IV. Calling PD now. Attempted to run after pt in parking lot, pt was seen going into daughters car. Daughter sped off at high speed before nurse could reach the car. [place] [employer] called by charge nurse Levon Fuchs and made aware that pt was pink slipped and still has IV in place. Dr. Hillary Judge also made aware that pt eloped. Pt was wearing white t shirt and and black sweat pants. Only other belonging pt had was her phone. Pt was searched and had no other belongings on her. Pt had not been changed into a gown yet, due to no official pink slip signed.       Servando Alamo, RN  10/26/22 [address] [place] [address], RN  10/26/22 [address] [place] [address], RN  10/26/22 [id number]

## 2022-10-27 PROBLEM — I44.0 1ST DEGREE AV BLOCK: Status: ACTIVE | Noted: 2022-10-27

## 2022-10-27 PROBLEM — E83.52 HYPERCALCEMIA: Status: ACTIVE | Noted: 2022-10-27

## 2022-10-27 PROBLEM — R79.89 ELEVATED LACTIC ACID LEVEL: Status: ACTIVE | Noted: 2022-10-27

## 2022-10-27 PROBLEM — F39 MOOD DISORDER (HCC): Status: ACTIVE | Noted: 2022-10-27

## 2022-10-27 LAB
ACETAMINOPHEN LEVEL: 10.4 MCG/ML (ref 10–30)
ACETAMINOPHEN LEVEL: 156.5 MCG/ML (ref 10–30)
ACETAMINOPHEN LEVEL: 210.8 MCG/ML (ref 10–30)
ACETAMINOPHEN LEVEL: 60.5 MCG/ML (ref 10–30)
ACETAMINOPHEN LEVEL: <5 MCG/ML (ref 10–30)
ALBUMIN SERPL-MCNC: 4.1 G/DL (ref 3.5–5.2)
ALBUMIN SERPL-MCNC: 4.2 G/DL (ref 3.5–5.2)
ALBUMIN SERPL-MCNC: 4.3 G/DL (ref 3.5–5.2)
ALBUMIN SERPL-MCNC: 4.6 G/DL (ref 3.5–5.2)
ALP BLD-CCNC: 80 U/L (ref 35–104)
ALP BLD-CCNC: 86 U/L (ref 35–104)
ALP BLD-CCNC: 87 U/L (ref 35–104)
ALP BLD-CCNC: 88 U/L (ref 35–104)
ALT SERPL-CCNC: 6 U/L (ref 0–32)
ALT SERPL-CCNC: 7 U/L (ref 0–32)
ALT SERPL-CCNC: 8 U/L (ref 0–32)
ALT SERPL-CCNC: 9 U/L (ref 0–32)
ANION GAP SERPL CALCULATED.3IONS-SCNC: 12 MMOL/L (ref 7–16)
ANION GAP SERPL CALCULATED.3IONS-SCNC: 15 MMOL/L (ref 7–16)
APTT: 33.9 SEC (ref 24.5–35.1)
APTT: 37.9 SEC (ref 24.5–35.1)
AST SERPL-CCNC: 12 U/L (ref 0–31)
AST SERPL-CCNC: 13 U/L (ref 0–31)
AST SERPL-CCNC: 13 U/L (ref 0–31)
AST SERPL-CCNC: 14 U/L (ref 0–31)
BASOPHILS ABSOLUTE: 0.09 E9/L (ref 0–0.2)
BASOPHILS RELATIVE PERCENT: 2 % (ref 0–2)
BILIRUB SERPL-MCNC: 0.2 MG/DL (ref 0–1.2)
BILIRUB SERPL-MCNC: <0.2 MG/DL (ref 0–1.2)
BILIRUBIN DIRECT: <0.2 MG/DL (ref 0–0.3)
BILIRUBIN DIRECT: <0.2 MG/DL (ref 0–0.3)
BILIRUBIN, INDIRECT: NORMAL MG/DL (ref 0–1)
BILIRUBIN, INDIRECT: NORMAL MG/DL (ref 0–1)
BUN BLDV-MCNC: 10 MG/DL (ref 6–20)
BUN BLDV-MCNC: 12 MG/DL (ref 6–20)
CALCIUM IONIZED: 1.26 MMOL/L (ref 1.15–1.33)
CALCIUM SERPL-MCNC: 9.5 MG/DL (ref 8.6–10.2)
CALCIUM SERPL-MCNC: 9.6 MG/DL (ref 8.6–10.2)
CHLORIDE BLD-SCNC: 101 MMOL/L (ref 98–107)
CHLORIDE BLD-SCNC: 99 MMOL/L (ref 98–107)
CO2: 24 MMOL/L (ref 22–29)
CO2: 24 MMOL/L (ref 22–29)
CREAT SERPL-MCNC: 0.6 MG/DL (ref 0.5–1)
CREAT SERPL-MCNC: 1 MG/DL (ref 0.5–1)
EOSINOPHILS ABSOLUTE: 0.31 E9/L (ref 0.05–0.5)
EOSINOPHILS RELATIVE PERCENT: 6.9 % (ref 0–6)
GFR SERPL CREATININE-BSD FRML MDRD: >60 ML/MIN/1.73
GFR SERPL CREATININE-BSD FRML MDRD: >60 ML/MIN/1.73
GLUCOSE BLD-MCNC: 119 MG/DL (ref 74–99)
GLUCOSE BLD-MCNC: 190 MG/DL (ref 74–99)
HCT VFR BLD CALC: 39.8 % (ref 34–48)
HEMOGLOBIN: 13.2 G/DL (ref 11.5–15.5)
IMMATURE GRANULOCYTES #: 0.01 E9/L
IMMATURE GRANULOCYTES %: 0.2 % (ref 0–5)
INR BLD: 1.2
INR BLD: 1.3
INR BLD: 1.3
LACTIC ACID: 1 MMOL/L (ref 0.5–2.2)
LYMPHOCYTES ABSOLUTE: 1.96 E9/L (ref 1.5–4)
LYMPHOCYTES RELATIVE PERCENT: 43.5 % (ref 20–42)
MAGNESIUM: 1.8 MG/DL (ref 1.6–2.6)
MCH RBC QN AUTO: 28.2 PG (ref 26–35)
MCHC RBC AUTO-ENTMCNC: 33.2 % (ref 32–34.5)
MCV RBC AUTO: 85 FL (ref 80–99.9)
MONOCYTES ABSOLUTE: 0.33 E9/L (ref 0.1–0.95)
MONOCYTES RELATIVE PERCENT: 7.3 % (ref 2–12)
NEUTROPHILS ABSOLUTE: 1.81 E9/L (ref 1.8–7.3)
NEUTROPHILS RELATIVE PERCENT: 40.1 % (ref 43–80)
PARATHYROID HORMONE INTACT: 70 PG/ML (ref 15–65)
PDW BLD-RTO: 13.2 FL (ref 11.5–15)
PHOSPHORUS: 4.3 MG/DL (ref 2.5–4.5)
PLATELET # BLD: 413 E9/L (ref 130–450)
PMV BLD AUTO: 9.4 FL (ref 7–12)
POTASSIUM SERPL-SCNC: 3.1 MMOL/L (ref 3.5–5)
POTASSIUM SERPL-SCNC: 3.7 MMOL/L (ref 3.5–5)
PROTHROMBIN TIME: 13.5 SEC (ref 9.3–12.4)
PROTHROMBIN TIME: 13.8 SEC (ref 9.3–12.4)
PROTHROMBIN TIME: 14.2 SEC (ref 9.3–12.4)
RBC # BLD: 4.68 E12/L (ref 3.5–5.5)
SODIUM BLD-SCNC: 135 MMOL/L (ref 132–146)
SODIUM BLD-SCNC: 140 MMOL/L (ref 132–146)
TOTAL PROTEIN: 6.8 G/DL (ref 6.4–8.3)
TOTAL PROTEIN: 6.9 G/DL (ref 6.4–8.3)
TOTAL PROTEIN: 7.1 G/DL (ref 6.4–8.3)
TOTAL PROTEIN: 7.8 G/DL (ref 6.4–8.3)
TSH SERPL DL<=0.05 MIU/L-ACNC: 0.74 UIU/ML (ref 0.27–4.2)
VITAMIN D 25-HYDROXY: 22 NG/ML (ref 30–100)
WBC # BLD: 4.5 E9/L (ref 4.5–11.5)

## 2022-10-27 PROCEDURE — 80076 HEPATIC FUNCTION PANEL: CPT

## 2022-10-27 PROCEDURE — 99222 1ST HOSP IP/OBS MODERATE 55: CPT | Performed by: FAMILY MEDICINE

## 2022-10-27 PROCEDURE — 2000000000 HC ICU R&B

## 2022-10-27 PROCEDURE — 99223 1ST HOSP IP/OBS HIGH 75: CPT | Performed by: INTERNAL MEDICINE

## 2022-10-27 PROCEDURE — 94664 DEMO&/EVAL PT USE INHALER: CPT

## 2022-10-27 PROCEDURE — 2580000003 HC RX 258

## 2022-10-27 PROCEDURE — 2580000003 HC RX 258: Performed by: EMERGENCY MEDICINE

## 2022-10-27 PROCEDURE — 6360000002 HC RX W HCPCS

## 2022-10-27 PROCEDURE — 82330 ASSAY OF CALCIUM: CPT

## 2022-10-27 PROCEDURE — 6370000000 HC RX 637 (ALT 250 FOR IP)

## 2022-10-27 PROCEDURE — 85730 THROMBOPLASTIN TIME PARTIAL: CPT

## 2022-10-27 PROCEDURE — 83605 ASSAY OF LACTIC ACID: CPT

## 2022-10-27 PROCEDURE — 82306 VITAMIN D 25 HYDROXY: CPT

## 2022-10-27 PROCEDURE — 85025 COMPLETE CBC W/AUTO DIFF WBC: CPT

## 2022-10-27 PROCEDURE — 80053 COMPREHEN METABOLIC PANEL: CPT

## 2022-10-27 PROCEDURE — 84443 ASSAY THYROID STIM HORMONE: CPT

## 2022-10-27 PROCEDURE — 84100 ASSAY OF PHOSPHORUS: CPT

## 2022-10-27 PROCEDURE — 94640 AIRWAY INHALATION TREATMENT: CPT

## 2022-10-27 PROCEDURE — 80143 DRUG ASSAY ACETAMINOPHEN: CPT

## 2022-10-27 PROCEDURE — 6360000002 HC RX W HCPCS: Performed by: EMERGENCY MEDICINE

## 2022-10-27 PROCEDURE — C9113 INJ PANTOPRAZOLE SODIUM, VIA: HCPCS

## 2022-10-27 PROCEDURE — 90791 PSYCH DIAGNOSTIC EVALUATION: CPT | Performed by: NURSE PRACTITIONER

## 2022-10-27 PROCEDURE — 36415 COLL VENOUS BLD VENIPUNCTURE: CPT

## 2022-10-27 PROCEDURE — 83970 ASSAY OF PARATHORMONE: CPT

## 2022-10-27 PROCEDURE — 85610 PROTHROMBIN TIME: CPT

## 2022-10-27 PROCEDURE — 83735 ASSAY OF MAGNESIUM: CPT

## 2022-10-27 RX ORDER — SODIUM CHLORIDE 0.9 % (FLUSH) 0.9 %
5-40 SYRINGE (ML) INJECTION EVERY 12 HOURS SCHEDULED
Status: DISCONTINUED | OUTPATIENT
Start: 2022-10-27 | End: 2022-10-28 | Stop reason: HOSPADM

## 2022-10-27 RX ORDER — ONDANSETRON 2 MG/ML
4 INJECTION INTRAMUSCULAR; INTRAVENOUS EVERY 6 HOURS PRN
Status: DISCONTINUED | OUTPATIENT
Start: 2022-10-27 | End: 2022-10-28 | Stop reason: HOSPADM

## 2022-10-27 RX ORDER — BUMETANIDE 1 MG/1
2 TABLET ORAL DAILY
Status: DISCONTINUED | OUTPATIENT
Start: 2022-10-27 | End: 2022-10-28 | Stop reason: HOSPADM

## 2022-10-27 RX ORDER — MONTELUKAST SODIUM 10 MG/1
10 TABLET ORAL NIGHTLY
Status: DISCONTINUED | OUTPATIENT
Start: 2022-10-27 | End: 2022-10-28 | Stop reason: HOSPADM

## 2022-10-27 RX ORDER — BUDESONIDE 0.5 MG/2ML
500 INHALANT ORAL 2 TIMES DAILY
Status: DISCONTINUED | OUTPATIENT
Start: 2022-10-27 | End: 2022-10-28 | Stop reason: HOSPADM

## 2022-10-27 RX ORDER — SODIUM CHLORIDE 9 MG/ML
INJECTION, SOLUTION INTRAVENOUS CONTINUOUS
Status: DISCONTINUED | OUTPATIENT
Start: 2022-10-27 | End: 2022-10-28

## 2022-10-27 RX ORDER — POLYETHYLENE GLYCOL 3350 17 G/17G
17 POWDER, FOR SOLUTION ORAL DAILY PRN
Status: DISCONTINUED | OUTPATIENT
Start: 2022-10-27 | End: 2022-10-28 | Stop reason: HOSPADM

## 2022-10-27 RX ORDER — POTASSIUM CHLORIDE 20 MEQ/1
40 TABLET, EXTENDED RELEASE ORAL ONCE
Status: COMPLETED | OUTPATIENT
Start: 2022-10-28 | End: 2022-10-27

## 2022-10-27 RX ORDER — MAGNESIUM SULFATE IN WATER 40 MG/ML
2000 INJECTION, SOLUTION INTRAVENOUS ONCE
Status: COMPLETED | OUTPATIENT
Start: 2022-10-27 | End: 2022-10-27

## 2022-10-27 RX ORDER — IPRATROPIUM BROMIDE AND ALBUTEROL SULFATE 2.5; .5 MG/3ML; MG/3ML
1 SOLUTION RESPIRATORY (INHALATION)
Status: DISCONTINUED | OUTPATIENT
Start: 2022-10-27 | End: 2022-10-28 | Stop reason: HOSPADM

## 2022-10-27 RX ORDER — SPIRONOLACTONE 25 MG/1
25 TABLET ORAL DAILY
Status: DISCONTINUED | OUTPATIENT
Start: 2022-10-27 | End: 2022-10-28 | Stop reason: HOSPADM

## 2022-10-27 RX ORDER — SODIUM CHLORIDE 0.9 % (FLUSH) 0.9 %
5-40 SYRINGE (ML) INJECTION PRN
Status: DISCONTINUED | OUTPATIENT
Start: 2022-10-27 | End: 2022-10-28 | Stop reason: HOSPADM

## 2022-10-27 RX ORDER — ONDANSETRON 4 MG/1
4 TABLET, ORALLY DISINTEGRATING ORAL EVERY 8 HOURS PRN
Status: DISCONTINUED | OUTPATIENT
Start: 2022-10-27 | End: 2022-10-28 | Stop reason: HOSPADM

## 2022-10-27 RX ORDER — PANTOPRAZOLE SODIUM 40 MG/10ML
40 INJECTION, POWDER, LYOPHILIZED, FOR SOLUTION INTRAVENOUS DAILY
Status: DISCONTINUED | OUTPATIENT
Start: 2022-10-27 | End: 2022-10-28

## 2022-10-27 RX ORDER — POTASSIUM CHLORIDE 7.45 MG/ML
10 INJECTION INTRAVENOUS
Status: DISPENSED | OUTPATIENT
Start: 2022-10-27 | End: 2022-10-28

## 2022-10-27 RX ORDER — ASPIRIN 81 MG/1
81 TABLET, CHEWABLE ORAL DAILY
Status: DISCONTINUED | OUTPATIENT
Start: 2022-10-27 | End: 2022-10-28 | Stop reason: HOSPADM

## 2022-10-27 RX ORDER — METOPROLOL SUCCINATE 50 MG/1
100 TABLET, EXTENDED RELEASE ORAL DAILY
Status: DISCONTINUED | OUTPATIENT
Start: 2022-10-27 | End: 2022-10-28 | Stop reason: HOSPADM

## 2022-10-27 RX ORDER — SODIUM CHLORIDE 9 MG/ML
INJECTION, SOLUTION INTRAVENOUS PRN
Status: DISCONTINUED | OUTPATIENT
Start: 2022-10-27 | End: 2022-10-28 | Stop reason: HOSPADM

## 2022-10-27 RX ORDER — ATORVASTATIN CALCIUM 40 MG/1
40 TABLET, FILM COATED ORAL DAILY
Status: DISCONTINUED | OUTPATIENT
Start: 2022-10-27 | End: 2022-10-28 | Stop reason: HOSPADM

## 2022-10-27 RX ORDER — HYDRALAZINE HYDROCHLORIDE 20 MG/ML
10 INJECTION INTRAMUSCULAR; INTRAVENOUS EVERY 6 HOURS PRN
Status: DISCONTINUED | OUTPATIENT
Start: 2022-10-27 | End: 2022-10-28 | Stop reason: HOSPADM

## 2022-10-27 RX ORDER — ARFORMOTEROL TARTRATE 15 UG/2ML
15 SOLUTION RESPIRATORY (INHALATION) 2 TIMES DAILY
Status: DISCONTINUED | OUTPATIENT
Start: 2022-10-27 | End: 2022-10-28 | Stop reason: HOSPADM

## 2022-10-27 RX ADMIN — ARFORMOTEROL TARTRATE 15 MCG: 15 SOLUTION RESPIRATORY (INHALATION) at 20:03

## 2022-10-27 RX ADMIN — SACUBITRIL AND VALSARTAN 1 TABLET: 97; 103 TABLET, FILM COATED ORAL at 21:33

## 2022-10-27 RX ADMIN — BUMETANIDE 2 MG: 1 TABLET ORAL at 09:50

## 2022-10-27 RX ADMIN — ACETYLCYSTEINE 2620 MG: 200 INJECTION, SOLUTION INTRAVENOUS at 01:02

## 2022-10-27 RX ADMIN — PANTOPRAZOLE SODIUM 40 MG: 40 INJECTION, POWDER, FOR SOLUTION INTRAVENOUS at 09:52

## 2022-10-27 RX ADMIN — IPRATROPIUM BROMIDE AND ALBUTEROL SULFATE 1 AMPULE: .5; 2.5 SOLUTION RESPIRATORY (INHALATION) at 15:12

## 2022-10-27 RX ADMIN — IPRATROPIUM BROMIDE AND ALBUTEROL SULFATE 1 AMPULE: .5; 2.5 SOLUTION RESPIRATORY (INHALATION) at 07:43

## 2022-10-27 RX ADMIN — MONTELUKAST 10 MG: 10 TABLET, FILM COATED ORAL at 20:47

## 2022-10-27 RX ADMIN — IPRATROPIUM BROMIDE AND ALBUTEROL SULFATE 1 AMPULE: .5; 2.5 SOLUTION RESPIRATORY (INHALATION) at 11:39

## 2022-10-27 RX ADMIN — POTASSIUM CHLORIDE 10 MEQ: 7.46 INJECTION, SOLUTION INTRAVENOUS at 23:07

## 2022-10-27 RX ADMIN — BUDESONIDE 500 MCG: 0.5 SUSPENSION RESPIRATORY (INHALATION) at 20:03

## 2022-10-27 RX ADMIN — SODIUM CHLORIDE: 9 INJECTION, SOLUTION INTRAVENOUS at 04:10

## 2022-10-27 RX ADMIN — POTASSIUM CHLORIDE 10 MEQ: 7.46 INJECTION, SOLUTION INTRAVENOUS at 21:01

## 2022-10-27 RX ADMIN — ONDANSETRON 4 MG: 2 INJECTION INTRAMUSCULAR; INTRAVENOUS at 09:52

## 2022-10-27 RX ADMIN — POTASSIUM CHLORIDE 10 MEQ: 7.46 INJECTION, SOLUTION INTRAVENOUS at 22:08

## 2022-10-27 RX ADMIN — POTASSIUM CHLORIDE 40 MEQ: 1500 TABLET, EXTENDED RELEASE ORAL at 23:39

## 2022-10-27 RX ADMIN — ARFORMOTEROL TARTRATE 15 MCG: 15 SOLUTION RESPIRATORY (INHALATION) at 07:43

## 2022-10-27 RX ADMIN — MAGNESIUM SULFATE HEPTAHYDRATE 2000 MG: 40 INJECTION, SOLUTION INTRAVENOUS at 21:10

## 2022-10-27 RX ADMIN — SPIRONOLACTONE 25 MG: 25 TABLET ORAL at 10:48

## 2022-10-27 RX ADMIN — IPRATROPIUM BROMIDE AND ALBUTEROL SULFATE 1 AMPULE: .5; 2.5 SOLUTION RESPIRATORY (INHALATION) at 20:03

## 2022-10-27 RX ADMIN — SODIUM CHLORIDE, PRESERVATIVE FREE 10 ML: 5 INJECTION INTRAVENOUS at 20:47

## 2022-10-27 RX ADMIN — BUDESONIDE 500 MCG: 0.5 SUSPENSION RESPIRATORY (INHALATION) at 07:43

## 2022-10-27 RX ADMIN — ACETYLCYSTEINE 5220 MG: 200 INJECTION, SOLUTION INTRAVENOUS at 06:48

## 2022-10-27 ASSESSMENT — ENCOUNTER SYMPTOMS
SORE THROAT: 0
ABDOMINAL PAIN: 1
VOMITING: 0
BLOOD IN STOOL: 0
WHEEZING: 0
NAUSEA: 1
RHINORRHEA: 0
COUGH: 0
DIARRHEA: 0
ABDOMINAL DISTENTION: 0
CONSTIPATION: 0
SHORTNESS OF BREATH: 0

## 2022-10-27 ASSESSMENT — PAIN SCALES - GENERAL: PAINLEVEL_OUTOF10: 10

## 2022-10-27 ASSESSMENT — PAIN - FUNCTIONAL ASSESSMENT: PAIN_FUNCTIONAL_ASSESSMENT: 0-10

## 2022-10-27 ASSESSMENT — PAIN DESCRIPTION - DESCRIPTORS: DESCRIPTORS: ACHING

## 2022-10-27 ASSESSMENT — PAIN DESCRIPTION - LOCATION: LOCATION: ABDOMEN

## 2022-10-27 NOTE — ED NOTES
Dr. William Ovalle paged regarding patients BP.       Elton Meek RN  10/27/22 [address] [facility] Annalee Hartley RN  10/27/22 0635

## 2022-10-27 NOTE — ED TRIAGE NOTES
Patient called ambulance for self, was found by police in the fetal position not speaking, with a bottle of tylenol in her hand. Vitals were all stable, patient will only arouse to painful stimuli. Patient was here earlier and had eloped, patient returned, in room appropriate gown. The above note was from earlier for patient's intial triage note and reason for being seen.

## 2022-10-27 NOTE — CONSULTS
Attestation statement:   I have personally assessed the patient this afternoon along with NP Radha Blanco, we agree with the below assessment. Patient has been counseled that she is pink slipped due to suicide attempt and will be admitted to inpatient behavioral health once medically stabilized. Patient's only question was \"when can I get out of here and get home\" she then states \"I am fine, really\" Mental status examination reveals a 57 yo female in a hospital gown with good hygiene and grooming not forthcoming in information. She was uncooperative. Psychomotor revealed agitation. Pt appeared in pain. Speech was coherent normal rhythm and tone, rapid rate. Eye contact is fair. Mood \"I am fine,\" affect was mood congruent. Thought process is illogical, with flights of idea. Thought contents are devoid of AVH, delusion, or any other perceptual abnormality. She denies SI, intent, or plan despite OD on Tylenol. She is pink slipped for suicide attempt. Memory and cognition not assessed at this time. Insight, judgement and impulse control are poor. Alert and oriented to time, person, and place. Behavioral Health Consult  Consulted by Bg Mathis MD    Reason for consult: Tylenol overdose, suicide attempt    Chief Complaint: \"I was drinking and my stomach hurt and I kept popping pills. \"    HPI: Luis Daniel Kemp is a 57 yo female with a significant pmh of previous suicide attempt on Ultram, CVA, congestive heart failure, COPD, hypertension, GERD, and hyperlipidemia who presented to the ED after calling 911 after taking \"a bunch of pain pills. \" Upon evaluation in the ED, pt states she was tired of the pain. UDS was negative. ETOH level was 172. Acetaminophen level was 211.9. During ED visit, pt eloped with an IV in her hand. The nurse ran after her, but the daughter sped off before they could reach her. Nurse called the pt's mother who said she would bring her back to ED.  Psych was consulted. Upon evaluation today, pt was very uncooperative. Did not want to answer any questions. Pt kept changing the topic of the conversation. She is minimizing circumstances of admission. She states she only took a handful of tylenol and it was because she was drinking all night that she didn't realize how much she took. She would not tell me how much she drank. She told me she took the tylenol because she had \"stomach pain\" and \"it wasn't working. \" When asked about previous suicide attempts, she nodded her head. According to chart, it was in 2007 on Ultram. When asked about previous psych inpatient stays, she nodded her head. When asked where, she said \"down the street. \" She then did not want to answer any other psych questions and started talking about all her other medical problems. She states she did not want to kill herself when she took the tylenol and agrees it was an impulsive decision. She states she would not try again because of all the symptoms. Pt states she eloped because she was tired of being on a bed in the hallway and she thought she didn't need to be here. Pt still endorses that she wants to go home and is concerned that she is missing appts she had today and is missing her medication. Pt has flights of idea during our conversation. Pt tells me \"tell them I'm stubborn and rude and maybe they'll do something. \" Pt then states \"this conversation is over\" then laid back down in bed. Pt denies feelings of worthlessness, hopelessness, being distractible, impulsive, and mood swings. Pt would not answer about other symptoms. Mental Status Exam: Mental status exam revealed a 55 yo female in a hospital gown with good hygiene and grooming not forthcoming in information. She was uncooperative. Psychomotor revealed agitation. Pt appeared in pain. Speech was coherent normal rhythm and tone, rapid rate. Eye contact is fair. Mood \"I am fine,\" affect was mood congruent.   Thought process is

## 2022-10-27 NOTE — H&P
[facility]  History and Physical    Patient:  Thelma Noonan 56 y.o. female MRN: [id number]     Date of Service: 10/27/2022    Hospital Day: 2    History of Present Illness   The patient is a 56 y.o. female with a past medical history of congestive heart failure with reduced ejection fraction, COPD, hypertension, GERD, hyperlipidemia and previous suicidal attempt who comes to the emergency department for Tylenol overdose. Patient initially eloped from the emergency department but she was declared pink slipped. Family was contacted and patient was brought back to the hospital.    Patient states that she was in a lot of pain and she took a bunch of Tylenol tablets. Patient knew that it could cause damage to her body placing her at risk for death. Does not remember how many tablets she took. She took them around 6 PM on 10/26/2022. This is not the first suicidal attempt. Patient reporting nausea without vomiting and mild abdominal discomfort. Denies any other signs or symptoms. Denies alcohol and tobacco use. Reports marijuana use. Denies any other drugs use. Patient would like to remain full code at this time. Emergency department:  Patient remained hemodynamically stable  Vitals within normal limits except for a blood pressure of 151/86  Labs remarkable for lactic acid 2.3 and calcium 10.5.   Normal liver function tests and normal coagulation test.  Serum drug screen positive for acetaminophen 211.9 and negative for ethanol  Urine drug screen positive for cannabis  EKG: Sinus bradycardia, extreme right axis deviation, first AV block, , poor R wave progression and negative for acute ischemic changes    Patient admitted to the intensive care unit for Tylenol overdose      Medications   acetylcysteine (ACETADOTE) 7,840 mg in dextrose 5 % 239.2 mL infusion (0 mg IntraVENous Stopped 10/27/22 0100)     Followed by   acetylcysteine (ACETADOTE) 2,620 mg in dextrose 5 % 513.1 mL infusion (2,620 mg IntraVENous New Bag 10/27/22 0102)     Followed by   acetylcysteine (ACETADOTE) 5,220 mg in dextrose 5 % 1,026.1 mL infusion (has no administration in time range)         Past Medical History:      Diagnosis Date    Angina at rest [facility])     cath in  showed no CAD    Asthma     Blood type AB+ 2018    CAD (coronary artery disease)     Carpal tunnel syndrome on left     CHF (congestive heart failure) (HCC)     CHF (congestive heart failure) (HCC)     COPD (chronic obstructive pulmonary disease) ([facility]     CVA (cerebral vascular accident) ([facility] 2009 and 2010. left parietal    GERD (gastroesophageal reflux disease)     HFrEF (heart failure with reduced ejection fraction) ([facility] 2020- echo- LVEF 20-25%, LA enlarged, moderate MR, mild TR, mild PH, LVDD: 6.7, RVDD: 2.2 (17- echo- LVEF 10%, stage III DD, severely dilated LA, appears L>R atrial shunt, mod TR, LVDD: 6.6,  RVDD: 2.3)    History of blood transfusion     Hyperlipidemia Diagnosed in . Dyslipidemia. Hypertension diagnosed in     ICD (implantable cardioverter-defibrillator) in place 2018    Placed by Dr. Melissa Lozano. Left ovarian cyst     Liver lesion 2015    Moderate mitral regurgitation 2015    mild-moderate    Nonischemic cardiomyopathy (HCC)     NSTEMI (non-ST elevated myocardial infarction) ([facility] 4/10/15--adm to [facility]    Suicide attempt by drug ingestion ([facility]     attempt in  OD on ultram    Tobacco abuse     Vitamin D deficiency        Past Surgical History:        Procedure Laterality Date    CARDIAC CATHETERIZATION  2018    Dr. Aaron Patino- Clean coronaries    [place]  2016    SICD    CARDIOVASCULAR STRESS TEST  2009 Normal      SECTION      COLONOSCOPY  10/2015    DIAGNOSTIC CARDIAC CATH LAB PROCEDURE  2007    [facility]: No significant CAD. Mild LVD with apical akinesis. EF 50%.     DIAGNOSTIC CARDIAC CATH LAB PROCEDURE  4/15    with PTCA to [facility] ob [email or web address]    ENDOSCOPY, COLON, DIAGNOSTIC      HYSTERECTOMY (CERVIX STATUS UNKNOWN)  10/1/15    at [address] by Dr. Willow Duarte    PTCA  4/10/15    at [address] [facility] ECHOCARDIOGRAM  3/19/13    EF 65%, mild MR    TUBAL LIGATION  1997       Medications Prior to Admission:    Prior to Admission medications    Medication Sig Start Date End Date Taking?  Authorizing Provider   Nutritional Supplements (BOOST HIGH PROTEIN) LIQD Take 1 Units by mouth 3 times daily 5/18/22   Philomena Burgos MD   pantoprazole (PROTONIX) 20 MG tablet Take 1 tablet by mouth daily 5/18/22   Philomena Burgos MD   nitroGLYCERIN (NITROSTAT) 0.4 MG SL tablet Place 1 tablet under the tongue every 5 minutes as needed for Chest pain 5/18/22   Philomena Burgos MD   acetaminophen (TYLENOL) 500 MG tablet Take 1 tablet by mouth 2 times daily as needed for Pain 5/18/22   Philomena Burgos MD   aspirin 81 MG chewable tablet Take 1 tablet by mouth daily 5/18/22   Philomena Burgos MD   atorvastatin (LIPITOR) 40 MG tablet Take 1 tablet by mouth daily 5/18/22   Philomena Burgos MD   sacubitril-valsartan (ENTRESTO)  MG per tablet Take 1 tablet by mouth 2 times daily 5/18/22   Philomena Burgos MD   metoprolol succinate (TOPROL XL) 100 MG extended release tablet Take 1 tablet by mouth daily 5/18/22   Philomena Burgos MD   bumetanide (BUMEX) 2 MG tablet Take 1 tablet by mouth daily 5/18/22   Philomena Burgos MD   spironolactone (ALDACTONE) 25 MG tablet Take 1 tablet by mouth daily 5/18/22   Philomena Burgos MD   budesonide-formoterol [facility]) 160-4.5 MCG/ACT AERO Inhale 2 puffs into the lungs 2 times daily 5/18/22   Philomena Burgos MD   montelukast (SINGULAIR) 10 MG tablet Take 1 tablet by mouth nightly 5/18/22   Philomena Burgos MD   Ergocalciferol (VITAMIN D2) 10 MCG (400 UNIT) TABS Take 800 Units by mouth daily 5/18/22 8/16/22  Philomena Burgos MD   cetirizine (ZYRTEC) 10 MG tablet Take 1 tablet by mouth daily 5/18/22 Haley Choe MD   tiotropium (SPIRIVA HANDIHALER) 18 MCG inhalation capsule Inhale 1 capsule into the lungs daily 5/18/22   Haley Choe MD   albuterol sulfate HFA (VENTOLIN HFA) 108 (90 Base) MCG/ACT inhaler Inhale 2 puffs into the lungs 4 times daily as needed for Wheezing 5/18/22   Haley Choe MD   Blood Pressure KIT 1 Units by Does not apply route 2 times daily 7/16/21   Haley Choe MD       Allergies:  Orange fruit [citrus], Crestor [rosuvastatin calcium], Loratadine, Norco [hydrocodone-acetaminophen], Sulfa antibiotics, and Aspirin    Social History:   TOBACCO:   reports that she has been smoking cigarettes. She has a 15.00 pack-year smoking history. Her smokeless tobacco use includes snuff. ETOH:   reports current alcohol use of about 14.0 standard drinks per week. Family History:       Problem Relation Age of Onset    High Blood Pressure Mother     Stroke Mother     High Blood Pressure Father     Stroke Father     Heart Disease Father     Pacemaker Father        REVIEW OF SYSTEMS:    Review of Systems   Constitutional:  Positive for fatigue. Negative for chills and fever. HENT:  Negative for congestion, rhinorrhea, sneezing and sore throat. Eyes:  Negative for visual disturbance. Respiratory:  Negative for cough, shortness of breath and wheezing. Cardiovascular:  Negative for chest pain, palpitations and leg swelling. Gastrointestinal:  Positive for abdominal pain and nausea. Negative for abdominal distention, blood in stool, constipation, diarrhea and vomiting. Genitourinary:  Negative for difficulty urinating, dysuria, frequency and hematuria. Skin:  Negative for rash. Neurological:  Negative for dizziness, tremors, numbness and headaches. Psychiatric/Behavioral:  Negative for agitation, hallucinations and suicidal ideas. The patient is not nervous/anxious.         Physical Exam   Vitals: BP (!) 151/86   Pulse 78   Temp 97.8 °F (36.6 °C) (Oral)   Resp 20   LMP 05/20/2014 SpO2 98%     Physical Exam  Vitals reviewed. Constitutional:       General: She is not in acute distress. HENT:      Head: Normocephalic and atraumatic. Nose: Nose normal. No congestion or rhinorrhea. Mouth/Throat:      Mouth: Mucous membranes are moist.      Pharynx: Oropharynx is clear. No oropharyngeal exudate or posterior oropharyngeal erythema. Eyes:      Pupils: Pupils are equal, round, and reactive to light. Cardiovascular:      Rate and Rhythm: Normal rate and regular rhythm. Pulses: Normal pulses. Heart sounds: Normal heart sounds. Pulmonary:      Effort: Pulmonary effort is normal. No respiratory distress. Breath sounds: Normal breath sounds. Abdominal:      General: Bowel sounds are normal. There is no distension. Palpations: Abdomen is soft. Tenderness: There is abdominal tenderness (Mild and diffuse). There is no guarding or rebound. Musculoskeletal:      Cervical back: Normal range of motion and neck supple. Right lower leg: No edema. Left lower leg: No edema. Skin:     General: Skin is warm. Capillary Refill: Capillary refill takes less than 2 seconds. Coloration: Skin is not jaundiced. Findings: No rash. Neurological:      Mental Status: She is alert. She is disoriented. Sensory: No sensory deficit. Motor: No weakness. Deep Tendon Reflexes: Reflexes normal.   Psychiatric:         Behavior: Behavior is uncooperative. Labs and Imaging Studies   Basic Labs  CBC:   No results for input(s): WBC, RBC, HGB, HCT, MCV, MCH, MCHC, RDW, PLT, MPV in the last 72 hours.     BMP:    Recent Labs     10/26/22  1841 10/26/22  2131    145   K 4.3 5.0    106   CO2 27 27   BUN 8 9   CREATININE 0.8 0.6   GLUCOSE 95 99   CALCIUM 10.3* 10.5*   PROT 7.6 7.3   LABALBU 4.6 4.5   BILITOT 0.3 0.2   ALKPHOS 97 92   AST 15 15   ALT 8 9       LIVER PROFILE:   Recent Labs     10/26/22  1841 10/26/22  2131   AST 15 15   ALT 8 9   BILITOT 0.3 0.2   ALKPHOS 97 92       PT/INR:   Recent Labs     10/26/22  1841 10/26/22  2131   PROTIME 12.4 12.4   INR 1.1 1.1       APTT:   Recent Labs     10/26/22  1841   APTT 33.8       Fasting Lipid Panel:    Lab Results   Component Value Date/Time    CHOL 234 05/18/2022 02:40 PM    TRIG 126 05/18/2022 02:40 PM    HDL 62 05/18/2022 02:40 PM       Cardiac Enzymes:    Lab Results   Component Value Date    CKTOTAL 79 01/20/2021    CKTOTAL 58 04/19/2014    CKTOTAL 62 04/19/2014    CKMB 0.9 04/19/2014    CKMB 0.9 04/19/2014    CKMB 1.1 04/18/2014    TROPONINI <0.01 03/17/2020    TROPONINI <0.01 03/16/2020    TROPONINI <0.01 03/16/2020       Imaging Studies:     No results found. Resident's Assessment and Plan     Donivan Epley is a 56 y.o. female    Principal Problem:    Tylenol overdose, intentional self-harm, [facility]  Active Problems:    Hypercalcemia    Elevated lactic acid level    1st degree AV block    Hyperlipidemia    COPD (chronic obstructive pulmonary disease) (HCC)    Chronic systolic CHF (congestive heart failure), NYHA class 3 ([facility]    Essential hypertension  Resolved Problems:    * No resolved hospital problems.  *    Tylenol overdose  Start acetylcysteine per protocol  Monitor CMP, coagulation studies, electrolytes and acetaminophen level  Hold aspirin and atorvastatin  Aspiration precautions, seizure precaution and suicide precautions  Constant observation  Poison control consulted from ED  Psych consult for suicidal attempt  Patient will be admitted to the ICU for further care    Hypercalcemia  Start normal saline IV fluids at 50 mL/h  Check vitamin D, PTH, TSH and ionized calcium  Monitor calcium    Elevated lactic acid  Start normal saline IV fluids at 50 mL/h  Check lactic acid in the morning    Bradycardia / first AV block  EKG showing sinus bradycardia and first-degree AV block  Hold metoprolol  Repeat EKG in the morning    Hypertension  Continue home Entresto, Bumex and Aldactone  Hold metoprolol due to bradycardia and first AV block  Monitor BP    Congestive heart failure  Last echocardiography in 10/2020 showed an ejection fraction of 25-30%  Continue home Bumex, Entresto and Aldactone  Hold metoprolol due to bradycardia and first AV block  Daily weights  Strict I/Os    COPD / Asthma  Start DuoNeb, Brovana and Pulmicort  Continue home montelukast      DVT prophylaxis: PCD's  GI prophylaxis: Protonix 40 mg daily  Diet: N.p.o.  Full code  Telemetry  Disposition: ICU      Arti Bond MD  Attending physician: Dr. Jj Hearn

## 2022-10-27 NOTE — ED NOTES
PATIENT PLACED IN APPROPRIATE GOWN, HAS ONE BAG OF BELONGINGS PLACED IN LOCKER 29 IN [place]EMA.      Marikay Gaucher, RN  10/26/22 7923

## 2022-10-27 NOTE — ED PROVIDER NOTES
HPI:  10/26/22,   Time: 9:30 PM EDT       Ron Ty is a 56 y.o. female presenting to the ED for ingestion, beginning approximately 6 PM.  The complaint has been intermittent, severe in severity, and worsened by nothing. Patient presents the emergency department after an ingestion. The patient states he was in a lot of pain and took a bunch of Tylenol. Patient was originally seen earlier today and eloped from the emergency department prior to completion. Patient's initial Tylenol level was 138. This was not her 4-hour level. Patient currently states she feels fatigued but denies any abdominal pain. No chest pain shortness of breath. No numbness tingling. Review of Systems:   Pertinent positives and negatives are stated within HPI, all other systems reviewed and are negative.          --------------------------------------------- PAST HISTORY ---------------------------------------------  Past Medical History:  has a past medical history of Angina at rest [facility]), Asthma, Blood type AB+, CAD (coronary artery disease), Carpal tunnel syndrome on left, CHF (congestive heart failure) ([address] CHF (congestive heart failure) ([address] COPD (chronic obstructive pulmonary disease) ([address] CVA (cerebral vascular accident) ([address] GERD (gastroesophageal reflux disease), HFrEF (heart failure with reduced ejection fraction) ([address] History of blood transfusion, Hyperlipidemia, Hypertension, ICD (implantable cardioverter-defibrillator) in place, Left ovarian cyst, Liver lesion, Moderate mitral regurgitation, Nonischemic cardiomyopathy ([address] NSTEMI (non-ST elevated myocardial infarction) ([address] Suicide attempt by drug ingestion ([address] Tobacco abuse, and Vitamin D deficiency. Past Surgical History:  has a past surgical history that includes cardiovascular stress test (); Tubal ligation (); Tonsillectomy;  section; transthoracic echocardiogram (3/19/13);  Diagnostic Cardiac Cath Lab Procedure (2007); Diagnostic Cardiac Cath Lab Procedure (4/15); Percutaneous Transluminal Coronary Angio (4/10/15); Hysterectomy (10/1/15); Colonoscopy (10/2015); Endoscopy, colon, diagnostic; Cardiac catheterization (04/04/2018); and Cardiac defibrillator placement (08/08/2016). Social History:  reports that she has been smoking cigarettes. She has a 15.00 pack-year smoking history. Her smokeless tobacco use includes snuff. She reports current alcohol use of about 14.0 standard drinks per week. She reports current drug use. Frequency: 3.00 times per week. Drug: Marijuana [place]). Family History: family history includes Heart Disease in her father; High Blood Pressure in her father and mother; Pacemaker in her father; Stroke in her father and mother. The patients home medications have been reviewed. Allergies: Orange fruit [citrus], Crestor [rosuvastatin calcium], Loratadine, Norco [hydrocodone-acetaminophen], Sulfa antibiotics, and Aspirin        ---------------------------------------------------PHYSICAL EXAM--------------------------------------    Constitutional/General: Alert and oriented x3,  NAD  Head: Normocephalic and atraumatic  Eyes: PERRL, EOMI, conjunctive normal, sclera non icteric  Mouth: Oropharynx clear, handling secretions, no trismus, no asymmetry of the posterior oropharynx or uvular edema  Neck: Supple, full ROM, non tender to palpation in the midline, no stridor, no crepitus, no meningeal signs  Respiratory: Lungs clear to auscultation bilaterally, no wheezes, rales, or rhonchi. Not in respiratory distress  Cardiovascular:  Regular rate. Regular rhythm. No murmurs, gallops, or rubs. 2+ distal pulses  GI:  Abdomen Soft, Non tender, Non distended. +BS. No organomegaly, no palpable masses,  No rebound, guarding, or rigidity. Musculoskeletal: Moves all extremities x 4. Warm and well perfused, no clubbing, cyanosis, or edema. Capillary refill <3 seconds  Integument: skin warm and dry. No rashes. Neurologic: GCS 15, no focal deficits, symmetric strength 5/5 in the upper and lower extremities bilaterally  Psychiatric: Normal Affect, appears intoxicated    -------------------------------------------------- RESULTS -------------------------------------------------  I have personally reviewed all laboratory and imaging results for this patient. Results are listed below. LABS:  Results for orders placed or performed during the hospital encounter of 10/26/22   Comprehensive Metabolic Panel w/ Reflex to MG   Result Value Ref Range    Sodium 145 132 - 146 mmol/L    Potassium reflex Magnesium 5.0 3.5 - 5.0 mmol/L    Chloride 106 98 - 107 mmol/L    CO2 27 22 - 29 mmol/L    Anion Gap 12 7 - 16 mmol/L    Glucose 99 74 - 99 mg/dL    BUN 9 6 - 20 mg/dL    Creatinine 0.6 0.5 - 1.0 mg/dL    Est, Glom Filt Rate >60 >=60 mL/min/1.73    Calcium 10.5 (H) 8.6 - 10.2 mg/dL    Total Protein 7.3 6.4 - 8.3 g/dL    Albumin 4.5 3.5 - 5.2 g/dL    Total Bilirubin 0.2 0.0 - 1.2 mg/dL    Alkaline Phosphatase 92 35 - 104 U/L    ALT 9 0 - 32 U/L    AST 15 0 - 31 U/L   Serum Drug Screen   Result Value Ref Range    Ethanol Lvl 111 mg/dL    Acetaminophen Level 211.9 (HH) 10.0 - 63.4 mcg/mL    Salicylate, Serum <3.3 0.0 - 30.0 mg/dL    TCA Scrn NEGATIVE Cutoff:300 ng/mL   Protime-INR   Result Value Ref Range    Protime 12.4 9.3 - 12.4 sec    INR 1.1    EKG 12 Lead   Result Value Ref Range    Ventricular Rate 58 BPM    Atrial Rate 58 BPM    P-R Interval 264 ms    QRS Duration 114 ms    Q-T Interval 476 ms    QTc Calculation (Bazett) 467 ms    P Axis 69 degrees    R Axis -91 degrees    T Axis 124 degrees       RADIOLOGY:  Interpreted by Radiologist.  No orders to display       EKG: This EKG is signed and interpreted by the EP. EKG shows sinus bradycardia at 50 bpm.  Right axis deviation. T wave inversions noted inferiorly and laterally.   No STEMI.    ------------------------- NURSING NOTES AND VITALS REVIEWED ---------------------------   The nursing notes within the ED encounter and vital signs as below have been reviewed by myself. BP (!) 151/86   Pulse 63   Temp 97.8 °F (36.6 °C) (Oral)   Resp 15   LMP 05/20/2014   SpO2 98%   Oxygen Saturation Interpretation: Normal    The patients available past medical records and past encounters were reviewed. ------------------------------ ED COURSE/MEDICAL DECISION MAKING----------------------  Medications   acetylcysteine (ACETADOTE) 7,840 mg in dextrose 5 % 239.2 mL infusion (has no administration in time range)     Followed by   acetylcysteine (ACETADOTE) 2,620 mg in dextrose 5 % 513.1 mL infusion (has no administration in time range)     Followed by   acetylcysteine (ACETADOTE) 5,220 mg in dextrose 5 % 1,026.1 mL infusion (has no administration in time range)         ED COURSE:       Medical Decision Making: This is a 82-year-old female presented to the ED after an overdose. Patient underwent repeat labs which showed a normal CBC. Normal INR. Patient's Tylenol level was 211.9. Poison control consulted. Patient was started on acetylcysteine. Patient will be admitted to the ICU for further care. Case discussed with intensivist as well as family medicine who is accept the patient for admission. Please note that the withdrawal or failure to initiate urgent interventions for this patient would likely result in a life threatening deterioration or permanent disability. Accordingly this patient received 32 minutes of critical care time, excluding separately billable procedures.       I, Dr. Mauri Summers, am the primary provider for this encounter    This patient's ED course included: a personal history and physicial examination, re-evaluation prior to disposition, multiple bedside re-evaluations, IV medications, cardiac monitoring, and continuous pulse oximetry    This patient has remained hemodynamically stable during their ED course. Re-Evaluations:             Re-evaluation. Patients symptoms are improving      Counseling: The emergency provider has spoken with the patient and discussed todays results, in addition to providing specific details for the plan of care and counseling regarding the diagnosis and prognosis. Questions are answered at this time and they are agreeable with the plan.       --------------------------------- IMPRESSION AND DISPOSITION ---------------------------------    IMPRESSION  1. Acetaminophen overdose of undetermined intent, initial encounter        DISPOSITION  Disposition: Admit to CCU/ICU  Patient condition is stable    NOTE: This report was transcribed using voice recognition software.  Every effort was made to ensure accuracy; however, inadvertent computerized transcription errors may be present        Gloria Cornelius DO  10/26/22 2106

## 2022-10-27 NOTE — CONSULTS
[facility]  Internal Medicine Residency Program  Consult  [facility]    Patient:  Mary Pascual 56 y.o. female MRN: [id number]     Date of Service: 10/27/2022    Hospital Day: 2      Chief complaint: Tylenol overdose  History of Present Illness   The patient is a 56 y.o. female with a past medical history of asthma/COPD, HFrEF (EF 20-25% in 2020), HTN, HLD, Vit D deficiency, GERD, previous hx of suicide attempt who presented for concerns for Tylenol overdose. Patient initially presented on early evening of 10/26/22 for ingesting multiple Tylenol pills. Per ED note, patient states that she took it because she is tired of the pain. On further work-up, her acetaminophen levels were found to be elevated at 211.9. She eloped with daughter during that time. Due to concerns of the patient being a threat to herself, the police department was notified. Patient's mother was also contacted and informed about patient's current condition and agreed to bring the patient back to the hospital.    Per patient, she did not have any intention of killing herself. She states that she was having drinks with her mother that time and was taking Tylenol every now and then because of abdominal pain. She continued drinking beer when she went home and continued taking Tylenol, states that she only took 8 pills. She states that she was taking alcohol with Tylenol to help maximize pain control. She then started feeling lightheaded and nauseated and then called EMS. She arrived in the ED hemodynamically stable with elevated blood pressures of 151/86, saturating well on room air. Labs were remarkable for acetaminophen level 211.9. Liver enzymes were normal and INR was at 1.1. Initially had elevated lactic acid of 2.3  and calcium levels at 10.5 but resolved upon fluid resuscitation. UDS came back positive for cannabinoid. EKG revealed sinus bradycardia with 1st degree AV block.     Poison control was consulted and recommended starting patient on N-Acetylcysteine. Patient admitted in the MICU for close monitoring. Past Medical History:      Diagnosis Date    Angina at rest [facility])     cath in  showed no CAD    Asthma     Blood type AB+ 2018    CAD (coronary artery disease)     Carpal tunnel syndrome on left     CHF (congestive heart failure) (HCC)     CHF (congestive heart failure) (HCC)     COPD (chronic obstructive pulmonary disease) [facility]     CVA (cerebral vascular accident) ([facility] 2009 and 2010. left parietal    GERD (gastroesophageal reflux disease)     HFrEF (heart failure with reduced ejection fraction) ([facility] 2020- echo- LVEF 20-25%, LA enlarged, moderate MR, mild TR, mild PH, LVDD: 6.7, RVDD: 2.2 (17- echo- LVEF 10%, stage III DD, severely dilated LA, appears L>R atrial shunt, mod TR, LVDD: 6.6,  RVDD: 2.3)    History of blood transfusion     Hyperlipidemia Diagnosed in . Dyslipidemia. Hypertension diagnosed in     ICD (implantable cardioverter-defibrillator) in place 2018    Placed by Dr. Jailene Vieira. Left ovarian cyst     Liver lesion 2015    Moderate mitral regurgitation 2015    mild-moderate    Nonischemic cardiomyopathy (HCC)     NSTEMI (non-ST elevated myocardial infarction) ([facility] 4/10/15--adm to [facility]    Suicide attempt by drug ingestion ([facility]     attempt in  OD on ultram    Tobacco abuse     Vitamin D deficiency        Past Surgical History:        Procedure Laterality Date    CARDIAC CATHETERIZATION  2018    Dr. Nance Fearing- Clean coronaries    [facility]  2016    SICD    CARDIOVASCULAR STRESS TEST  2009 Normal      SECTION      COLONOSCOPY  10/2015    DIAGNOSTIC CARDIAC CATH LAB PROCEDURE  2007    [facility]: No significant CAD. Mild LVD with apical akinesis. EF 50%.     DIAGNOSTIC CARDIAC CATH LAB PROCEDURE  4/15    with PTCA to 1st ob [email or web address]    ENDOSCOPY, COLON, DIAGNOSTIC HYSTERECTOMY (CERVIX STATUS UNKNOWN)  10/1/15    at [facility] by Dr. Caro Franco    PTCA  4/10/15    at [address] [place] [address] ECHOCARDIOGRAM  3/19/13    EF 65%, mild MR    TUBAL LIGATION  1997       Medications Prior to Admission:    Prior to Admission medications    Medication Sig Start Date End Date Taking?  Authorizing Provider   pantoprazole (PROTONIX) 20 MG tablet Take 1 tablet by mouth daily 5/18/22   Lorenzo Ya MD   nitroGLYCERIN (NITROSTAT) 0.4 MG SL tablet Place 1 tablet under the tongue every 5 minutes as needed for Chest pain 5/18/22   Lorenzo Ya MD   acetaminophen (TYLENOL) 500 MG tablet Take 1 tablet by mouth 2 times daily as needed for Pain 5/18/22   Lorenzo Ya MD   aspirin 81 MG chewable tablet Take 1 tablet by mouth daily 5/18/22   Lorenzo Ya MD   atorvastatin (LIPITOR) 40 MG tablet Take 1 tablet by mouth daily 5/18/22   Lorenzo Ya MD   sacubitril-valsartan (ENTRESTO)  MG per tablet Take 1 tablet by mouth 2 times daily 5/18/22   Lorenzo Ya MD   metoprolol succinate (TOPROL XL) 100 MG extended release tablet Take 1 tablet by mouth daily 5/18/22   Lorenzo Ya MD   bumetanide (BUMEX) 2 MG tablet Take 1 tablet by mouth daily 5/18/22   Lorenzo Ya MD   spironolactone (ALDACTONE) 25 MG tablet Take 1 tablet by mouth daily 5/18/22   Lorenzo Ya MD   budesonide-formoterol [facility]) 160-4.5 MCG/ACT AERO Inhale 2 puffs into the lungs 2 times daily 5/18/22   Lorenzo Ya MD   montelukast (SINGULAIR) 10 MG tablet Take 1 tablet by mouth nightly 5/18/22   Lorenzo Ya MD   cetirizine (ZYRTEC) 10 MG tablet Take 1 tablet by mouth daily 5/18/22   Lorenzo Ya MD   tiotropium (Jalloh Jest) 18 MCG inhalation capsule Inhale 1 capsule into the lungs daily 5/18/22   Lorenzo Ya MD   albuterol sulfate HFA (VENTOLIN HFA) 108 (90 Base) MCG/ACT inhaler Inhale 2 puffs into the lungs 4 times daily as needed for Wheezing 5/18/22   Lorenzo Ya MD       Allergies:  Orange fruit [citrus], Crestor [rosuvastatin calcium], Loratadine, Norco [hydrocodone-acetaminophen], Sulfa antibiotics, and Aspirin    Social History:   TOBACCO:   reports that she has been smoking cigarettes. She has a 15.00 pack-year smoking history. Her smokeless tobacco use includes snuff. ETOH:   reports current alcohol use of about 14.0 standard drinks per week. Family History:       Problem Relation Age of Onset    High Blood Pressure Mother     Stroke Mother     High Blood Pressure Father     Stroke Father     Heart Disease Father     Pacemaker Father        REVIEW OF SYSTEMS:  Review of Systems   Constitutional:  Negative for appetite change, fatigue and fever. Respiratory:  Negative for cough and shortness of breath. Cardiovascular:  Negative for chest pain and palpitations. Gastrointestinal:  Positive for abdominal pain and nausea. Negative for diarrhea and vomiting. Genitourinary: Negative. Musculoskeletal: Negative. Skin: Negative. Psychiatric/Behavioral:  Negative for agitation and suicidal ideas. The patient is not nervous/anxious. Physical Exam   Vitals: BP (!) 175/83   Pulse 82   Temp 97.8 °F (36.6 °C) (Oral)   Resp 26   LMP 05/20/2014   SpO2 98%         Physical Exam  Constitutional:       Appearance: Normal appearance. She is normal weight. HENT:      Head: Normocephalic and atraumatic. Right Ear: External ear normal.      Left Ear: External ear normal.      Nose: Nose normal.      Mouth/Throat:      Mouth: Mucous membranes are moist.      Pharynx: Oropharynx is clear. Eyes:      Extraocular Movements: Extraocular movements intact. Conjunctiva/sclera: Conjunctivae normal.      Pupils: Pupils are equal, round, and reactive to light. Abdominal:      General: Abdomen is flat. Bowel sounds are normal.      Palpations: Abdomen is soft. Tenderness: There is abdominal tenderness (mild tenderness on epigastric area).    Musculoskeletal:         General: Normal range of motion. Skin:     General: Skin is warm. Capillary Refill: Capillary refill takes less than 2 seconds. Neurological:      Mental Status: She is alert and oriented to person, place, and time. Mental status is at baseline. Psychiatric:         Mood and Affect: Mood normal.         Speech: Speech normal.         Behavior: Behavior is cooperative. Thought Content: Thought content does not include suicidal ideation. Thought content does not include suicidal plan. Lines     site day    Art line   None    TLC None    PICC None    Hemoaccess None    Oxygen: On room air    Lab Results   Component Value Date/Time    PH 7.441 03/16/2020 02:49 PM    PCO2 37.2 03/16/2020 02:49 PM    PO2 109.8 03/16/2020 02:49 PM    HCO3 24.8 03/16/2020 02:49 PM    BE 0.8 03/16/2020 02:49 PM    THB 13.1 03/16/2020 02:49 PM    O2SAT 98.3 03/16/2020 02:49 PM     Labs and Imaging Studies   Basic Labs  CMP:  Lab Results   Component Value Date/Time     10/27/2022 05:58 PM    K 3.1 10/27/2022 05:58 PM    K 5.0 10/26/2022 09:31 PM    CL 99 10/27/2022 05:58 PM    CO2 24 10/27/2022 05:58 PM    BUN 12 10/27/2022 05:58 PM    PROT 6.9 10/27/2022 09:05 PM     PT/INR:  No results found for: PTINR    Imaging Studies:     No results found. EKG: sinus bradycardia, 1st degree AV block. Resident's Assessment and Plan     Campos Bettencourt is a 56 y.o. female with a past medical history of asthma/COPD, HFrEF (EF 20-25% in 2020), HTN, HLD, Vit D deficiency, GERD, previous hx of suicide attempt who presented for concerns for Tylenol overdose. Tylenol overdose, likely intentional - acetaminophen level 200s on presentation, started on NAC per poison control recommendation  Hypertension - on metoprolol 100mg daily at home  Concerns for sinus bradycardia and first degree AV block on EKG  Hx of HFrEF (EF 20-25% in 2020) - on GDMT, (Bumex 2mg daily, Entresto BID, metoprolol 100mg daily and Aldactone 25mg daily at home). Currently not in exacerbation. Hx of COPD/ asthma - on Symbicort, Spiriva, albuterol PRN and montelukast at home  Hx of Hyperlipidemia - on statin at home  Hypercalcemia - resolved - Ca 10.5 on presentation, improved after fluid resuscitation  Lactic acidosis - LA 2.3 on presentation, resolved with fluid resuscitation  Vitamin D deficiency - Vit D level 22  Hx of suicide attempt (overdosed with Ultram in 2007)  Substance use (marijuana)  Concerns for suicide attempt - pink slipped    -Continue NAC. Monitor LFT and acetaminophen levels 1 hour before last infusion. Discontinue NAC if acetaminophen levels are undetectable and LFTs remain normal.  -Hold metoprolol for now due to concerns for sinus bradycardia and first degree AV block. Patient currently not in bradycardia. Repeat EKG in AM.  -Continue Bumex, Entresto and Aldactone. Strict I/O and daily weights.  -Hydralazine PRN on board  -Continue montelukast  -Continue breathing treatments with DuoNeb, Pulmicort and Brovana  -Holding statins for now. May resume in AM once LFTs remain normal  -Sitter at bedside due to concerns for suicide  -Psych consulted. Pink slipped. For transfer to [facility] once medically stable. PT/OT evaluation: not indicated at this time  DVT prophylaxis/ GI prophylaxis: PCDs/protonix  Disposition: Admit to [facility]    Viola Ramirez MD, PGY-2  Attending physician: Dr. Brianna Zambrano  Department of Pulmonary, Critical Care and Sleep Medicine  [facility]  Department of Internal Medicine      During multidisciplinary team rounds Maeve Keene is a 56 y.o. female was seen, examined and discussed. This is confirmation that I have personally seen and examined the patient and that the key elements of the encounter were performed by me (> 85 % time). The medications & laboratory data was discussed and adjusted where necessary.  The radiographic images were reviewed or with radiologist or consultant if felt dis-concordant with the exam or history. The above findings were corroborated, plans confirmed and changes made if needed. Family is updated at the bedside as available. Key issues of the case were discussed among consultants. Critical Care time is documented if appropriate.       Elena Polanco DO, FACP, FCCP, [place],

## 2022-10-27 NOTE — ED NOTES
Spoke to [employer] mother, Yi Kulkarni and told her how important it is that she comes back to the hospital. She states she will get her back here.       Zak Moran RN  10/26/22 2033

## 2022-10-27 NOTE — CARE COORDINATION
EFRA transition of care. Pt presented to [facility] ER secondary to ingestion of tylenol. Per notes pt had been seen earlier in the day and had eloped from the ED. Pt later returned to the ED and was admitted to ICU for tylenol overdose, intentional self harm. Tylenol level  211.9 on admission,  now trending down to 60.5. Acetylcysteine and IVF ordered. Psychiatry on consult. Per notes pt reporting that she took the tylenol due to stomach pain. D/c plan pending psychiatric evaluation and medical clearance. CM/LORRAINE to follow.   Burnell Goldberg RN CM

## 2022-10-27 NOTE — PROGRESS NOTES
[address] [facility] Attending    S: 56 y.o. female with who was admitted for acetaminophen overdose. There is concern that she intentionally took the tylenol. She does have a hsitory of prior SI and attempt. At bedside this morning, the patient states that the reason she took too many tylenol was because she was drinking alcohol and could not remember if she had already taken the medication. She admits to drinking beer along with the tylenol. She states that she does not drink alcohol everyday. She also denies current suicidal ideation. She is most concerned about getting something to eat. She notes that she has chronic pain in her epigastric region and has a history of ulcers. Her pain was very severe yesterday and that is why she was taking the medication for pain relief. She has had no recent EGD. She feels that her abdominal pain is not as severe today. She denies any current nausea/vomiting. She does have suicide precautions and a sitter is present in the room. O: VS- Blood pressure (!) 172/94, pulse 98, temperature 97.8 °F (36.6 °C), temperature source Oral, resp. rate 22, last menstrual period 05/20/2014, SpO2 98 %, not currently breastfeeding. Exam is as noted by resident with the following changes, additions or corrections:  Gen:  AAOx3  CVS:  RRR  Lungs:  CTAB  Abd:  there is tenderness diffusely, but most tender in the epigastric region; no rebound or guarding. Ext:  no CCE    Impressions:   Principal Problem:    Tylenol overdose, intentional self-harm, initial encounter ([facility] [address]  Active Problems:    Hypercalcemia    Elevated lactic acid level    1st degree AV block    Hyperlipidemia    COPD (chronic obstructive pulmonary disease) (HCC)    Chronic systolic CHF (congestive heart failure), NYHA class 3 (HCC)    Essential hypertension     Epigastric pain  Resolved Problems:    * No resolved hospital problems.  *      Plan:     IVF and acetadote for the OD  Psych consulted  IV PPI  Continue home medications  BB on hold due to 1st degree AV block  Trial of soft diet  WA protocol  Suicide precautions/sitter     Attending Physician Statement  I have reviewed the chart and seen the patient with the resident(s). I personally reviewed images, EKG's and similar tests, if present. I personally reviewed and performed key elements of the history and exam.  I have reviewed and confirmed student and/or resident history and exam with changes as indicated above. I agree with the assessment, plan and orders as documented by the resident. Please refer to the resident admission history and physical  for additional information.       Vivien Julian MD

## 2022-10-28 ENCOUNTER — HOSPITAL ENCOUNTER (INPATIENT)
Age: 57
LOS: 6 days | Discharge: HOME | DRG: 885 | End: 2022-11-03
Attending: PSYCHIATRY & NEUROLOGY | Admitting: PSYCHIATRY & NEUROLOGY
Payer: COMMERCIAL

## 2022-10-28 VITALS
BODY MASS INDEX: 19.81 KG/M2 | WEIGHT: 118.9 LBS | HEIGHT: 65 IN | RESPIRATION RATE: 17 BRPM | SYSTOLIC BLOOD PRESSURE: 140 MMHG | TEMPERATURE: 98.2 F | HEART RATE: 95 BPM | OXYGEN SATURATION: 94 % | DIASTOLIC BLOOD PRESSURE: 97 MMHG

## 2022-10-28 DIAGNOSIS — I50.22 CHRONIC SYSTOLIC CHF (CONGESTIVE HEART FAILURE), NYHA CLASS 3 (HCC): ICD-10-CM

## 2022-10-28 DIAGNOSIS — J45.909 PERSISTENT ASTHMA WITHOUT COMPLICATION, UNSPECIFIED ASTHMA SEVERITY: ICD-10-CM

## 2022-10-28 DIAGNOSIS — Z76.0 MEDICATION REFILL: ICD-10-CM

## 2022-10-28 DIAGNOSIS — J44.9 CHRONIC OBSTRUCTIVE PULMONARY DISEASE, UNSPECIFIED COPD TYPE (HCC): ICD-10-CM

## 2022-10-28 DIAGNOSIS — I10 ESSENTIAL HYPERTENSION: Chronic | ICD-10-CM

## 2022-10-28 PROBLEM — T39.1X4A ACETAMINOPHEN OVERDOSE OF UNDETERMINED INTENT: Status: ACTIVE | Noted: 2022-10-26

## 2022-10-28 PROBLEM — F33.0 MDD (MAJOR DEPRESSIVE DISORDER), RECURRENT EPISODE, MILD (HCC): Status: ACTIVE | Noted: 2022-10-28

## 2022-10-28 LAB
ACETAMINOPHEN LEVEL: <5 MCG/ML (ref 10–30)
ALBUMIN SERPL-MCNC: 4.4 G/DL (ref 3.5–5.2)
ALP BLD-CCNC: 93 U/L (ref 35–104)
ALT SERPL-CCNC: 8 U/L (ref 0–32)
ANION GAP SERPL CALCULATED.3IONS-SCNC: 13 MMOL/L (ref 7–16)
APTT: 30.6 SEC (ref 24.5–35.1)
AST SERPL-CCNC: 15 U/L (ref 0–31)
BASOPHILS ABSOLUTE: 0.1 E9/L (ref 0–0.2)
BASOPHILS RELATIVE PERCENT: 1.3 % (ref 0–2)
BILIRUB SERPL-MCNC: 0.4 MG/DL (ref 0–1.2)
BUN BLDV-MCNC: 8 MG/DL (ref 6–20)
CALCIUM SERPL-MCNC: 10.1 MG/DL (ref 8.6–10.2)
CHLORIDE BLD-SCNC: 106 MMOL/L (ref 98–107)
CO2: 20 MMOL/L (ref 22–29)
CREAT SERPL-MCNC: 0.7 MG/DL (ref 0.5–1)
EKG ATRIAL RATE: 58 BPM
EKG ATRIAL RATE: 88 BPM
EKG P AXIS: 49 DEGREES
EKG P AXIS: 69 DEGREES
EKG P-R INTERVAL: 220 MS
EKG P-R INTERVAL: 264 MS
EKG Q-T INTERVAL: 390 MS
EKG Q-T INTERVAL: 476 MS
EKG QRS DURATION: 100 MS
EKG QRS DURATION: 114 MS
EKG QTC CALCULATION (BAZETT): 467 MS
EKG QTC CALCULATION (BAZETT): 471 MS
EKG R AXIS: -82 DEGREES
EKG R AXIS: -91 DEGREES
EKG T AXIS: -4 DEGREES
EKG T AXIS: 124 DEGREES
EKG VENTRICULAR RATE: 58 BPM
EKG VENTRICULAR RATE: 88 BPM
EOSINOPHILS ABSOLUTE: 0.18 E9/L (ref 0.05–0.5)
EOSINOPHILS RELATIVE PERCENT: 2.4 % (ref 0–6)
GFR SERPL CREATININE-BSD FRML MDRD: >60 ML/MIN/1.73
GLUCOSE BLD-MCNC: 140 MG/DL (ref 74–99)
HCT VFR BLD CALC: 42.2 % (ref 34–48)
HEMOGLOBIN: 14.1 G/DL (ref 11.5–15.5)
IMMATURE GRANULOCYTES #: 0.02 E9/L
IMMATURE GRANULOCYTES %: 0.3 % (ref 0–5)
INR BLD: 1.1
LYMPHOCYTES ABSOLUTE: 1.8 E9/L (ref 1.5–4)
LYMPHOCYTES RELATIVE PERCENT: 24 % (ref 20–42)
MAGNESIUM: 2.2 MG/DL (ref 1.6–2.6)
MCH RBC QN AUTO: 28.7 PG (ref 26–35)
MCHC RBC AUTO-ENTMCNC: 33.4 % (ref 32–34.5)
MCV RBC AUTO: 85.8 FL (ref 80–99.9)
MONOCYTES ABSOLUTE: 0.68 E9/L (ref 0.1–0.95)
MONOCYTES RELATIVE PERCENT: 9.1 % (ref 2–12)
NEUTROPHILS ABSOLUTE: 4.71 E9/L (ref 1.8–7.3)
NEUTROPHILS RELATIVE PERCENT: 62.9 % (ref 43–80)
PDW BLD-RTO: 13.2 FL (ref 11.5–15)
PHOSPHORUS: 2.5 MG/DL (ref 2.5–4.5)
PLATELET # BLD: 371 E9/L (ref 130–450)
PMV BLD AUTO: 10 FL (ref 7–12)
POTASSIUM SERPL-SCNC: 4.1 MMOL/L (ref 3.5–5)
PROTHROMBIN TIME: 12.4 SEC (ref 9.3–12.4)
RBC # BLD: 4.92 E12/L (ref 3.5–5.5)
SARS-COV-2, NAAT: NOT DETECTED
SODIUM BLD-SCNC: 139 MMOL/L (ref 132–146)
TOTAL PROTEIN: 7.5 G/DL (ref 6.4–8.3)
WBC # BLD: 7.5 E9/L (ref 4.5–11.5)

## 2022-10-28 PROCEDURE — 6370000000 HC RX 637 (ALT 250 FOR IP)

## 2022-10-28 PROCEDURE — 93005 ELECTROCARDIOGRAM TRACING: CPT

## 2022-10-28 PROCEDURE — 93010 ELECTROCARDIOGRAM REPORT: CPT | Performed by: INTERNAL MEDICINE

## 2022-10-28 PROCEDURE — 85025 COMPLETE CBC W/AUTO DIFF WBC: CPT

## 2022-10-28 PROCEDURE — 94640 AIRWAY INHALATION TREATMENT: CPT

## 2022-10-28 PROCEDURE — 99233 SBSQ HOSP IP/OBS HIGH 50: CPT | Performed by: INTERNAL MEDICINE

## 2022-10-28 PROCEDURE — 6360000002 HC RX W HCPCS

## 2022-10-28 PROCEDURE — 83735 ASSAY OF MAGNESIUM: CPT

## 2022-10-28 PROCEDURE — 84100 ASSAY OF PHOSPHORUS: CPT

## 2022-10-28 PROCEDURE — 99232 SBSQ HOSP IP/OBS MODERATE 35: CPT | Performed by: FAMILY MEDICINE

## 2022-10-28 PROCEDURE — 1240000000 HC EMOTIONAL WELLNESS R&B

## 2022-10-28 PROCEDURE — 2580000003 HC RX 258

## 2022-10-28 PROCEDURE — 85610 PROTHROMBIN TIME: CPT

## 2022-10-28 PROCEDURE — 80143 DRUG ASSAY ACETAMINOPHEN: CPT

## 2022-10-28 PROCEDURE — 85730 THROMBOPLASTIN TIME PARTIAL: CPT

## 2022-10-28 PROCEDURE — 87635 SARS-COV-2 COVID-19 AMP PRB: CPT

## 2022-10-28 PROCEDURE — 80053 COMPREHEN METABOLIC PANEL: CPT

## 2022-10-28 RX ORDER — ASPIRIN 81 MG/1
81 TABLET, CHEWABLE ORAL DAILY
Status: CANCELLED | OUTPATIENT
Start: 2022-10-29

## 2022-10-28 RX ORDER — ERGOCALCIFEROL 1.25 MG/1
50000 CAPSULE ORAL WEEKLY
Status: DISCONTINUED | OUTPATIENT
Start: 2022-11-04 | End: 2022-11-03 | Stop reason: HOSPADM

## 2022-10-28 RX ORDER — MAGNESIUM HYDROXIDE/ALUMINUM HYDROXICE/SIMETHICONE 120; 1200; 1200 MG/30ML; MG/30ML; MG/30ML
30 SUSPENSION ORAL PRN
Status: DISCONTINUED | OUTPATIENT
Start: 2022-10-28 | End: 2022-11-03 | Stop reason: HOSPADM

## 2022-10-28 RX ORDER — BUDESONIDE 0.5 MG/2ML
500 INHALANT ORAL 2 TIMES DAILY
Status: CANCELLED | OUTPATIENT
Start: 2022-10-28

## 2022-10-28 RX ORDER — HYDRALAZINE HYDROCHLORIDE 20 MG/ML
10 INJECTION INTRAMUSCULAR; INTRAVENOUS EVERY 6 HOURS PRN
Status: CANCELLED | OUTPATIENT
Start: 2022-10-28

## 2022-10-28 RX ORDER — HALOPERIDOL 5 MG/ML
3 INJECTION INTRAMUSCULAR EVERY 6 HOURS PRN
Status: DISCONTINUED | OUTPATIENT
Start: 2022-10-28 | End: 2022-11-03 | Stop reason: HOSPADM

## 2022-10-28 RX ORDER — IPRATROPIUM BROMIDE AND ALBUTEROL SULFATE 2.5; .5 MG/3ML; MG/3ML
1 SOLUTION RESPIRATORY (INHALATION)
Status: DISCONTINUED | OUTPATIENT
Start: 2022-10-28 | End: 2022-11-03 | Stop reason: HOSPADM

## 2022-10-28 RX ORDER — ATORVASTATIN CALCIUM 40 MG/1
40 TABLET, FILM COATED ORAL DAILY
Status: DISCONTINUED | OUTPATIENT
Start: 2022-10-29 | End: 2022-11-03 | Stop reason: HOSPADM

## 2022-10-28 RX ORDER — ARFORMOTEROL TARTRATE 15 UG/2ML
15 SOLUTION RESPIRATORY (INHALATION) 2 TIMES DAILY
Status: DISCONTINUED | OUTPATIENT
Start: 2022-10-28 | End: 2022-11-03 | Stop reason: HOSPADM

## 2022-10-28 RX ORDER — SPIRONOLACTONE 25 MG/1
25 TABLET ORAL DAILY
Status: DISCONTINUED | OUTPATIENT
Start: 2022-10-29 | End: 2022-11-03 | Stop reason: HOSPADM

## 2022-10-28 RX ORDER — HYDRALAZINE HYDROCHLORIDE 20 MG/ML
10 INJECTION INTRAMUSCULAR; INTRAVENOUS EVERY 6 HOURS PRN
Status: DISCONTINUED | OUTPATIENT
Start: 2022-10-28 | End: 2022-11-03 | Stop reason: HOSPADM

## 2022-10-28 RX ORDER — ASPIRIN 81 MG/1
81 TABLET, CHEWABLE ORAL DAILY
Status: DISCONTINUED | OUTPATIENT
Start: 2022-10-29 | End: 2022-11-03 | Stop reason: HOSPADM

## 2022-10-28 RX ORDER — HYDROXYZINE PAMOATE 50 MG/1
50 CAPSULE ORAL 3 TIMES DAILY PRN
Status: DISCONTINUED | OUTPATIENT
Start: 2022-10-28 | End: 2022-11-03 | Stop reason: HOSPADM

## 2022-10-28 RX ORDER — PANTOPRAZOLE SODIUM 40 MG/1
40 TABLET, DELAYED RELEASE ORAL
Status: DISCONTINUED | OUTPATIENT
Start: 2022-10-28 | End: 2022-10-28 | Stop reason: HOSPADM

## 2022-10-28 RX ORDER — BUDESONIDE 0.5 MG/2ML
500 INHALANT ORAL 2 TIMES DAILY
Status: DISCONTINUED | OUTPATIENT
Start: 2022-10-28 | End: 2022-11-03 | Stop reason: HOSPADM

## 2022-10-28 RX ORDER — ERGOCALCIFEROL 1.25 MG/1
50000 CAPSULE ORAL WEEKLY
Status: CANCELLED | OUTPATIENT
Start: 2022-11-04 | End: 2022-12-23

## 2022-10-28 RX ORDER — METOPROLOL SUCCINATE 25 MG/1
100 TABLET, EXTENDED RELEASE ORAL DAILY
Status: DISCONTINUED | OUTPATIENT
Start: 2022-10-29 | End: 2022-11-03 | Stop reason: HOSPADM

## 2022-10-28 RX ORDER — SPIRONOLACTONE 25 MG/1
25 TABLET ORAL DAILY
Status: CANCELLED | OUTPATIENT
Start: 2022-10-29

## 2022-10-28 RX ORDER — METOPROLOL SUCCINATE 50 MG/1
100 TABLET, EXTENDED RELEASE ORAL DAILY
Status: CANCELLED | OUTPATIENT
Start: 2022-10-29

## 2022-10-28 RX ORDER — MONTELUKAST SODIUM 10 MG/1
10 TABLET ORAL NIGHTLY
Status: DISCONTINUED | OUTPATIENT
Start: 2022-10-28 | End: 2022-10-28

## 2022-10-28 RX ORDER — HALOPERIDOL 2 MG/1
3 TABLET ORAL EVERY 6 HOURS PRN
Status: DISCONTINUED | OUTPATIENT
Start: 2022-10-28 | End: 2022-11-03 | Stop reason: HOSPADM

## 2022-10-28 RX ORDER — BUMETANIDE 1 MG/1
2 TABLET ORAL DAILY
Status: DISCONTINUED | OUTPATIENT
Start: 2022-10-29 | End: 2022-11-03 | Stop reason: HOSPADM

## 2022-10-28 RX ORDER — NICOTINE 21 MG/24HR
1 PATCH, TRANSDERMAL 24 HOURS TRANSDERMAL DAILY
Status: DISCONTINUED | OUTPATIENT
Start: 2022-10-28 | End: 2022-11-03 | Stop reason: HOSPADM

## 2022-10-28 RX ORDER — LANOLIN ALCOHOL/MO/W.PET/CERES
3 CREAM (GRAM) TOPICAL NIGHTLY
Status: DISCONTINUED | OUTPATIENT
Start: 2022-10-28 | End: 2022-11-03 | Stop reason: HOSPADM

## 2022-10-28 RX ORDER — PANTOPRAZOLE SODIUM 40 MG/1
40 TABLET, DELAYED RELEASE ORAL
Status: CANCELLED | OUTPATIENT
Start: 2022-10-29

## 2022-10-28 RX ORDER — ERGOCALCIFEROL 1.25 MG/1
50000 CAPSULE ORAL WEEKLY
Status: DISCONTINUED | OUTPATIENT
Start: 2022-10-28 | End: 2022-10-28 | Stop reason: HOSPADM

## 2022-10-28 RX ORDER — ENOXAPARIN SODIUM 100 MG/ML
40 INJECTION SUBCUTANEOUS DAILY
Status: DISCONTINUED | OUTPATIENT
Start: 2022-10-28 | End: 2022-10-28

## 2022-10-28 RX ORDER — ARFORMOTEROL TARTRATE 15 UG/2ML
15 SOLUTION RESPIRATORY (INHALATION) 2 TIMES DAILY
Status: CANCELLED | OUTPATIENT
Start: 2022-10-28

## 2022-10-28 RX ORDER — IPRATROPIUM BROMIDE AND ALBUTEROL SULFATE 2.5; .5 MG/3ML; MG/3ML
1 SOLUTION RESPIRATORY (INHALATION)
Status: CANCELLED | OUTPATIENT
Start: 2022-10-28

## 2022-10-28 RX ORDER — BUMETANIDE 1 MG/1
2 TABLET ORAL DAILY
Status: CANCELLED | OUTPATIENT
Start: 2022-10-29

## 2022-10-28 RX ORDER — PANTOPRAZOLE SODIUM 40 MG/1
40 TABLET, DELAYED RELEASE ORAL
Status: DISCONTINUED | OUTPATIENT
Start: 2022-10-29 | End: 2022-11-03 | Stop reason: HOSPADM

## 2022-10-28 RX ORDER — ATORVASTATIN CALCIUM 40 MG/1
40 TABLET, FILM COATED ORAL DAILY
Status: CANCELLED | OUTPATIENT
Start: 2022-10-29

## 2022-10-28 RX ORDER — MONTELUKAST SODIUM 10 MG/1
10 TABLET ORAL NIGHTLY
Status: CANCELLED | OUTPATIENT
Start: 2022-10-28

## 2022-10-28 RX ADMIN — PANTOPRAZOLE SODIUM 40 MG: 40 TABLET, DELAYED RELEASE ORAL at 06:25

## 2022-10-28 RX ADMIN — HYDRALAZINE HYDROCHLORIDE 10 MG: 20 INJECTION INTRAMUSCULAR; INTRAVENOUS at 00:08

## 2022-10-28 RX ADMIN — SACUBITRIL AND VALSARTAN 1 TABLET: 97; 103 TABLET, FILM COATED ORAL at 08:55

## 2022-10-28 RX ADMIN — ASPIRIN 81 MG 81 MG: 81 TABLET ORAL at 11:40

## 2022-10-28 RX ADMIN — BUDESONIDE 500 MCG: 0.5 SUSPENSION RESPIRATORY (INHALATION) at 09:14

## 2022-10-28 RX ADMIN — METOPROLOL SUCCINATE 100 MG: 50 TABLET, FILM COATED, EXTENDED RELEASE ORAL at 08:55

## 2022-10-28 RX ADMIN — BUMETANIDE 2 MG: 1 TABLET ORAL at 08:52

## 2022-10-28 RX ADMIN — IPRATROPIUM BROMIDE AND ALBUTEROL SULFATE 1 AMPULE: .5; 2.5 SOLUTION RESPIRATORY (INHALATION) at 17:25

## 2022-10-28 RX ADMIN — SPIRONOLACTONE 25 MG: 25 TABLET ORAL at 08:52

## 2022-10-28 RX ADMIN — IPRATROPIUM BROMIDE AND ALBUTEROL SULFATE 1 AMPULE: .5; 2.5 SOLUTION RESPIRATORY (INHALATION) at 09:14

## 2022-10-28 RX ADMIN — ERGOCALCIFEROL 50000 UNITS: 1.25 CAPSULE ORAL at 08:55

## 2022-10-28 RX ADMIN — ATORVASTATIN CALCIUM 40 MG: 40 TABLET, FILM COATED ORAL at 08:52

## 2022-10-28 RX ADMIN — SODIUM CHLORIDE, PRESERVATIVE FREE 10 ML: 5 INJECTION INTRAVENOUS at 08:52

## 2022-10-28 RX ADMIN — ARFORMOTEROL TARTRATE 15 MCG: 15 SOLUTION RESPIRATORY (INHALATION) at 09:14

## 2022-10-28 ASSESSMENT — ENCOUNTER SYMPTOMS
ABDOMINAL PAIN: 1
NAUSEA: 1
COUGH: 0
DIARRHEA: 0
VOMITING: 0
SHORTNESS OF BREATH: 0

## 2022-10-28 NOTE — PROGRESS NOTES
Poison control updated on Ast/ALT and acetaminophen levels. Per Alek Kiran from poison control cleared patient. No additional treatment is needed such as acetylsteine.

## 2022-10-28 NOTE — DISCHARGE SUMMARY
Discharge Summary    Kaci Ardon  :  1965  MRN:  [id number]    Admit date:  10/26/2022  Discharge date:  10/28/2022    Admitting Physician:  Luis Ramirez MD    Discharge Diagnoses:    Patient Active Problem List   Diagnosis    Hyperlipidemia    Left ovarian cyst    CVA (cerebral vascular accident)    COPD (chronic obstructive pulmonary disease) ([facility] [address]    Carpal tunnel syndrome on left    Suicide attempt by drug ingestion ([facility] [address]    Severe mitral regurgitation    Tobacco abuse    Asthma    Allergic rhinosinusitis    History of irregular menstrual bleeding    Anemia due to blood loss    History of MI (myocardial infarction)    Mass of right lobe of liver    Chronic systolic CHF (congestive heart failure), NYHA class 3 (HCC)    Non-rheumatic mitral regurgitation    Essential hypertension    ICD (implantable cardioverter-defibrillator) in place    Dyspnea    Coronary artery disease involving native coronary artery of native heart without angina pectoris    Nonischemic cardiomyopathy ([facility] [address]    Blood type AB+    Acute decompensated heart failure (HCC)    Acute pulmonary edema (HCC)    Heart failure, left systolic, acute on chronic (HCC)    Acetaminophen overdose of undetermined intent    Hypercalcemia    Elevated lactic acid level    1st degree AV block    Mood disorder (HCC)    MDD (major depressive disorder), recurrent episode, mild (HCC)    Severe manic bipolar 1 disorder with psychotic behavior ([facility] [address]       Admission Condition:  poor    Discharged Condition:  stable    Hospital Course:   Kaci Ardon is a 56 y.o. female with a PMHx of HFrEF, COPD, hypertension, GERD, HLD and previous suicidal attempt who comes to the emergency department for Tylenol overdose. 6pm on 10/26/2022, patient had some pain so she took a bunch of Tylenol, then had alcohol with that. She then had nausea without vomiting and mild abdominal discomfort.  She called EMS and they found her only arousal to pain with a bottle of Tylenol next to her. She initially was pink slipped. She then eloped from the ED with help of family but was brought back to the hospital when contacted. In the ED, patient remained hemodynamically stable. Her serum acetaminophen levels were 211.9. Labs remarkable for lactic acid 2.3 and calcium 10.5. Normal liver function tests and normal coagulation test. EKG showeds sinus bradycardia, extreme right axis deviation, first AV block, , poor R wave progression and negative for acute ischemic changes. She was started on acetylcysteine drip and IV fluids, and admitted to the ICU. Within 2 days, acetaminophen levels was no longer detected and LFTs were WNL. Hypercalcemia and elevated lactic acid levels were resolved. Patient's chronic conditions were stable and she was medically cleared to go to the psych unit. Discharge plans:  Restart all home meds including metoprolol that was initially held. Suicide precautions still in place, consider CWAL protocol if needed. Monitor BP, breathing efforts and consult medical team if needed.     Discharge Medications:         Medication List        ASK your doctor about these medications      acetaminophen 500 MG tablet  Commonly known as: TYLENOL  Take 1 tablet by mouth 2 times daily as needed for Pain     albuterol sulfate  (90 Base) MCG/ACT inhaler  Commonly known as: Ventolin HFA  Inhale 2 puffs into the lungs 4 times daily as needed for Wheezing     aspirin 81 MG chewable tablet  Take 1 tablet by mouth daily     atorvastatin 40 MG tablet  Commonly known as: LIPITOR  Take 1 tablet by mouth daily     budesonide-formoterol 160-4.5 MCG/ACT Aero  Commonly known as: Symbicort  Inhale 2 puffs into the lungs 2 times daily     bumetanide 2 MG tablet  Commonly known as: BUMEX  Take 1 tablet by mouth daily     cetirizine 10 MG tablet  Commonly known as: ZYRTEC  Take 1 tablet by mouth daily     Entresto  MG per tablet  Generic drug: sacubitril-valsartan  Take 1 tablet by mouth 2 times daily     metoprolol succinate 100 MG extended release tablet  Commonly known as: TOPROL XL  Take 1 tablet by mouth daily     montelukast 10 MG tablet  Commonly known as: Singulair  Take 1 tablet by mouth nightly     nitroGLYCERIN 0.4 MG SL tablet  Commonly known as: Nitrostat  Place 1 tablet under the tongue every 5 minutes as needed for Chest pain     pantoprazole 20 MG tablet  Commonly known as: Protonix  Take 1 tablet by mouth daily     Spiriva HandiHaler 18 MCG inhalation capsule  Generic drug: tiotropium  Inhale 1 capsule into the lungs daily     spironolactone 25 MG tablet  Commonly known as: ALDACTONE  Take 1 tablet by mouth daily              Consults:  pulmonary/intensive care and psychiatry    Significant Diagnostic Studies:      Labs:  Na/K/Cl/CO2:  139/4.1/106/20 (10/28 0507)  BUN/Cr/glu/ALT/AST/amyl/lip:  8/0.7/--/8/15/--/-- (10/28 0507)  WBC/Hgb/Hct/Plts:  7.5/14.1/42.2/371 (10/28 0507)    estimated creatinine clearance is 77 mL/min (based on SCr of 0.7 mg/dL). New Imaging:  No results found. Treatments:   as above    Discharge Exam:  Please refer to last progress note    Disposition:   Psych inpatient unit    No future appointments. More than 30 minutes was spent in preparation of this patient's discharge including, but not limited to, examination, preparation of documents, prescription preparation, counseling and coordination.     Tacho Anaya MD  10/30/2022, 1:56 PM

## 2022-10-28 NOTE — PROGRESS NOTES
Poison control updated on patient condition. Recommends repeating AST/ALT and acetaminophen level an hour before acytelcystine infusion is complete.

## 2022-10-28 NOTE — PLAN OF CARE
Problem: Chronic Conditions and Co-morbidities  Goal: Patient's chronic conditions and co-morbidity symptoms are monitored and maintained or improved  Outcome: Progressing  Flowsheets (Taken 10/27/2022 2000)  Care Plan - Patient's Chronic Conditions and Co-Morbidity Symptoms are Monitored and Maintained or Improved: Monitor and assess patient's chronic conditions and comorbid symptoms for stability, deterioration, or improvement     Problem: Discharge Planning  Goal: Discharge to home or other facility with appropriate resources  Outcome: Progressing  Flowsheets (Taken 10/27/2022 2000)  Discharge to home or other facility with appropriate resources: Identify barriers to discharge with patient and caregiver     Problem: Skin/Tissue Integrity  Goal: Absence of new skin breakdown  Description: 1. Monitor for areas of redness and/or skin breakdown  2. Assess vascular access sites hourly  3. Every 4-6 hours minimum:  Change oxygen saturation probe site  4. Every 4-6 hours:  If on nasal continuous positive airway pressure, respiratory therapy assess nares and determine need for appliance change or resting period.   Outcome: Progressing     Problem: ABCDS Injury Assessment  Goal: Absence of physical injury  Outcome: Progressing

## 2022-10-28 NOTE — PROGRESS NOTES
[address] [place] [address] Attending    S: 56 y.o. female with who was admitted for acetaminophen overdose. There is concern that she intentionally took the tylenol. She does have a hsitory of prior SI and attempt. Upon further discussion with the patient, she notes that the reason she took too many tylenol was because she was drinking alcohol and could not remember if she had already taken the medication. She admits to drinking beer along with the tylenol. She states that she does not drink alcohol everyday. She also denies current suicidal ideation. She notes that she has chronic pain in her epigastric region and has a history of ulcers. Her pain was very severe on the day of admission and that is why she was taking the medication for pain relief. She has had no recent EGD. Today the patient denies any significant abdominal pain, nausea/vomiting. She does have suicide precautions and a sitter is present in the room. She denies SI, but agrees to going to the psych floor for further evaluation. This morning her acetaminophen level is undetectable, liver enzymes are normal, and patient is stable. O: VS- Blood pressure 139/82, pulse (!) 109, temperature 98.2 °F (36.8 °C), temperature source Temporal, resp. rate 17, height 5' 5\" (1.651 m), weight 118 lb 14.4 oz (53.9 kg), last menstrual period 05/20/2014, SpO2 97 %, not currently breastfeeding.   Exam is as noted by resident with the following changes, additions or corrections:  Gen:  AAOx3  CVS:  RRR  Lungs:  CTAB  Abd:  soft, NT, ND, BS+ x 4 quadrants  Ext:  no CCE    Impressions:  Principal Problem:    Tylenol overdose, intentional self-harm, initial encounter ([facility])-resolved  Active Problems:    Hypercalcemia-resolved    Elevated lactic acid level-resolved    1st degree AV block-persistent    Hyperlipidemia-poorly controlled; currently on lipitor 40 mg daily    Hypertension-poorly controlled    COPD (chronic obstructive pulmonary disease) (HCC)-stable and not in exacerbation    Chronic systolic CHF (congestive heart failure), NYHA class 3 (HCC)-on entresto, bumex, BB, spironolactone    GERD-PPI    Plan:     IVF and acetadote used for treatment of the OD-patient medically stable; acetaminophen level is undetectable; normal liver enzymes    Psych consulted, pink slip in place; will likely move to psych floor later today, if lambert wit MICU team.    IV PPI initially, now on PO PPI    Continue home medications    BB home med for HFrEF and HTN- initially on hold due to 1st degree AV block-restarted     Could also consider adding HCTZ if BP remains elevated    Tolerating diet    Suicide precautions/sitter     Attending Physician Statement  I have reviewed the chart and seen the patient with the resident(s). I personally reviewed images, EKG's and similar tests, if present. I personally reviewed and performed key elements of the history and exam.  I have reviewed and confirmed student and/or resident history and exam with changes as indicated above. I agree with the assessment, plan and orders as documented by the resident. Please refer to the resident admission history and physical  for additional information.       Bobby Lr MD

## 2022-10-28 NOTE — PROGRESS NOTES
Patient transported to [facility] with transport with all belongings including cell phone and clothing

## 2022-10-28 NOTE — PROGRESS NOTES
[facility]   Resident Progress Note    S:  Hospital day: 2    Brief Synopsis: Steph Augustin is a 56 y.o. female with a PMH of congestive heart failure with reduced ejection fraction, COPD, hypertension, GERD, hyperlipidemia and previous suicidal attempt who comes to the emergency department for Tylenol overdose. 6pm on 10/26/2022, patient had some pain before taking Tylenol, then had alcohol with that. She then had nausea without vomiting and mild abdominal discomfort. She called EMS and they found her only arousal to pain with a bottle of Tylenol next to her. She initially was pink slipped and eloped from the ED with help of family but was brought back to the hospital when contacted. In the ED, patient remained hemodynamically stable. Her serum acetaminophen levels were 211.9. Labs remarkable for lactic acid 2.3 and calcium 10.5. Normal liver function tests and normal coagulation test. EKG showeds inus bradycardia, extreme right axis deviation, first AV block, , poor R wave progression and negative for acute ischemic changes. She was started on acetylcysteine drip, and admitted to the ICU. Overnight, the acetylcysteine drip was discontinued per poison control. Patient was seen and examined this morning. She is very responsive and feels good. She denies any abdominal pain or discomfort elsewhere. She was able to eat a sandwich and keep it down. She denies suicide ideation now and when she called EMS.     Cont meds:    sodium chloride       Scheduled meds:    vitamin D  50,000 Units Oral Weekly    pantoprazole  40 mg Oral QAM AC    aspirin  81 mg Oral Daily    atorvastatin  40 mg Oral Daily    Arformoterol Tartrate  15 mcg Nebulization BID    budesonide  500 mcg Nebulization BID    ipratropium-albuterol  1 ampule Inhalation Q4H WA    bumetanide  2 mg Oral Daily    metoprolol succinate  100 mg Oral Daily    montelukast  10 mg Oral Nightly    sacubitril-valsartan  1 tablet Oral BID spironolactone  25 mg Oral Daily    sodium chloride flush  5-40 mL IntraVENous 2 times per day     PRN meds: sodium chloride flush, sodium chloride, ondansetron **OR** ondansetron, polyethylene glycol, hydrALAZINE     I reviewed the patient's Past Medical and Surgical History, Medications and Allergies. O:  VS: BP (!) 150/102   Pulse 86   Temp 97.7 °F (36.5 °C) (Temporal)   Resp 26   Ht 5' 5\" (1.651 m)   Wt 118 lb 14.4 oz (53.9 kg)   LMP 05/20/2014   SpO2 97%   BMI 19.79 kg/m²     Physical Exam:  Physical Exam  Vitals reviewed. Constitutional:       General: She is not in acute distress. HENT:      Mouth/Throat:      Mouth: Mucous membranes are moist.   Eyes:      Extraocular Movements: Extraocular movements intact. Pupils: Pupils are equal, round, and reactive to light. Cardiovascular:      Rate and Rhythm: Normal rate and regular rhythm. Pulmonary:      Effort: Pulmonary effort is normal.      Breath sounds: No wheezing, rhonchi or rales. Abdominal:      Tenderness: There is abdominal tenderness (mild TTP at RUQ and epigastrium). Skin:     General: Skin is warm and dry. Neurological:      General: No focal deficit present. Mental Status: She is alert and oriented to person, place, and time. Psychiatric:      Comments: Indenial for SI          Labs:  Na/K/Cl/CO2:  139/4.1/106/20 (10/28 0507)  BUN/Cr/glu/ALT/AST/amyl/lip:  8/0.7/--/8/15/--/-- (10/28 0507)  WBC/Hgb/Hct/Plts:  7.5/14.1/42.2/371 (10/28 0507)  estimated creatinine clearance is 77 mL/min (based on SCr of 0.7 mg/dL).   Other pertinent labs as noted below    New Imaging:  No orders to display       A/P:  Principal Problem:    Tylenol overdose, intentional self-harm, initial encounter ([facility] [address]  Active Problems:    Hypercalcemia    Elevated lactic acid level    1st degree AV block    Mood disorder (HCC)    Hyperlipidemia    COPD (chronic obstructive pulmonary disease) (HCC)    Chronic systolic CHF (congestive heart failure), NYHA class 3 (HCC)    Essential hypertension  Resolved Problems:    * No resolved hospital problems. *      1. Tylenol overdose, Hx of suicide attempt  -Completed acetylcysteine txt per poison control  -Hx of suicide attempt on Ultram in 2007, reports no SI today  -Continue to monitor CMP, coagulation studies, electrolytes  -Hold aspirin  -Aspiration precautions, seizure precaution and suicide precautions  -Constant observation, sitter at bedside  -Psych consult for suicidal attempt, transfer to inpatient psych unit once medically cleared  -Patient will be admitted to the ICU for further care     2. Hypercalcemia - resolved  -Ca at 10.5 at admission, today it is at 10.1    3. Elevated lactic acid - resolved  -2.3 at admission, repeat at 1.0    4. Bradycardia / first AV block  -EKG showing sinus bradycardia and first-degree AV block  -Metoprolol restarted  -repeat EKG continue to show the same pattern    5. Hx of Hypertension  -Continue home Entresto, Bumex and Aldactone  -Hold metoprolol due to bradycardia and first AV block  -Monitor BP    6. Hx of HFrEF  -Last echocardiography in 10/2020 showed an ejection fraction of 25-30%  -Continue home Bumex, Entresto and Aldactone  -Hold metoprolol due to bradycardia and first AV block  -Daily weights  -Strict I/Os, +1500 yesterday    7. Hx of COPD / Asthma  -Start DuoNeb, Brovana and Pulmicort  -Continue home montelukast    8.  Hx of HLD  -Statin restarted        DVT prophylaxis: PCD's  GI prophylaxis: Protonix 40 mg daily  Diet: N.p.o.  Full code  Telemetry    Disposition: patient medically cleared to be transferred to psychiatric floor    Electronically signed by Irlanda Wolff MD on 10/28/2022 at 8:28 AM  This case was discussed with attending physician Dr. Kee Durbin

## 2022-10-28 NOTE — ED NOTES
Call from [facility] ICU [phone] regarding transfer to psych. Reviewed chart, psych consult completed by Reji Goldberg NP on 10/27/2022, needs medical clearance for transfer to psych documented in chart, also will need negative COVID result. Reviewed this with [facility], also requested that pink slip be faxed to [phone]. [address], [place], [facility]  10/28/22 0293    Call to [facility] at [place] [address], spoke with Yakelin Fuel advise of possible pending referral and to insure COVID testing completed.      [address], [place], [facility]  10/28/22 6034

## 2022-10-28 NOTE — PROGRESS NOTES
[address] [facility]  Department of Internal Medicine   Internal Medicine Residency   MICU Progress Note    Patient:  Memo Cunha 56 y.o. female  MRN: [id number]     Date of Service: 10/28/2022    Allergy: Orange fruit [citrus], Crestor [rosuvastatin calcium], Loratadine, Norco [hydrocodone-acetaminophen], Sulfa antibiotics, and Aspirin    Subjective     Patient was seen and examined this morning, denies any new issues overnight. Objective     VS: BP (!) 156/107   Pulse (!) 106   Temp 98.2 °F (36.8 °C) (Temporal)   Resp 23   Ht 5' 5\" (1.651 m)   Wt 118 lb 14.4 oz (53.9 kg)   LMP 05/20/2014   SpO2 97%   BMI 19.79 kg/m²           I & O - 24hr:   Intake/Output Summary (Last 24 hours) at 10/28/2022 1214  Last data filed at 10/28/2022 0603  Gross per 24 hour   Intake 1524.6 ml   Output 0 ml   Net 1524.6 ml       Physical Exam:  Physical Exam  Constitutional:       Appearance: Normal appearance. She is normal weight. HENT:      Head: Normocephalic and atraumatic. Right Ear: External ear normal.      Left Ear: External ear normal.      Nose: Nose normal.      Mouth/Throat:      Mouth: Mucous membranes are moist.      Pharynx: Oropharynx is clear. Eyes:      Extraocular Movements: Extraocular movements intact. Conjunctiva/sclera: Conjunctivae normal.      Pupils: Pupils are equal, round, and reactive to light. Abdominal:      General: Abdomen is flat. Bowel sounds are normal.      Palpations: Abdomen is soft. Tenderness: There is abdominal tenderness (mild tenderness on epigastric area). Musculoskeletal:         General: Normal range of motion. Skin:     General: Skin is warm. Capillary Refill: Capillary refill takes less than 2 seconds. Neurological:      Mental Status: She is alert and oriented to person, place, and time. Mental status is at baseline.    Psychiatric:         Mood and Affect: Mood normal.         Speech: Speech normal.         Behavior: Behavior is cooperative. Thought Content: Thought content does not include suicidal ideation. Thought content does not include suicidal plan.         Lines     site day    Art line   None    TLC None    PICC None    Hemoaccess None      ABG:     Lab Results   Component Value Date/Time    PH 7.441 03/16/2020 02:49 PM    PCO2 37.2 03/16/2020 02:49 PM    PO2 109.8 03/16/2020 02:49 PM    HCO3 24.8 03/16/2020 02:49 PM    BE 0.8 03/16/2020 02:49 PM    THB 13.1 03/16/2020 02:49 PM    O2SAT 98.3 03/16/2020 02:49 PM        Medications     Infusions: (Fluid, Sedation, Vasopressors)  IVF:   None  Vasopressors  None  Sedation  None    Nutrition:   Adult diet      Labs   CBC with Differential:    Lab Results   Component Value Date/Time    WBC 7.5 10/28/2022 05:07 AM    RBC 4.92 10/28/2022 05:07 AM    HGB 14.1 10/28/2022 05:07 AM    HCT 42.2 10/28/2022 05:07 AM     10/28/2022 05:07 AM    MCV 85.8 10/28/2022 05:07 AM    MCH 28.7 10/28/2022 05:07 AM    MCHC 33.4 10/28/2022 05:07 AM    RDW 13.2 10/28/2022 05:07 AM    SEGSPCT 72 03/20/2014 07:50 PM    LYMPHOPCT 24.0 10/28/2022 05:07 AM    MONOPCT 9.1 10/28/2022 05:07 AM    EOSPCT 0 10/20/2010 03:25 AM    BASOPCT 1.3 10/28/2022 05:07 AM    MONOSABS 0.68 10/28/2022 05:07 AM    LYMPHSABS 1.80 10/28/2022 05:07 AM    EOSABS 0.18 10/28/2022 05:07 AM    BASOSABS 0.10 10/28/2022 05:07 AM     CMP:    Lab Results   Component Value Date/Time     10/28/2022 05:07 AM    K 4.1 10/28/2022 05:07 AM    K 5.0 10/26/2022 09:31 PM     10/28/2022 05:07 AM    CO2 20 10/28/2022 05:07 AM    BUN 8 10/28/2022 05:07 AM    CREATININE 0.7 10/28/2022 05:07 AM    GFRAA >60 05/18/2022 02:40 PM    LABGLOM >60 10/28/2022 05:07 AM    GLUCOSE 140 10/28/2022 05:07 AM    GLUCOSE 82 03/01/2011 09:00 AM    PROT 7.5 10/28/2022 05:07 AM    LABALBU 4.4 10/28/2022 05:07 AM    LABALBU 4.2 03/01/2011 09:00 AM    CALCIUM 10.1 10/28/2022 05:07 AM    BILITOT 0.4 10/28/2022 05:07 AM    ALKPHOS 93 10/28/2022 05:07 AM AST 15 10/28/2022 05:07 AM    ALT 8 10/28/2022 05:07 AM       Imaging Studies:  None    Resident's Assessment and Plan     Dominique Cox is a 56 y.o. female with a past medical history of asthma/COPD, HFrEF (EF 20-25% in 2020), HTN, HLD, Vit D deficiency, GERD, previous hx of suicide attempt who presented for concerns for Tylenol overdose. Tylenol overdose, likely intentional - acetaminophen level 200s on presentation, s/p NAC per poison control recommendation. Now acetaminophen undetectable with stable LFTs and INR  Hypertension - on metoprolol 100mg daily at home  Concerns for first degree AV block on EKG  Hx of HFrEF (EF 20-25% in 2020) - on GDMT, (Bumex 2mg daily, Entresto BID, metoprolol 100mg daily and Aldactone 25mg daily at home). Currently not in exacerbation.   Hx of COPD/ asthma - on Symbicort, Spiriva, albuterol PRN and montelukast at home  Hx of Hyperlipidemia - on statin at home  Hypercalcemia - resolved - Ca 10.5 on presentation, improved after fluid resuscitation  Lactic acidosis - LA 2.3 on presentation, resolved with fluid resuscitation  Vitamin D deficiency - Vit D level 22  Hx of suicide attempt (overdosed with Ultram in 2007)  Substance use (marijuana)  Concerns for suicide attempt - pink slipped     -NAC discontinued this morning given now undetectable levels of acetaminophen, normal LFTs and coags  -Resume home meds  -Continue breathing treatments with DuoNeb, Pulmicort and Brovana  -Sitter at bedside due to concerns for suicide  -Psych seen the patient this morning, to be transferred to the psychiatric unit today  -Medically cleared for transfer to psych    PT/OT: Not indicated  DVT/GI ppx: PCD's/Protonix  Dispo: Transfer/discharge to behavioral health unit    Partha Alicia MD, PGY-2  Attending physician: Dr. Alexsandra Dunn  Department of Pulmonary, Critical Care and Sleep Medicine  [address] [facility]  Department of Internal Medicine      During multidisciplinary team rounds Lorrie Butler is a 56 y.o. female was seen, examined and discussed. This is confirmation that I have personally seen and examined the patient and that the key elements of the encounter were performed by me (> 85 % time). The medications & laboratory data was discussed and adjusted where necessary. The radiographic images were reviewed or with radiologist or consultant if felt dis-concordant with the exam or history. The above findings were corroborated, plans confirmed and changes made if needed. Family is updated at the bedside as available. Key issues of the case were discussed among consultants. Critical Care time is documented if appropriate.       Haley Avelar DO, FACP, FCCP, [facility],

## 2022-10-28 NOTE — ED NOTES
Negative COVID noted    SW spoke to [employer] and instructed her to call N2N to Naa Ochoa to charge nurse to review for admission. SW awaiting a call back with decision.           ADRIÁN Ash, [place]  10/28/22 6229

## 2022-10-28 NOTE — ED NOTES
SW received a call from [facility] from [address] [facility] and was updated Pt has been accepted and want the Pt to go to room 7305B. LORRAINE spoke to medical floor RN and updated her on Pt being accepted. RN was instructed to send up original Pink Slip and belongings.       ADRIÁN Hopkins, [facility]  10/28/22 1688

## 2022-10-28 NOTE — CARE COORDINATION
10/28:  Update CM Note:  Pt presented to ER after calling EMS. Pt was found by police in a fetal position not speaking with a bottle of tylenol in her hand. Pt had eloped from the ER after she had been pink slipped. Tylenol level on admission was 211.9 when pt returned to ER 60.5. Pt has a CO. When pt returned to the ER she was admitted to MICU for tylenol overdose, suicide attempt. UDS+ Cannabinoid. Ethanol Level is 172. Pt is on 4L/NC at 93%. Per Psychiatry - pt to transfer to inpatient once medically cleared by attending & documented in progress note. Pt will need a negative Covid. RN must call [facility] to place on list at [phone]. Once accepted will need to call RN to RN at [phone]. Pink slip will need faxed to [phone]. LORRAINE/EFRA will continue to follow for dc planning.  Electronically signed by Theresa Nowak RN on 10/28/2022 at 2:50 PM

## 2022-10-28 NOTE — ED NOTES
Fax of pink slip received in [facility], Per [place] [facility] has been sent, medical clearance noted by Dr Jovon Batista 10/28/2022 under \"Plan\" section.   Once Covid resulted can proceed with referral.      Francis Mcdaniel, MSW, LSW  10/28/22 5938

## 2022-10-29 PROBLEM — F31.2 SEVERE MANIC BIPOLAR 1 DISORDER WITH PSYCHOTIC BEHAVIOR (HCC): Status: ACTIVE | Noted: 2022-10-29

## 2022-10-29 LAB — HBA1C MFR BLD: 5.9 % (ref 4–5.6)

## 2022-10-29 PROCEDURE — 83036 HEMOGLOBIN GLYCOSYLATED A1C: CPT

## 2022-10-29 PROCEDURE — 90792 PSYCH DIAG EVAL W/MED SRVCS: CPT | Performed by: NURSE PRACTITIONER

## 2022-10-29 PROCEDURE — 6360000002 HC RX W HCPCS: Performed by: NURSE PRACTITIONER

## 2022-10-29 PROCEDURE — 94664 DEMO&/EVAL PT USE INHALER: CPT

## 2022-10-29 PROCEDURE — 6370000000 HC RX 637 (ALT 250 FOR IP)

## 2022-10-29 PROCEDURE — 6370000000 HC RX 637 (ALT 250 FOR IP): Performed by: PSYCHIATRY & NEUROLOGY

## 2022-10-29 PROCEDURE — 1240000000 HC EMOTIONAL WELLNESS R&B

## 2022-10-29 PROCEDURE — 36415 COLL VENOUS BLD VENIPUNCTURE: CPT

## 2022-10-29 PROCEDURE — 6360000002 HC RX W HCPCS

## 2022-10-29 PROCEDURE — 94640 AIRWAY INHALATION TREATMENT: CPT

## 2022-10-29 PROCEDURE — 6370000000 HC RX 637 (ALT 250 FOR IP): Performed by: NURSE PRACTITIONER

## 2022-10-29 RX ORDER — HALOPERIDOL 5 MG/ML
5 INJECTION INTRAMUSCULAR ONCE
Status: COMPLETED | OUTPATIENT
Start: 2022-10-29 | End: 2022-10-29

## 2022-10-29 RX ORDER — DIPHENHYDRAMINE HYDROCHLORIDE 50 MG/ML
INJECTION INTRAMUSCULAR; INTRAVENOUS
Status: DISPENSED
Start: 2022-10-29 | End: 2022-10-29

## 2022-10-29 RX ORDER — DIPHENHYDRAMINE HYDROCHLORIDE 50 MG/ML
25 INJECTION INTRAMUSCULAR; INTRAVENOUS ONCE
Status: COMPLETED | OUTPATIENT
Start: 2022-10-29 | End: 2022-10-29

## 2022-10-29 RX ORDER — LORAZEPAM 2 MG/ML
1 INJECTION INTRAMUSCULAR ONCE
Status: COMPLETED | OUTPATIENT
Start: 2022-10-29 | End: 2022-10-29

## 2022-10-29 RX ORDER — PALIPERIDONE 3 MG/1
3 TABLET, EXTENDED RELEASE ORAL 2 TIMES DAILY
Status: DISCONTINUED | OUTPATIENT
Start: 2022-10-29 | End: 2022-11-03 | Stop reason: HOSPADM

## 2022-10-29 RX ORDER — LITHIUM CARBONATE 300 MG/1
300 CAPSULE ORAL 2 TIMES DAILY WITH MEALS
Status: DISCONTINUED | OUTPATIENT
Start: 2022-10-29 | End: 2022-11-03 | Stop reason: HOSPADM

## 2022-10-29 RX ORDER — LORAZEPAM 2 MG/ML
INJECTION INTRAMUSCULAR
Status: DISPENSED
Start: 2022-10-29 | End: 2022-10-29

## 2022-10-29 RX ORDER — OMEGA-3S/DHA/EPA/FISH OIL/D3 300MG-1000
400 CAPSULE ORAL DAILY
COMMUNITY

## 2022-10-29 RX ADMIN — LITHIUM CARBONATE 300 MG: 300 CAPSULE, GELATIN COATED ORAL at 17:35

## 2022-10-29 RX ADMIN — SPIRONOLACTONE 25 MG: 25 TABLET ORAL at 10:07

## 2022-10-29 RX ADMIN — BUDESONIDE INHALATION SUSPENSION 500 MCG: 0.5 SUSPENSION RESPIRATORY (INHALATION) at 22:30

## 2022-10-29 RX ADMIN — METOPROLOL SUCCINATE 100 MG: 25 TABLET, EXTENDED RELEASE ORAL at 10:09

## 2022-10-29 RX ADMIN — ATORVASTATIN CALCIUM 40 MG: 40 TABLET, FILM COATED ORAL at 10:08

## 2022-10-29 RX ADMIN — IPRATROPIUM BROMIDE AND ALBUTEROL SULFATE 1 AMPULE: .5; 2.5 SOLUTION RESPIRATORY (INHALATION) at 10:18

## 2022-10-29 RX ADMIN — LORAZEPAM 1 MG: 2 INJECTION INTRAMUSCULAR; INTRAVENOUS at 14:16

## 2022-10-29 RX ADMIN — SACUBITRIL AND VALSARTAN 1 TABLET: 97; 103 TABLET, FILM COATED ORAL at 20:56

## 2022-10-29 RX ADMIN — SACUBITRIL AND VALSARTAN 1 TABLET: 97; 103 TABLET, FILM COATED ORAL at 10:07

## 2022-10-29 RX ADMIN — IPRATROPIUM BROMIDE AND ALBUTEROL SULFATE 1 AMPULE: .5; 2.5 SOLUTION RESPIRATORY (INHALATION) at 22:30

## 2022-10-29 RX ADMIN — PANTOPRAZOLE SODIUM 40 MG: 40 TABLET, DELAYED RELEASE ORAL at 06:31

## 2022-10-29 RX ADMIN — ASPIRIN 81 MG CHEWABLE TABLET 81 MG: 81 TABLET CHEWABLE at 10:08

## 2022-10-29 RX ADMIN — MELATONIN 3 MG ORAL TABLET 3 MG: 3 TABLET ORAL at 20:56

## 2022-10-29 RX ADMIN — DIPHENHYDRAMINE HYDROCHLORIDE 25 MG: 50 INJECTION, SOLUTION INTRAMUSCULAR; INTRAVENOUS at 12:35

## 2022-10-29 RX ADMIN — PALIPERIDONE 3 MG: 3 TABLET, EXTENDED RELEASE ORAL at 20:56

## 2022-10-29 RX ADMIN — BUMETANIDE 2 MG: 1 TABLET ORAL at 10:08

## 2022-10-29 RX ADMIN — ARFORMOTEROL TARTRATE 15 MCG: 15 SOLUTION RESPIRATORY (INHALATION) at 06:18

## 2022-10-29 RX ADMIN — ARFORMOTEROL TARTRATE 15 MCG: 15 SOLUTION RESPIRATORY (INHALATION) at 22:30

## 2022-10-29 RX ADMIN — HALOPERIDOL LACTATE 5 MG: 5 INJECTION, SOLUTION INTRAMUSCULAR at 12:36

## 2022-10-29 RX ADMIN — BUDESONIDE INHALATION SUSPENSION 500 MCG: 0.5 SUSPENSION RESPIRATORY (INHALATION) at 06:18

## 2022-10-29 ASSESSMENT — SLEEP AND FATIGUE QUESTIONNAIRES
DO YOU HAVE DIFFICULTY SLEEPING: NO
AVERAGE NUMBER OF SLEEP HOURS: 7
DO YOU USE A SLEEP AID: NO
AVERAGE NUMBER OF SLEEP HOURS: 7
DO YOU HAVE DIFFICULTY SLEEPING: NO
DO YOU USE A SLEEP AID: NO

## 2022-10-29 ASSESSMENT — LIFESTYLE VARIABLES
HOW OFTEN DO YOU HAVE A DRINK CONTAINING ALCOHOL: 2-4 TIMES A MONTH
HOW OFTEN DO YOU HAVE A DRINK CONTAINING ALCOHOL: 2-3 TIMES A WEEK
HOW MANY STANDARD DRINKS CONTAINING ALCOHOL DO YOU HAVE ON A TYPICAL DAY: 3 OR 4
HOW MANY STANDARD DRINKS CONTAINING ALCOHOL DO YOU HAVE ON A TYPICAL DAY: 1 OR 2

## 2022-10-29 ASSESSMENT — PATIENT HEALTH QUESTIONNAIRE - PHQ9: SUM OF ALL RESPONSES TO PHQ QUESTIONS 1-9: 6

## 2022-10-29 NOTE — BH NOTE
Alert and oriented x 4. Patient awake now ate dinner and did take her lithium after explanation. Denies suicidal or homicidal ideation, denies hallucinations, medication compliant. Q 15 minute checks for his/her safety and protection.

## 2022-10-29 NOTE — PROGRESS NOTES
56 yr old female presents to the psych unit with an admitting Dx of major depressive disorder. Patient is A+Ox 4. It's reported that patient called the paramedics stating that she'd taken a \"bunch of Tylenol. \" She was found sitting in the fetal position and stated to paramedics that she is tired of the pain. She currently denies SI, HI, and internal stimulation. She states that she had taken a total of 6 Ibuprofen pills to help with pain, and stated that she'd never kill herself. She does however admit that she attempted suicide 22 yrs ago and aborted the plan. She denies ever having received psychiatric inpatient care. Patient states to reside alone. She has her mother and daughter as her support system. Prior to admission assessment, patient was observed sitting out on the unit watching television. She was observed to be hostile and dismissive. During admission intake, patient had become irritable and loud when she was informed that she would not be able to sign herself out of the unit. Patient refused to sign all documents with an exception to the ' involuntary admission form.' As admission progressed, patient had become less irritable and more accepting to having been involuntarily admitted. She could be seen laughing and smiling. Patient has a psych Hx of suicide attempt and depression. Her medical Hx includes: asthma, hyperlipidemia, hypercalcemia, myocardial infarction, hypertension, CAD, heart failure, and acute pulmonary edema. Patient denies pain and discomfort. She's been oriented to the unit and administered refreshments. Staff will continue to offer support and interventions per request and as required.

## 2022-10-29 NOTE — PLAN OF CARE
Problem: Depression  Goal: Will be euthymic at discharge  Description: INTERVENTIONS:  1. Administer medication as ordered  2. Provide emotional support via 1:1 interaction with staff  3. Encourage involvement in milieu/groups/activities  4. Monitor for social isolation  Outcome: Progressing     Problem: Anxiety  Goal: Will report anxiety at manageable levels  Description: INTERVENTIONS:  1. Administer medication as ordered  2. Teach and rehearse alternative coping skills  3. Provide emotional support with 1:1 interaction with staff  Outcome: Progressing     Problem: Involuntary Admit  Goal: Will cooperate with staff recommendations and doctor's orders and will demonstrate appropriate behavior  Description: INTERVENTIONS:  1. Treat underlying conditions and offer medication as ordered  2. Educate regarding involuntary admission procedures and rules  3.  Contain excessive/inappropriate behavior per unit and hospital policies  Outcome: Progressing     Problem: Chronic Conditions and Co-morbidities  Goal: Patient's chronic conditions and co-morbidity symptoms are monitored and maintained or improved  Outcome: Progressing

## 2022-10-29 NOTE — PROGRESS NOTES
No behaviors observed during night shift, no prn's administered at this time. No signs/symptoms of distress or discomfort noted. Patient currently awake and requesting respiratory treatments. Will continue to monitor and support.  Q 15 minute observation checks maintained

## 2022-10-29 NOTE — H&P
Department of Psychiatry  History and Physical - Adult     CHIEF COMPLAINT:  \" I am leaving here today\"     Patient was seen after discussing with the treatment team and reviewing the chart    CIRCUMSTANCES OF ADMISSION:   Patient admitted on pink slip after suicide attempt. HISTORY OF PRESENT ILLNESS:      The patient is a 56 y.o. female with previous suicide attempt on ultram, pmh of previous suicide attempt on Ultram, CVA, congestive heart failure, COPD, hypertension, GERD, and hyperlipidemia who presented to the ED after calling 911 after taking \"a bunch of pain pills. \" Upon evaluation in the ED, pt states she was tired of the pain. UDS was negative. ETOH level was 172. Acetaminophen level was 211.9. During ED visit, pt eloped with an IV in her hand. The nurse ran after her, but the daughter sped off before they could reach her. Nurse called the pt's mother who said she would bring her back to ED. Psych was consulted, once the patient was medically cleared from her acetaminophen overdose she was admitted to [facility] for psychiatric evaluation and stabilization. Upon evaluation today, pt was very uncooperative. Did not want to answer any questions. Pt kept changing the topic of the conversation. She is minimizing circumstances of admission. She states she only took a handful of tylenol and it was because she was drinking all night that she didn't realize how much she took. She would not tell me how much she drank. She told me she took the tylenol because she had \"stomach pain\" and \"it wasn't working. \" When asked about previous suicide attempts, she nodded her head. According to chart, it was in 2007 on Ultram. When asked about previous psych inpatient stays, she nodded her head. When asked where, she said \"down the street. \" She then did not want to answer any other psych questions and started talking about all her other medical problems.  As assessment, progressed the patient became increasingly uncooperative and agitated she was demonstrating unstable thought process was extremely distractible and demonstrating significant amount of impulsivity. She has an extremely concrete thought process she is unable to be reasoned with or redirected. She is misinterpreting obviously extremely paranoid and manic. She begins threatening to leave \"today\" her agitation is high, she is yelling, threatening, begins pacing the unit. Staff advised that the patient is threatening to elope, however she still managed to elope the unit following the house keeper off the unit. She is then stopped between the unit, [employer] police called for assistance, she is confrontational, combative, requiring the use of STAT IM PRN medications. She has no understanding of the circumstances of her hospitalization, she is unable to be redirected. She put up a significant fight at time of medication administration. Required assistance of many staff. Information from assessment limited, she was fully uncooperative for full assessment. Her behaviors are impulsive, she is manic, paranoid and misinterpreting based on her presentation we will give dx of manic bipolar 1 with psychotic feature and treat with lithium and invega. Past Medical History:        Diagnosis Date    Angina at rest [facility])     cath in 2007 showed no CAD    Asthma     Blood type AB+ 04/03/2018    CAD (coronary artery disease)     Carpal tunnel syndrome on left     CHF (congestive heart failure) (HCC)     CHF (congestive heart failure) (HCC)     COPD (chronic obstructive pulmonary disease) ([facility]     CVA (cerebral vascular accident) ([facility] 12/2009 and 12/2010.     left parietal    GERD (gastroesophageal reflux disease)     HFrEF (heart failure with reduced ejection fraction) ([facility] 01/14/2020 8/26/19- echo- LVEF 20-25%, LA enlarged, moderate MR, mild TR, mild PH, LVDD: 6.7, RVDD: 2.2 (12/9/17- echo- LVEF 10%, stage III DD, severely dilated LA, appears L>R atrial shunt, mod TR, LVDD: 6.6,  RVDD: 2.3)    History of blood transfusion     Hyperlipidemia Diagnosed in 2007. Dyslipidemia. Hypertension diagnosed in 2007    ICD (implantable cardioverter-defibrillator) in place 04/05/2018    Placed by Dr. Aishwarya Correa.     Left ovarian cyst     Liver lesion 07/2015    Moderate mitral regurgitation 07/27/2015    mild-moderate    Nonischemic cardiomyopathy (HCC)     NSTEMI (non-ST elevated myocardial infarction) ([facility] 4/10/15--adm to [facility]    Suicide attempt by drug ingestion ([facility]     attempt in 2007 OD on ultram    Tobacco abuse     Vitamin D deficiency        Medications Prior to Admission:   Medications Prior to Admission: vitamin D3 (CHOLECALCIFEROL) 10 MCG (400 UNIT) TABS tablet, Take 400 Units by mouth daily Take 2 tablets PO once daily  pantoprazole (PROTONIX) 20 MG tablet, Take 1 tablet by mouth daily  nitroGLYCERIN (NITROSTAT) 0.4 MG SL tablet, Place 1 tablet under the tongue every 5 minutes as needed for Chest pain  acetaminophen (TYLENOL) 500 MG tablet, Take 1 tablet by mouth 2 times daily as needed for Pain  aspirin 81 MG chewable tablet, Take 1 tablet by mouth daily  atorvastatin (LIPITOR) 40 MG tablet, Take 1 tablet by mouth daily  sacubitril-valsartan (ENTRESTO)  MG per tablet, Take 1 tablet by mouth 2 times daily  metoprolol succinate (TOPROL XL) 100 MG extended release tablet, Take 1 tablet by mouth daily  bumetanide (BUMEX) 2 MG tablet, Take 1 tablet by mouth daily  spironolactone (ALDACTONE) 25 MG tablet, Take 1 tablet by mouth daily  budesonide-formoterol (SYMBICORT) 160-4.5 MCG/ACT AERO, Inhale 2 puffs into the lungs 2 times daily  montelukast (SINGULAIR) 10 MG tablet, Take 1 tablet by mouth nightly  cetirizine (ZYRTEC) 10 MG tablet, Take 1 tablet by mouth daily  tiotropium (SPIRIVA HANDIHALER) 18 MCG inhalation capsule, Inhale 1 capsule into the lungs daily  albuterol sulfate HFA (VENTOLIN HFA) 108 (90 Base) MCG/ACT inhaler, Inhale 2 puffs into the lungs 4 times daily as needed for Wheezing    Past Surgical History:        Procedure Laterality Date    CARDIAC CATHETERIZATION  2018    Dr. Cruz- Clean coronaries    CARDIAC DEFIBRILLATOR PLACEMENT  2016    SICD    CARDIOVASCULAR STRESS TEST  2009 Normal      SECTION      COLONOSCOPY  10/2015    DIAGNOSTIC CARDIAC CATH LAB PROCEDURE  2007    [facility]: No significant CAD. Mild LVD with apical akinesis. EF 50%. DIAGNOSTIC CARDIAC CATH LAB PROCEDURE  4/15    with PTCA to 1st ob [email or web address]    ENDOSCOPY, COLON, DIAGNOSTIC      HYSTERECTOMY (CERVIX STATUS UNKNOWN)  10/1/15    at [facility] by Dr. Vianney Santos    PTCA  4/10/15    at [address] [place] [address] ECHOCARDIOGRAM  3/19/13    EF 65%, mild MR    TUBAL LIGATION         Allergies:   Orange fruit [citrus], Crestor [rosuvastatin calcium], Loratadine, Norco [hydrocodone-acetaminophen], Sulfa antibiotics, and Aspirin    Family History  Family History   Problem Relation Age of Onset    High Blood Pressure Mother     Stroke Mother     High Blood Pressure Father     Stroke Father     Heart Disease Father     Pacemaker Father              EXAMINATION:    REVIEW OF SYSTEMS:    ROS:  [x] All negative/unchanged except if checked.  Explain positive(checked items) below:  [] Constitutional  [] Eyes  [] Ear/Nose/Mouth/Throat  [] Respiratory  [] CV  [] GI  []   [] Musculoskeletal  [] Skin/Breast  [] Neurological  [] Endocrine  [] Heme/Lymph  [] Allergic/Immunologic    Explanation:     Vitals:  /76   Pulse 75   Temp 98.4 °F (36.9 °C) (Temporal)   Resp 18   Ht 5' 5\" (1.651 m)   Wt 118 lb (53.5 kg)   LMP 2014   SpO2 98%   BMI 19.64 kg/m²      Physical Examination:   Head: x  Atraumatic: x normocephalic  Skin and Mucosa        Moist x  Dry   Pale  x Normal   Neck:  Thyroid  Palpable   x  Not palpable   venus distention   adenopathy   Chest: x Clear   Rhonchi     Wheezing   CV:  xS1   xS2    xNo murmer   Abdomen:  x  Soft    Tender Viceromegaly   Extremities:  x No Edema     Edema     Cranial Nerves Examination:   CN II:   xPupils are reactive to light  Pupils are non reactive to light  CN III, IV, VI:  xNo eye deviation    No diplopia or ptosis   CN V:    xFacial Sensation is intact     Facial Sensation is not intact   CN IIIV:   x Hearing is normal to rubbing fingers   CN IX, X:     xNormal gag reflex and phonation   CN XI:   xShoulder shrug and neck rotation is normal  CNXII:    xTongue is midline no deviation or atrophy    Mental Status Examination:    Level of consciousness:  within normal limits   Appearance:  well-appearing  Behavior/Motor:  no abnormalities noted  Attitude toward examiner:  cooperative and attentive  Speech:  spontaneous, yelling loud,   Mood: euthymic  Affect:  mood congruent  Thought processes:  goal directed, concrete   Thought content:  Paranoid, misinterpreting Denies Suicidal ideations denies homicidal ideation   Cognition:  oriented to person, place, and time   Concentration distractible  Memory intact  Insight poor   Judgement poor   Fund of Knowledge marginal      DIAGNOSIS:  Severe manic bipolar 1 disorder with psychotic behavior  Marijuana abuse         LABS: REVIEWED TODAY:  Recent Labs     10/27/22  0610 10/28/22  0507   WBC 4.5 7.5   HGB 13.2 14.1    371     Recent Labs     10/27/22  0610 10/27/22  1758 10/28/22  0507    135 139   K 3.7 3.1* 4.1    99 106   CO2 24 24 20*   BUN 10 12 8   CREATININE 0.6 1.0 0.7   GLUCOSE 119* 190* 140*     Recent Labs     10/27/22  1758 10/27/22  2105 10/28/22  0507   BILITOT <0.2 <0.2 0.4   ALKPHOS 80 86 93   AST 12 13 15   ALT 6 8 8     Lab Results   Component Value Date/Time    LABAMPH NOT DETECTED 10/26/2022 06:41 PM    LABAMPH NOT DETECTED 05/21/2012 09:47 AM    BARBSCNU NOT DETECTED 10/26/2022 06:41 PM    LABBENZ NOT DETECTED 10/26/2022 06:41 PM    LABBENZ NOT DETECTED 05/21/2012 09:47 AM    CANNAB POSITIVE 01/13/2014 12:00 PM    COCAINESCRN NOT DETECTED 01/13/2014 12:00 PM    LABMETH NOT DETECTED 10/26/2022 06:41 PM    OPIATESCREENURINE NOT DETECTED 10/26/2022 06:41 PM    PHENCYCLIDINESCREENURINE NOT DETECTED 10/26/2022 06:41 PM    ETOH 111 10/26/2022 09:31 PM     Lab Results   Component Value Date/Time    TSH 0.736 10/27/2022 06:10 AM     No results found for: LITHIUM  No results found for: VALPROATE, CBMZ  No results found for: LITHIUM, VALPROATE      Radiology No results found. TREATMENT PLAN:  The patient's diagnosis, treatment plan, medication management were formulated after patient was seen directly by the attending physician and myself and all relevant documentation was reviewed. Risk, benefit, side effects, possible outcomes of the medication and alternatives discussed with the patient and the patient demonstrated understanding. The patient was also educated that the outcome of treatment will depend on the medication compliance as directed by the prescribers along with regular follow-up, compliance with the labs and other work-up, as clinically indicated. Risk Management: Based on the diagnosis and assessment biopsychosocial treatment model was presented to the patient and was given the opportunity to ask any question. The patient was agreeable to the plan and all the patient's questions were answered to the patient's satisfaction. I discussed with the patient the risk, benefit, alternative and common side effects for the proposed medication treatment. The patient is consenting to this treatment. The patient was referred to outpatient/inpatient substance abuse rehabilitation programming.   She was educated multiple times during the hospitalization that if she chooses to continue to use drugs or alcohol, she may act out impulsively, resulting in serious harm to self or others even though unintentional.  She was also educated that mental health treatment cannot be optimized with ongoing use of drugs or alcohol she demonstrated understanding and has the capacity to understand that. The patients risk factors have been mitigated as they have been admitted to inpatient behavioral health in an emotionally supportive environment with q 15 minute safety checks. Okay to discontinue the 1:1, patient moderate risk for suicide. They have the following:  Risk Factors: substance use, not connected with an outpatient mental health provider, self isolating    Protective Factors: strong family/friend support, help-seeking behaviors, safe/stable housing, access to essential needs, communication skills, steady income     Collateral Information:  Will obtain collateral information from the family or friends. Will obtain medical records as appropriate from out patient providers  Will consult the hospitalist for a physical exam to rule out any co-morbid physical condition. Home medication Reconciled   Reviewed and continued as clinically indicated    New Medications started during this admission :    Lithium 300 mg twice daily for mood stabilization due to acute rudy  [place] level on day 4  Invega 3 mg 3 times daily for psychotic feature including paranoia    Prn Haldol 5mg and Vistaril 50mg q6hr for extreme agitation. Trazodone as ordered for insomnia  Vistaril as ordered for anxiety      Psychotherapy:   Encourage participation in milieu and group therapy  Individual therapy as needed        NOTE: This report was transcribed using voice recognition software. Every effort was made to ensure accuracy; however, inadvertent computerized transcription errors may be present.       Behavioral Services  Medicare Certification Upon Admission    I certify that this patient's inpatient psychiatric hospital admission is medically necessary for:    [x] (1) Treatment which could reasonably be expected to improve this patient's condition,       [x] (2) Or for diagnostic study;     AND     [x](2) The inpatient psychiatric services are provided while the individual is under the care of a physician and are included in the individualized plan of care.     Estimated length of stay/service 5 - 7 days based on stability     Plan for post-hospital care  follow with OP provider     Electronically signed by GA Calhoun CNP on 10/29/2022 at 11:34 AM          Electronically signed by GA Calhoun CNP on 10/29/2022 at 11:33 AM

## 2022-10-29 NOTE — PLAN OF CARE
Problem: Behavior  Goal: Pt/Family maintain appropriate behavior and adhere to behavioral management agreement, if implemented  Description: INTERVENTIONS:  1. Assess patient/family's coping skills and  non-compliant behavior (including use of illegal substances)  2. Notify security of behavior or suspected illegal substances which indicate the need for search of the family and/or belongings  3. Encourage verbalization of thoughts and concerns in a socially appropriate manner  4. Utilize positive, consistent limit setting strategies supporting safety of patient, staff and others  5. Encourage participation in the decision making process about the behavioral management agreement  6. If a visitor's behavior poses a threat to safety call refer to organization policy. 7. Initiate consult with , Psychosocial CNS, Spiritual Care as appropriate  10/29/2022 1420 by Khalida Fenton RN  Outcome: Not Progressing  10/29/2022 0137 by Annette Rooney RN  Outcome: Progressing     Problem: Self Harm/Suicidality  Goal: Will have no self-injury during hospital stay  Description: INTERVENTIONS:  1. Q 30 MINUTES: Routine safety checks  2. Q SHIFT & PRN: Assess risk to determine if routine checks are adequate to maintain patient safety  10/29/2022 1420 by Khalida Fenton RN  Outcome: Progressing  10/29/2022 0137 by Annette Rooney RN  Outcome: Progressing      Patient has been sleeping since being medicated earlier. She does deny suicidal and homicidal thoughts. Denies hallucinations. She concerned about her rent and being evicted. \"I said something I shouldn't have\". \" I love my life\". Denies suicidal and homicidal thoughts. Denies hallucinations. Will continue to observe and support.

## 2022-10-29 NOTE — PLAN OF CARE
[facility]  Initial Interdisciplinary Treatment Plan NOTE    Review Date & Time: 10/29/2022    12:26 PM      Patient was in treatment team    Admission Type:   Admission Type: Involuntary    Reason for admission:  Reason for Admission: \"Because I made a stupid decision. ..listening to other people telling me to take the pills to help with my pain. \"      Estimated Length of Stay Update:   7 DAYS  Estimated Discharge Date Update:  11/5/22    EDUCATION:   Learner Progress Toward Treatment Goals: Reviewed results and recommendations of this team    Method: Individual    Outcome: Refused Education    PATIENT GOALS:  \" TO BE RELEASED\"    PLAN/TREATMENT RECOMMENDATIONS UPDATE:  ENCOURAGE DAILY GOALS AND GROUPS WHEN ABLE. MONITOR BEHAVIOR AND MEDICATE PRN.      GOALS UPDATE:   Time frame for Short-Term Goals:  BY DISCHARGE    Du Patel RN

## 2022-10-29 NOTE — BH NOTE
PATIENT ELOPED FROM [facility]  AND FOUND IN RECEPTION AREA. \" THEY LIED TO ME AND MY 72 HOURS IS UP\". UNCOOPERATIVE. PATIENT DID FINALLY AGREE TO WALK INTO [facility] AND MEDICATED IN OPEN SECLUSION. SHE THAN WENT TO HER  ROOM . RESTING NOW. SEE MAR.

## 2022-10-29 NOTE — CARE COORDINATION
Biopsychosocial Assessment Note    Social work met with patient to complete the biopsychosocial assessment and C-SSRS. Chief Complaint: pt reports \"I was having pain in my stomach and I took 2 ibuprofen while I was drinking. \"    Mental Status Exam: pt alert&oriented x4. Pt cooperative, discharge focused. Pt mood irritable, affect appropriate. Pt eye contact good, speech clear. Pt thoughts preoccupied, circumstantial. Pt insight/judgement poor. Pt denied SI/HI/AVH. Clinical Summary: pt reports she drinking beer and started to have pain in her stomach. Pt reports she took 2 ibuprofen and then shortly after someone told her if she drinks with the ibuprofen it will make it work faster. Pt reports she couldn't remember if she had taken any so she took more and then she realized by that point she had taken 8. Pt reports she was not trying to harm herself. Pt reports the ibuprofen and beer made her feel high, she didn't like the way she felt, so she called for help. Pt denied any hx of psych admissions. Pt denied any hx of SI, attempts, or self injurious behaviors. Pt reports drinking 4-5 beers a month. Pt denied drug use hx. Pt denied legal hx. Pt denied trauma/abuse hx. Pt graduated from high school, receives [employer] and [employer], and has strong family support. Pt has three daughters ages 28, 35, and 28.     Risk Factors: substance use, not connected with an outpatient mental health provider, self isolating     Protective Factors: strong family/friend support, help-seeking behaviors, safe/stable housing, access to essential needs, communication skills, steady income     Gender  [] Male [x] Female [] Transgender  [] Other    Sexual Orientation    [x] Heterosexual [] Homosexual [] Bisexual [] Other    Suicidal Ideation  [] Past [] Present [x] Denies     C-SSRS Screening Completed: Current Suicide Risk:  [] No Risk  [] Low [x] Moderate [] High    Homicidal Ideation  [] Past [] Present [x] Denies Hallucinations/Delusions (Specify type)  [] Reports [x] Denies     Current or Past Mental Health Treatment:  [] Yes, When and Where:   [x] No    Substance Use/Alcohol Use/Addiction  [] Reports [x] Denies     Tobacco Use (within the last 6 months)  [x] Reports [] Denies     Trauma History  [] Reports [x] Denies     Self Injurious/Self Mutilation Behaviors:   [] Reports:    [] Past [] Present   [x] Denies    Legal History:  []  Yes (Specify)    [x] No    Collateral Contact (MAYNOR signed)  Name: Aimee Jim  Relationship: mom   daughter  Number:   Needs to get number out of her phone    Collateral Information: Daniel Rocha reports pt has been feeling depressed recently because she is lonely and doesn't go anywhere. She reports her and her friends stopped going out due to all of the violence. Valerio Mcclure reports pt was at her house the day she came to the hospital she seemed like herself. Valerio Kami doesn't think pt wanted to hurt herself prior to admission and she has no safety concerns for pt at this time. Valerio Kami reports pt does not have access to any guns or weapons. Valerio Mcclure stated her and pt daughter will be looking out for her at time of discharge. Access to Weapons per Collateral Contact: [] Reports [x] Denies     After consideration of C-SSRS screening results, C-SSRS assessments, and this professional's assessment the patient's overall suicide risk assessed to be:  [] None   [x] Low   [] Moderate   [] High     [x] Discussed current suicide risk, protective and risk factors with RN and NP/Psychiatrist.    Discharge Plan:  [x] Home: lives alone  [] Shelter:  [] Crisis Unit:  [] Substance Abuse Rehab:  [] Nursing Facility:  [] Other (Specify):     Follow up Provider: will need to be referred for services

## 2022-10-29 NOTE — PROGRESS NOTES
[facility]  Admission Note     Admission Type:   Admission Type: Involuntary    Reason for admission:  Reason for Admission: \"Because I made a stupid decision. ..listening to other people telling me to take the pills to help with my pain. \"      Addictive Behavior:   Addictive Behavior  In the Past 3 Months, Have You Felt or Has Someone Told You That You Have a Problem With  : None    Medical Problems:   Past Medical History:   Diagnosis Date    Angina at rest [facility])     cath in 2007 showed no CAD    Asthma     Blood type AB+ 04/03/2018    CAD (coronary artery disease)     Carpal tunnel syndrome on left     CHF (congestive heart failure) ([facility])     CHF (congestive heart failure) ([facility])     COPD (chronic obstructive pulmonary disease) ([facility]     CVA (cerebral vascular accident) ([facility] 12/2009 and 12/2010. left parietal    GERD (gastroesophageal reflux disease)     HFrEF (heart failure with reduced ejection fraction) ([facility] [facility] 01/14/2020 8/26/19- echo- LVEF 20-25%, LA enlarged, moderate MR, mild TR, mild PH, LVDD: 6.7, RVDD: 2.2 (12/9/17- echo- LVEF 10%, stage III DD, severely dilated LA, appears L>R atrial shunt, mod TR, LVDD: 6.6,  RVDD: 2.3)    History of blood transfusion     Hyperlipidemia Diagnosed in 2007. Dyslipidemia. Hypertension diagnosed in 2007    ICD (implantable cardioverter-defibrillator) in place 04/05/2018    Placed by Dr. Veronica Guerrero.     Left ovarian cyst     Liver lesion 07/2015    Moderate mitral regurgitation 07/27/2015    mild-moderate    Nonischemic cardiomyopathy (HCC)     NSTEMI (non-ST elevated myocardial infarction) ([facility] 4/10/15--adm to [facility]    Suicide attempt by drug ingestion ([facility]     attempt in 2007 OD on ultram    Tobacco abuse     Vitamin D deficiency        Status EXAM:  Mental Status and Behavioral Exam  Normal: No  Level of Assistance: Independent/Self  Facial Expression: Brightened, Hostile (Hostile initially and later brightened)  Affect: Congruent  Level of Consciousness: Alert  Frequency of Checks: 4 times per hour, close  Mood:Normal: No  Mood: Irritable  Motor Activity:Normal: Yes  Eye Contact: Good  Observed Behavior: Preoccupied, Agitated  Sexual Misconduct History: Past - no  Preception: [place] to person, [place] to time, [place] to place, [place] to situation  Attention:Normal: No  Attention: Distractible  Thought Processes: Circumstantial  Thought Content:Normal: No  Thought Content: Preoccupations  Depression Symptoms: Increased irritability  Anxiety Symptoms: Generalized  Emily Symptoms: No problems reported or observed. Hallucinations: None  Delusions: No  Memory:Normal: No  Memory: Poor recent  Insight and Judgment: No  Insight and Judgment: Poor judgment, Poor insight    Tobacco Screening:  Practical Counseling, on admission, jolene X, if applicable and completed (first 3 are required if patient doesn't refuse):             (x ) Recognizing danger situations (included triggers and roadblocks)                    ( x) Coping skills (new ways to manage stress,relaxation techniques, changing routine, distraction)                                                           ( x) Basic information about quitting (benefits of quitting, techniques in how to quit, available resources  ( ) Referral for counseling fax[facility] to [facility]                                                                                                                   ( ) Patient refused counseling  ( ) Patient has not smoked in the last 30 days    Metabolic Screening:    Lab Results   Component Value Date    LABA1C 5.5 06/14/2021       Lab Results   Component Value Date    CHOL 234 (H) 05/18/2022    CHOL 254 (H) 01/20/2021    CHOL 216 (H) 01/15/2020    CHOL 192 08/28/2018    CHOL 246 (H) 06/24/2017    CHOL 281 (H) 01/06/2015    CHOL 220 (H) 04/19/2014    CHOL 218 (H) 05/23/2013    CHOL 251 (H) 03/01/2011    CHOL 217 (H) 12/28/2010    CHOL 206 (H) 12/16/2010     Lab Results Component Value Date    TRIG 126 05/18/2022    TRIG 81 01/20/2021    TRIG 92 01/15/2020    TRIG 76 08/28/2018    TRIG 51 06/24/2017    TRIG 101 01/06/2015    TRIG 93 04/19/2014    TRIG 97 05/23/2013    TRIG 138 03/01/2011    TRIG 120 12/28/2010    TRIG 110 12/16/2010     Lab Results   Component Value Date    HDL 62 05/18/2022    HDL 76 01/20/2021    HDL 50 01/15/2020    HDL 50 08/28/2018    HDL 44 04/06/2018    HDL 86 06/24/2017    HDL 66 01/06/2015    HDL 34 04/19/2014    HDL 48.0 05/23/2013    HDL 69.0 03/01/2011    HDL 50.0 12/28/2010    HDL 74.0 12/16/2010     No components found for: LDLCAL  Lab Results   Component Value Date    LABVLDL 25 05/18/2022    LABVLDL 16 01/20/2021    LABVLDL 18 01/15/2020    LABVLDL 15 08/28/2018    LABVLDL 15 04/06/2018    LABVLDL 10 06/24/2017    LABVLDL 20 01/06/2015    LABVLDL 19 04/19/2014         Body mass index is 19.64 kg/m². BP Readings from Last 2 Encounters:   10/28/22 125/76   10/28/22 (!) 140/97           Pt admitted with followings belongings:  Dental Appliances: None  Vision - Corrective Lenses: Eyeglasses  Hearing Aid: None  Jewelry: None  Body Piercings Removed: N/A  Clothing: Socks, Pants, Shirt, Undergarments, Footwear  Other Valuables:  Other (Comment)    Mery Lester RN

## 2022-10-29 NOTE — PROGRESS NOTES
Leisure assessment incomplete. Observed in bed sleeping, due to medication/ agitation earlier. Encouraged by nurse to allow rest.  Will complete as improves.

## 2022-10-30 PROCEDURE — 99232 SBSQ HOSP IP/OBS MODERATE 35: CPT | Performed by: NURSE PRACTITIONER

## 2022-10-30 PROCEDURE — 6370000000 HC RX 637 (ALT 250 FOR IP)

## 2022-10-30 PROCEDURE — 6370000000 HC RX 637 (ALT 250 FOR IP): Performed by: PSYCHIATRY & NEUROLOGY

## 2022-10-30 PROCEDURE — 6360000002 HC RX W HCPCS

## 2022-10-30 PROCEDURE — 6370000000 HC RX 637 (ALT 250 FOR IP): Performed by: NURSE PRACTITIONER

## 2022-10-30 PROCEDURE — 1240000000 HC EMOTIONAL WELLNESS R&B

## 2022-10-30 PROCEDURE — 94640 AIRWAY INHALATION TREATMENT: CPT

## 2022-10-30 RX ADMIN — IPRATROPIUM BROMIDE AND ALBUTEROL SULFATE 1 AMPULE: .5; 2.5 SOLUTION RESPIRATORY (INHALATION) at 12:08

## 2022-10-30 RX ADMIN — IPRATROPIUM BROMIDE AND ALBUTEROL SULFATE 1 AMPULE: .5; 2.5 SOLUTION RESPIRATORY (INHALATION) at 20:15

## 2022-10-30 RX ADMIN — MELATONIN 3 MG ORAL TABLET 3 MG: 3 TABLET ORAL at 21:17

## 2022-10-30 RX ADMIN — SACUBITRIL AND VALSARTAN 1 TABLET: 97; 103 TABLET, FILM COATED ORAL at 21:16

## 2022-10-30 RX ADMIN — SACUBITRIL AND VALSARTAN 1 TABLET: 97; 103 TABLET, FILM COATED ORAL at 09:32

## 2022-10-30 RX ADMIN — ARFORMOTEROL TARTRATE 15 MCG: 15 SOLUTION RESPIRATORY (INHALATION) at 20:15

## 2022-10-30 RX ADMIN — LITHIUM CARBONATE 300 MG: 300 CAPSULE, GELATIN COATED ORAL at 09:36

## 2022-10-30 RX ADMIN — PANTOPRAZOLE SODIUM 40 MG: 40 TABLET, DELAYED RELEASE ORAL at 06:24

## 2022-10-30 RX ADMIN — IPRATROPIUM BROMIDE AND ALBUTEROL SULFATE 1 AMPULE: .5; 2.5 SOLUTION RESPIRATORY (INHALATION) at 17:00

## 2022-10-30 RX ADMIN — BUDESONIDE INHALATION SUSPENSION 500 MCG: 0.5 SUSPENSION RESPIRATORY (INHALATION) at 20:15

## 2022-10-30 RX ADMIN — BUDESONIDE INHALATION SUSPENSION 500 MCG: 0.5 SUSPENSION RESPIRATORY (INHALATION) at 12:07

## 2022-10-30 RX ADMIN — ARFORMOTEROL TARTRATE 15 MCG: 15 SOLUTION RESPIRATORY (INHALATION) at 12:06

## 2022-10-30 RX ADMIN — ATORVASTATIN CALCIUM 40 MG: 40 TABLET, FILM COATED ORAL at 09:32

## 2022-10-30 RX ADMIN — SPIRONOLACTONE 25 MG: 25 TABLET ORAL at 09:32

## 2022-10-30 RX ADMIN — BUMETANIDE 2 MG: 1 TABLET ORAL at 09:32

## 2022-10-30 RX ADMIN — LITHIUM CARBONATE 300 MG: 300 CAPSULE, GELATIN COATED ORAL at 17:04

## 2022-10-30 RX ADMIN — PALIPERIDONE 3 MG: 3 TABLET, EXTENDED RELEASE ORAL at 09:32

## 2022-10-30 RX ADMIN — ASPIRIN 81 MG CHEWABLE TABLET 81 MG: 81 TABLET CHEWABLE at 09:32

## 2022-10-30 RX ADMIN — METOPROLOL SUCCINATE 100 MG: 25 TABLET, EXTENDED RELEASE ORAL at 09:32

## 2022-10-30 RX ADMIN — PALIPERIDONE 3 MG: 3 TABLET, EXTENDED RELEASE ORAL at 21:17

## 2022-10-30 ASSESSMENT — PAIN SCALES - GENERAL: PAINLEVEL_OUTOF10: 0

## 2022-10-30 NOTE — PROGRESS NOTES
Attended evening relaxation group. Accepting of handout on mindfulness and pleasant in sharing benefits. Was 1 of 14 in attendance.

## 2022-10-30 NOTE — CARE COORDINATION
LORRAINE received a call from pt mariza Ortega  (MAYNOR signed). Giovanni Sebastian asked if pt was discharging today. When LORRAINE informed her that pt would not be discharged today Giovanni Sebastian terminated the phone call.

## 2022-10-30 NOTE — CARE COORDINATION
LORRAINE met with pt. Pt reports feeling great today, denied SI/HI/AVH. Pt reports she is enjoying watching the football game and she is looking forward to watching her team play tomorrow. Pt stated her discharge plan remains the same for her to return home. Pt is not connected with an outpatient mental health provider at this time and will need to be referred for services. Pt cooperative, bright, anxious, with fair eye contact, clear speech, poor insight/judgement.

## 2022-10-30 NOTE — PLAN OF CARE
Problem: Chronic Conditions and Co-morbidities  Goal: Patient's chronic conditions and co-morbidity symptoms are monitored and maintained or improved  10/30/2022 0804 by Obie Arredondo RN  Outcome: Progressing  10/30/2022 0359 by Jose Patel RN  Outcome: Progressing     Problem: Self Harm/Suicidality  Goal: Will have no self-injury during hospital stay  Description: INTERVENTIONS:  1. Q 30 MINUTES: Routine safety checks  2. Q SHIFT & PRN: Assess risk to determine if routine checks are adequate to maintain patient safety  10/30/2022 0804 by Obie Arredondo RN  Outcome: Progressing  10/30/2022 0359 by Jose Patel RN  Outcome: Progressing     Problem: Depression  Goal: Will be euthymic at discharge  Description: INTERVENTIONS:  1. Administer medication as ordered  2. Provide emotional support via 1:1 interaction with staff  3. Encourage involvement in milieu/groups/activities  4. Monitor for social isolation  10/30/2022 0804 by Obie Arredondo RN  Outcome: Progressing  10/30/2022 0359 by Jose Patel RN  Outcome: Progressing     Problem: Behavior  Goal: Pt/Family maintain appropriate behavior and adhere to behavioral management agreement, if implemented  Description: INTERVENTIONS:  1. Assess patient/family's coping skills and  non-compliant behavior (including use of illegal substances)  2. Notify security of behavior or suspected illegal substances which indicate the need for search of the family and/or belongings  3. Encourage verbalization of thoughts and concerns in a socially appropriate manner  4. Utilize positive, consistent limit setting strategies supporting safety of patient, staff and others  5. Encourage participation in the decision making process about the behavioral management agreement  6. If a visitor's behavior poses a threat to safety call refer to organization policy.   7. Initiate consult with , Psychosocial CNS, Spiritual Care as appropriate  10/30/2022 5865 by Josh Ramsey RN  Outcome: Progressing  10/30/2022 0359 by Tianna Phan RN  Outcome: Progressing     Problem: Anxiety  Goal: Will report anxiety at manageable levels  Description: INTERVENTIONS:  1. Administer medication as ordered  2. Teach and rehearse alternative coping skills  3. Provide emotional support with 1:1 interaction with staff  10/30/2022 0804 by Josh Ramsey RN  Outcome: Progressing  10/30/2022 0359 by Tianna Phan RN  Outcome: Progressing     Problem: Involuntary Admit  Goal: Will cooperate with staff recommendations and doctor's orders and will demonstrate appropriate behavior  Description: INTERVENTIONS:  1. Treat underlying conditions and offer medication as ordered  2. Educate regarding involuntary admission procedures and rules  3. Contain excessive/inappropriate behavior per unit and hospital policies  [id number] by Josh Ramsey RN  Outcome: Progressing  10/30/2022 0359 by Tianna Phan RN  Outcome: Progressing     Problem: Safety - Adult  Goal: Free from fall injury  10/30/2022 0804 by Josh Ramsey RN  Outcome: Progressing  10/30/2022 0359 by Tianna Phan RN  Outcome: Progressing     Problem: Pain  Goal: Verbalizes/displays adequate comfort level or baseline comfort level  10/30/2022 0804 by Josh Ramsey RN  Outcome: Progressing  10/30/2022 0359 by Tianna Phan RN  Outcome: Progressing     Received report from previous shift, alert and oriented x 4, flat affect, preoccupied and internally stimulated, poor insight, visible on the unit but seclusive to herself, no interaction with peers, guarded and dismissive, follows direction, discharge focused. Pt medication compliant, no groups attended but encouraged by staff, denies suicidal or homicidal ideation, contracts for safety, denies hallucinations, no somatic complaints noted, Q 15 minute checks for her safety and protection.

## 2022-10-30 NOTE — PROGRESS NOTES
[facility] FOLLOW-UP NOTE     10/30/2022     Patient was seen and examined in person, Chart reviewed   Patient's case discussed with staff/team    Chief Complaint: \" I am doing just fine\"      Interim History:   Patient observed sitting up in the dayroom with a very bizarre affect laughing and smiling inappropriately providing answers that are not relevant to the question being asked. She has very poor insight into need for treatment she has very poor understanding of the circumstances surrounding her hospitalization. She is extremely impulsive paranoid misinterpreting. Offers little for conversation. Had significant event yesterday when she eloped the [place] unit and was caught in the corridor between the units requiring police present and stat IM injections. Significantly minimizing circumstances surrounding her hospitalization    Appetite:   [x] Normal/Unchanged  [] Increased  [] Decreased      Sleep:       [x] Normal/Unchanged  [] Fair       [] Poor              Energy:    [] Normal/Unchanged  [x] Increased  [] Decreased        SI [] Present  [x] Absent    HI  []Present  [x] Absent     Aggression:  [] yes  [x] no    Patient is [x] able  [] unable to CONTRACT FOR SAFETY     PAST MEDICAL/PSYCHIATRIC HISTORY:   Past Medical History:   Diagnosis Date    Angina at rest [facility])     cath in 2007 showed no CAD    Asthma     Blood type AB+ 04/03/2018    CAD (coronary artery disease)     Carpal tunnel syndrome on left     CHF (congestive heart failure) ([facility])     CHF (congestive heart failure) ([facility]     COPD (chronic obstructive pulmonary disease) ([facility]     CVA (cerebral vascular accident) ([facility] 12/2009 and 12/2010.     left parietal    GERD (gastroesophageal reflux disease)     HFrEF (heart failure with reduced ejection fraction) ([facility] 01/14/2020 8/26/19- echo- LVEF 20-25%, LA enlarged, moderate MR, mild TR, mild PH, LVDD: 6.7, RVDD: 2.2 (12/9/17- echo- LVEF 10%, stage III DD, severely dilated LA, appears L>R atrial shunt, mod TR, LVDD: 6.6,  RVDD: 2.3)    History of blood transfusion     Hyperlipidemia Diagnosed in . Dyslipidemia. Hypertension diagnosed in     ICD (implantable cardioverter-defibrillator) in place 2018    Placed by Dr. Luly Cedillo. Left ovarian cyst     Liver lesion 2015    Moderate mitral regurgitation 2015    mild-moderate    Nonischemic cardiomyopathy (HCC)     NSTEMI (non-ST elevated myocardial infarction) ([facility] 4/10/15--adm to [facility]    Suicide attempt by drug ingestion ([facility]     attempt in  OD on ultram    Tobacco abuse     Vitamin D deficiency        FAMILY/SOCIAL HISTORY:  Family History   Problem Relation Age of Onset    High Blood Pressure Mother     Stroke Mother     High Blood Pressure Father     Stroke Father     Heart Disease Father     Pacemaker Father      Social History     Socioeconomic History    Marital status: Legally      Spouse name: Not on file    Number of children: Not on file    Years of education: Not on file    Highest education level: Not on file   Occupational History    Occupation: disability   Tobacco Use    Smoking status: Every Day     Packs/day: 0.50     Years: 30.00     Pack years: 15.00     Types: Cigarettes     Last attempt to quit: 2018     Years since quittin.3    Smokeless tobacco: Current     Types: Snuff    Tobacco comments:     currently 3 cig/day, 21. Vaping Use    Vaping Use: Never used   Substance and Sexual Activity    Alcohol use:  Yes     Alcohol/week: 14.0 standard drinks     Types: 14 Cans of beer per week    Drug use: Yes     Frequency: 3.0 times per week     Types: Marijuana Shellye Burkitt)    Sexual activity: Not on file   Other Topics Concern    Not on file   Social History Narrative    Drinks 1-2 cups of coffee daily     Social Determinants of Health     Financial Resource Strain: Low Risk     Difficulty of Paying Living Expenses: Not hard at all   Food Insecurity: No Food Insecurity    Worried About Running Out of Food in the Last Year: Never true    Ran Out of Food in the Last Year: Never true   Transportation Needs: Not on file   Physical Activity: Not on file   Stress: Not on file   Social Connections: Not on file   Intimate Partner Violence: Not on file   Housing Stability: Not on file           ROS:  [x] All negative/unchanged except if checked.  Explain positive(checked items) below:  [] Constitutional  [] Eyes  [] Ear/Nose/Mouth/Throat  [] Respiratory  [] CV  [] GI  []   [] Musculoskeletal  [] Skin/Breast  [] Neurological  [] Endocrine  [] Heme/Lymph  [] Allergic/Immunologic    Explanation:     MEDICATIONS:    Current Facility-Administered Medications:     lithium capsule 300 mg, 300 mg, Oral, BID WC, Fawadjohanna Lara APRN - CNP, 300 mg at 10/30/22 0936    paliperidone (INVEGA) extended release tablet 3 mg, 3 mg, Oral, BID, Fawadjohanna Lara, APRN - CNP, 3 mg at 10/30/22 0932    Arformoterol Tartrate (BROVANA) nebulizer solution 15 mcg, 15 mcg, Nebulization, BID, Alfredo Florentino MD, 15 mcg at 10/29/22 2230    budesonide (PULMICORT) nebulizer suspension 500 mcg, 500 mcg, Nebulization, BID, Alfredo Florentino MD, 500 mcg at 10/29/22 2230    atorvastatin (LIPITOR) tablet 40 mg, 40 mg, Oral, Daily, Alfredo Florentino MD, 40 mg at 10/30/22 0932    aspirin chewable tablet 81 mg, 81 mg, Oral, Daily, Alfredo Florentino MD, 81 mg at 10/30/22 0932    bumetanide (BUMEX) tablet 2 mg, 2 mg, Oral, Daily, Alfredo Florentino MD, 2 mg at 10/30/22 0932    hydrALAZINE (APRESOLINE) injection 10 mg, 10 mg, IntraVENous, Q6H PRN, Alfredo Florentino MD    ipratropium-albuterol (DUONEB) nebulizer solution 1 ampule, 1 ampule, Inhalation, Q4H WA, Alfredo Florentino MD, 1 ampule at 10/29/22 2230    metoprolol succinate (TOPROL XL) extended release tablet 100 mg, 100 mg, Oral, Daily, Alfredo Florentino MD, 100 mg at 10/30/22 0932    pantoprazole (PROTONIX) tablet 40 mg, 40 mg, Oral, [facility], Alfredo Florentino MD, 40 mg at 10/30/22 0624    sacubitril-valsartan (ENTRESTO)  MG per tablet 1 tablet, 1 tablet, Oral, BID, Claude Early, MD, 1 tablet at 10/30/22 0932    spironolactone (ALDACTONE) tablet 25 mg, 25 mg, Oral, Daily, Claude Early, MD, 25 mg at 10/30/22 0932    [START ON 11/4/2022] vitamin D (ERGOCALCIFEROL) capsule 50,000 Units, 50,000 Units, Oral, Weekly, Claude Early, MD    magnesium hydroxide (MILK OF MAGNESIA) 400 MG/5ML suspension 30 mL, 30 mL, Oral, Daily PRN, Tawanda Stanford MD    nicotine (NICODERM CQ) 21 MG/24HR 1 patch, 1 patch, TransDERmal, Daily, Tawanda Stanford MD    aluminum & magnesium hydroxide-simethicone (MAALOX) 200-200-20 MG/5ML suspension 30 mL, 30 mL, Oral, PRN, Tawanda Stanford MD    hydrOXYzine pamoate (VISTARIL) capsule 50 mg, 50 mg, Oral, TID PRN, Tawanda Stanford MD    haloperidol (HALDOL) tablet 3 mg, 3 mg, Oral, Q6H PRN **OR** haloperidol lactate (HALDOL) injection 3 mg, 3 mg, IntraMUSCular, Q6H PRN, Tawanda Stanford MD    melatonin tablet 3 mg, 3 mg, Oral, Nightly, Tawanda Stanford MD, 3 mg at 10/29/22 2056      Examination:  BP (!) 90/58   Pulse 79   Temp 97.2 °F (36.2 °C)   Resp 14   Ht 5' 5\" (1.651 m)   Wt 118 lb (53.5 kg)   LMP 05/20/2014   SpO2 97%   BMI 19.64 kg/m²   Gait - steady  Medication side effects(SE): None reported    Mental Status Examination:    Level of consciousness:  within normal limits   Appearance:  well-appearing  Behavior/Motor:  no abnormalities noted  Attitude toward examiner:  cooperative and attentive  Speech:  spontaneous, yelling loud,   Mood: euthymic  Affect:  mood congruent  Thought processes:  goal directed, concrete   Thought content:  Paranoid, misinterpreting Denies Suicidal ideations denies homicidal ideation   Cognition:  oriented to person, place, and time   Concentration distractible  Memory intact  Insight poor   Judgement poor   Fund of Knowledge marginal    ASSESSMENT:   Patient symptoms are:  [] Well controlled  [] Improving  [] Worsening  [x] No change      Diagnosis:   Principal Problem:    Severe manic bipolar 1 disorder with psychotic behavior ([facility] [address]  Resolved Problems:    * No resolved hospital problems. *      LABS:    Recent Labs     10/28/22  0507   WBC 7.5   HGB 14.1        Recent Labs     10/27/22  1758 10/28/22  0507    139   K 3.1* 4.1   CL 99 106   CO2 24 20*   BUN 12 8   CREATININE 1.0 0.7   GLUCOSE 190* 140*     Recent Labs     10/27/22  1758 10/27/22  2105 10/28/22  0507   BILITOT <0.2 <0.2 0.4   ALKPHOS 80 86 93   AST 12 13 15   ALT 6 8 8     Lab Results   Component Value Date/Time    LABAMPH NOT DETECTED 10/26/2022 06:41 PM    LABAMPH NOT DETECTED 05/21/2012 09:47 AM    BARBSCNU NOT DETECTED 10/26/2022 06:41 PM    LABBENZ NOT DETECTED 10/26/2022 06:41 PM    LABBENZ NOT DETECTED 05/21/2012 09:47 AM    CANNAB POSITIVE 01/13/2014 12:00 PM    COCAINESCRN NOT DETECTED 01/13/2014 12:00 PM    LABMETH NOT DETECTED 10/26/2022 06:41 PM    OPIATESCREENURINE NOT DETECTED 10/26/2022 06:41 PM    PHENCYCLIDINESCREENURINE NOT DETECTED 10/26/2022 06:41 PM    ETOH 111 10/26/2022 09:31 PM     Lab Results   Component Value Date/Time    TSH 0.736 10/27/2022 06:10 AM     No results found for: LITHIUM  No results found for: VALPROATE, CBMZ        Treatment Plan:  The patient's diagnosis, treatment plan, medication management were formulated after patient was seen directly by the attending physician and myself and all relevant documentation was reviewed. Risk, benefit, side effects, possible outcomes of the medication and alternatives discussed with the patient and the patient demonstrated understanding. The patient was also educated that the outcome of treatment will depend on the medication compliance as directed by the prescribers along with regular follow-up, compliance with the labs and other work-up, as clinically indicated.     New Medications started during this admission :    Lithium 300 mg twice daily for mood stabilization due to acute rudy  [place] level on day 4  Invega 3 mg 3 times daily for psychotic feature including paranoia    Collateral information: Followed by social work  CD evaluation   Encourage patient to attend group and other milieu activities. Discharge planning discussed with the patient and treatment team.    PSYCHOTHERAPY/COUNSELING:  [x] Therapeutic interview  [x] Supportive  [] CBT  [] Ongoing  [] Other    [x] Patient continues to need, on a daily basis, active treatment furnished directly by or requiring the supervision of inpatient psychiatric personnel      Anticipated Length of stay: 5 to 7 days based on stability    NOTE: This report was transcribed using voice recognition software. Every effort was made to ensure accuracy; however, inadvertent computerized transcription errors may be present.        Electronically signed by Teddie Bloch, APRN - CNP on 10/30/2022 at 11:14 AM

## 2022-10-31 PROCEDURE — 6370000000 HC RX 637 (ALT 250 FOR IP): Performed by: NURSE PRACTITIONER

## 2022-10-31 PROCEDURE — 6370000000 HC RX 637 (ALT 250 FOR IP): Performed by: PSYCHIATRY & NEUROLOGY

## 2022-10-31 PROCEDURE — 6370000000 HC RX 637 (ALT 250 FOR IP)

## 2022-10-31 PROCEDURE — 1240000000 HC EMOTIONAL WELLNESS R&B

## 2022-10-31 PROCEDURE — 94640 AIRWAY INHALATION TREATMENT: CPT

## 2022-10-31 PROCEDURE — 6360000002 HC RX W HCPCS

## 2022-10-31 PROCEDURE — 99232 SBSQ HOSP IP/OBS MODERATE 35: CPT | Performed by: NURSE PRACTITIONER

## 2022-10-31 RX ADMIN — PANTOPRAZOLE SODIUM 40 MG: 40 TABLET, DELAYED RELEASE ORAL at 09:59

## 2022-10-31 RX ADMIN — SPIRONOLACTONE 25 MG: 25 TABLET ORAL at 09:59

## 2022-10-31 RX ADMIN — PALIPERIDONE 3 MG: 3 TABLET, EXTENDED RELEASE ORAL at 09:59

## 2022-10-31 RX ADMIN — MELATONIN 3 MG ORAL TABLET 3 MG: 3 TABLET ORAL at 20:56

## 2022-10-31 RX ADMIN — LITHIUM CARBONATE 300 MG: 300 CAPSULE, GELATIN COATED ORAL at 17:24

## 2022-10-31 RX ADMIN — IPRATROPIUM BROMIDE AND ALBUTEROL SULFATE 1 AMPULE: .5; 2.5 SOLUTION RESPIRATORY (INHALATION) at 15:54

## 2022-10-31 RX ADMIN — SACUBITRIL AND VALSARTAN 1 TABLET: 97; 103 TABLET, FILM COATED ORAL at 10:00

## 2022-10-31 RX ADMIN — BUMETANIDE 2 MG: 1 TABLET ORAL at 09:59

## 2022-10-31 RX ADMIN — BUDESONIDE INHALATION SUSPENSION 500 MCG: 0.5 SUSPENSION RESPIRATORY (INHALATION) at 19:46

## 2022-10-31 RX ADMIN — ASPIRIN 81 MG CHEWABLE TABLET 81 MG: 81 TABLET CHEWABLE at 09:59

## 2022-10-31 RX ADMIN — IPRATROPIUM BROMIDE AND ALBUTEROL SULFATE 1 AMPULE: .5; 2.5 SOLUTION RESPIRATORY (INHALATION) at 19:47

## 2022-10-31 RX ADMIN — LITHIUM CARBONATE 300 MG: 300 CAPSULE, GELATIN COATED ORAL at 09:58

## 2022-10-31 RX ADMIN — ATORVASTATIN CALCIUM 40 MG: 40 TABLET, FILM COATED ORAL at 09:58

## 2022-10-31 RX ADMIN — METOPROLOL SUCCINATE 100 MG: 25 TABLET, EXTENDED RELEASE ORAL at 10:13

## 2022-10-31 RX ADMIN — IPRATROPIUM BROMIDE AND ALBUTEROL SULFATE 1 AMPULE: .5; 2.5 SOLUTION RESPIRATORY (INHALATION) at 13:20

## 2022-10-31 RX ADMIN — SACUBITRIL AND VALSARTAN 1 TABLET: 97; 103 TABLET, FILM COATED ORAL at 20:57

## 2022-10-31 RX ADMIN — ARFORMOTEROL TARTRATE 15 MCG: 15 SOLUTION RESPIRATORY (INHALATION) at 19:45

## 2022-10-31 RX ADMIN — PALIPERIDONE 3 MG: 3 TABLET, EXTENDED RELEASE ORAL at 20:56

## 2022-10-31 ASSESSMENT — PAIN SCALES - GENERAL: PAINLEVEL_OUTOF10: 0

## 2022-10-31 NOTE — CARE COORDINATION
Pt was seen during treatment team. Pt reports that she is doing good, that her medications have been helping her. Pt reports that she plans to return to her home where her daughter stays with her. She denies SI/HI/AVH. Pt is bright, shares good eye contact, thoughts appear organized. Pt has poor insight/judgment. SW received a call from pt mariza Ortega  (MAYNOR signed). She reports that pt daughter Cory Park can be reached at [phone]. Sw contacted pt daughter Cory Park  (MAYNOR signed). She reports that pt never made any statements about being depressed or being suicidal. She had no reason to believe that pt was going to attempt to end her life. She thinks that this may have been an accident, reports that pt has stomach ulcers that she goes to the doctor for. She denies having any concerns for pt. She reports that this is the first time that anything like this has happened to pt. She denies pt having a hx of depression to her knowledge. She reports that pt can return home at discharge, reports that she can transport. She denies pt access to weapons in the home. She reports that she does stay with pt at pt home. Sw met with pt to discuss outpatient follow up. Pt reports that she would be agreeable to [facility] referral because it is close to her home. Sw to make referral when discharge date is known.

## 2022-10-31 NOTE — PROGRESS NOTES
[facility] 46 NOTE     10/31/2022     Patient was seen and examined in person, Chart reviewed   Patient's case discussed with staff/team    Chief Complaint: \" I learned my lesson\"      Interim History:   Patient seen during treatment team.  She states that she is \"learned her lesson. \"  She seems less paranoid though/misinterpreting. She states that her daughter lives with her. She continues to deny that she intentionally took too much Tylenol stating that she was just in pain and kept taking without thinking. She denies SI/HI intent or plan denies auditory or visual hallucinations no overt or covert signs psychosis she agrees to allow  to call her daughter. Appetite:   [x] Normal/Unchanged  [] Increased  [] Decreased      Sleep:       [x] Normal/Unchanged  [] Fair       [] Poor              Energy:    [] Normal/Unchanged  [x] Increased  [] Decreased        SI [] Present  [x] Absent    HI  []Present  [x] Absent     Aggression:  [] yes  [x] no    Patient is [x] able  [] unable to CONTRACT FOR SAFETY     PAST MEDICAL/PSYCHIATRIC HISTORY:   Past Medical History:   Diagnosis Date    Angina at rest [facility])     cath in 2007 showed no CAD    Asthma     Blood type AB+ 04/03/2018    CAD (coronary artery disease)     Carpal tunnel syndrome on left     CHF (congestive heart failure) ([facility])     CHF (congestive heart failure) ([facility] [address]     COPD (chronic obstructive pulmonary disease) ([facility] [address]     CVA (cerebral vascular accident) ([facility] [address] 12/2009 and 12/2010. left parietal    GERD (gastroesophageal reflux disease)     HFrEF (heart failure with reduced ejection fraction) ([facility] [address] 01/14/2020 8/26/19- echo- LVEF 20-25%, LA enlarged, moderate MR, mild TR, mild PH, LVDD: 6.7, RVDD: 2.2 (12/9/17- echo- LVEF 10%, stage III DD, severely dilated LA, appears L>R atrial shunt, mod TR, LVDD: 6.6,  RVDD: 2.3)    History of blood transfusion     Hyperlipidemia Diagnosed in 2007. Dyslipidemia.     Hypertension diagnosed in     ICD (implantable cardioverter-defibrillator) in place 2018    Placed by Dr. Bakari Dugan. Left ovarian cyst     Liver lesion 2015    Moderate mitral regurgitation 2015    mild-moderate    Nonischemic cardiomyopathy (HCC)     NSTEMI (non-ST elevated myocardial infarction) ([facility] [address] 4/10/15--adm to [facility]    Suicide attempt by drug ingestion ([facility] [address]     attempt in  OD on ultram    Tobacco abuse     Vitamin D deficiency        FAMILY/SOCIAL HISTORY:  Family History   Problem Relation Age of Onset    High Blood Pressure Mother     Stroke Mother     High Blood Pressure Father     Stroke Father     Heart Disease Father     Pacemaker Father      Social History     Socioeconomic History    Marital status: Legally      Spouse name: Not on file    Number of children: Not on file    Years of education: Not on file    Highest education level: Not on file   Occupational History    Occupation: disability   Tobacco Use    Smoking status: Every Day     Packs/day: 0.50     Years: 30.00     Pack years: 15.00     Types: Cigarettes     Last attempt to quit: 2018     Years since quittin.3    Smokeless tobacco: Current     Types: Snuff    Tobacco comments:     currently 3 cig/day, 21. Vaping Use    Vaping Use: Never used   Substance and Sexual Activity    Alcohol use:  Yes     Alcohol/week: 14.0 standard drinks     Types: 14 Cans of beer per week    Drug use: Yes     Frequency: 3.0 times per week     Types: Marijuana Jacki Mcnair)    Sexual activity: Not on file   Other Topics Concern    Not on file   Social History Narrative    Drinks 1-2 cups of coffee daily     Social Determinants of Health     Financial Resource Strain: Low Risk     Difficulty of Paying Living Expenses: Not hard at all   Food Insecurity: No Food Insecurity    Worried About Running Out of Food in the Last Year: Never true    Ran Out of Food in the Last Year: Never true   Transportation Needs: Not on file   Physical Activity: Not on file   Stress: Not on file   Social Connections: Not on file   Intimate Partner Violence: Not on file   Housing Stability: Not on file           ROS:  [x] All negative/unchanged except if checked.  Explain positive(checked items) below:  [] Constitutional  [] Eyes  [] Ear/Nose/Mouth/Throat  [] Respiratory  [] CV  [] GI  []   [] Musculoskeletal  [] Skin/Breast  [] Neurological  [] Endocrine  [] Heme/Lymph  [] Allergic/Immunologic    Explanation:     MEDICATIONS:    Current Facility-Administered Medications:     lithium capsule 300 mg, 300 mg, Oral, BID WC, Larene Major, APRN - CNP, 300 mg at 10/31/22 5706    paliperidone (INVEGA) extended release tablet 3 mg, 3 mg, Oral, BID, Larene Major, APRN - CNP, 3 mg at 10/31/22 8823    Arformoterol Tartrate (BROVANA) nebulizer solution 15 mcg, 15 mcg, Nebulization, BID, Ankit Londono MD, 15 mcg at 10/30/22 2015    budesonide (PULMICORT) nebulizer suspension 500 mcg, 500 mcg, Nebulization, BID, Ankit Londono MD, 500 mcg at 10/30/22 2015    atorvastatin (LIPITOR) tablet 40 mg, 40 mg, Oral, Daily, Ankit Londono MD, 40 mg at 10/31/22 7292    aspirin chewable tablet 81 mg, 81 mg, Oral, Daily, Ankit Londono MD, 81 mg at 10/31/22 0959    bumetanide (BUMEX) tablet 2 mg, 2 mg, Oral, Daily, Ankit Londono MD, 2 mg at 10/31/22 0959    hydrALAZINE (APRESOLINE) injection 10 mg, 10 mg, IntraVENous, Q6H PRN, Ankit Londono MD    ipratropium-albuterol (DUONEB) nebulizer solution 1 ampule, 1 ampule, Inhalation, Q4H WA, Ankit Londono MD, 1 ampule at 10/30/22 2015    metoprolol succinate (TOPROL XL) extended release tablet 100 mg, 100 mg, Oral, Daily, Ankit Londono MD, 100 mg at 10/31/22 1013    pantoprazole (PROTONIX) tablet 40 mg, 40 mg, Oral, QAM AC, Ankit Londono MD, 40 mg at 10/31/22 0959    sacubitril-valsartan (ENTRESTO)  MG per tablet 1 tablet, 1 tablet, Oral, BID, Ankit Londono MD, 1 tablet at 10/31/22 1000    spironolactone (ALDACTONE) tablet 25 mg, 25 mg, Oral, Daily, Kika Cantu Maria C Madden MD, 25 mg at 10/31/22 0959    [START ON 11/4/2022] vitamin D (ERGOCALCIFEROL) capsule 50,000 Units, 50,000 Units, Oral, Weekly, Ivory Rodriguez MD    magnesium hydroxide (MILK OF MAGNESIA) 400 MG/5ML suspension 30 mL, 30 mL, Oral, Daily PRN, Arianna Petersen MD    nicotine (NICODERM CQ) 21 MG/24HR 1 patch, 1 patch, TransDERmal, Daily, Arianna Petersen MD, 1 patch at 10/31/22 0957    aluminum & magnesium hydroxide-simethicone (MAALOX) 200-200-20 MG/5ML suspension 30 mL, 30 mL, Oral, PRN, Arianna Petersen MD    hydrOXYzine pamoate (VISTARIL) capsule 50 mg, 50 mg, Oral, TID PRN, Arianna Petersen MD    haloperidol (HALDOL) tablet 3 mg, 3 mg, Oral, Q6H PRN **OR** haloperidol lactate (HALDOL) injection 3 mg, 3 mg, IntraMUSCular, Q6H PRN, Arianna Petersen MD    melatonin tablet 3 mg, 3 mg, Oral, Nightly, Arianna Petersen MD, 3 mg at 10/30/22 2117      Examination:  BP (!) 114/56   Pulse 80   Temp 98.2 °F (36.8 °C)   Resp 14   Ht 5' 5\" (1.651 m)   Wt 118 lb (53.5 kg)   LMP 05/20/2014   SpO2 98%   BMI 19.64 kg/m²   Gait - steady  Medication side effects(SE): None reported    Mental Status Examination:    Level of consciousness:  within normal limits   Appearance:  well-appearing  Behavior/Motor:  no abnormalities noted  Attitude toward examiner:  cooperative and attentive  Speech:  spontaneous, yelling loud,   Mood: \"I feel good. \"  Affect:  mood congruent appropriate and pleasant  Thought processes: Without flight of ideas loose associationsThought content: Devoid of any auditory visual hallucinations delusions or other perceptual normalities.   Denies SI/HI intent or plan  Cognition:  oriented to person, place, and time   Concentration fair  Memory intact  Insight improved  Judgement improved  Fund of Knowledge adequate    ASSESSMENT:   Patient symptoms are:  [] Well controlled  [] Improving  [] Worsening  [x] No change      Diagnosis:   Principal Problem:    Severe manic bipolar 1 disorder with psychotic behavior ([facility] [address]  Resolved Problems:    * No resolved hospital problems. *      LABS:    No results for input(s): WBC, HGB, PLT in the last 72 hours. No results for input(s): NA, K, CL, CO2, BUN, CREATININE, GLUCOSE in the last 72 hours. No results for input(s): BILITOT, ALKPHOS, AST, ALT in the last 72 hours. Lab Results   Component Value Date/Time    LABAMPH NOT DETECTED 10/26/2022 06:41 PM    LABAMPH NOT DETECTED 05/21/2012 09:47 AM    BARBSCNU NOT DETECTED 10/26/2022 06:41 PM    LABBENZ NOT DETECTED 10/26/2022 06:41 PM    LABBENZ NOT DETECTED 05/21/2012 09:47 AM    CANNAB POSITIVE 01/13/2014 12:00 PM    COCAINESCRN NOT DETECTED 01/13/2014 12:00 PM    LABMETH NOT DETECTED 10/26/2022 06:41 PM    OPIATESCREENURINE NOT DETECTED 10/26/2022 06:41 PM    PHENCYCLIDINESCREENURINE NOT DETECTED 10/26/2022 06:41 PM    ETOH 111 10/26/2022 09:31 PM     Lab Results   Component Value Date/Time    TSH 0.736 10/27/2022 06:10 AM     No results found for: LITHIUM  No results found for: VALPROATE, CBMZ        Treatment Plan:  The patient's diagnosis, treatment plan, medication management were formulated after patient was seen directly by the attending physician and myself and all relevant documentation was reviewed. Risk, benefit, side effects, possible outcomes of the medication and alternatives discussed with the patient and the patient demonstrated understanding. The patient was also educated that the outcome of treatment will depend on the medication compliance as directed by the prescribers along with regular follow-up, compliance with the labs and other work-up, as clinically indicated.     New Medications started during this admission :    Lithium 300 mg twice daily for mood stabilization due to acute rudy  Impact level on day 4  Invega 3 mg 3 times daily for psychotic feature including paranoia    Collateral information: Followed by social work  CD evaluation   Encourage patient to attend group and other milieu activities. Discharge planning discussed with the patient and treatment team.    PSYCHOTHERAPY/COUNSELING:  [x] Therapeutic interview  [x] Supportive  [] CBT  [] Ongoing  [] Other    [x] Patient continues to need, on a daily basis, active treatment furnished directly by or requiring the supervision of inpatient psychiatric personnel      Anticipated Length of stay: 5 to 7 days based on stability    NOTE: This report was transcribed using voice recognition software. Every effort was made to ensure accuracy; however, inadvertent computerized transcription errors may be present.        Electronically signed by GA Blancas CNP on 50/16/6851 at 12:05 PM

## 2022-10-31 NOTE — PROGRESS NOTES
Attended morning community meeting. Updated on staffing and daily expectations. Shared goal for the day as to stay focused and not listen to others.

## 2022-10-31 NOTE — GROUP NOTE
Date: 10/31/2022    Group Start Time: 1000  Group End Time: 4875  Group Topic: Psychoeducation    [facility]  [id number] [address] [facility]                                                                        Group Therapy Note    Wellness Binder Information  Module Name:  10 things to give up. Patient's Goal:  Patient will be able to id different strategies to help increase happiness. Notes:  Pleasant and sharing in group able to give input to strategies. Status After Intervention:  Improved    Participation Level:  Active Listener    Participation Quality: Appropriate, Attentive, and Sharing      Speech:  normal      Thought Process/Content: Logical      Affective Functioning: Congruent      Mood: euthymic      Level of consciousness:  Alert, Oriented x4, and Attentive      Response to Learning: Able to verbalize/acknowledge new learning, Able to retain information, and Progressing to goal      Endings: None Reported    Modes of Intervention: Education, Support, Socialization, Exploration, and Problem-solving      Discipline Responsible: Psychoeducational Specialist      Signature:  Lovely Canales

## 2022-10-31 NOTE — PLAN OF CARE
Denies SI/HI  denies hallucinations however appears preoccupied at times  mood is anxious with underlying irritability    out on the unit but stays to self   attended treatment team   cooperative with meds  attending groups  will continue to monitor

## 2022-10-31 NOTE — PLAN OF CARE
Problem: Chronic Conditions and Co-morbidities  Goal: Patient's chronic conditions and co-morbidity symptoms are monitored and maintained or improved  10/31/2022 1703 by Poornima Ceballos RN  Outcome: Progressing     Problem: Self Harm/Suicidality  Goal: Will have no self-injury during hospital stay  Description: INTERVENTIONS:  1. Q 30 MINUTES: Routine safety checks  2. Q SHIFT & PRN: Assess risk to determine if routine checks are adequate to maintain patient safety  10/31/2022 1703 by Poornima Ceballos RN  Outcome: Progressing     Problem: Depression  Goal: Will be euthymic at discharge  Description: INTERVENTIONS:  1. Administer medication as ordered  2. Provide emotional support via 1:1 interaction with staff  3. Encourage involvement in milieu/groups/activities  4. Monitor for social isolation  10/31/2022 1703 by Poornima Ceballos RN  Outcome: Progressing     Problem: Behavior  Goal: Pt/Family maintain appropriate behavior and adhere to behavioral management agreement, if implemented  Description: INTERVENTIONS:  1. Assess patient/family's coping skills and  non-compliant behavior (including use of illegal substances)  2. Notify security of behavior or suspected illegal substances which indicate the need for search of the family and/or belongings  3. Encourage verbalization of thoughts and concerns in a socially appropriate manner  4. Utilize positive, consistent limit setting strategies supporting safety of patient, staff and others  5. Encourage participation in the decision making process about the behavioral management agreement  6. If a visitor's behavior poses a threat to safety call refer to organization policy. 7. Initiate consult with , Psychosocial CNS, Spiritual Care as appropriate  10/31/2022 1703 by Poornima Ceballos RN  Outcome: Progressing     Problem: Anxiety  Goal: Will report anxiety at manageable levels  Description: INTERVENTIONS:  1. Administer medication as ordered  2.  Teach and rehearse alternative coping skills  3. Provide emotional support with 1:1 interaction with staff  10/31/2022 1703 by Ajit Galeas RN  Outcome: Progressing     Problem: Safety - Adult  Goal: Free from fall injury  10/31/2022 1703 by Ajit Galeas RN  Outcome: Progressing     Problem: Pain  Goal: Verbalizes/displays adequate comfort level or baseline comfort level  10/31/2022 1703 by Ajit Galeas RN  Outcome: Progressing   Pt has been out on the unit and on approach she is pleasant, cooperative and has bright affect during interaction with her. She states \"I feel great\" and denies any depression, anxiety and suicidal ideations. She states \"I'll never do that again\" when referring to drinking and taking medication.

## 2022-11-01 LAB
LITHIUM DOSE AMOUNT: NORMAL
LITHIUM LEVEL: 0.76 MMOL/L (ref 0.5–1.5)

## 2022-11-01 PROCEDURE — 6370000000 HC RX 637 (ALT 250 FOR IP)

## 2022-11-01 PROCEDURE — 6370000000 HC RX 637 (ALT 250 FOR IP): Performed by: PSYCHIATRY & NEUROLOGY

## 2022-11-01 PROCEDURE — 36415 COLL VENOUS BLD VENIPUNCTURE: CPT

## 2022-11-01 PROCEDURE — 6370000000 HC RX 637 (ALT 250 FOR IP): Performed by: NURSE PRACTITIONER

## 2022-11-01 PROCEDURE — 6360000002 HC RX W HCPCS

## 2022-11-01 PROCEDURE — 80178 ASSAY OF LITHIUM: CPT

## 2022-11-01 PROCEDURE — 94640 AIRWAY INHALATION TREATMENT: CPT

## 2022-11-01 PROCEDURE — 99232 SBSQ HOSP IP/OBS MODERATE 35: CPT | Performed by: NURSE PRACTITIONER

## 2022-11-01 PROCEDURE — 1240000000 HC EMOTIONAL WELLNESS R&B

## 2022-11-01 RX ADMIN — PANTOPRAZOLE SODIUM 40 MG: 40 TABLET, DELAYED RELEASE ORAL at 06:30

## 2022-11-01 RX ADMIN — ATORVASTATIN CALCIUM 40 MG: 40 TABLET, FILM COATED ORAL at 09:45

## 2022-11-01 RX ADMIN — PALIPERIDONE 3 MG: 3 TABLET, EXTENDED RELEASE ORAL at 09:46

## 2022-11-01 RX ADMIN — IPRATROPIUM BROMIDE AND ALBUTEROL SULFATE 1 AMPULE: .5; 2.5 SOLUTION RESPIRATORY (INHALATION) at 21:36

## 2022-11-01 RX ADMIN — IPRATROPIUM BROMIDE AND ALBUTEROL SULFATE 1 AMPULE: .5; 2.5 SOLUTION RESPIRATORY (INHALATION) at 12:57

## 2022-11-01 RX ADMIN — BUDESONIDE INHALATION SUSPENSION 500 MCG: 0.5 SUSPENSION RESPIRATORY (INHALATION) at 09:53

## 2022-11-01 RX ADMIN — LITHIUM CARBONATE 300 MG: 300 CAPSULE, GELATIN COATED ORAL at 09:46

## 2022-11-01 RX ADMIN — ARFORMOTEROL TARTRATE 15 MCG: 15 SOLUTION RESPIRATORY (INHALATION) at 21:36

## 2022-11-01 RX ADMIN — MELATONIN 3 MG ORAL TABLET 3 MG: 3 TABLET ORAL at 21:00

## 2022-11-01 RX ADMIN — IPRATROPIUM BROMIDE AND ALBUTEROL SULFATE 1 AMPULE: .5; 2.5 SOLUTION RESPIRATORY (INHALATION) at 09:53

## 2022-11-01 RX ADMIN — SPIRONOLACTONE 25 MG: 25 TABLET ORAL at 09:46

## 2022-11-01 RX ADMIN — LITHIUM CARBONATE 300 MG: 300 CAPSULE, GELATIN COATED ORAL at 17:08

## 2022-11-01 RX ADMIN — ASPIRIN 81 MG CHEWABLE TABLET 81 MG: 81 TABLET CHEWABLE at 09:46

## 2022-11-01 RX ADMIN — PALIPERIDONE 3 MG: 3 TABLET, EXTENDED RELEASE ORAL at 21:00

## 2022-11-01 RX ADMIN — BUMETANIDE 2 MG: 1 TABLET ORAL at 09:45

## 2022-11-01 RX ADMIN — SACUBITRIL AND VALSARTAN 1 TABLET: 97; 103 TABLET, FILM COATED ORAL at 09:46

## 2022-11-01 RX ADMIN — BUDESONIDE INHALATION SUSPENSION 500 MCG: 0.5 SUSPENSION RESPIRATORY (INHALATION) at 21:36

## 2022-11-01 RX ADMIN — IPRATROPIUM BROMIDE AND ALBUTEROL SULFATE 1 AMPULE: .5; 2.5 SOLUTION RESPIRATORY (INHALATION) at 16:07

## 2022-11-01 RX ADMIN — METOPROLOL SUCCINATE 100 MG: 25 TABLET, EXTENDED RELEASE ORAL at 09:40

## 2022-11-01 RX ADMIN — ARFORMOTEROL TARTRATE 15 MCG: 15 SOLUTION RESPIRATORY (INHALATION) at 09:53

## 2022-11-01 RX ADMIN — SACUBITRIL AND VALSARTAN 1 TABLET: 97; 103 TABLET, FILM COATED ORAL at 21:02

## 2022-11-01 ASSESSMENT — PAIN SCALES - GENERAL
PAINLEVEL_OUTOF10: 0
PAINLEVEL_OUTOF10: 0

## 2022-11-01 NOTE — PROGRESS NOTES
BEHAVIORAL HEALTH FOLLOW-UP NOTE     11/1/2022     Patient was seen and examined in person, Chart reviewed   Patient's case discussed with staff/team    Chief Complaint: \" I am feeling a lot better\"      Interim History:   Patient seen up on the unit she is watching TV. She vehemently denies suicidal homicidal ideations intent or plan she denies any auditory visual hallucination she maintains that she was never intentionally trying to hurt her self she actually took too much Tylenol. She states that she had poor sleep last night however staff indicates that she slept well last night. She is up on the unit eating well sleeping well no neurovegetative signs of depression no behavioral disturbances noted on the unit. States she is been talking with her daughter and lives with her daughter on discharge. She is been attending groups social with select peers on the unit feeling denies suicidal or homicidal ideations intent or plan        Appetite:   [x] Normal/Unchanged  [] Increased  [] Decreased      Sleep:       [x] Normal/Unchanged  [] Fair       [] Poor              Energy:    [] Normal/Unchanged  [x] Increased  [] Decreased        SI [] Present  [x] Absent    HI  []Present  [x] Absent     Aggression:  [] yes  [x] no    Patient is [x] able  [] unable to CONTRACT FOR SAFETY     PAST MEDICAL/PSYCHIATRIC HISTORY:   Past Medical History:   Diagnosis Date    Angina at rest [facility])     cath in 2007 showed no CAD    Asthma     Blood type AB+ 04/03/2018    CAD (coronary artery disease)     Carpal tunnel syndrome on left     CHF (congestive heart failure) ([facility])     CHF (congestive heart failure) ([facility] [address]     COPD (chronic obstructive pulmonary disease) ([facility] [address]     CVA (cerebral vascular accident) ([facility] [address] 12/2009 and 12/2010.     left parietal    GERD (gastroesophageal reflux disease)     HFrEF (heart failure with reduced ejection fraction) ([facility] [address] 01/14/2020 8/26/19- echo- LVEF 20-25%, LA enlarged, moderate MR, mild TR, mild PH, LVDD: 6.7, RVDD: 2.2 (17- echo- LVEF 10%, stage III DD, severely dilated LA, appears L>R atrial shunt, mod TR, LVDD: 6.6,  RVDD: 2.3)    History of blood transfusion     Hyperlipidemia Diagnosed in . Dyslipidemia. Hypertension diagnosed in     ICD (implantable cardioverter-defibrillator) in place 2018    Placed by Dr. Jose Correia. Left ovarian cyst     Liver lesion 2015    Moderate mitral regurgitation 2015    mild-moderate    Nonischemic cardiomyopathy (HCC)     NSTEMI (non-ST elevated myocardial infarction) ([facility] [address] 4/10/15--adm to [facility]    Suicide attempt by drug ingestion ([facility] [address]     attempt in  OD on ultram    Tobacco abuse     Vitamin D deficiency        FAMILY/SOCIAL HISTORY:  Family History   Problem Relation Age of Onset    High Blood Pressure Mother     Stroke Mother     High Blood Pressure Father     Stroke Father     Heart Disease Father     Pacemaker Father      Social History     Socioeconomic History    Marital status: Legally      Spouse name: Not on file    Number of children: Not on file    Years of education: Not on file    Highest education level: Not on file   Occupational History    Occupation: disability   Tobacco Use    Smoking status: Every Day     Packs/day: 0.50     Years: 30.00     Pack years: 15.00     Types: Cigarettes     Last attempt to quit: 2018     Years since quittin.3    Smokeless tobacco: Current     Types: Snuff    Tobacco comments:     currently 3 cig/day, 21. Vaping Use    Vaping Use: Never used   Substance and Sexual Activity    Alcohol use:  Yes     Alcohol/week: 14.0 standard drinks     Types: 14 Cans of beer per week    Drug use: Yes     Frequency: 3.0 times per week     Types: Marijuana Corrine Moreno)    Sexual activity: Not on file   Other Topics Concern    Not on file   Social History Narrative    Drinks 1-2 cups of coffee daily     Social Determinants of Health     Financial Resource Strain: Low Risk     Difficulty of Paying Living Expenses: Not hard at all   Food Insecurity: No Food Insecurity    Worried About Running Out of Food in the Last Year: Never true    Ran Out of Food in the Last Year: Never true   Transportation Needs: Not on file   Physical Activity: Not on file   Stress: Not on file   Social Connections: Not on file   Intimate Partner Violence: Not on file   Housing Stability: Not on file           ROS:  [x] All negative/unchanged except if checked.  Explain positive(checked items) below:  [] Constitutional  [] Eyes  [] Ear/Nose/Mouth/Throat  [] Respiratory  [] CV  [] GI  []   [] Musculoskeletal  [] Skin/Breast  [] Neurological  [] Endocrine  [] Heme/Lymph  [] Allergic/Immunologic    Explanation:     MEDICATIONS:    Current Facility-Administered Medications:     lithium capsule 300 mg, 300 mg, Oral, BID , Iris Antunez APRN - CNP, 300 mg at 11/01/22 0946    paliperidone (INVEGA) extended release tablet 3 mg, 3 mg, Oral, BID, GA Del Valle - CNP, 3 mg at 11/01/22 0946    Arformoterol Tartrate (BROVANA) nebulizer solution 15 mcg, 15 mcg, Nebulization, BID, Leandra Powers MD, 15 mcg at 11/01/22 0953    budesonide (PULMICORT) nebulizer suspension 500 mcg, 500 mcg, Nebulization, BID, Leandra Powers MD, 500 mcg at 11/01/22 0953    atorvastatin (LIPITOR) tablet 40 mg, 40 mg, Oral, Daily, Leandra Powers MD, 40 mg at 11/01/22 0945    aspirin chewable tablet 81 mg, 81 mg, Oral, Daily, Leandra Powers MD, 81 mg at 11/01/22 0946    bumetanide (BUMEX) tablet 2 mg, 2 mg, Oral, Daily, Leandra Powers MD, 2 mg at 11/01/22 0945    hydrALAZINE (APRESOLINE) injection 10 mg, 10 mg, IntraVENous, Q6H PRN, Leandra Powers MD    ipratropium-albuterol (DUONEB) nebulizer solution 1 ampule, 1 ampule, Inhalation, Q4H Jewels Thornton MD, 1 ampule at 11/01/22 1257    metoprolol succinate (TOPROL XL) extended release tablet 100 mg, 100 mg, Oral, Daily, Leandra Powers MD, 100 mg at 11/01/22 0940    pantoprazole (PROTONIX) tablet 40 mg, 40 mg, Oral, GHASSAN MUSTAFA, Vivien Gallegos MD, 40 mg at 11/01/22 0630    sacubitril-valsartan (ENTRESTO)  MG per tablet 1 tablet, 1 tablet, Oral, BID, Vivien Gallegos MD, 1 tablet at 11/01/22 0946    spironolactone (ALDACTONE) tablet 25 mg, 25 mg, Oral, Daily, Vivien Gallegos MD, 25 mg at 11/01/22 0946    [START ON 11/4/2022] vitamin D (ERGOCALCIFEROL) capsule 50,000 Units, 50,000 Units, Oral, Weekly, Vivien Gallegos MD    magnesium hydroxide (MILK OF MAGNESIA) 400 MG/5ML suspension 30 mL, 30 mL, Oral, Daily PRN, Vlad Monterroso MD    nicotine (NICODERM CQ) 21 MG/24HR 1 patch, 1 patch, TransDERmal, Daily, Vlad Monterroso MD, 1 patch at 11/01/22 0940    aluminum & magnesium hydroxide-simethicone (MAALOX) 200-200-20 MG/5ML suspension 30 mL, 30 mL, Oral, PRN, Vlad Monterroso MD    hydrOXYzine pamoate (VISTARIL) capsule 50 mg, 50 mg, Oral, TID PRN, Vlad Monterroso MD    haloperidol (HALDOL) tablet 3 mg, 3 mg, Oral, Q6H PRN **OR** haloperidol lactate (HALDOL) injection 3 mg, 3 mg, IntraMUSCular, Q6H PRN, Vlad Monterroso MD    melatonin tablet 3 mg, 3 mg, Oral, Nightly, Vlad Monterroso MD, 3 mg at 10/31/22 2056      Examination:  /68   Pulse 76   Temp 97.5 °F (36.4 °C) (Oral)   Resp 14   Ht 5' 5\" (1.651 m)   Wt 118 lb (53.5 kg)   LMP 05/20/2014   SpO2 98%   BMI 19.64 kg/m²   Gait - steady  Medication side effects(SE): None reported    Mental Status Examination:    Level of consciousness:  within normal limits   Appearance:  well-appearing  Behavior/Motor:  no abnormalities noted  Attitude toward examiner:  cooperative and attentive  Speech:  spontaneous, yelling loud,   Mood: \"I feel good. \"  Affect:  mood congruent appropriate and pleasant  Thought processes: Without flight of ideas loose associationsThought content: Devoid of any auditory visual hallucinations delusions or other perceptual normalities.   Denies SI/HI intent or plan  Cognition:  oriented to person, place, and time   Concentration fair  Memory intact  Insight improved  Judgement improved  Fund of Knowledge adequate    ASSESSMENT:   Patient symptoms are:  [] Well controlled  [x] Improving  [] Worsening  [] No change      Diagnosis:   Principal Problem:    Severe manic bipolar 1 disorder with psychotic behavior ([facility] [address]  Resolved Problems:    * No resolved hospital problems. *      LABS:    No results for input(s): WBC, HGB, PLT in the last 72 hours. No results for input(s): NA, K, CL, CO2, BUN, CREATININE, GLUCOSE in the last 72 hours. No results for input(s): BILITOT, ALKPHOS, AST, ALT in the last 72 hours. Lab Results   Component Value Date/Time    LABAMPH NOT DETECTED 10/26/2022 06:41 PM    LABAMPH NOT DETECTED 05/21/2012 09:47 AM    BARBSCNU NOT DETECTED 10/26/2022 06:41 PM    LABBENZ NOT DETECTED 10/26/2022 06:41 PM    LABBENZ NOT DETECTED 05/21/2012 09:47 AM    CANNAB POSITIVE 01/13/2014 12:00 PM    COCAINESCRN NOT DETECTED 01/13/2014 12:00 PM    LABMETH NOT DETECTED 10/26/2022 06:41 PM    OPIATESCREENURINE NOT DETECTED 10/26/2022 06:41 PM    PHENCYCLIDINESCREENURINE NOT DETECTED 10/26/2022 06:41 PM    ETOH 111 10/26/2022 09:31 PM     Lab Results   Component Value Date/Time    TSH 0.736 10/27/2022 06:10 AM     Lab Results   Component Value Date    LITHIUM 0.76 11/01/2022     No results found for: VALPROATE, CBMZ        Treatment Plan:  The patient's diagnosis, treatment plan, medication management were formulated after patient was seen directly by the attending physician and myself and all relevant documentation was reviewed. Risk, benefit, side effects, possible outcomes of the medication and alternatives discussed with the patient and the patient demonstrated understanding. The patient was also educated that the outcome of treatment will depend on the medication compliance as directed by the prescribers along with regular follow-up, compliance with the labs and other work-up, as clinically indicated.     New Medications started during this admission :

## 2022-11-01 NOTE — GROUP NOTE
Group Therapy Note    Date: 11/1/2022    Group Start Time: 1000  Group End Time: 1750  Group Topic: Cognitive Skills    [facility]    Thankful ADRIÁN Palmer, HARI        Group Therapy Note    Attendees: 11       Patient's Goal:  Patient attended community meeting and was able to identify their daily goal as \"to stay on the right track. \"     Notes:  Pt was alert and oriented and an active participant in group therapy. Pt also learned about trauma, symptoms and treatment modalities. Status After Intervention:  Improved    Participation Level: Active Listener and Interactive    Participation Quality: Appropriate, Attentive, and Sharing      Speech:  normal      Thought Process/Content: Logical      Affective Functioning: Congruent      Mood: euthymic      Level of consciousness:  Alert, Oriented x4, and Attentive      Response to Learning: Able to verbalize current knowledge/experience, Able to verbalize/acknowledge new learning, and Able to retain information      Endings: None Reported    Modes of Intervention: Education, Support, Socialization, Exploration, Clarifying, and Problem-solving      Discipline Responsible: /Counselor      Signature:   ADRIÁN Kendrick LSW

## 2022-11-01 NOTE — CARE COORDINATION
Sw met with pt. Pt found laying in bed, sits up and shares fair eye contact with sw during discussion. Pt appears to have normal mood, appropriate affect. Pt reports that she is feeling ok today, but that she is tired. Pt reports that she was not feeling tired all night, so she did not sleep at all last night. Pt reports that she is resting now because she is trying to catch up on sleep. Pt denies SI/HI/AVH. Pt denied needing further support from sw at this time. Pt plans to discharge home where she lives with her daughter, follow up with Danilo Goddard.

## 2022-11-01 NOTE — GROUP NOTE
Group Therapy Note    Date: 11/1/2022    Group Start Time: 1100  Group End Time: 1150  Group Topic: Psychotherapy    [facility]  [address], [facility]        Group Therapy Note    Attendees: 8       Patient's Goal:  To increase social interaction and improve relationships with others. Notes:  Pt was attentive in group and was able to identify an agenda. Pt was also able to verbalize relating to others within the group. Status After Intervention:  Unchanged    Participation Level:  Active Listener    Participation Quality: Appropriate and Attentive      Speech:  normal      Thought Process/Content: Logical      Affective Functioning: Flat      Mood: depressed      Level of consciousness:  Alert and Oriented x4      Response to Learning: Able to retain information      Endings: None Reported    Modes of Intervention: Support, Socialization, and Exploration      Discipline Responsible: /Counselor      Signature:  ADRIÁN Flores, [place]

## 2022-11-01 NOTE — CARE COORDINATION
Sameera contacted [place] and scheduled appointment for pt. Pt has mental health diagnostic assessment 11/3 at 2pm with Katerina in office. Pt has initial psych appointment 11/9 at 10am with Sravanthi in office.

## 2022-11-02 LAB — SARS-COV-2, NAAT: NOT DETECTED

## 2022-11-02 PROCEDURE — 6370000000 HC RX 637 (ALT 250 FOR IP): Performed by: PSYCHIATRY & NEUROLOGY

## 2022-11-02 PROCEDURE — 6360000002 HC RX W HCPCS

## 2022-11-02 PROCEDURE — 94640 AIRWAY INHALATION TREATMENT: CPT

## 2022-11-02 PROCEDURE — 6370000000 HC RX 637 (ALT 250 FOR IP): Performed by: NURSE PRACTITIONER

## 2022-11-02 PROCEDURE — 1240000000 HC EMOTIONAL WELLNESS R&B

## 2022-11-02 PROCEDURE — 6370000000 HC RX 637 (ALT 250 FOR IP)

## 2022-11-02 PROCEDURE — 87635 SARS-COV-2 COVID-19 AMP PRB: CPT

## 2022-11-02 PROCEDURE — 99232 SBSQ HOSP IP/OBS MODERATE 35: CPT | Performed by: NURSE PRACTITIONER

## 2022-11-02 RX ADMIN — MELATONIN 3 MG ORAL TABLET 3 MG: 3 TABLET ORAL at 21:50

## 2022-11-02 RX ADMIN — IPRATROPIUM BROMIDE AND ALBUTEROL SULFATE 1 AMPULE: .5; 2.5 SOLUTION RESPIRATORY (INHALATION) at 21:30

## 2022-11-02 RX ADMIN — IPRATROPIUM BROMIDE AND ALBUTEROL SULFATE 1 AMPULE: .5; 2.5 SOLUTION RESPIRATORY (INHALATION) at 16:44

## 2022-11-02 RX ADMIN — ARFORMOTEROL TARTRATE 15 MCG: 15 SOLUTION RESPIRATORY (INHALATION) at 08:53

## 2022-11-02 RX ADMIN — HYDROXYZINE PAMOATE 50 MG: 50 CAPSULE ORAL at 10:25

## 2022-11-02 RX ADMIN — PANTOPRAZOLE SODIUM 40 MG: 40 TABLET, DELAYED RELEASE ORAL at 06:22

## 2022-11-02 RX ADMIN — ATORVASTATIN CALCIUM 40 MG: 40 TABLET, FILM COATED ORAL at 09:17

## 2022-11-02 RX ADMIN — LITHIUM CARBONATE 300 MG: 300 CAPSULE, GELATIN COATED ORAL at 09:17

## 2022-11-02 RX ADMIN — SACUBITRIL AND VALSARTAN 1 TABLET: 97; 103 TABLET, FILM COATED ORAL at 21:50

## 2022-11-02 RX ADMIN — IPRATROPIUM BROMIDE AND ALBUTEROL SULFATE 1 AMPULE: .5; 2.5 SOLUTION RESPIRATORY (INHALATION) at 08:53

## 2022-11-02 RX ADMIN — HYDROXYZINE PAMOATE 50 MG: 50 CAPSULE ORAL at 21:52

## 2022-11-02 RX ADMIN — PALIPERIDONE 3 MG: 3 TABLET, EXTENDED RELEASE ORAL at 09:16

## 2022-11-02 RX ADMIN — PALIPERIDONE 3 MG: 3 TABLET, EXTENDED RELEASE ORAL at 21:50

## 2022-11-02 RX ADMIN — BUDESONIDE INHALATION SUSPENSION 500 MCG: 0.5 SUSPENSION RESPIRATORY (INHALATION) at 08:53

## 2022-11-02 RX ADMIN — LITHIUM CARBONATE 300 MG: 300 CAPSULE, GELATIN COATED ORAL at 17:23

## 2022-11-02 RX ADMIN — ARFORMOTEROL TARTRATE 15 MCG: 15 SOLUTION RESPIRATORY (INHALATION) at 21:30

## 2022-11-02 RX ADMIN — BUDESONIDE INHALATION SUSPENSION 500 MCG: 0.5 SUSPENSION RESPIRATORY (INHALATION) at 21:30

## 2022-11-02 RX ADMIN — ASPIRIN 81 MG CHEWABLE TABLET 81 MG: 81 TABLET CHEWABLE at 09:17

## 2022-11-02 RX ADMIN — SACUBITRIL AND VALSARTAN 1 TABLET: 97; 103 TABLET, FILM COATED ORAL at 09:16

## 2022-11-02 ASSESSMENT — PAIN SCALES - GENERAL: PAINLEVEL_OUTOF10: 0

## 2022-11-02 NOTE — PLAN OF CARE
Patient denies suicidal ideation, homicidal ideations and AVH. Patient denies anxiety and depression. Patient appears flat, sad and anxious but does brighten with conversation. Presents calm and cooperative during assessment. Patient is out on the unit and is social with peers. Medications taken without issue. No complaints or concerns verbalized at this time. No unit problems reported. Will continue to observe and support. Problem: Chronic Conditions and Co-morbidities  Goal: Patient's chronic conditions and co-morbidity symptoms are monitored and maintained or improved  Outcome: Progressing     Problem: Self Harm/Suicidality  Goal: Will have no self-injury during hospital stay  Description: INTERVENTIONS:  1. Q 30 MINUTES: Routine safety checks  2. Q SHIFT & PRN: Assess risk to determine if routine checks are adequate to maintain patient safety  Outcome: Progressing     Problem: Depression  Goal: Will be euthymic at discharge  Description: INTERVENTIONS:  1. Administer medication as ordered  2. Provide emotional support via 1:1 interaction with staff  3. Encourage involvement in milieu/groups/activities  4. Monitor for social isolation  Outcome: Progressing     Problem: Behavior  Goal: Pt/Family maintain appropriate behavior and adhere to behavioral management agreement, if implemented  Description: INTERVENTIONS:  1. Assess patient/family's coping skills and  non-compliant behavior (including use of illegal substances)  2. Notify security of behavior or suspected illegal substances which indicate the need for search of the family and/or belongings  3. Encourage verbalization of thoughts and concerns in a socially appropriate manner  4. Utilize positive, consistent limit setting strategies supporting safety of patient, staff and others  5. Encourage participation in the decision making process about the behavioral management agreement  6.  If a visitor's behavior poses a threat to safety call refer to organization policy.   7. Initiate consult with , Psychosocial CNS, Spiritual Care as appropriate  Outcome: Progressing

## 2022-11-02 NOTE — PLAN OF CARE
Addendum for 11.1   6p. Attended / particip well in this neil's 1h group on veterans day and lessons fr the life of raciel rodriguez. Appeared to have very good focus during the 2 quiz / scholastic activities. Scored 100% on each; appeared in good spirits. Appeared to all digest key learning points.

## 2022-11-02 NOTE — PROGRESS NOTES
Patient came to community meeting. However when prompted to share goal patient stated yes then ran out of group. Patient did not return or attend psycho-education group. Patient visited to set goal for the day and stated sorry I had to use the bathroom. Patient unable to set goal for the day.  Patient stated I don't know when prompted to set goal.

## 2022-11-02 NOTE — CARE COORDINATION
Sw met with pt. Pt found socializing with peers in the common area. Pt bright and pleasant, reports that she is feeling much better today and that she was able to sleep good last night. Pt shares good eye contact during encounter. Pt denies SI/HI/AVH, reports that she is looking forward to celebrating her grandson's birthday with him when she is discharged. Pt plans to return home where she lives with her daughter, follow up with Alex Shirley.

## 2022-11-02 NOTE — CARE COORDINATION
LORRAINE received a call from pt mom Johny Estrella [phone] (MAYNOR signed) asking what time pt will be discharging today. LORRAINE advised Johny Estrella that pt is not scheduled for a discharge today. Johny Estrella upset and stated \"yall can pay her late fees then\" and terminated the phone call.

## 2022-11-03 VITALS
DIASTOLIC BLOOD PRESSURE: 59 MMHG | HEIGHT: 65 IN | TEMPERATURE: 98.2 F | WEIGHT: 118 LBS | SYSTOLIC BLOOD PRESSURE: 105 MMHG | OXYGEN SATURATION: 98 % | HEART RATE: 77 BPM | RESPIRATION RATE: 20 BRPM | BODY MASS INDEX: 19.66 KG/M2

## 2022-11-03 PROCEDURE — 6370000000 HC RX 637 (ALT 250 FOR IP): Performed by: PSYCHIATRY & NEUROLOGY

## 2022-11-03 PROCEDURE — 99239 HOSP IP/OBS DSCHRG MGMT >30: CPT | Performed by: NURSE PRACTITIONER

## 2022-11-03 PROCEDURE — 6370000000 HC RX 637 (ALT 250 FOR IP)

## 2022-11-03 PROCEDURE — 6370000000 HC RX 637 (ALT 250 FOR IP): Performed by: NURSE PRACTITIONER

## 2022-11-03 PROCEDURE — 94640 AIRWAY INHALATION TREATMENT: CPT

## 2022-11-03 RX ORDER — BUMETANIDE 2 MG/1
1 TABLET ORAL DAILY
Qty: 90 TABLET | Refills: 3 | Status: SHIPPED | OUTPATIENT
Start: 2022-11-03

## 2022-11-03 RX ORDER — ERGOCALCIFEROL 1.25 MG/1
50000 CAPSULE ORAL WEEKLY
Qty: 5 CAPSULE | Refills: 1 | Status: SHIPPED | OUTPATIENT
Start: 2022-11-04 | End: 2022-12-17

## 2022-11-03 RX ORDER — LANOLIN ALCOHOL/MO/W.PET/CERES
3 CREAM (GRAM) TOPICAL NIGHTLY
Qty: 30 TABLET | Refills: 0 | Status: SHIPPED | OUTPATIENT
Start: 2022-11-03 | End: 2022-12-03

## 2022-11-03 RX ORDER — LITHIUM CARBONATE 300 MG/1
300 CAPSULE ORAL 2 TIMES DAILY WITH MEALS
Qty: 60 CAPSULE | Refills: 0 | Status: SHIPPED | OUTPATIENT
Start: 2022-11-03 | End: 2022-12-03

## 2022-11-03 RX ORDER — PALIPERIDONE 3 MG/1
3 TABLET, EXTENDED RELEASE ORAL 2 TIMES DAILY
Qty: 60 TABLET | Refills: 0 | Status: SHIPPED | OUTPATIENT
Start: 2022-11-03 | End: 2022-12-03

## 2022-11-03 RX ADMIN — ARFORMOTEROL TARTRATE 15 MCG: 15 SOLUTION RESPIRATORY (INHALATION) at 09:56

## 2022-11-03 RX ADMIN — BUDESONIDE INHALATION SUSPENSION 500 MCG: 0.5 SUSPENSION RESPIRATORY (INHALATION) at 09:56

## 2022-11-03 RX ADMIN — ATORVASTATIN CALCIUM 40 MG: 40 TABLET, FILM COATED ORAL at 10:23

## 2022-11-03 RX ADMIN — BUMETANIDE 2 MG: 1 TABLET ORAL at 10:22

## 2022-11-03 RX ADMIN — SPIRONOLACTONE 25 MG: 25 TABLET ORAL at 10:24

## 2022-11-03 RX ADMIN — SACUBITRIL AND VALSARTAN 1 TABLET: 97; 103 TABLET, FILM COATED ORAL at 10:23

## 2022-11-03 RX ADMIN — LITHIUM CARBONATE 300 MG: 300 CAPSULE, GELATIN COATED ORAL at 10:23

## 2022-11-03 RX ADMIN — PALIPERIDONE 3 MG: 3 TABLET, EXTENDED RELEASE ORAL at 10:23

## 2022-11-03 RX ADMIN — IPRATROPIUM BROMIDE AND ALBUTEROL SULFATE 1 AMPULE: .5; 2.5 SOLUTION RESPIRATORY (INHALATION) at 09:55

## 2022-11-03 RX ADMIN — ASPIRIN 81 MG CHEWABLE TABLET 81 MG: 81 TABLET CHEWABLE at 10:22

## 2022-11-03 RX ADMIN — PANTOPRAZOLE SODIUM 40 MG: 40 TABLET, DELAYED RELEASE ORAL at 06:45

## 2022-11-03 NOTE — PLAN OF CARE
Problem: Chronic Conditions and Co-morbidities  Goal: Patient's chronic conditions and co-morbidity symptoms are monitored and maintained or improved  Outcome: Progressing     Problem: Depression  Goal: Will be euthymic at discharge  Description: INTERVENTIONS:  1. Administer medication as ordered  2. Provide emotional support via 1:1 interaction with staff  3. Encourage involvement in milieu/groups/activities  4. Monitor for social isolation  Outcome: Adequate for Discharge     Problem: Behavior  Goal: Pt/Family maintain appropriate behavior and adhere to behavioral management agreement, if implemented  Description: INTERVENTIONS:  1. Assess patient/family's coping skills and  non-compliant behavior (including use of illegal substances)  2. Notify security of behavior or suspected illegal substances which indicate the need for search of the family and/or belongings  3. Encourage verbalization of thoughts and concerns in a socially appropriate manner  4. Utilize positive, consistent limit setting strategies supporting safety of patient, staff and others  5. Encourage participation in the decision making process about the behavioral management agreement  6. If a visitor's behavior poses a threat to safety call refer to organization policy.   7. Initiate consult with , Psychosocial CNS, Spiritual Care as appropriate  Outcome: Adequate for Discharge     Problem: Self Harm/Suicidality  Goal: Will have no self-injury during hospital stay  Description: INTERVENTIONS:  1. Q 30 MINUTES: Routine safety checks  2. Q SHIFT & PRN: Assess risk to determine if routine checks are adequate to maintain patient safety  Outcome: Completed

## 2022-11-03 NOTE — PROGRESS NOTES
BEHAVIORAL HEALTH FOLLOW-UP NOTE     11/3/2022     Patient was seen and examined in person, Chart reviewed   Patient's case discussed with staff/team    Chief Complaint: \" I am a little disappointed I did not go home today\"      Interim History:   I saw patient this afternoon up on the unit. She states that she is \"a little disappointed. \"  That she can go home today. She denies suicidal homicidal ideations intent or plan she denies auditory or visual hallucinations her affect is bright and pleasant she is smiling she attends groups and she socializing with peers. Eating well sleeping well no neurovegetative signs of depression. She voices readiness for discharge      Appetite:   [x] Normal/Unchanged  [] Increased  [] Decreased      Sleep:       [x] Normal/Unchanged  [] Fair       [] Poor              Energy:    [] Normal/Unchanged  [x] Increased  [] Decreased        SI [] Present  [x] Absent    HI  []Present  [x] Absent     Aggression:  [] yes  [x] no    Patient is [x] able  [] unable to CONTRACT FOR SAFETY     PAST MEDICAL/PSYCHIATRIC HISTORY:   Past Medical History:   Diagnosis Date    Angina at [facility])     cath in 2007 showed no CAD    Asthma     Blood type AB+ 04/03/2018    CAD (coronary artery disease)     Carpal tunnel syndrome on left     CHF (congestive heart failure) ([facility])     CHF (congestive heart failure) ([facility] [address]     COPD (chronic obstructive pulmonary disease) ([facility] [address]     CVA (cerebral vascular accident) ([facility] [address] 12/2009 and 12/2010. left parietal    GERD (gastroesophageal reflux disease)     HFrEF (heart failure with reduced ejection fraction) ([facility] [address] 01/14/2020 8/26/19- echo- LVEF 20-25%, LA enlarged, moderate MR, mild TR, mild PH, LVDD: 6.7, RVDD: 2.2 (12/9/17- echo- LVEF 10%, stage III DD, severely dilated LA, appears L>R atrial shunt, mod TR, LVDD: 6.6,  RVDD: 2.3)    History of blood transfusion     Hyperlipidemia Diagnosed in 2007. Dyslipidemia.     Hypertension diagnosed in 2007    ICD (implantable cardioverter-defibrillator) in place 2018    Placed by Dr. Rodrigo Martinez. Left ovarian cyst     Liver lesion 2015    Moderate mitral regurgitation 2015    mild-moderate    Nonischemic cardiomyopathy (HCC)     NSTEMI (non-ST elevated myocardial infarction) ([facility] [address] 4/10/15--adm to [facility]    Suicide attempt by drug ingestion ([facility] [address]     attempt in  OD on ultram    Tobacco abuse     Vitamin D deficiency        FAMILY/SOCIAL HISTORY:  Family History   Problem Relation Age of Onset    High Blood Pressure Mother     Stroke Mother     High Blood Pressure Father     Stroke Father     Heart Disease Father     Pacemaker Father      Social History     Socioeconomic History    Marital status: Legally      Spouse name: Not on file    Number of children: Not on file    Years of education: Not on file    Highest education level: Not on file   Occupational History    Occupation: disability   Tobacco Use    Smoking status: Every Day     Packs/day: 0.50     Years: 30.00     Pack years: 15.00     Types: Cigarettes     Last attempt to quit: 2018     Years since quittin.3    Smokeless tobacco: Current     Types: Snuff    Tobacco comments:     currently 3 cig/day, 21. Vaping Use    Vaping Use: Never used   Substance and Sexual Activity    Alcohol use:  Yes     Alcohol/week: 14.0 standard drinks     Types: 14 Cans of beer per week    Drug use: Yes     Frequency: 3.0 times per week     Types: Marijuana Ramiro Radon)    Sexual activity: Not on file   Other Topics Concern    Not on file   Social History Narrative    Drinks 1-2 cups of coffee daily     Social Determinants of Health     Financial Resource Strain: Low Risk     Difficulty of Paying Living Expenses: Not hard at all   Food Insecurity: No Food Insecurity    Worried About Running Out of Food in the Last Year: Never true    Ran Out of Food in the Last Year: Never true   Transportation Needs: Not on file   Physical Activity: Not on file   Stress: Not on file   Social Connections: Not on file   Intimate Partner Violence: Not on file   Housing Stability: Not on file           ROS:  [x] All negative/unchanged except if checked.  Explain positive(checked items) below:  [] Constitutional  [] Eyes  [] Ear/Nose/Mouth/Throat  [] Respiratory  [] CV  [] GI  []   [] Musculoskeletal  [] Skin/Breast  [] Neurological  [] Endocrine  [] Heme/Lymph  [] Allergic/Immunologic    Explanation:     MEDICATIONS:    Current Facility-Administered Medications:     lithium capsule 300 mg, 300 mg, Oral, BID WC, Teddie Bloch, APRN - CNP, 300 mg at 11/03/22 1023    paliperidone (INVEGA) extended release tablet 3 mg, 3 mg, Oral, BID, Teddie Bloch, APRN - CNP, 3 mg at 11/03/22 1023    Arformoterol Tartrate (BROVANA) nebulizer solution 15 mcg, 15 mcg, Nebulization, BID, Kinsey Hassan MD, 15 mcg at 11/03/22 0956    budesonide (PULMICORT) nebulizer suspension 500 mcg, 500 mcg, Nebulization, BID, Kinsey Hassan MD, 500 mcg at 11/03/22 0956    atorvastatin (LIPITOR) tablet 40 mg, 40 mg, Oral, Daily, Kinsey Hassan MD, 40 mg at 11/03/22 1023    aspirin chewable tablet 81 mg, 81 mg, Oral, Daily, Kinsey Hassan MD, 81 mg at 11/03/22 1022    bumetanide (BUMEX) tablet 2 mg, 2 mg, Oral, Daily, Kinsey Hassan MD, 2 mg at 11/03/22 1022    hydrALAZINE (APRESOLINE) injection 10 mg, 10 mg, IntraVENous, Q6H PRN, Kinsey Hassan MD    ipratropium-albuterol (DUONEB) nebulizer solution 1 ampule, 1 ampule, Inhalation, Q4H WA, Kinsey Hassan MD, 1 ampule at 11/03/22 0955    metoprolol succinate (TOPROL XL) extended release tablet 100 mg, 100 mg, Oral, Daily, Kinsey Hassan MD, 100 mg at 11/01/22 0940    pantoprazole (PROTONIX) tablet 40 mg, 40 mg, Oral, QAM AC, Kinsey Hassan MD, 40 mg at 11/03/22 0645    sacubitril-valsartan (ENTRESTO)  MG per tablet 1 tablet, 1 tablet, Oral, BID, Kinsey Hassan MD, 1 tablet at 11/03/22 1023    spironolactone (ALDACTONE) tablet 25 mg, 25 mg, Oral, Daily, Kinsey Hassan MD, 25 mg at 11/03/22 1024    [START ON 11/4/2022] vitamin D (ERGOCALCIFEROL) capsule 50,000 Units, 50,000 Units, Oral, Weekly, Claude Early, MD    magnesium hydroxide (MILK OF MAGNESIA) 400 MG/5ML suspension 30 mL, 30 mL, Oral, Daily PRN, Tawanda Stanford MD    nicotine (NICODERM CQ) 21 MG/24HR 1 patch, 1 patch, TransDERmal, Daily, Tawanda Stanford MD, 1 patch at 11/03/22 1013    aluminum & magnesium hydroxide-simethicone (MAALOX) 200-200-20 MG/5ML suspension 30 mL, 30 mL, Oral, PRN, Tawanda Stanford MD    hydrOXYzine pamoate (VISTARIL) capsule 50 mg, 50 mg, Oral, TID PRN, Tawanda Stanford MD, 50 mg at 11/02/22 2152    haloperidol (HALDOL) tablet 3 mg, 3 mg, Oral, Q6H PRN **OR** haloperidol lactate (HALDOL) injection 3 mg, 3 mg, IntraMUSCular, Q6H PRN, Tawanda Stanford MD    melatonin tablet 3 mg, 3 mg, Oral, Nightly, Tawanda Stanford MD, 3 mg at 11/02/22 2150    Current Outpatient Medications:     lithium 300 MG capsule, Take 1 capsule by mouth 2 times daily (with meals), Disp: 60 capsule, Rfl: 0    melatonin 3 MG TABS tablet, Take 1 tablet by mouth nightly, Disp: 30 tablet, Rfl: 0    paliperidone (INVEGA) 3 MG extended release tablet, Take 1 tablet by mouth in the morning and at bedtime, Disp: 60 tablet, Rfl: 0    [START ON 11/4/2022] Ergocalciferol (VITAMIN D) 81939 units CAPS, Take 50,000 Units by mouth once a week for 7 doses, Disp: 5 capsule, Rfl: 1    bumetanide (BUMEX) 2 MG tablet, Take 0.5 tablets by mouth daily, Disp: 90 tablet, Rfl: 3    vitamin D3 (CHOLECALCIFEROL) 10 MCG (400 UNIT) TABS tablet, Take 400 Units by mouth daily Take 2 tablets PO once daily, Disp: , Rfl:     pantoprazole (PROTONIX) 20 MG tablet, Take 1 tablet by mouth daily, Disp: 30 tablet, Rfl: 0    nitroGLYCERIN (NITROSTAT) 0.4 MG SL tablet, Place 1 tablet under the tongue every 5 minutes as needed for Chest pain, Disp: 25 tablet, Rfl: 1    aspirin 81 MG chewable tablet, Take 1 tablet by mouth daily, Disp: 90 tablet, Rfl: 2    atorvastatin (LIPITOR) 40 MG tablet, Take 1 tablet by mouth daily, Disp: 90 tablet, Rfl: 2    sacubitril-valsartan (ENTRESTO)  MG per tablet, Take 1 tablet by mouth 2 times daily, Disp: 180 tablet, Rfl: 2    metoprolol succinate (TOPROL XL) 100 MG extended release tablet, Take 1 tablet by mouth daily, Disp: 90 tablet, Rfl: 2    budesonide-formoterol (SYMBICORT) 160-4.5 MCG/ACT AERO, Inhale 2 puffs into the lungs 2 times daily, Disp: 6 each, Rfl: 2    montelukast (SINGULAIR) 10 MG tablet, Take 1 tablet by mouth nightly, Disp: 90 tablet, Rfl: 2    cetirizine (ZYRTEC) 10 MG tablet, Take 1 tablet by mouth daily, Disp: 30 tablet, Rfl: 5    tiotropium (SPIRIVA HANDIHALER) 18 MCG inhalation capsule, Inhale 1 capsule into the lungs daily, Disp: 90 capsule, Rfl: 1    albuterol sulfate HFA (VENTOLIN HFA) 108 (90 Base) MCG/ACT inhaler, Inhale 2 puffs into the lungs 4 times daily as needed for Wheezing, Disp: 18 g, Rfl: 5      Examination:  BP (!) 105/59   Pulse 77   Temp 98.2 °F (36.8 °C) (Temporal)   Resp 20   Ht 5' 5\" (1.651 m)   Wt 118 lb (53.5 kg)   LMP 05/20/2014   SpO2 98%   BMI 19.64 kg/m²   Gait - steady  Medication side effects(SE): None reported    Mental Status Examination:    Level of consciousness:  within normal limits   Appearance:  well-appearing  Behavior/Motor:  no abnormalities noted  Attitude toward examiner:  cooperative and attentive  Speech:  spontaneous, yelling loud,   Mood: \"I feel good. \"  Affect:  mood congruent appropriate and pleasant  Thought processes: Without flight of ideas loose associationsThought content: Devoid of any auditory visual hallucinations delusions or other perceptual normalities.   Denies SI/HI intent or plan  Cognition:  oriented to person, place, and time   Concentration fair  Memory intact  Insight improved  Judgement improved  Fund of Knowledge adequate    ASSESSMENT:   Patient symptoms are:  [] Well controlled  [x] Improving  [] Worsening  [] No change      Diagnosis:   Principal Problem:    Severe manic bipolar 1 disorder with psychotic behavior ([facility] [address]  Resolved Problems:    * No resolved hospital problems. *      LABS:    No results for input(s): WBC, HGB, PLT in the last 72 hours. No results for input(s): NA, K, CL, CO2, BUN, CREATININE, GLUCOSE in the last 72 hours. No results for input(s): BILITOT, ALKPHOS, AST, ALT in the last 72 hours. Lab Results   Component Value Date/Time    LABAMPH NOT DETECTED 10/26/2022 06:41 PM    LABAMPH NOT DETECTED 05/21/2012 09:47 AM    BARBSCNU NOT DETECTED 10/26/2022 06:41 PM    LABBENZ NOT DETECTED 10/26/2022 06:41 PM    LABBENZ NOT DETECTED 05/21/2012 09:47 AM    CANNAB POSITIVE 01/13/2014 12:00 PM    COCAINESCRN NOT DETECTED 01/13/2014 12:00 PM    LABMETH NOT DETECTED 10/26/2022 06:41 PM    OPIATESCREENURINE NOT DETECTED 10/26/2022 06:41 PM    PHENCYCLIDINESCREENURINE NOT DETECTED 10/26/2022 06:41 PM    ETOH 111 10/26/2022 09:31 PM     Lab Results   Component Value Date/Time    TSH 0.736 10/27/2022 06:10 AM     Lab Results   Component Value Date    LITHIUM 0.76 11/01/2022     No results found for: VALPROATE, CBMZ        Treatment Plan:  The patient's diagnosis, treatment plan, medication management were formulated after patient was seen directly by the attending physician and myself and all relevant documentation was reviewed. Risk, benefit, side effects, possible outcomes of the medication and alternatives discussed with the patient and the patient demonstrated understanding. The patient was also educated that the outcome of treatment will depend on the medication compliance as directed by the prescribers along with regular follow-up, compliance with the labs and other work-up, as clinically indicated.     New Medications started during this admission :    Lithium 300 mg twice daily for mood stabilization due to acute rudy  [place] level on day 4  Invega 3 mg 2 times daily for psychotic feature including paranoia    Collateral information: Followed by social work  CD evaluation   Encourage patient to attend group and other milieu activities. Discharge planning discussed with the patient and treatment team.    PSYCHOTHERAPY/COUNSELING:  [x] Therapeutic interview  [x] Supportive  [] CBT  [] Ongoing  [] Other    [x] Patient continues to need, on a daily basis, active treatment furnished directly by or requiring the supervision of inpatient psychiatric personnel      Anticipated Length of stay: 5 to 7 days based on stability    NOTE: This report was transcribed using voice recognition software. Every effort was made to ensure accuracy; however, inadvertent computerized transcription errors may be present.        Electronically signed by GA Bhatia CNP on 39/4/0674 at 4:09 PM

## 2022-11-03 NOTE — PLAN OF CARE
Problem: Chronic Conditions and Co-morbidities  Goal: Patient's chronic conditions and co-morbidity symptoms are monitored and maintained or improved  11/3/2022 1413 by Davy Ny RN  Outcome: Adequate for Discharge  11/3/2022 1413 by Davy Ny RN  Outcome: Progressing     Problem: Depression  Goal: Will be euthymic at discharge  Description: INTERVENTIONS:  1. Administer medication as ordered  2. Provide emotional support via 1:1 interaction with staff  3. Encourage involvement in milieu/groups/activities  4. Monitor for social isolation  11/3/2022 1413 by Davy Ny RN  Outcome: Adequate for Discharge  11/3/2022 1413 by Davy Ny RN  Outcome: Adequate for Discharge     Problem: Behavior  Goal: Pt/Family maintain appropriate behavior and adhere to behavioral management agreement, if implemented  Description: INTERVENTIONS:  1. Assess patient/family's coping skills and  non-compliant behavior (including use of illegal substances)  2. Notify security of behavior or suspected illegal substances which indicate the need for search of the family and/or belongings  3. Encourage verbalization of thoughts and concerns in a socially appropriate manner  4. Utilize positive, consistent limit setting strategies supporting safety of patient, staff and others  5. Encourage participation in the decision making process about the behavioral management agreement  6. If a visitor's behavior poses a threat to safety call refer to organization policy. 7. Initiate consult with , Psychosocial CNS, Spiritual Care as appropriate  11/3/2022 1413 by Davy Ny RN  Outcome: Adequate for Discharge  11/3/2022 1413 by Davy Ny RN  Outcome: Adequate for Discharge     Problem: Anxiety  Goal: Will report anxiety at manageable levels  Description: INTERVENTIONS:  1. Administer medication as ordered  2. Teach and rehearse alternative coping skills  3.  Provide emotional support with 1:1 interaction with staff  Outcome: Adequate for Discharge     Problem: Involuntary Admit  Goal: Will cooperate with staff recommendations and doctor's orders and will demonstrate appropriate behavior  Description: INTERVENTIONS:  1. Treat underlying conditions and offer medication as ordered  2. Educate regarding involuntary admission procedures and rules  3.  Contain excessive/inappropriate behavior per unit and hospital policies  Outcome: Adequate for Discharge     Problem: Safety - Adult  Goal: Free from fall injury  Outcome: Adequate for Discharge     Problem: Pain  Goal: Verbalizes/displays adequate comfort level or baseline comfort level  Outcome: Adequate for Discharge

## 2022-11-03 NOTE — PROGRESS NOTES
CLINICAL PHARMACY NOTE: MEDS TO BEDS    Total # of Prescriptions Filled: 5   The following medications were delivered to the patient:  Melatonin 3 mg  Lithium carbonate 300 mg  Paliperidone 6 mg  Vitamin D 1.25 mg  Bumetanide 2 mg  Delivered to [facility]    Additional Documentation:

## 2022-11-03 NOTE — GROUP NOTE
Group Therapy Note    Date: 11/3/2022    Group Start Time: 1000  Group End Time: 5529  Group Topic: Psychoeducation    [facility] [address]        Group Therapy Note    Attendees: 10       Notes: Attended discussion group on coping skills. Pleasant and interactive with peers. Status After Intervention:  Improved    Participation Level:  Active Listener and Interactive    Participation Quality: Appropriate, Attentive, and Sharing      Speech:  normal      Thought Process/Content: Logical      Affective Functioning: Congruent      Mood: euthymic      Level of consciousness:  Alert and Attentive      Response to Learning: Capable of insight, Able to change behavior, and Progressing to goal      Endings: None Reported    Modes of Intervention: Education, Support, Socialization, and Exploration      Discipline Responsible: Psychoeducational Specialist      Signature:  Nita Perez, [address]

## 2022-11-03 NOTE — BH NOTE
Patient educated on discharge instructions including medications and follow-up appointments; pt understanding of same. Pt D/C time out was 1535. Pt picked up by her daughter. All belongings sent home with patient including medications, which were filled here in the hospital pharmacy.

## 2022-11-03 NOTE — CARE COORDINATION
Sw met with pt to discuss discharge. Pt is bright and pleasant, states that she feels like she is doing great. Pt reports that she feels like she has been doing much better since she has been here, states that her thoughts feel better. Pt denies SI/HI/AVH, reports that she is looking forward to going home and celebrating her grandson's birthday. Pt states that she feels ready to discharge. Pt informed of her [facility] appointments and states that she plans to attend. Pt reports that she plans to return to her home where her daughter lives with her. Sw contacted pt mom Becky Munoz  (MAYNOR signed) to inform her of pt discharge. She states no concerns for pt discharge. Sameera contacted pt daughter Maria A Cotton  (MAYNOR signed) to inform her of pt discharge. She states no concerns for pt discharge, reports that she is able to  pt when she is discharged today.      In order to ensure appropriate transition and discharge planning is in place, the following documents have been transmitted to [facility], as the new outpatient provider:    The d/c diagnosis was transmitted to the next care provider  The reason for hospitalization was transmitted to the next care provider  The d/c medications (dosage and indication) were transmitted to the next care provider   The continuing care plan was transmitted to the next care provider

## 2022-11-03 NOTE — DISCHARGE SUMMARY
DISCHARGE SUMMARY      Patient ID:  Tawana Lund  [id number]  56 y.o.  1965    Admit date: 10/28/2022    Discharge date and time: 11/3/2022    Admitting Physician: Kiana Kruger MD     Discharge Physician: Dr Pattie Medina MD    Discharge Diagnoses:   Patient Active Problem List   Diagnosis    Hyperlipidemia    Left ovarian cyst    CVA (cerebral vascular accident)    COPD (chronic obstructive pulmonary disease) ([facility]    Carpal tunnel syndrome on left    Suicide attempt by drug ingestion ([facility]    Severe mitral regurgitation    Tobacco abuse    Asthma    Allergic rhinosinusitis    History of irregular menstrual bleeding    Anemia due to blood loss    History of MI (myocardial infarction)    Mass of right lobe of liver    Chronic systolic CHF (congestive heart failure), NYHA class 3 (HCC)    Non-rheumatic mitral regurgitation    Essential hypertension    ICD (implantable cardioverter-defibrillator) in place    Dyspnea    Coronary artery disease involving native coronary artery of native heart without angina pectoris    Nonischemic cardiomyopathy ([facility]    Blood type AB+    Acute decompensated heart failure (HCC)    Acute pulmonary edema (HCC)    Heart failure, left systolic, acute on chronic (HCC)    Acetaminophen overdose of undetermined intent    Hypercalcemia    Elevated lactic acid level    1st degree AV block    Mood disorder (HCC)    MDD (major depressive disorder), recurrent episode, mild (HCC)    Severe manic bipolar 1 disorder with psychotic behavior ([facility]       Admission Condition: poor    Discharged Condition: stable    Admission Circumstance: Patient admitted on pink slip after suicide attempt.           PAST MEDICAL/PSYCHIATRIC HISTORY:   Past Medical History:   Diagnosis Date    Angina at rest [facility])     cath in 2007 showed no CAD    Asthma     Blood type AB+ 04/03/2018    CAD (coronary artery disease)     Carpal tunnel syndrome on left     CHF (congestive heart failure) (HCC)     CHF (congestive heart failure) ([facility]     COPD (chronic obstructive pulmonary disease) ([facility]     CVA (cerebral vascular accident) ([facility] 2009 and 2010. left parietal    GERD (gastroesophageal reflux disease)     HFrEF (heart failure with reduced ejection fraction) ([facility] 2020- echo- LVEF 20-25%, LA enlarged, moderate MR, mild TR, mild PH, LVDD: 6.7, RVDD: 2.2 (17- echo- LVEF 10%, stage III DD, severely dilated LA, appears L>R atrial shunt, mod TR, LVDD: 6.6,  RVDD: 2.3)    History of blood transfusion     Hyperlipidemia Diagnosed in . Dyslipidemia. Hypertension diagnosed in     ICD (implantable cardioverter-defibrillator) in place 2018    Placed by Dr. Melissa Lozano. Left ovarian cyst     Liver lesion 2015    Moderate mitral regurgitation 2015    mild-moderate    Nonischemic cardiomyopathy (HCC)     NSTEMI (non-ST elevated myocardial infarction) ([facility] 4/10/15--adm to [facility]    Suicide attempt by drug ingestion ([facility]     attempt in  OD on ultram    Tobacco abuse     Vitamin D deficiency        FAMILY/SOCIAL HISTORY:  Family History   Problem Relation Age of Onset    High Blood Pressure Mother     Stroke Mother     High Blood Pressure Father     Stroke Father     Heart Disease Father     Pacemaker Father      Social History     Socioeconomic History    Marital status: Legally      Spouse name: Not on file    Number of children: Not on file    Years of education: Not on file    Highest education level: Not on file   Occupational History    Occupation: disability   Tobacco Use    Smoking status: Every Day     Packs/day: 0.50     Years: 30.00     Pack years: 15.00     Types: Cigarettes     Last attempt to quit: 2018     Years since quittin.3    Smokeless tobacco: Current     Types: Snuff    Tobacco comments:     currently 3 cig/day, 21. Vaping Use    Vaping Use: Never used   Substance and Sexual Activity    Alcohol use:  Yes     Alcohol/week: 14.0 standard drinks Types: 14 Cans of beer per week    Drug use: Yes     Frequency: 3.0 times per week     Types: Marijuana [place])    Sexual activity: Not on file   Other Topics Concern    Not on file   Social History Narrative    Drinks 1-2 cups of coffee daily     Social Determinants of Health     Financial Resource Strain: Low Risk     Difficulty of Paying Living Expenses: Not hard at all   Food Insecurity: No Food Insecurity    Worried About Running Out of Food in the Last Year: Never true    Ran Out of Food in the Last Year: Never true   Transportation Needs: Not on file   Physical Activity: Not on file   Stress: Not on file   Social Connections: Not on file   Intimate Partner Violence: Not on file   Housing Stability: Not on file       MEDICATIONS:    Current Facility-Administered Medications:     lithium capsule 300 mg, 300 mg, Oral, BID WC, GA Clark CNP, 300 mg at 11/03/22 1023    paliperidone (INVEGA) extended release tablet 3 mg, 3 mg, Oral, BID, GA Clark CNP, 3 mg at 11/03/22 1023    Arformoterol Tartrate (BROVANA) nebulizer solution 15 mcg, 15 mcg, Nebulization, BID, Adam Riley MD, 15 mcg at 11/03/22 0956    budesonide (PULMICORT) nebulizer suspension 500 mcg, 500 mcg, Nebulization, BID, Adam Riley MD, 500 mcg at 11/03/22 0956    atorvastatin (LIPITOR) tablet 40 mg, 40 mg, Oral, Daily, Adam Riley MD, 40 mg at 11/03/22 1023    aspirin chewable tablet 81 mg, 81 mg, Oral, Daily, Adam Riley MD, 81 mg at 11/03/22 1022    bumetanide (BUMEX) tablet 2 mg, 2 mg, Oral, Daily, Adam Riley MD, 2 mg at 11/03/22 1022    hydrALAZINE (APRESOLINE) injection 10 mg, 10 mg, IntraVENous, Q6H PRN, Adam Riley MD    ipratropium-albuterol (DUONEB) nebulizer solution 1 ampule, 1 ampule, Inhalation, Q4H WA, Adam Riley MD, 1 ampule at 11/03/22 0955    metoprolol succinate (TOPROL XL) extended release tablet 100 mg, 100 mg, Oral, Daily, Adam Riley MD, 100 mg at 11/01/22 0940    pantoprazole (PROTONIX) tablet 40 mg, 40 mg, Oral, QAM AC, Jovanni Cooney MD, 40 mg at 11/03/22 0645    sacubitril-valsartan (ENTRESTO)  MG per tablet 1 tablet, 1 tablet, Oral, BID, Jovanni Cooney MD, 1 tablet at 11/03/22 1023    spironolactone (ALDACTONE) tablet 25 mg, 25 mg, Oral, Daily, Jovanni Cooney MD, 25 mg at 11/03/22 1024    [START ON 11/4/2022] vitamin D (ERGOCALCIFEROL) capsule 50,000 Units, 50,000 Units, Oral, Weekly, Jovanni Cooney MD    magnesium hydroxide (MILK OF MAGNESIA) 400 MG/5ML suspension 30 mL, 30 mL, Oral, Daily PRN, Queen Brandy MD    nicotine (NICODERM CQ) 21 MG/24HR 1 patch, 1 patch, TransDERmal, Daily, Queen Brandy MD, 1 patch at 11/03/22 1013    aluminum & magnesium hydroxide-simethicone (MAALOX) 200-200-20 MG/5ML suspension 30 mL, 30 mL, Oral, PRN, Queen Brandy MD    hydrOXYzine pamoate (VISTARIL) capsule 50 mg, 50 mg, Oral, TID PRN, Queen Brandy MD, 50 mg at 11/02/22 2152    haloperidol (HALDOL) tablet 3 mg, 3 mg, Oral, Q6H PRN **OR** haloperidol lactate (HALDOL) injection 3 mg, 3 mg, IntraMUSCular, Q6H PRN, Queen Brandy MD    melatonin tablet 3 mg, 3 mg, Oral, Nightly, Queen Brandy MD, 3 mg at 11/02/22 2150    Examination:  BP (!) 105/59   Pulse 77   Temp 98.2 °F (36.8 °C) (Temporal)   Resp 20   Ht 5' 5\" (1.651 m)   Wt 118 lb (53.5 kg)   LMP 05/20/2014   SpO2 98%   BMI 19.64 kg/m²   Gait - steady    HOSPITAL COURSE[de-identified]  Patient was admitted to the unit on 10/28/2022 was closely monitored for suicidal ideations. She was evaluated and treated with Invega 3 mg twice daily and lithium 300 mg twice daily. Medical events were insignificant and patient continued to improve on the floor. She started coming out of her room she was attending groups to socializing with peers. She never made any suicidal statements or any suicidal gestures while in the unit.   Social workers obtain confirmation patient's daughter who lives with patient who was able voicing concerns that she had she indicated that she never believe the patient intentionally attempted suicide she no concern for patient safety and also safety reported no access to any guns or weapons. Patient was seen in treatment team prior to discharge patient file denied any suicidal homicidal ideations intent or plan she stated she was not attempting suicide her affect was bright and pleasant she decreased out that she received here. Treatment team felt the patient obtain the maximum benefit for her hospitalization She was set up with an outpatient mental health agency for outpatient follow-up services . At the time of discharge patient  did not show impulsive behavior. She was up on the unit she was attending groups and socializing with peers. She vehemently denied any suicidal homicidal ideations intent or plan. She was eating well and sleeping well there are no neurovegetative signs or symptoms of depression she denied any auditory visualizations. There are no overt or covert signs psychosis. She was appreciative of the help that she received here. This patient no longer meets criteria for inpatient hospitalization. No AVH or paranoid thoughts  No hopeless or worthless feeling  No active SI/HI  Appetite:  [x] Normal  [] Increased  [] Decreased    Sleep:       [x] Normal  [] Fair       [] Poor            Energy:    [x] Normal  [] Increased  [] Decreased     SI [] Present  [x] Absent  HI  []Present  [x] Absent   Aggression:  [] yes  [x] no  Patient is [x] able  [] unable to CONTRACT FOR SAFETY   Medication side effects(SE):  [x] None(Psych. Meds.) [] Other      Mental Status Examination on discharge:    Level of consciousness:  within normal limits   Appearance:  well-appearing  Behavior/Motor:  no abnormalities noted  Attitude toward examiner:  attentive and good eye contact  Speech:  spontaneous, normal rate and normal volume   Mood: \"My mood is good. \"  Affect: Appropriate and pleasant  Thought processes: Linear without flight of ideas loose associations  Thought content: Devoid any auditory visual hallucinations delusions or other perceptual normalities. Denies SI/HI intent or plan  Cognition:  oriented to person, place, and time   Concentration intact  Memory intact  Insight good   Judgement fair   Fund of Knowledge adequate      ASSESSMENT:  Patient symptoms are:  [x] Well controlled  [x] Improving  [] Worsening  [] No change    Reason for more than one antipsychotic:  [] N/A  [] 3 Failed Monotherapy attempts (Drugs tried:)  [] Crossover to a new antipsychotic  [] Taper to Monotherapy from Polypharmacy  [] Augmentation of clozapine therapy due to treatment resistance to single therapy    Diagnosis:  Principal Problem:    Severe manic bipolar 1 disorder with psychotic behavior ([facility]  Resolved Problems:    * No resolved hospital problems. *      LABS:    No results for input(s): WBC, HGB, PLT in the last 72 hours. No results for input(s): NA, K, CL, CO2, BUN, CREATININE, GLUCOSE in the last 72 hours. No results for input(s): BILITOT, ALKPHOS, AST, ALT in the last 72 hours. Lab Results   Component Value Date/Time    LABAMPH NOT DETECTED 10/26/2022 06:41 PM    LABAMPH NOT DETECTED 05/21/2012 09:47 AM    BARBSCNU NOT DETECTED 10/26/2022 06:41 PM    LABBENZ NOT DETECTED 10/26/2022 06:41 PM    LABBENZ NOT DETECTED 05/21/2012 09:47 AM    CANNAB POSITIVE 01/13/2014 12:00 PM    COCAINESCRN NOT DETECTED 01/13/2014 12:00 PM    LABMETH NOT DETECTED 10/26/2022 06:41 PM    OPIATESCREENURINE NOT DETECTED 10/26/2022 06:41 PM    PHENCYCLIDINESCREENURINE NOT DETECTED 10/26/2022 06:41 PM    ETOH 111 10/26/2022 09:31 PM     Lab Results   Component Value Date/Time    TSH 0.736 10/27/2022 06:10 AM     Lab Results   Component Value Date    LITHIUM 0.76 11/01/2022     No results found for: VALPROATE, CBMZ    RISK ASSESSMENT AT DISCHARGE: Low risk for suicide and homicide. Treatment Plan:  Reviewed current Medications with the patient.  Education provided on the complaince with treatment. Risks, benefits, side effects, drug-to-drug interactions and alternatives to treatment were discussed. Encourage patient to attend outpatient follow up appointment and therapy. Patient was advised to call the outpatient provider, visit the nearest ED or call 911 if symptoms are not manageable. Patient's family member was contacted prior to the discharge.          Medication List        START taking these medications      lithium 300 MG capsule  Take 1 capsule by mouth 2 times daily (with meals)     melatonin 3 MG Tabs tablet  Take 1 tablet by mouth nightly     paliperidone 3 MG extended release tablet  Commonly known as: INVEGA  Take 1 tablet by mouth in the morning and at bedtime     Vitamin D (Ergocalciferol) 09860 units Caps  Take 50,000 Units by mouth once a week for 7 doses  Start taking on: November 4, 2022            CHANGE how you take these medications      bumetanide 2 MG tablet  Commonly known as: BUMEX  Take 0.5 tablets by mouth daily  What changed: how much to take            CONTINUE taking these medications      albuterol sulfate  (90 Base) MCG/ACT inhaler  Commonly known as: Ventolin HFA  Inhale 2 puffs into the lungs 4 times daily as needed for Wheezing     aspirin 81 MG chewable tablet  Take 1 tablet by mouth daily     atorvastatin 40 MG tablet  Commonly known as: LIPITOR  Take 1 tablet by mouth daily     budesonide-formoterol 160-4.5 MCG/ACT Aero  Commonly known as: Symbicort  Inhale 2 puffs into the lungs 2 times daily     cetirizine 10 MG tablet  Commonly known as: ZYRTEC  Take 1 tablet by mouth daily     Entresto  MG per tablet  Generic drug: sacubitril-valsartan  Take 1 tablet by mouth 2 times daily     metoprolol succinate 100 MG extended release tablet  Commonly known as: TOPROL XL  Take 1 tablet by mouth daily     montelukast 10 MG tablet  Commonly known as: Singulair  Take 1 tablet by mouth nightly     nitroGLYCERIN 0.4 MG SL tablet  Commonly known as: Nitrostat  Place 1 tablet under the tongue every 5 minutes as needed for Chest pain     pantoprazole 20 MG tablet  Commonly known as: Protonix  Take 1 tablet by mouth daily     Spiriva HandiHaler 18 MCG inhalation capsule  Generic drug: tiotropium  Inhale 1 capsule into the lungs daily     vitamin D3 10 MCG (400 UNIT) Tabs tablet  Commonly known as: CHOLECALCIFEROL            STOP taking these medications      acetaminophen 500 MG tablet  Commonly known as: TYLENOL     spironolactone 25 MG tablet  Commonly known as: ALDACTONE               Where to Get Your Medications        These medications were sent to [facility] [address] [address]      Phone: [phone]   bumetanide 2 MG tablet  lithium 300 MG capsule  melatonin 3 MG Tabs tablet  paliperidone 3 MG extended release tablet  Vitamin D (Ergocalciferol) 27958 units Caps       Patient is counseled if she continues to abuse drugs or alcohol she could act out impulsively causing serious harm to herself or others even though it may be unintentional.  She demonstrated understanding of this and has the capacity understand this    Patient is counseled her mental health treatment will be difficult to optimize with ongoing use of drugs or alcohol she demonstrated understanding of this and has the capacity to understand this     Patient is counseled that if she uses any amount of opiates after any clean time she could have an unintentional overdose she demonstrated understanding standing of this and has  the capacity understand this    Patient is counseled she must remain compliant with all medications outpatient follow-up appointments    Patient is discharged home in stable condition      TIME SPEND - 35 MINUTES TO COMPLETE THE EVALUATION, DISCHARGE SUMMARY, MEDICATION RECONCILIATION AND FOLLOW UP CARE     Signed:  GA Burgos - CNP  11/3/2022  3:53 PM

## 2022-11-03 NOTE — PROGRESS NOTES
Spiritual Support Group Note    Number of Participants in Group:          6                 Time:    2    Goal: Relief from isolation and loneliness             Marisa Sharing             Self-understanding and gain insight              Acceptance and belonging            Recognize they are not alone                Socialization             Empowerment       Encouragement    Topic:  [x] Spiritual Wellness and Self Care                  [x] Hope                     [] Connecting with Divine/Others        [] Thankfulness and Gratitude               [x]  Meaningfulness and Purpose               [] Forgiveness               [] Peace               [] Connect to [employer]      [] Other    Participation Level:   [x] Active Listener   [] Minimal   [] Monopolizing   [] Interactive   [] No Participation   []  Other:     Attention:   [x] Alert   [] Distractible   [] Drowsy   [] Poor   [] Other:    Manner:   [x] Cooperative   [] Suspicious   [] Withdrawn   [] Guarded   [] Irritable   [] Inhospitable   [] Other:     Others Comments from Group:

## 2022-11-16 ENCOUNTER — TELEPHONE (OUTPATIENT)
Dept: FAMILY MEDICINE CLINIC | Age: 57
End: 2022-11-16

## 2022-11-16 NOTE — TELEPHONE ENCOUNTER
Pt call stating she went to the hospital because she has been coughing a lot and and she's feeling light headed, her bp was low 105/59, when she got home she checked her bp again before going to bed and still having that cough it was 87/69. At this point pt is stating her dad said that her bp is still too low and to take a home covid test and she did and it was negative so her mom gave her some cough syrup and she took it and took her bp again and it was 100/67 and her dad sad it was low again, but she went too sleep.  Please advise

## 2022-11-17 ENCOUNTER — HOSPITAL ENCOUNTER (EMERGENCY)
Age: 57
Discharge: HOME | End: 2022-11-17
Attending: STUDENT IN AN ORGANIZED HEALTH CARE EDUCATION/TRAINING PROGRAM
Payer: COMMERCIAL

## 2022-11-17 ENCOUNTER — TELEPHONE (OUTPATIENT)
Dept: OTHER | Facility: CLINIC | Age: 57
End: 2022-11-17

## 2022-11-17 ENCOUNTER — APPOINTMENT (OUTPATIENT)
Dept: CT IMAGING | Age: 57
End: 2022-11-17
Payer: COMMERCIAL

## 2022-11-17 VITALS
HEART RATE: 88 BPM | SYSTOLIC BLOOD PRESSURE: 106 MMHG | TEMPERATURE: 99.5 F | OXYGEN SATURATION: 93 % | BODY MASS INDEX: 19.3 KG/M2 | RESPIRATION RATE: 18 BRPM | WEIGHT: 116 LBS | DIASTOLIC BLOOD PRESSURE: 57 MMHG

## 2022-11-17 DIAGNOSIS — I95.9 HYPOTENSION, UNSPECIFIED HYPOTENSION TYPE: ICD-10-CM

## 2022-11-17 DIAGNOSIS — N17.9 AKI (ACUTE KIDNEY INJURY) (HCC): ICD-10-CM

## 2022-11-17 DIAGNOSIS — J10.1 INFLUENZA A: Primary | ICD-10-CM

## 2022-11-17 LAB
ALBUMIN SERPL-MCNC: 4.5 G/DL (ref 3.5–5.2)
ALP BLD-CCNC: 103 U/L (ref 35–104)
ALT SERPL-CCNC: 7 U/L (ref 0–32)
ANION GAP SERPL CALCULATED.3IONS-SCNC: 16 MMOL/L (ref 7–16)
AST SERPL-CCNC: 15 U/L (ref 0–31)
BACTERIA: ABNORMAL /HPF
BASOPHILS ABSOLUTE: 0.06 E9/L (ref 0–0.2)
BASOPHILS RELATIVE PERCENT: 0.4 % (ref 0–2)
BILIRUB SERPL-MCNC: 0.3 MG/DL (ref 0–1.2)
BILIRUBIN URINE: NEGATIVE
BLOOD, URINE: ABNORMAL
BUN BLDV-MCNC: 27 MG/DL (ref 6–20)
CALCIUM SERPL-MCNC: 10.4 MG/DL (ref 8.6–10.2)
CHLORIDE BLD-SCNC: 93 MMOL/L (ref 98–107)
CLARITY: CLEAR
CO2: 23 MMOL/L (ref 22–29)
COLOR: YELLOW
CREAT SERPL-MCNC: 1.3 MG/DL (ref 0.5–1)
EKG ATRIAL RATE: 86 BPM
EKG P AXIS: 40 DEGREES
EKG P-R INTERVAL: 232 MS
EKG Q-T INTERVAL: 358 MS
EKG QRS DURATION: 110 MS
EKG QTC CALCULATION (BAZETT): 428 MS
EKG R AXIS: -92 DEGREES
EKG T AXIS: 44 DEGREES
EKG VENTRICULAR RATE: 86 BPM
EOSINOPHILS ABSOLUTE: 0.01 E9/L (ref 0.05–0.5)
EOSINOPHILS RELATIVE PERCENT: 0.1 % (ref 0–6)
EPITHELIAL CELLS, UA: ABNORMAL /HPF
GFR SERPL CREATININE-BSD FRML MDRD: 48 ML/MIN/1.73
GLUCOSE BLD-MCNC: 113 MG/DL (ref 74–99)
GLUCOSE URINE: NEGATIVE MG/DL
HCT VFR BLD CALC: 39.3 % (ref 34–48)
HEMOGLOBIN: 13.1 G/DL (ref 11.5–15.5)
IMMATURE GRANULOCYTES #: 0.06 E9/L
IMMATURE GRANULOCYTES %: 0.4 % (ref 0–5)
INFLUENZA A BY PCR: DETECTED
INFLUENZA B BY PCR: NOT DETECTED
KETONES, URINE: NEGATIVE MG/DL
LACTIC ACID, SEPSIS: 1.4 MMOL/L (ref 0.5–1.9)
LEUKOCYTE ESTERASE, URINE: NEGATIVE
LYMPHOCYTES ABSOLUTE: 1.19 E9/L (ref 1.5–4)
LYMPHOCYTES RELATIVE PERCENT: 7.4 % (ref 20–42)
MCH RBC QN AUTO: 29.2 PG (ref 26–35)
MCHC RBC AUTO-ENTMCNC: 33.3 % (ref 32–34.5)
MCV RBC AUTO: 87.7 FL (ref 80–99.9)
MONOCYTES ABSOLUTE: 1.41 E9/L (ref 0.1–0.95)
MONOCYTES RELATIVE PERCENT: 8.7 % (ref 2–12)
NEUTROPHILS ABSOLUTE: 13.44 E9/L (ref 1.8–7.3)
NEUTROPHILS RELATIVE PERCENT: 83 % (ref 43–80)
NITRITE, URINE: NEGATIVE
PDW BLD-RTO: 13.8 FL (ref 11.5–15)
PH UA: 6 (ref 5–9)
PLATELET # BLD: 392 E9/L (ref 130–450)
PMV BLD AUTO: 9.4 FL (ref 7–12)
POTASSIUM SERPL-SCNC: 3.9 MMOL/L (ref 3.5–5)
PRO-BNP: 908 PG/ML (ref 0–125)
PROTEIN UA: ABNORMAL MG/DL
RBC # BLD: 4.48 E12/L (ref 3.5–5.5)
RBC UA: ABNORMAL /HPF (ref 0–2)
SARS-COV-2, NAAT: NOT DETECTED
SODIUM BLD-SCNC: 132 MMOL/L (ref 132–146)
SPECIFIC GRAVITY UA: >=1.03 (ref 1–1.03)
TOTAL PROTEIN: 8.6 G/DL (ref 6.4–8.3)
TROPONIN, HIGH SENSITIVITY: 11 NG/L (ref 0–9)
UROBILINOGEN, URINE: 0.2 E.U./DL
WBC # BLD: 16.2 E9/L (ref 4.5–11.5)
WBC UA: ABNORMAL /HPF (ref 0–5)

## 2022-11-17 PROCEDURE — 2580000003 HC RX 258

## 2022-11-17 PROCEDURE — 87502 INFLUENZA DNA AMP PROBE: CPT

## 2022-11-17 PROCEDURE — 36415 COLL VENOUS BLD VENIPUNCTURE: CPT

## 2022-11-17 PROCEDURE — 6360000002 HC RX W HCPCS

## 2022-11-17 PROCEDURE — 96360 HYDRATION IV INFUSION INIT: CPT

## 2022-11-17 PROCEDURE — 87040 BLOOD CULTURE FOR BACTERIA: CPT

## 2022-11-17 PROCEDURE — 6360000002 HC RX W HCPCS: Performed by: STUDENT IN AN ORGANIZED HEALTH CARE EDUCATION/TRAINING PROGRAM

## 2022-11-17 PROCEDURE — 71275 CT ANGIOGRAPHY CHEST: CPT

## 2022-11-17 PROCEDURE — 99285 EMERGENCY DEPT VISIT HI MDM: CPT

## 2022-11-17 PROCEDURE — 96374 THER/PROPH/DIAG INJ IV PUSH: CPT

## 2022-11-17 PROCEDURE — 87635 SARS-COV-2 COVID-19 AMP PRB: CPT

## 2022-11-17 PROCEDURE — 83605 ASSAY OF LACTIC ACID: CPT

## 2022-11-17 PROCEDURE — 84484 ASSAY OF TROPONIN QUANT: CPT

## 2022-11-17 PROCEDURE — 96375 TX/PRO/DX INJ NEW DRUG ADDON: CPT

## 2022-11-17 PROCEDURE — 81001 URINALYSIS AUTO W/SCOPE: CPT

## 2022-11-17 PROCEDURE — 2580000003 HC RX 258: Performed by: STUDENT IN AN ORGANIZED HEALTH CARE EDUCATION/TRAINING PROGRAM

## 2022-11-17 PROCEDURE — 83880 ASSAY OF NATRIURETIC PEPTIDE: CPT

## 2022-11-17 PROCEDURE — 93005 ELECTROCARDIOGRAM TRACING: CPT

## 2022-11-17 PROCEDURE — 6360000004 HC RX CONTRAST MEDICATION: Performed by: RADIOLOGY

## 2022-11-17 PROCEDURE — 96361 HYDRATE IV INFUSION ADD-ON: CPT

## 2022-11-17 PROCEDURE — 80053 COMPREHEN METABOLIC PANEL: CPT

## 2022-11-17 PROCEDURE — 85025 COMPLETE CBC W/AUTO DIFF WBC: CPT

## 2022-11-17 RX ORDER — 0.9 % SODIUM CHLORIDE 0.9 %
250 INTRAVENOUS SOLUTION INTRAVENOUS ONCE
Status: COMPLETED | OUTPATIENT
Start: 2022-11-17 | End: 2022-11-17

## 2022-11-17 RX ORDER — ONDANSETRON 2 MG/ML
4 INJECTION INTRAMUSCULAR; INTRAVENOUS ONCE
Status: COMPLETED | OUTPATIENT
Start: 2022-11-17 | End: 2022-11-17

## 2022-11-17 RX ADMIN — CEFTRIAXONE 1000 MG: 1 INJECTION, POWDER, FOR SOLUTION INTRAMUSCULAR; INTRAVENOUS at 17:31

## 2022-11-17 RX ADMIN — ONDANSETRON 4 MG: 2 INJECTION INTRAMUSCULAR; INTRAVENOUS at 17:29

## 2022-11-17 RX ADMIN — IOPAMIDOL 75 ML: 755 INJECTION, SOLUTION INTRAVENOUS at 16:44

## 2022-11-17 RX ADMIN — SODIUM CHLORIDE 250 ML: 9 INJECTION, SOLUTION INTRAVENOUS at 14:52

## 2022-11-17 ASSESSMENT — PAIN - FUNCTIONAL ASSESSMENT: PAIN_FUNCTIONAL_ASSESSMENT: NONE - DENIES PAIN

## 2022-11-17 ASSESSMENT — ENCOUNTER SYMPTOMS
COUGH: 1
EYE PAIN: 0
ABDOMINAL PAIN: 0
CONSTIPATION: 0
NAUSEA: 0
DIARRHEA: 1
BACK PAIN: 0
SINUS PAIN: 0
VOMITING: 1
SHORTNESS OF BREATH: 0

## 2022-11-17 NOTE — ED PROVIDER NOTES
ATTENDING PROVIDER ATTESTATION:     Lorrie Butler presented to the emergency department for evaluation of Nausea (N/V few days not eating, home covid negative, 90/64)  . The patient was initially evaluated by the Medical Resident. Please see their note for further details, HPI, and ED course. I have reviewed & discussed the patient's case in details, including pertinent history & exam findings, with the Medical Resident and have personally participated in and/or performed the history, physical examination, medical decision-making, and procedure(s). I agree with all pertinent clinical information and any changes or corrections, if present, are noted below or in my separate documentation. I have reviewed my findings and recommendations with the assigned Medical Resident, patient, and family member(s) present at the time of disposition. I have performed a history and physical examination of this patient and directly supervised the resident caring for this patient. I have directly supervised any procedures performed by the resident and was present for the procedure including all critical portions of the procedure(s). I, Dr. Nisha Drake, am the primary provider of record    I have personally reviewed EKG(s) performed for this patient and agree with resident EKG interpretation below    Reviewed incidental findings with patient including pericardial effusion, small as well as need for surveillance of lung nodules. As for her acute kidney injury, suspect dehydration, as patient states she has not been drinking much. Her blood pressure has improved appropriately with 250 cc of fluid. She is not having any respiratory distress, increased work of breathing, and her vitals are otherwise stable. Discussed admission for ALAN, but patient would rather repeat testing as an outpatient with her primary doctor. I think this is reasonable.          Lady Han MD  11/22/22 3094

## 2022-11-17 NOTE — ED PROVIDER NOTES
HPI     Patient is a 56 y.o. female presents with a chief complaint of light headedness, vomiting, diarrhea  This has been occurring for 3 days. Patient states that it gets better with nothing. Patient states that it gets worse with nothing. Patient states that it is severe in severity. Patient states it was gradual in onset. Pt with PMH of CHF with reduced EF and an implantable AICD arrived complaining of 3 days of severe light headedness/dizziness and weakness. Associated symptoms include one episode of vomiting last night, 3 days of diarrhea, and a cough for 4 days. She states the light headedness is worse with positional change especially standing and she gets so light headed she has had to change her clothes lying down. She has loss of appetite but has been able to keep water down. SEPSIS identified at 3:30 pm with elevated WBC and an initial HR of 120. Review of Systems   Constitutional:  Positive for appetite change. Negative for chills and fever. HENT:  Negative for ear pain and sinus pain. Eyes:  Negative for pain. Respiratory:  Positive for cough. Negative for shortness of breath. Cardiovascular:  Negative for chest pain. Gastrointestinal:  Positive for diarrhea and vomiting. Negative for abdominal pain, constipation and nausea. Genitourinary:  Negative for difficulty urinating, dysuria and flank pain. Musculoskeletal:  Negative for back pain. Skin:  Negative for rash. Neurological:  Positive for dizziness, weakness and light-headedness. Negative for headaches. Psychiatric/Behavioral:  Negative for confusion. Physical Exam  Constitutional:       General: She is not in acute distress. Appearance: Normal appearance. She is normal weight. She is not ill-appearing. HENT:      Head: Normocephalic. Right Ear: External ear normal.      Left Ear: External ear normal.      Nose: Nose normal. No congestion or rhinorrhea.       Mouth/Throat:      Mouth: Mucous membranes are moist.   Eyes:      Conjunctiva/sclera: Conjunctivae normal.      Pupils: Pupils are equal, round, and reactive to light. Cardiovascular:      Rate and Rhythm: Normal rate and regular rhythm. Pulses: Normal pulses. Pulmonary:      Effort: Pulmonary effort is normal. No respiratory distress. Breath sounds: Normal breath sounds. No stridor. No wheezing or rales. Abdominal:      General: Abdomen is flat. Bowel sounds are normal. There is no distension. Palpations: Abdomen is soft. Tenderness: There is no abdominal tenderness. There is no guarding. Musculoskeletal:         General: No tenderness. Normal range of motion. Cervical back: Normal range of motion and neck supple. Skin:     General: Skin is warm. Findings: No erythema, lesion or rash. Neurological:      General: No focal deficit present. Mental Status: She is alert and oriented to person, place, and time. Sensory: No sensory deficit. Motor: No weakness. Psychiatric:         Behavior: Behavior normal.        Procedures     EKG: This EKG is signed by emergency department physician. Rate: 86  Rhythm: normal Sinus with first degree AV block  Interpretation: no acute changes  Comparison: stable as compared to patient's most recent EKG     MDM         Patient is a 56 y.o. female with an AICD and CHF, PE history presenting with lightheadedness, vomiting, diarrhea, weakness, and cough for 3 days. She has also been recording her BP which has been low with a systolic of 36B and 80Y. She also complained of a sharp CP 3 days ago. Vitals included a soft BP, afebrile with tachycardia on arrival. Exam revealed no abdominal pain, no focal weakness, PERRLA and EOMI, tachycardia. She was given 250 bolus of fluids due to her CHF. She was given rocephin after meeting sepsis criteria without a source. inFluenza A resulted as positive.  A CTA pulm was ordered because of her tachycardia, her sharp CP, and hypotension. CTA was negative. Shared decision making was done with the patient who had a preference to go home. Precautions were discussed prior to discharge. Pt vitals had all recovered prior to discharge. Pt felt comfortable going home. Pt has a PCP and agrees to follow up shortly.      --------------------------------------------- PAST HISTORY ---------------------------------------------  Past Medical History:  has a past medical history of Angina at rest [facility]), Asthma, Blood type AB+, CAD (coronary artery disease), Carpal tunnel syndrome on left, CHF (congestive heart failure) ([address] CHF (congestive heart failure) ([address] COPD (chronic obstructive pulmonary disease) ([address] CVA (cerebral vascular accident) ([address] GERD (gastroesophageal reflux disease), HFrEF (heart failure with reduced ejection fraction) ([address] History of blood transfusion, Hyperlipidemia, Hypertension, ICD (implantable cardioverter-defibrillator) in place, Left ovarian cyst, Liver lesion, Moderate mitral regurgitation, Nonischemic cardiomyopathy ([address] NSTEMI (non-ST elevated myocardial infarction) ([address] Suicide attempt by drug ingestion ([address] Tobacco abuse, and Vitamin D deficiency. Past Surgical History:  has a past surgical history that includes cardiovascular stress test (); Tubal ligation (); Tonsillectomy;  section; transthoracic echocardiogram (3/19/13); Diagnostic Cardiac Cath Lab Procedure (2007); Diagnostic Cardiac Cath Lab Procedure (4/15); Percutaneous Transluminal Coronary Angio (4/10/15); Hysterectomy (10/1/15); Colonoscopy (10/2015); Endoscopy, colon, diagnostic; Cardiac catheterization (2018); and Cardiac defibrillator placement (2016). Social History:  reports that she has been smoking cigarettes. She has a 15.00 pack-year smoking history. Her smokeless tobacco use includes snuff. She reports current alcohol use of about 14.0 standard drinks per week. She reports current drug use. Frequency: 3.00 times per week. Drug: Marijuana Zohaibrandi Hernandes). Family History: family history includes Heart Disease in her father; High Blood Pressure in her father and mother; Pacemaker in her father; Stroke in her father and mother. The patients home medications have been reviewed.     Allergies: Orange fruit [citrus], Crestor [rosuvastatin calcium], Loratadine, Norco [hydrocodone-acetaminophen], Sulfa antibiotics, and Aspirin    -------------------------------------------------- RESULTS -------------------------------------------------  Labs:  Results for orders placed or performed during the hospital encounter of 11/17/22   Rapid influenza A/B antigens    Specimen: Nasopharyngeal   Result Value Ref Range    Influenza A by PCR DETECTED (A) Not Detected    Influenza B by PCR Not Detected Not Detected   Lactate, Sepsis   Result Value Ref Range    Lactic Acid, Sepsis 1.4 0.5 - 1.9 mmol/L   Comprehensive Metabolic Panel   Result Value Ref Range    Sodium 132 132 - 146 mmol/L    Potassium 3.9 3.5 - 5.0 mmol/L    Chloride 93 (L) 98 - 107 mmol/L    CO2 23 22 - 29 mmol/L    Anion Gap 16 7 - 16 mmol/L    Glucose 113 (H) 74 - 99 mg/dL    BUN 27 (H) 6 - 20 mg/dL    Creatinine 1.3 (H) 0.5 - 1.0 mg/dL    Est, Glom Filt Rate 48 >=60 mL/min/1.73    Calcium 10.4 (H) 8.6 - 10.2 mg/dL    Total Protein 8.6 (H) 6.4 - 8.3 g/dL    Albumin 4.5 3.5 - 5.2 g/dL    Total Bilirubin 0.3 0.0 - 1.2 mg/dL    Alkaline Phosphatase 103 35 - 104 U/L    ALT 7 0 - 32 U/L    AST 15 0 - 31 U/L   CBC with Auto Differential   Result Value Ref Range    WBC 16.2 (H) 4.5 - 11.5 E9/L    RBC 4.48 3.50 - 5.50 E12/L    Hemoglobin 13.1 11.5 - 15.5 g/dL    Hematocrit 39.3 34.0 - 48.0 %    MCV 87.7 80.0 - 99.9 fL    MCH 29.2 26.0 - 35.0 pg    MCHC 33.3 32.0 - 34.5 %    RDW 13.8 11.5 - 15.0 fL    Platelets 782 558 - 033 E9/L    MPV 9.4 7.0 - 12.0 fL    Neutrophils % 83.0 (H) 43.0 - 80.0 %    Immature Granulocytes % 0.4 0.0 - 5.0 %    Lymphocytes % 7.4 (L) 20.0 - 42.0 %    Monocytes % 8.7 2.0 - 12.0 %    Eosinophils % 0.1 0.0 - 6.0 %    Basophils % 0.4 0.0 - 2.0 %    Neutrophils Absolute 13.44 (H) 1.80 - 7.30 E9/L    Immature Granulocytes # 0.06 E9/L    Lymphocytes Absolute 1.19 (L) 1.50 - 4.00 E9/L    Monocytes Absolute 1.41 (H) 0.10 - 0.95 E9/L    Eosinophils Absolute 0.01 (L) 0.05 - 0.50 E9/L    Basophils Absolute 0.06 0.00 - 0.20 E9/L   Brain Natriuretic Peptide   Result Value Ref Range    Pro- (H) 0 - 125 pg/mL   Urinalysis   Result Value Ref Range    Color, UA Yellow Straw/Yellow    Clarity, UA Clear Clear    Glucose, Ur Negative Negative mg/dL    Bilirubin Urine Negative Negative    Ketones, Urine Negative Negative mg/dL    Specific Gravity, UA >=1.030 1.005 - 1.030    Blood, Urine SMALL (A) Negative    pH, UA 6.0 5.0 - 9.0    Protein, UA TRACE Negative mg/dL    Urobilinogen, Urine 0.2 <2.0 E.U./dL    Nitrite, Urine Negative Negative    Leukocyte Esterase, Urine Negative Negative   Microscopic Urinalysis   Result Value Ref Range    WBC, UA 5-10 (A) 0 - 5 /HPF    RBC, UA 1-3 0 - 2 /HPF    Epithelial Cells, UA MODERATE /HPF    Bacteria, UA MANY (A) None Seen /HPF   Troponin   Result Value Ref Range    Troponin, High Sensitivity 11 (H) 0 - 9 ng/L   EKG 12 Lead   Result Value Ref Range    Ventricular Rate 86 BPM    Atrial Rate 86 BPM    P-R Interval 232 ms    QRS Duration 110 ms    Q-T Interval 358 ms    QTc Calculation (Bazett) 428 ms    P Axis 40 degrees    R Axis -92 degrees    T Axis 44 degrees       Radiology:  CTA PULMONARY W CONTRAST   Final Result   1. No evidence of pulmonary embolism. 2. Moderate cardiomegaly with small pericardial effusion. 3. Peribronchial thickening related to inflammation bilaterally notable in   perihilar locations as well as multiple sites of bronchial stenosis in right   and left lower lobes. 4. No airspace opacity or pleural effusion. 5. Emphysematous changes.    6. Few stable nodular opacities located in the right upper lobe measuring up   to 6 mm possibly related to focal areas of subsegmental atelectasis. Continued follow-up recommended to assure stability. 7. Multiple prominent mediastinal lymph nodes. ------------------------- NURSING NOTES AND VITALS REVIEWED ---------------------------  Date / Time Roomed:  11/17/2022  1:42 PM  ED Bed Assignment:  Nikita Hoffman    The nursing notes within the ED encounter and vital signs as below have been reviewed. BP (!) 106/57   Pulse 88   Temp 99.5 °F (37.5 °C) (Oral)   Resp 18   Wt 116 lb (52.6 kg)   LMP 05/20/2014   SpO2 93%   BMI 19.30 kg/m²   Oxygen Saturation Interpretation: Normal      ------------------------------------------ PROGRESS NOTES ------------------------------------------  11:11 PM EST  I have spoken with the patient and discussed todays results, in addition to providing specific details for the plan of care and counseling regarding the diagnosis and prognosis. Their questions are answered at this time and they are agreeable with the plan. I discussed at length with them reasons for immediate return here for re evaluation. They will followup with their primary care physician by calling their office tomorrow. --------------------------------- ADDITIONAL PROVIDER NOTES ---------------------------------  At this time the patient is without objective evidence of an acute process requiring hospitalization or inpatient management. They have remained hemodynamically stable throughout their entire ED visit and are stable for discharge with outpatient follow-up. The plan has been discussed in detail and they are aware of the specific conditions for emergent return, as well as the importance of follow-up. Discharge Medication List as of 11/17/2022  6:25 PM          Diagnosis:  1. Influenza A    2. Hypotension, unspecified hypotension type    3.  ALAN (acute kidney injury) ([facility] [address]        Disposition:  Patient's disposition: Discharge to home  Patient's condition is stable. Patient was given return precautions. Labs were interpreted by me. Patient will follow up with their primary care provider. Patient is agreeable to this plan. Patient has remained stable throughout their stay in the ED. Patient was seen and evaluated by myself and my attending Tangela Alamo MD. Assessment and Plan discussed with attending provider, please see attestation for final plan of care. This note was done using dictation software and there may be some grammatical errors associated with this.     DO Giana Chapman DO  Resident  11/17/22 2050

## 2022-11-18 ENCOUNTER — TELEPHONE (OUTPATIENT)
Dept: OTHER | Facility: CLINIC | Age: 57
End: 2022-11-18

## 2022-11-18 ENCOUNTER — TELEPHONE (OUTPATIENT)
Dept: ADMINISTRATIVE | Age: 57
End: 2022-11-18

## 2022-11-18 NOTE — TELEPHONE ENCOUNTER
RN access attempted to contact patient regarding need for ED follow-up appointment. Attempt was not successful. Left message to callback for help scheduling ED follow-up appointment.

## 2022-11-18 NOTE — TELEPHONE ENCOUNTER
Pt calling for ER f/u apt/timing with Dr. Evelyn Gordon 11/17/22 - she states they told her to f/u re: Fluid on her heart.

## 2022-11-21 ENCOUNTER — OFFICE VISIT (OUTPATIENT)
Dept: FAMILY MEDICINE CLINIC | Age: 57
End: 2022-11-21
Payer: COMMERCIAL

## 2022-11-21 ENCOUNTER — TELEPHONE (OUTPATIENT)
Dept: ENT CLINIC | Age: 57
End: 2022-11-21

## 2022-11-21 VITALS
TEMPERATURE: 97 F | HEART RATE: 62 BPM | RESPIRATION RATE: 16 BRPM | OXYGEN SATURATION: 99 % | SYSTOLIC BLOOD PRESSURE: 130 MMHG | DIASTOLIC BLOOD PRESSURE: 68 MMHG

## 2022-11-21 DIAGNOSIS — Z23 NEED FOR INFLUENZA VACCINATION: Primary | ICD-10-CM

## 2022-11-21 PROCEDURE — 99213 OFFICE O/P EST LOW 20 MIN: CPT

## 2022-11-21 PROCEDURE — G8484 FLU IMMUNIZE NO ADMIN: HCPCS

## 2022-11-21 PROCEDURE — 3074F SYST BP LT 130 MM HG: CPT

## 2022-11-21 PROCEDURE — 3017F COLORECTAL CA SCREEN DOC REV: CPT

## 2022-11-21 PROCEDURE — G8420 CALC BMI NORM PARAMETERS: HCPCS

## 2022-11-21 PROCEDURE — 1111F DSCHRG MED/CURRENT MED MERGE: CPT

## 2022-11-21 PROCEDURE — G8427 DOCREV CUR MEDS BY ELIG CLIN: HCPCS

## 2022-11-21 PROCEDURE — 4004F PT TOBACCO SCREEN RCVD TLK: CPT

## 2022-11-21 PROCEDURE — 3078F DIAST BP <80 MM HG: CPT

## 2022-11-21 PROCEDURE — 99212 OFFICE O/P EST SF 10 MIN: CPT

## 2022-11-21 NOTE — PROGRESS NOTES
[address]  FAMILY MEDICINE RESIDENCY PROGRAM  DATE OF VISIT : 2022    Patient : Chaitanya Huizar   Age : 56 y.o.  : 1965   MRN : [id number]   ________________________________________________________________    Chief Complaint:   Chief Complaint   Patient presents with    ED Follow-up     Fluid around heart        HPI:   History obtained from the patient. Chaitanya Huizar is a 56 y.o. female here for follow-up on ED visit. She does have a past medical history of CHF and was presenting lightheadedness, diarrhea, weakness, cough for the past 3 days before she decided to go to the emergency department. While in the emergency department imaging studies were all normal.  Patient tested positive for influenza type a and was discharged with follow-up with PCP. Currently she denies having any shortness of breath, dizziness, weakness, chest pain and states that her symptoms have since resolved. She denies having any orthopnea        I reviewed the patient's past medications, allergies, past medical history, past surgical history, family history and social history during this visit      ROS:  Pertinent positives and negatives are stated within HPI    Physical Exam:    Vitals: /68   Pulse 62   Temp 97 °F (36.1 °C) (Temporal)   Resp 16   LMP 2014   SpO2 99%     Physical Exam  Constitutional:       General: She is not in acute distress. Appearance: Normal appearance. She is not ill-appearing. Cardiovascular:      Rate and Rhythm: Normal rate and regular rhythm. Heart sounds: Normal heart sounds. No murmur heard. Pulmonary:      Effort: Pulmonary effort is normal.      Breath sounds: Normal breath sounds. No wheezing, rhonchi or rales. Abdominal:      General: Bowel sounds are normal.      Palpations: Abdomen is soft. There is no mass. Tenderness: There is no abdominal tenderness. Musculoskeletal:         General: No swelling. Right lower leg: No edema. Left lower leg: No edema. Neurological:      General: No focal deficit present. Mental Status: She is alert and oriented to person, place, and time. Mental status is at baseline. Sensory: No sensory deficit. Motor: No weakness. Assessment & Plan:    ED follow-up  States she and symptoms have since resolved. She did test positive for influenza type a while she was in the emergency department. Do not feel the need to obtain any other imaging studies or laboratory work-up. Need for influenza vaccination  Patient agreeable to return to clinic in 2 weeks for influenza vaccine. At this moment will hold in the event vaccine as she tested positive for just a couple days ago. - Influenza, AFLURIA, (age 1 y+), IM, Preservative Free, 0.5 mL; Future      Educational materials printed for patient's review and were included in patient instructions on his/her After Visit Summary and given to patient at the end of visit. Counseled regarding above diagnosis, including possible risks and complications,  especially if left uncontrolled. Counseled regarding the possible side effects, risks, benefits and alternatives to treatment. Call or go to ED immediately if symptoms worsen or persist. Indications and proper use of medication(s) reviewed. Potential side-effects and risks of medication(s) also explained. Reviewed age and gender appropriate health screening exams and vaccinations. Advised patient regarding importance of keeping up with recommended health maintenance and to schedule as soon as possible if overdue, as this is important in assessing for undiagnosed pathology, especially cancer, as well as protecting against potentially harmful/life threatening disease. Patient and/or guardian verbalizes understanding and agrees with above counseling, assessment and plan. All questions answered.     Return to Office: Return in about 2 weeks (around 12/5/2022) for Nurse visit  - 2 weeks for flu shot.  Zak Eddy MD   This case was discussed with Dr. Theresa Hernandez

## 2022-11-21 NOTE — PROGRESS NOTES
S: 56 y.o. female presents today for ED Follow-up (Fluid around heart )    ED f/u   [place], sob, vomiting, weakness  Influenza positive; given rocephin and fluids  Today; symptoms resolved    O: VS: /68   Pulse 62   Temp 97 °F (36.1 °C) (Temporal)   Resp 16   LMP 05/20/2014   SpO2 99%   AAO/NAD, appropriate affect for mood  CV:  RRR, no murmur  Resp: CTAB  Abdomen: SNTND  Ext: no edema    Assessment/Plan:   1) Influenza a - resolved  RTO: PRN    Attending Physician Statement  I have discussed the case, including pertinent history and exam findings with the resident. I agree with the documented assessment and plan.       Electronically signed by Marleni Shirley MD on 11/22/2022 at 5:15 PM

## 2022-11-21 NOTE — TELEPHONE ENCOUNTER
Pt called to r/s an audiology and Dr Blayne Gomez appt from 10-27-22. Pt had been hospitalized at the time of the appt.   Please call her back with appt info [phone]

## 2022-11-22 LAB
BLOOD CULTURE, ROUTINE: NORMAL
CULTURE, BLOOD 2: NORMAL

## 2022-12-07 ENCOUNTER — OFFICE VISIT (OUTPATIENT)
Dept: CARDIOLOGY CLINIC | Age: 57
End: 2022-12-07

## 2022-12-07 VITALS
BODY MASS INDEX: 19.66 KG/M2 | DIASTOLIC BLOOD PRESSURE: 68 MMHG | SYSTOLIC BLOOD PRESSURE: 116 MMHG | HEIGHT: 65 IN | WEIGHT: 118 LBS | HEART RATE: 60 BPM | RESPIRATION RATE: 16 BRPM

## 2022-12-07 DIAGNOSIS — Z95.810 ICD (IMPLANTABLE CARDIOVERTER-DEFIBRILLATOR) IN PLACE: ICD-10-CM

## 2022-12-07 DIAGNOSIS — I50.20 HFREF (HEART FAILURE WITH REDUCED EJECTION FRACTION) (HCC): Primary | ICD-10-CM

## 2022-12-07 DIAGNOSIS — I34.0 NONRHEUMATIC MITRAL VALVE REGURGITATION: ICD-10-CM

## 2022-12-07 NOTE — PROGRESS NOTES
[address] [facility]     OUTPATIENT CARDIOLOGY FOLLOW-UP    Name: Maeve Keene    Age: 56 y.o. Primary Care Physician: Porfirio Daniels MD    Date of Service: 12/7/22     Chief Complaint: Chronic HFrEF. Mitral regurgitation        Interim History:    Ms. Tracy Rogers is a 56year old lady who is known to Dr. Génesis Christina service. She has a lengthy past medical history including chronic HFrEF with RV dilation due to nonischemic cardiomyopathy; mitral regurgitation; and CVA. She was seen in the ED on November 17 due to cough, nausea, vomiting, and loss of appetite; she tested positive for influenza and was treated with Rocephin and 250 mls of fluids as she was tachycardic. Prior to this she had psychiatric admission for suicide attempt in October. She is somewhat of a difficult historian, but denies any chest discomfort, palpitations, dyspnea, or syncope. She is unsure of what her baseline dry weight is, but reports compliance with medications. Review of Systems:   Cardiovascular: Denies chest pain or pressure, palpitations or ICD shocks. She has occasional lower extremity swelling. Respiratory: Denies exertional dyspnea, cough, orthopnea, PND or wheezing. Consitutional: Denies fevers or chills. No excessive weight gain/ loss. Positive for fatigue. HEENMT: Denies headaches, bleeding gums or epistaxis   Musculoskeletal: Denies myalgias or arthralgias. Neurological: Denies dizziness, syncope, numbness or tingling. Integumentary: Denies rash, hives or pruritis   Gastrointestinal: Denies nausea, vomiting, abdominal pain, constipation/diarrhea, or dark/bloody stools. Genitourinary: Denies dysuria or blood in urine  Hematologic/Lymphatic: Denies abnormal bruising or bleeding. Endocrine: Denies temperature intolerance or excessive thirst.   Psychiatric: Denies anxiety or depression. Past Medical History:  Non-ischemic cardiomyopathy.  ?Combined ischemic/non-ischemic aortic stenosis is present. Unable to estimate PA systolic pressure. No evidence for hemodynamically significant pericardial effusion. Lexiscan Stress Test (3/2020)  FINDINGS:  LV is dilated. Perfusion images demonstrate no reversible perfusion defect. Severe global hypokinesis. The end diastolic volume is 822 ml. The end systolic volume is 248 ml. The estimated ejection fraction is 20 %. Impression  1. No reversible perfusion defect  2. Ejection fraction is 20 %. 3. Dilated LV. Severe global hypokinesis. TTE (Dr. Yeimy Bennett, 3/2020)   Summary   Left ventricle is moderate-severely dilated. LVEDD 6.8 cm. LV systolic function is severely reduced. Ejection fraction is visually estimated at 20-25%. Abnormal diastolic function, indeterminate grade. There is severe global hypokinesis. Normal left ventricular wall thickness. Abnormal LV septal motion consistent with conduction disorder. Mildly dilated right ventricle. Right ventricle global systolic function is mildly reduced. The left atrium is massively dilated. Severe eccentric mitral regurgitation, directed posteriorly. Mild tricuspid regurgitation. Mild pulmonic regurgitation. 4/4/2018 [facility] (Dr. Ishaan Lombardo)  Findings:  Left main: 0%  stenosis  LAD: 0. %  stenosis  Circumflex: 0. %   stenosis  RCA: Dominant. 0. %  stenosis  Ao: 112/62.      4/3/2018 US carotid  Peak systolic and end diastolic velocities, ICA/CCA ratios and   antegrade vertebral artery flow as above. See above for details fo the   examination and below for internal carotid artery interpretation   guidelines. 1. Elevated peak systolic velocity in the left proximal internal   carotid artery falling within the estimated luminal stenosis range of   50-69%. 2. Scattered areas of mild to moderate calcified and noncalcified   plaque left carotid arterial systems, most prominent proximal distal   left internal carotid artery.    3. Right carotid arterial system is not evaluated secondary to   ultrasound technician been unable to access secondary to bandaging. 4/3/2018 bilateral FAVIAN  Normal ankle arm index     Echocardiogram (Dr. Kailey Rolon, 12/2017): Severely dilated left atrium, with LUZ MARINA 59 ml/m2. Appears to be possible   left to right inter atrial shunt demosntrated with color doppler on image   0065, [id number]., [id number]. Eccentric hypertrophy. Severely dilated left ventricle. Severe left   ventricular systolic function. Visually estimated LVEF is 10%. Severe   global hypokinesis with regional variation. Grade 3 restrictive diastolic   dysfunction, which correlates with very poor prognosis. Apically tethered mitral valve leaflets with posterior leaflet   restriction. Severe mitral regurgitation. Eccentric posteriorly directed   jet. Trace pericardial effusion, no tamponade physiology. RV measurements biased by such significant LV dilatation with septal shift   into the RV. Severe RV dysfunction. RVSP is 50 mm Hg consistent with   pulmonary hypertension. Moderate tricuspid regurgitation, central regurgitant jet. Findings communicated with the floor; prior to this time patient signed   out of the hospital against medical advice. [facility] (04/2015, [place] [address] Dr. Antwan Mello)   Unsuccessful angioplasty to 1st OM branch (99% thrombo-occlusion) due to small size of this branch (EF was 40-45% with moderate lateral wall hypokinesis, left main normal, LAD normal, left circumflex normal, OM1 had a distal 99% lesion, and RCA dominant and normal)     [facility] (2007)   No obstructive CAD. EF 50%. Social History:    15 pack years  ETOH abuse up to 2 24 ounce beers a day  Illicit Drugs: Marijuana    Family History: Mother HTN and CVA  Sister Open heart in her 42's  Father CABG, CVA,  and PPM    Allergies: Allergies   Allergen Reactions    Orange Fruit [Citrus] Hives and Itching     ONLY allergic to oranges - NOT any other citrus fruit. NO oranges or orange juice. Crestor [Rosuvastatin Calcium]     Loratadine     Norco [Hydrocodone-Acetaminophen] Itching    Sulfa Antibiotics     Aspirin      Itching sometime     Patient takes 81mg Aspirin daily, but is unable to take 324 mg.         Current Medications:  Current Outpatient Medications   Medication Sig Dispense Refill    lithium 300 MG capsule Take 1 capsule by mouth 2 times daily (with meals) 60 capsule 0    melatonin 3 MG TABS tablet Take 1 tablet by mouth nightly 30 tablet 0    paliperidone (INVEGA) 3 MG extended release tablet Take 1 tablet by mouth in the morning and at bedtime 60 tablet 0    Ergocalciferol (VITAMIN D) 57182 units CAPS Take 50,000 Units by mouth once a week for 7 doses 5 capsule 1    bumetanide (BUMEX) 2 MG tablet Take 0.5 tablets by mouth daily 90 tablet 3    vitamin D3 (CHOLECALCIFEROL) 10 MCG (400 UNIT) TABS tablet Take 400 Units by mouth daily Take 2 tablets PO once daily      pantoprazole (PROTONIX) 20 MG tablet Take 1 tablet by mouth daily 30 tablet 0    nitroGLYCERIN (NITROSTAT) 0.4 MG SL tablet Place 1 tablet under the tongue every 5 minutes as needed for Chest pain 25 tablet 1    aspirin 81 MG chewable tablet Take 1 tablet by mouth daily 90 tablet 2    atorvastatin (LIPITOR) 40 MG tablet Take 1 tablet by mouth daily 90 tablet 2    sacubitril-valsartan (ENTRESTO)  MG per tablet Take 1 tablet by mouth 2 times daily 180 tablet 2    metoprolol succinate (TOPROL XL) 100 MG extended release tablet Take 1 tablet by mouth daily 90 tablet 2    budesonide-formoterol (SYMBICORT) 160-4.5 MCG/ACT AERO Inhale 2 puffs into the lungs 2 times daily 6 each 2    montelukast (SINGULAIR) 10 MG tablet Take 1 tablet by mouth nightly 90 tablet 2    cetirizine (ZYRTEC) 10 MG tablet Take 1 tablet by mouth daily 30 tablet 5    tiotropium (SPIRIVA HANDIHALER) 18 MCG inhalation capsule Inhale 1 capsule into the lungs daily 90 capsule 1    albuterol sulfate HFA (VENTOLIN HFA) 108 (90 Base) MCG/ACT inhaler Inhale 2 puffs into the lungs 4 times daily as needed for Wheezing 18 g 5     No current facility-administered medications for this visit. Facility-Administered Medications Ordered in Other Visits   Medication Dose Route Frequency Provider Last Rate Last Admin    perflutren lipid microspheres (DEFINITY) injection 1.5 mL  1.5 mL IntraVENous ONCE PRN BOBBY Davis           Physical Exam:  /68   Pulse 60   Resp 16   Ht 5' 5\" (1.651 m)   Wt 118 lb (53.5 kg)   LMP 05/20/2014   BMI 19.64 kg/m²   Wt Readings from Last 3 Encounters:   12/07/22 118 lb (53.5 kg)   11/17/22 116 lb (52.6 kg)   10/28/22 118 lb 14.4 oz (53.9 kg)     Appearance: Well developed, well nourished middle aged [facility] who appears of stated age. No apparent acute distress. Skin: Intact, warm and dry   Head: Normocephalic, atraumatic. Oral mucosa pink and moist.   Neck: Supple, no elevated JVP or carotid bruits  Lungs: Clear to auscultation bilaterally. No wheezes, rales, or rhonchi  Cardiac: Regular rate and rhythm, no murmurs, S3 or S4, or rubs  Abdomen: Soft, non-tender, non-distended. Bowel sounds present. Extremities: No lower extremity edema, intact posterior tibial pulses bilaterally  Neurologic: Normal mood and affect. Alert and oriented to person, place, time. Speech clear and appropriate. Musculoskeletal: Moves all extremities. No gross muscle atrophy. Diagnostics:  ECG today showed Sinus Rhythm,  First degree A-V block, Low voltage in limb leads. Anterior MI, old  Inferior and Lateral ST/T abnormality    Assessment:   Chronic heart failure with reduced ejection fraction and RV dysfunction  Appears euvolemic on exam today. ACC stage C/NYHA class III. Most recent EF 25 to 30% on CLAUDIA in October 2020. GDMT:   Entresto 97/103 mg twice daily   Toprol  mg daily   Bumex 1 mg daily   ? Combined ischemic/non-ischemic cardiomyopathy  Coronary artery disease with unsuccessful angioplasty to  of small OM in 2015 . Cath in 4/2018 showed. Nonischemic stress in 3/2020  Moderate to severe secondary MR with centrally directed jet and tethering of both the anterior and posterior leaflets on CLAUDIA October 2020. Status-post ICD placement 2016. Non-compliant with follow-up. IRBBB/LAFB on EKG. Hypertension  Hyperlipidemia: on statin therapy. History of SVT. History of left parietal CVA. Carotid artery disease. Marijuana abuse, alcohol abuse. Tobacco abuse. Chronic obstructive pulmonary disease/asthma. Longstanding medical non-compliance. Plan:  Repeat BMP due to recent ALAN  If renal function stable, will add Aldactone   Will also plan to add SGLT2- I later   The importance of compliance with medications and follow up appointments was reviewed. 5.   Referral to valve clinic for consideration of mitral valve intervention/Shelia-Clip  6. Referral to EP for device care: she was advised on the importance of routine device clinic follow up   7. Recommendation to re establish with Advanced Heart Failure was discussed with her: will need to revisit and reinforce indication (will ask for family to accompany her at next visit)  8. Return office visit in one month: may adjust pending scheduling of valve clinic appointment     The patient's current medication list, allergies, problem list and results of all previously ordered testing were reviewed at today's visit. - Weigh yourself daily    -Stay Hydrated, 64 oz     -Diet should sodium restricted to 2 grams    -Again watch your daily weight trends and if you gain water weight please follow below instructions.    -If you gain 3-5 pounds in 2-3 days OR notice that you are retaining fluid in anyway just like you did before then take an extra dose of your water pill (bumetanide/Bumex) every day until you lose the weight or feel better.     -If you notice that you have taken more than 2 extra doses in 1 week then please call and let us know.     -If at any time you feel that you are retaining fluid, your medications are not working, or you feel ill in anyway, then please call us for either same day appointment or the next day, and for instructions. Our goal is to keep you out of the emergency room and the hospital and we have ways to do it. You just need to call us in a timely manner.     -If you become sick for other reasons, and notice that you are not urinating as much, the urine is very dark, you have significant diarrhea or vomiting, then please DO NOT take your water pill and CALL US immediately.      Electronically signed by GA Doll on 12/9/2022 at 3:59 PM  [facility] ([facility]) Cardiology

## 2022-12-08 ENCOUNTER — HOSPITAL ENCOUNTER (OUTPATIENT)
Dept: CARDIOLOGY | Age: 57
Discharge: HOME | End: 2022-12-08

## 2023-01-12 ENCOUNTER — OFFICE VISIT (OUTPATIENT)
Dept: NON INVASIVE DIAGNOSTICS | Age: 58
End: 2023-01-12

## 2023-01-12 ENCOUNTER — TELEPHONE (OUTPATIENT)
Dept: NON INVASIVE DIAGNOSTICS | Age: 58
End: 2023-01-12

## 2023-01-12 ENCOUNTER — OFFICE VISIT (OUTPATIENT)
Dept: CARDIOLOGY CLINIC | Age: 58
End: 2023-01-12
Payer: COMMERCIAL

## 2023-01-12 VITALS
BODY MASS INDEX: 19.33 KG/M2 | HEART RATE: 64 BPM | RESPIRATION RATE: 16 BRPM | OXYGEN SATURATION: 99 % | SYSTOLIC BLOOD PRESSURE: 100 MMHG | HEIGHT: 65 IN | DIASTOLIC BLOOD PRESSURE: 60 MMHG | WEIGHT: 116 LBS

## 2023-01-12 DIAGNOSIS — I42.8 NICM (NONISCHEMIC CARDIOMYOPATHY) (HCC): ICD-10-CM

## 2023-01-12 DIAGNOSIS — I50.20 HFREF (HEART FAILURE WITH REDUCED EJECTION FRACTION) (HCC): ICD-10-CM

## 2023-01-12 DIAGNOSIS — Z72.0 TOBACCO USE: ICD-10-CM

## 2023-01-12 DIAGNOSIS — I34.0 NONRHEUMATIC MITRAL VALVE REGURGITATION: Primary | ICD-10-CM

## 2023-01-12 DIAGNOSIS — I50.22 CHRONIC HFREF (HEART FAILURE WITH REDUCED EJECTION FRACTION) (HCC): ICD-10-CM

## 2023-01-12 DIAGNOSIS — Z45.02 IMPLANTABLE CARDIOVERTER-DEFIBRILLATOR (ICD) GENERATOR END OF LIFE: Primary | ICD-10-CM

## 2023-01-12 DIAGNOSIS — Z95.810 ICD (IMPLANTABLE CARDIOVERTER-DEFIBRILLATOR) IN PLACE: ICD-10-CM

## 2023-01-12 DIAGNOSIS — I42.0 DILATED CARDIOMYOPATHY (HCC): ICD-10-CM

## 2023-01-12 DIAGNOSIS — Z45.02 AICD AT END OF BATTERY LIFE: ICD-10-CM

## 2023-01-12 PROCEDURE — 3078F DIAST BP <80 MM HG: CPT | Performed by: INTERNAL MEDICINE

## 2023-01-12 PROCEDURE — G8427 DOCREV CUR MEDS BY ELIG CLIN: HCPCS | Performed by: INTERNAL MEDICINE

## 2023-01-12 PROCEDURE — 93000 ELECTROCARDIOGRAM COMPLETE: CPT | Performed by: INTERNAL MEDICINE

## 2023-01-12 PROCEDURE — 3074F SYST BP LT 130 MM HG: CPT | Performed by: INTERNAL MEDICINE

## 2023-01-12 PROCEDURE — G8484 FLU IMMUNIZE NO ADMIN: HCPCS | Performed by: INTERNAL MEDICINE

## 2023-01-12 PROCEDURE — G8420 CALC BMI NORM PARAMETERS: HCPCS | Performed by: INTERNAL MEDICINE

## 2023-01-12 PROCEDURE — 4004F PT TOBACCO SCREEN RCVD TLK: CPT | Performed by: INTERNAL MEDICINE

## 2023-01-12 PROCEDURE — 99214 OFFICE O/P EST MOD 30 MIN: CPT | Performed by: INTERNAL MEDICINE

## 2023-01-12 PROCEDURE — 3017F COLORECTAL CA SCREEN DOC REV: CPT | Performed by: INTERNAL MEDICINE

## 2023-01-12 NOTE — TELEPHONE ENCOUNTER
ASAP Prior Annieleanna Ramos Gen Change -Battery Dead  CPT- 27093  ICD -Z45.02  Dr Luly Mccormick  January 13, 2023 @ 11:30    I spoke to Jasbir Hunt and reminded them of their upcoming EP procedure with Dr. Luly Mccormick. They know to arrive at [facility] ([facility] at 9:30 on 18/13/2023. I instructed them to remain NPO after midnight. Jasbir Hunt verbalized understanding.

## 2023-01-12 NOTE — PROGRESS NOTES
OFFICE VISIT     PRIMARY CARE PHYSICIAN:      Patsy Curling, MD       ALLERGIES / SENSITIVITIES:        Allergies   Allergen Reactions    Orange Fruit [Citrus] Hives and Itching     ONLY allergic to oranges - NOT any other citrus fruit. NO oranges or orange juice. Crestor [Rosuvastatin Calcium]     Loratadine     Norco [Hydrocodone-Acetaminophen] Itching    Sulfa Antibiotics     Aspirin      Itching sometime     Patient takes 81mg Aspirin daily, but is unable to take 324 mg.            REVIEWED MEDICATIONS:        Current Outpatient Medications:     lithium 300 MG capsule, Take 1 capsule by mouth 2 times daily (with meals), Disp: 60 capsule, Rfl: 0    melatonin 3 MG TABS tablet, Take 1 tablet by mouth nightly, Disp: 30 tablet, Rfl: 0    paliperidone (INVEGA) 3 MG extended release tablet, Take 1 tablet by mouth in the morning and at bedtime, Disp: 60 tablet, Rfl: 0    Ergocalciferol (VITAMIN D) 07679 units CAPS, Take 50,000 Units by mouth once a week for 7 doses, Disp: 5 capsule, Rfl: 1    bumetanide (BUMEX) 2 MG tablet, Take 0.5 tablets by mouth daily, Disp: 90 tablet, Rfl: 3    vitamin D3 (CHOLECALCIFEROL) 10 MCG (400 UNIT) TABS tablet, Take 400 Units by mouth daily Take 2 tablets PO once daily, Disp: , Rfl:     pantoprazole (PROTONIX) 20 MG tablet, Take 1 tablet by mouth daily, Disp: 30 tablet, Rfl: 0    nitroGLYCERIN (NITROSTAT) 0.4 MG SL tablet, Place 1 tablet under the tongue every 5 minutes as needed for Chest pain, Disp: 25 tablet, Rfl: 1    aspirin 81 MG chewable tablet, Take 1 tablet by mouth daily, Disp: 90 tablet, Rfl: 2    atorvastatin (LIPITOR) 40 MG tablet, Take 1 tablet by mouth daily, Disp: 90 tablet, Rfl: 2    sacubitril-valsartan (ENTRESTO)  MG per tablet, Take 1 tablet by mouth 2 times daily, Disp: 180 tablet, Rfl: 2    metoprolol succinate (TOPROL XL) 100 MG extended release tablet, Take 1 tablet by mouth daily, Disp: 90 tablet, Rfl: 2    budesonide-formoterol (SYMBICORT) 160-4.5 MCG/ACT AERO, Inhale 2 puffs into the lungs 2 times daily, Disp: 6 each, Rfl: 2    montelukast (SINGULAIR) 10 MG tablet, Take 1 tablet by mouth nightly, Disp: 90 tablet, Rfl: 2    cetirizine (ZYRTEC) 10 MG tablet, Take 1 tablet by mouth daily, Disp: 30 tablet, Rfl: 5    tiotropium (SPIRIVA HANDIHALER) 18 MCG inhalation capsule, Inhale 1 capsule into the lungs daily, Disp: 90 capsule, Rfl: 1    albuterol sulfate HFA (VENTOLIN HFA) 108 (90 Base) MCG/ACT inhaler, Inhale 2 puffs into the lungs 4 times daily as needed for Wheezing, Disp: 18 g, Rfl: 5      S: REASON FOR VISIT:       Chief Complaint   Patient presents with    Congestive Heart Failure    Coronary Artery Disease    Hypertension    Hyperlipidemia     Denies cardiac complaints          History of Present Illness:       Office Visit for follow up of  CMP, VHD  56year old with Hx of chronic HFrEF with RV dilation due to nonischemic cardiomyopathy; s/p ICD 2016,, Mitral regurgitation; and CVA came for f/u visit    Seen by our ANDREI- Evan Born 12/2022   Echo ordered 12/7/2022 - Not yet done  No ICD check up recently   No hospitalizations or surgeries since last visit   Patient is compliant with all medications   Andrew any exertional chest pain or short of breath   No palpitations, dizzy or syncope. Active at home   Try to watch diet          Past Medical History:   Diagnosis Date    Angina at rest [facility])     cath in 2007 showed no CAD    Asthma     Blood type AB+ 04/03/2018    CAD (coronary artery disease)     Carpal tunnel syndrome on left     CHF (congestive heart failure) (HCC)     CHF (congestive heart failure) ([address]     COPD (chronic obstructive pulmonary disease) ([facility]     CVA (cerebral vascular accident) ([facility] 12/2009 and 12/2010.     left parietal    GERD (gastroesophageal reflux disease)     HFrEF (heart failure with reduced ejection fraction) ([address] 01/14/2020 8/26/19- echo- LVEF 20-25%, LA enlarged, moderate MR, mild TR, mild PH, LVDD: 6.7, RVDD: 2.2 (17- echo- LVEF 10%, stage III DD, severely dilated LA, appears L>R atrial shunt, mod TR, LVDD: 6.6,  RVDD: 2.3)    History of blood transfusion     Hyperlipidemia Diagnosed in . Dyslipidemia. Hypertension diagnosed in     ICD (implantable cardioverter-defibrillator) in place 2018    Placed by Dr. Virginie Gallegos. Left ovarian cyst     Liver lesion 2015    Moderate mitral regurgitation 2015    mild-moderate    Nonischemic cardiomyopathy (HCC)     NSTEMI (non-ST elevated myocardial infarction) ([facility] 4/10/15--adm to [facility]    Suicide attempt by drug ingestion ([facility] [address]     attempt in  OD on ultram    Tobacco abuse     Vitamin D deficiency             Past Surgical History:   Procedure Laterality Date    CARDIAC CATHETERIZATION  2018    Dr. Bette Banks- Clean coronaries    [facility] Retort  2016    SICD    CARDIOVASCULAR STRESS TEST  2009 Normal      SECTION      COLONOSCOPY  10/2015    DIAGNOSTIC CARDIAC CATH LAB PROCEDURE  2007    [facility]: No significant CAD. Mild LVD with apical akinesis. EF 50%.     DIAGNOSTIC CARDIAC CATH LAB PROCEDURE  4/15    with PTCA to [facility] ob diana [email or web address]    ENDOSCOPY, COLON, DIAGNOSTIC      HYSTERECTOMY (CERVIX STATUS UNKNOWN)  10/1/15    at [facility] by Dr. Cornelio Pozo    PTCA  4/10/15    at [address] [place] [address] ECHOCARDIOGRAM  3/19/13    EF 65%, mild MR    TUBAL LIGATION            Family History   Problem Relation Age of Onset    High Blood Pressure Mother     Stroke Mother     High Blood Pressure Father     Stroke Father     Heart Disease Father     Pacemaker Father           Social History     Tobacco Use    Smoking status: Former     Packs/day: 0.50     Years: 30.00     Pack years: 15.00     Types: Cigarettes     Quit date: 2018     Years since quittin.5    Smokeless tobacco: Current     Types: Snuff   Substance Use Topics    Alcohol use: Not Currently     Alcohol/week: 4.0 standard drinks Types: 4 Glasses of wine per week         Review of Systems:    Constitutional: negative for fever and chills, or significant weight loss  HEENT: negative for acute visual symptoms or auditory problems, no dysphagia  Respiratory: negative for cough, wheezing, or hemoptysis  Cardiovascular: negative for chest pain, palpitations, and dyspnea  Gastrointestinal: negative for abdominal pain, diarrhea, melena, nausea and vomiting  Endocrine: Negative for polyuria and polydyspsia  Genitourinary: negative for dysuria and hematuria  Derm: negative for rash and skin lesion(s)  Neurological: negative for tingling, numbness, weakness, seizures  Endocrine: negative for polydipsia and polyuria  Musculoskeletal: negative for pain or tenderness  Psychiatric: negative for anxiety, depression, or suicidal ideations         O:  COMPLETE PHYSICAL EXAM:       /60 (Site: Left Upper Arm, Position: Sitting, Cuff Size: Medium Adult)   Pulse 64   Resp 16   Ht 5' 5\" (1.651 m)   Wt 116 lb (52.6 kg)   LMP 05/20/2014   SpO2 99%   BMI 19.30 kg/m²       General:   Patient alert, comfortable, no distress. Appears stated age. HEENT:    Pupils equal, no icterus; Tongue moist.   Neck:              No masses, Thyroid not palpable. No elevated JVD, No carotid bruit. Chest:   Normal configuration, non tender. Left Sub-Q ICD   Lungs:   Clear to auscultation bilaterally except few scattered rhonchi. Cardiovascular:  Regular rhythm, 3/6 systolic murmur, No S3, no palpable thrills. Abdomen:  Soft, Bowel sounds normal, no pulsatile abdominal aorta, no palpable masses. Extremities:  No edema. Distal pulses palpable. No cyanosis, no clubbing. Skin:   Good turgor, warm and dry, no cyanosis. Musculoskeletal: No joint swelling or deformity. Neuro:   Cranial nerves grossly intact; No focal neurologic deficit. Psych:   Alert, good mood and effect.      REVIEW OF DIAGNOSTIC TESTS:        Electrocardiogram: Reviewed   ICD - Interrogation- Unable to interrogate - Device End of life     Labs:  Reviewed - Blood cultures 11/2022 - OK     CAT chest 11/17/2022  Impression   1. No evidence of pulmonary embolism. 2. Moderate cardiomegaly with small pericardial effusion. 3. Peribronchial thickening related to inflammation bilaterally notable in   perihilar locations as well as multiple sites of bronchial stenosis in right   and left lower lobes. 4. No airspace opacity or pleural effusion. 5. Emphysematous changes. 6. Few stable nodular opacities located in the right upper lobe measuring up   to 6 mm possibly related to focal areas of subsegmental atelectasis. Continued follow-up recommended to assure stability. 7. Multiple prominent mediastinal lymph nodes. CLAUDIA (Dr. Cassidy Patel, 10/2020)   Summary   Technically difficult study, patient was coughing constantly during the   study. Left ventricle is severely enlarged . Ejection fraction is visually estimated at 25-30%. No regional wall motion abnormalities seen. Normal left ventricle wall thickness. Mildly enlarged right ventricle cavity. Right ventricle global systolic function is mildly reduced . Tethering of both anterior and posterior leaflets noted with a tenting   height of 1.2 cm. Anterior leaflet length 1.5 cm and posterior leaflet   length 1.0 cm. Moderate-to-severe mitral regurgitation with centrally directed jet. Marline type 3b. PISA radius 0.9 cm, ERO 0.2 cm2, no PV flow reversal.   No hemodynamically significant aortic stenosis is present. Unable to estimate PA systolic pressure. No evidence for hemodynamically significant pericardial effusion. Lexiscan Stress Test (3/2020)  FINDINGS:  LV is dilated. Perfusion images demonstrate no reversible perfusion defect. Severe global hypokinesis. The end diastolic volume is 539 ml. The end systolic volume is 545 ml. The estimated ejection fraction is 20 %. Impression  1.  No reversible perfusion defect  2. Ejection fraction is 20 %. 3. Dilated LV. Severe global hypokinesis    Left heart cath 3/28/2018  Findings:  Left main: 0%  stenosis  LAD: 0. %  stenosis  Circumflex: 0. %   stenosis  RCA: Dominant. 0. %  stenosis     ASSESSMENT / PLAN:    Gabriela Ingram was seen today for congestive heart failure, coronary artery disease, hypertension and hyperlipidemia. Diagnoses and all orders for this visit:      AICD at end of battery life  -     Gonzalo Santoyo MD, Electrophysiology, [facility]    ICD (implantable cardioverter-defibrillator) in place- LEFT - Sub-Q ICD - BS A209 (8/6/2016 Dr Zulay Multani) - Noncomplinct with f/u - she is agreeable to f/u at [facility] office. ICD (implantable cardioverter-defibrillator) in place-Left Sub-Q ICD - BS A209 -(8/6/2016 Dr Zulay Multani) - Noncomplinct with  Recommend to folow up with [facility]    Non-rheumatic mitral valve regurgitation - Moderate to severe - Echo pending  -     [facility]    Chronic HFrEF (heart failure with reduced ejection fraction) ([facility])    NICM (nonischemic cardiomyopathy) ([facility]  -     Radha Delgado MD, Electrophysiology, [place]     VHD (valvular heart disease) - MR, TR, OK     Pulmonary hypertension: Stable     History of CVA (cerebrovascular accident)     Tobacco use - Counseled to quit smoking     Non compliance with follow-ups - Compliance discussed    Preventive Cardiology: Low cholesterol diet, regular exercise as tolerate, and gradual weight loss discussed. D/W Dr Jacquelyn Lima     Above recommendations discussed with her her   The patient's current medication list, allergies, problem list and results of prior tests (as available) were reviewed at today's visit   All questions answered about cardiac diagnoses and cardiac medications. Continue current medications. Monitor BP and heart rates.               Compliance with medications and f/u with all physicians discussed. Risk factor modification based on risk profile discussed. Call if any exertional chest pain, short of breath, dizzy or palpitations. Follow up in 3-4 months or earlier if needed.          [facility]  [address], [place], [facility]  [phone]

## 2023-01-13 ENCOUNTER — ANESTHESIA (OUTPATIENT)
Dept: CARDIAC CATH/INVASIVE PROCEDURES | Age: 58
End: 2023-01-13

## 2023-01-13 ENCOUNTER — ANESTHESIA EVENT (OUTPATIENT)
Dept: CARDIAC CATH/INVASIVE PROCEDURES | Age: 58
End: 2023-01-13

## 2023-01-13 ENCOUNTER — HOSPITAL ENCOUNTER (OUTPATIENT)
Dept: CARDIAC CATH/INVASIVE PROCEDURES | Age: 58
Discharge: HOME | End: 2023-01-13
Payer: MEDICAID

## 2023-01-13 VITALS
DIASTOLIC BLOOD PRESSURE: 94 MMHG | BODY MASS INDEX: 19.33 KG/M2 | OXYGEN SATURATION: 99 % | SYSTOLIC BLOOD PRESSURE: 164 MMHG | HEIGHT: 65 IN | RESPIRATION RATE: 18 BRPM | HEART RATE: 84 BPM | WEIGHT: 116 LBS | TEMPERATURE: 98.1 F

## 2023-01-13 LAB
ANION GAP SERPL CALCULATED.3IONS-SCNC: 12 MMOL/L (ref 7–16)
BASOPHILS ABSOLUTE: 0.07 E9/L (ref 0–0.2)
BASOPHILS RELATIVE PERCENT: 1 % (ref 0–2)
BUN BLDV-MCNC: 12 MG/DL (ref 6–20)
CALCIUM SERPL-MCNC: 10.2 MG/DL (ref 8.6–10.2)
CHLORIDE BLD-SCNC: 105 MMOL/L (ref 98–107)
CO2: 26 MMOL/L (ref 22–29)
CREAT SERPL-MCNC: 0.8 MG/DL (ref 0.5–1)
EOSINOPHILS ABSOLUTE: 0.13 E9/L (ref 0.05–0.5)
EOSINOPHILS RELATIVE PERCENT: 1.8 % (ref 0–6)
GFR SERPL CREATININE-BSD FRML MDRD: >60 ML/MIN/1.73
GLUCOSE BLD-MCNC: 104 MG/DL (ref 74–99)
HCT VFR BLD CALC: 37 % (ref 34–48)
HEMOGLOBIN: 11.9 G/DL (ref 11.5–15.5)
IMMATURE GRANULOCYTES #: 0.02 E9/L
IMMATURE GRANULOCYTES %: 0.3 % (ref 0–5)
LYMPHOCYTES ABSOLUTE: 1.68 E9/L (ref 1.5–4)
LYMPHOCYTES RELATIVE PERCENT: 22.9 % (ref 20–42)
MAGNESIUM: 2 MG/DL (ref 1.6–2.6)
MCH RBC QN AUTO: 27.7 PG (ref 26–35)
MCHC RBC AUTO-ENTMCNC: 32.2 % (ref 32–34.5)
MCV RBC AUTO: 86.2 FL (ref 80–99.9)
MONOCYTES ABSOLUTE: 0.56 E9/L (ref 0.1–0.95)
MONOCYTES RELATIVE PERCENT: 7.6 % (ref 2–12)
NEUTROPHILS ABSOLUTE: 4.89 E9/L (ref 1.8–7.3)
NEUTROPHILS RELATIVE PERCENT: 66.4 % (ref 43–80)
PDW BLD-RTO: 15.2 FL (ref 11.5–15)
PLATELET # BLD: 405 E9/L (ref 130–450)
PMV BLD AUTO: 9.2 FL (ref 7–12)
POTASSIUM SERPL-SCNC: 4.3 MMOL/L (ref 3.5–5)
RBC # BLD: 4.29 E12/L (ref 3.5–5.5)
SODIUM BLD-SCNC: 143 MMOL/L (ref 132–146)
WBC # BLD: 7.4 E9/L (ref 4.5–11.5)

## 2023-01-13 PROCEDURE — C1768 GRAFT, VASCULAR: HCPCS

## 2023-01-13 PROCEDURE — 3700000001 HC ADD 15 MINUTES (ANESTHESIA)

## 2023-01-13 PROCEDURE — 2580000003 HC RX 258: Performed by: NURSE ANESTHETIST, CERTIFIED REGISTERED

## 2023-01-13 PROCEDURE — 2720000010 HC SURG SUPPLY STERILE

## 2023-01-13 PROCEDURE — 3700000000 HC ANESTHESIA ATTENDED CARE

## 2023-01-13 PROCEDURE — 36415 COLL VENOUS BLD VENIPUNCTURE: CPT

## 2023-01-13 PROCEDURE — 6360000002 HC RX W HCPCS: Performed by: NURSE ANESTHETIST, CERTIFIED REGISTERED

## 2023-01-13 PROCEDURE — 2580000003 HC RX 258

## 2023-01-13 PROCEDURE — 83735 ASSAY OF MAGNESIUM: CPT

## 2023-01-13 PROCEDURE — 85025 COMPLETE CBC W/AUTO DIFF WBC: CPT

## 2023-01-13 PROCEDURE — 6360000002 HC RX W HCPCS

## 2023-01-13 PROCEDURE — 80048 BASIC METABOLIC PNL TOTAL CA: CPT

## 2023-01-13 PROCEDURE — 2500000003 HC RX 250 WO HCPCS

## 2023-01-13 RX ORDER — PROPOFOL 10 MG/ML
INJECTION, EMULSION INTRAVENOUS CONTINUOUS PRN
Status: DISCONTINUED | OUTPATIENT
Start: 2023-01-13 | End: 2023-01-13 | Stop reason: SDUPTHER

## 2023-01-13 RX ORDER — SODIUM CHLORIDE 9 MG/ML
INJECTION, SOLUTION INTRAVENOUS CONTINUOUS PRN
Status: DISCONTINUED | OUTPATIENT
Start: 2023-01-13 | End: 2023-01-13 | Stop reason: SDUPTHER

## 2023-01-13 RX ORDER — VANCOMYCIN HYDROCHLORIDE 1 G/20ML
INJECTION, POWDER, LYOPHILIZED, FOR SOLUTION INTRAVENOUS PRN
Status: DISCONTINUED | OUTPATIENT
Start: 2023-01-13 | End: 2023-01-13 | Stop reason: SDUPTHER

## 2023-01-13 RX ORDER — KETOROLAC TROMETHAMINE 30 MG/ML
INJECTION, SOLUTION INTRAMUSCULAR; INTRAVENOUS PRN
Status: DISCONTINUED | OUTPATIENT
Start: 2023-01-13 | End: 2023-01-13 | Stop reason: SDUPTHER

## 2023-01-13 RX ORDER — CEFAZOLIN SODIUM 1 G/3ML
INJECTION, POWDER, FOR SOLUTION INTRAMUSCULAR; INTRAVENOUS PRN
Status: DISCONTINUED | OUTPATIENT
Start: 2023-01-13 | End: 2023-01-13 | Stop reason: SDUPTHER

## 2023-01-13 RX ORDER — DOXYCYCLINE HYCLATE 100 MG
100 TABLET ORAL 2 TIMES DAILY
Qty: 14 TABLET | Refills: 0 | Status: SHIPPED | OUTPATIENT
Start: 2023-01-13 | End: 2023-01-20

## 2023-01-13 RX ORDER — FENTANYL CITRATE 50 UG/ML
INJECTION, SOLUTION INTRAMUSCULAR; INTRAVENOUS PRN
Status: DISCONTINUED | OUTPATIENT
Start: 2023-01-13 | End: 2023-01-13 | Stop reason: SDUPTHER

## 2023-01-13 RX ORDER — MIDAZOLAM HYDROCHLORIDE 1 MG/ML
INJECTION INTRAMUSCULAR; INTRAVENOUS PRN
Status: DISCONTINUED | OUTPATIENT
Start: 2023-01-13 | End: 2023-01-13 | Stop reason: SDUPTHER

## 2023-01-13 RX ORDER — DEXTROSE MONOHYDRATE 50 MG/ML
INJECTION, SOLUTION INTRAVENOUS CONTINUOUS PRN
Status: DISCONTINUED | OUTPATIENT
Start: 2023-01-13 | End: 2023-01-13 | Stop reason: SDUPTHER

## 2023-01-13 RX ADMIN — PROPOFOL 30 MG: 10 INJECTION, EMULSION INTRAVENOUS at 14:04

## 2023-01-13 RX ADMIN — SODIUM CHLORIDE: 9 INJECTION, SOLUTION INTRAVENOUS at 13:23

## 2023-01-13 RX ADMIN — PROPOFOL 50 MCG/KG/MIN: 10 INJECTION, EMULSION INTRAVENOUS at 13:40

## 2023-01-13 RX ADMIN — CEFAZOLIN 2 G: 1 INJECTION, POWDER, FOR SOLUTION INTRAMUSCULAR; INTRAVENOUS at 14:00

## 2023-01-13 RX ADMIN — MIDAZOLAM 2 MG: 1 INJECTION INTRAMUSCULAR; INTRAVENOUS at 13:37

## 2023-01-13 RX ADMIN — FENTANYL CITRATE 50 MCG: 50 INJECTION, SOLUTION INTRAMUSCULAR; INTRAVENOUS at 14:04

## 2023-01-13 RX ADMIN — FENTANYL CITRATE 50 MCG: 50 INJECTION, SOLUTION INTRAMUSCULAR; INTRAVENOUS at 13:45

## 2023-01-13 RX ADMIN — VANCOMYCIN HYDROCHLORIDE 1000 MG: 1 INJECTION, POWDER, LYOPHILIZED, FOR SOLUTION INTRAVENOUS at 13:57

## 2023-01-13 RX ADMIN — KETOROLAC TROMETHAMINE 30 MG: 30 INJECTION, SOLUTION INTRAMUSCULAR; INTRAVENOUS at 15:09

## 2023-01-13 RX ADMIN — FENTANYL CITRATE 25 MCG: 50 INJECTION, SOLUTION INTRAMUSCULAR; INTRAVENOUS at 14:00

## 2023-01-13 RX ADMIN — DEXTROSE MONOHYDRATE: 50 INJECTION, SOLUTION INTRAVENOUS at 13:57

## 2023-01-13 RX ADMIN — FENTANYL CITRATE 25 MCG: 50 INJECTION, SOLUTION INTRAMUSCULAR; INTRAVENOUS at 13:53

## 2023-01-13 ASSESSMENT — LIFESTYLE VARIABLES: SMOKING_STATUS: 1

## 2023-01-13 ASSESSMENT — ENCOUNTER SYMPTOMS: SHORTNESS OF BREATH: 1

## 2023-01-13 NOTE — PROGRESS NOTES
[address] and [address] Electrophysiology  Consultation Report  PATIENT: Diana Neumann  MEDICAL RECORD NUMBER: [id number]  DATE OF SERVICE:  1/13/2023  ATTENDING ELECTROPHYSIOLOGIST: Dora Cullen MD  REFERRING PHYSICIAN:Dr Luz Maria Santos and Nereida Ontiveros MD  CHIEF COMPLAINT:   ICD generator at end of service        HPI: This is a 56 y.o. female with a history of nonischemic cardiomyopathy, subcutaneous ICD placed in 2016 who presents to cardiac electrophysiology clinic for consultation. She was seen by Dr. Luz Maria Santos earlier today for her routine follow-up visit and a brief device interrogation shows that her ICD generator has reached end of service possibly in the last several months. The patient denies any new cardiac symptoms. She recalls her last ICD shock at least 2 to 3 years ago. No recent symptoms of chest pain or shortness of breath at rest or on exertion. No symptoms of paroxysmal nocturnal dyspnea, orthopnea or leg edema.     Patient Active Problem List    Diagnosis Date Noted    Severe manic bipolar 1 disorder with psychotic behavior ([address] 10/29/2022     Priority: Medium    MDD (major depressive disorder), recurrent episode, mild ([address] 10/28/2022     Priority: Medium    Hypercalcemia 10/27/2022     Priority: Medium    Elevated lactic acid level 10/27/2022     Priority: Medium    1st degree AV block 10/27/2022     Priority: Medium    Mood disorder ([address] 10/27/2022     Priority: Medium    Acetaminophen overdose of undetermined intent 10/26/2022     Priority: Medium    Heart failure, left systolic, acute on chronic ([address] 03/16/2020    Acute decompensated heart failure ([address] 12/26/2019    Acute pulmonary edema ([address] 12/26/2019    Blood type AB+ 04/03/2018    Nonischemic cardiomyopathy ([address]     Coronary artery disease involving native coronary artery of native heart without angina pectoris 02/14/2018    Dyspnea 02/02/2018    S/P ICD (internal cardiac defibrillator) procedure 08/23/2016     Overview Note:     Subcutaneous ICD  [employer] Emblem   Implanted 8/8/2016      Chronic systolic CHF (congestive heart failure), NYHA class 3 ([address] 07/25/2016     Overview Note:     EF -   /2022. Bumex 2mg  Entresto  Spironolactone 25  Metoprolol Succ 100     Dry Weight  Baseline / Dry BNP  F/u with Cardio:          Non-rheumatic mitral regurgitation 07/25/2016    Essential hypertension 07/25/2016    Mass of right lobe of liver 07/27/2015     Overview Note:     On MRI done in July of 2015, look for report it says addendum will be done      History of MI (myocardial infarction) 07/26/2015    History of irregular menstrual bleeding 05/16/2014    Anemia due to blood loss 05/16/2014     Overview Note:     Resolved       Allergic rhinosinusitis 08/27/2013    Asthma     Severe mitral regurgitation 05/10/2013    Tobacco abuse 05/10/2013    Suicide attempt by drug ingestion ([address] 11/27/2012    Hyperlipidemia     Left ovarian cyst     CVA (cerebral vascular accident)     COPD (chronic obstructive pulmonary disease) ([address]     Carpal tunnel syndrome on left        Past Medical History:   Diagnosis Date    Angina at rest [facility])     cath in 2007 showed no CAD    Asthma     Blood type AB+ 04/03/2018    CAD (coronary artery disease)     Carpal tunnel syndrome on left     CHF (congestive heart failure) (HCC)     CHF (congestive heart failure) (HCC)     COPD (chronic obstructive pulmonary disease) ([address]     CVA (cerebral vascular accident) ([facility] 12/2009 and 12/2010. left parietal    GERD (gastroesophageal reflux disease)     HFrEF (heart failure with reduced ejection fraction) ([address] 01/14/2020 8/26/19- echo- LVEF 20-25%, LA enlarged, moderate MR, mild TR, mild PH, LVDD: 6.7, RVDD: 2.2 (12/9/17- echo- LVEF 10%, stage III DD, severely dilated LA, appears L>R atrial shunt, mod TR, LVDD: 6.6,  RVDD: 2.3)    History of blood transfusion     Hyperlipidemia Diagnosed in 2007. Dyslipidemia. Hypertension diagnosed in     ICD (implantable cardioverter-defibrillator) in place 2018    Placed by Dr. Jose Correia.     Left ovarian cyst     Liver lesion 2015    Moderate mitral regurgitation 2015    mild-moderate    Nonischemic cardiomyopathy (HCC)     NSTEMI (non-ST elevated myocardial infarction) ([facility] 4/10/15--adm to Ch[facility] Monet [id number]    Suicide attempt by drug ingestion ([facility]     attempt in  OD on ultram    Tobacco abuse     Vitamin D deficiency        Family History   Problem Relation Age of Onset    High Blood Pressure Mother     Stroke Mother     High Blood Pressure Father     Stroke Father     Heart Disease Father     Pacemaker Father        Social History     Tobacco Use    Smoking status: Former     Packs/day: 0.50     Years: 30.00     Pack years: 15.00     Types: Cigarettes     Quit date: 2018     Years since quittin.5    Smokeless tobacco: Current     Types: Snuff   Substance Use Topics    Alcohol use: Not Currently     Alcohol/week: 4.0 standard drinks     Types: 4 Glasses of wine per week       Current Outpatient Medications   Medication Sig Dispense Refill    doxycycline hyclate (VIBRA-TABS) 100 MG tablet Take 1 tablet by mouth 2 times daily for 7 days 14 tablet 0    lithium 300 MG capsule Take 1 capsule by mouth 2 times daily (with meals) 60 capsule 0    melatonin 3 MG TABS tablet Take 1 tablet by mouth nightly 30 tablet 0    paliperidone (INVEGA) 3 MG extended release tablet Take 1 tablet by mouth in the morning and at bedtime 60 tablet 0    Ergocalciferol (VITAMIN D) 88870 units CAPS Take 50,000 Units by mouth once a week for 7 doses 5 capsule 1    bumetanide (BUMEX) 2 MG tablet Take 0.5 tablets by mouth daily 90 tablet 3    vitamin D3 (CHOLECALCIFEROL) 10 MCG (400 UNIT) TABS tablet Take 400 Units by mouth daily Take 2 tablets PO once daily      pantoprazole (PROTONIX) 20 MG tablet Take 1 tablet by mouth daily 30 tablet 0    nitroGLYCERIN (NITROSTAT) 0.4 MG SL tablet Place 1 tablet under the tongue every 5 minutes as needed for Chest pain 25 tablet 1    aspirin 81 MG chewable tablet Take 1 tablet by mouth daily 90 tablet 2    atorvastatin (LIPITOR) 40 MG tablet Take 1 tablet by mouth daily 90 tablet 2    sacubitril-valsartan (ENTRESTO)  MG per tablet Take 1 tablet by mouth 2 times daily 180 tablet 2    metoprolol succinate (TOPROL XL) 100 MG extended release tablet Take 1 tablet by mouth daily 90 tablet 2    budesonide-formoterol (SYMBICORT) 160-4.5 MCG/ACT AERO Inhale 2 puffs into the lungs 2 times daily 6 each 2    montelukast (SINGULAIR) 10 MG tablet Take 1 tablet by mouth nightly 90 tablet 2    cetirizine (ZYRTEC) 10 MG tablet Take 1 tablet by mouth daily 30 tablet 5    tiotropium (SPIRIVA HANDIHALER) 18 MCG inhalation capsule Inhale 1 capsule into the lungs daily 90 capsule 1    albuterol sulfate HFA (VENTOLIN HFA) 108 (90 Base) MCG/ACT inhaler Inhale 2 puffs into the lungs 4 times daily as needed for Wheezing 18 g 5     No current facility-administered medications for this visit. Allergies   Allergen Reactions    Orange Fruit [Citrus] Hives and Itching     ONLY allergic to oranges - NOT any other citrus fruit. NO oranges or orange juice. Crestor [Rosuvastatin Calcium]     Loratadine     Norco [Hydrocodone-Acetaminophen] Itching    Sulfa Antibiotics     Aspirin      Itching sometime     Patient takes 81mg Aspirin daily, but is unable to take 324 mg. ROS:   Constitutional: Negative for fever, activity change and appetite change. HENT: Negative for epistaxis. Eyes: Negative for diploplia, blurred vision. Respiratory: Negative for cough, chest tightness, shortness of breath and wheezing. Cardiovascular: pertinent positives in HPI  Gastrointestinal: Negative for abdominal pain and blood in stool.    All other review of systems are negative     PHYSICAL EXAM:   Blood pressure was 100/60, pulse 60-70 and regular  Constitutional: Well-developed, no acute distress  Eyes: conjunctivae normal, no xanthelasma   Ears, Nose, Throat: oral mucosa moist, no cyanosis   CV: PMI is laterally displaced, regular rate and rhythm. Normal S1S2 and no S3. No murmurs, rubs, or gallops. Lungs: clear to auscultation bilaterally, normal respiratory effort without used of accessory muscles  Abdomen: soft, non-tender, bowel sounds present, no masses or hepatomegaly   Musculoskeletal: no digital clubbing, no edema   Skin: warm, no rashes     I have personally reviewed the laboratory, cardiac diagnostic and radiographic testing as outlined below:    Data:    Recent Labs     23  1100   WBC 7.4   HGB 11.9   HCT 37.0        Recent Labs     23  1100      K 4.3      CO2 26   BUN 12   CREATININE 0.8   CALCIUM 10.2      Lab Results   Component Value Date/Time    MG 2.0 2023 11:00 AM     No results for input(s): TSH in the last 72 hours. No results for input(s): INR in the last 72 hours. EK23: Sinus rhythm with a first-degree AV block, left axis deviation, poor R wave progression in the anterolateral leads     Echocardiogram:   Summary   Technically difficult study, patient was coughing constantly during the   study. Left ventricle is severely enlarged . Ejection fraction is visually estimated at 25-30%. No regional wall motion abnormalities seen. Normal left ventricle wall thickness. Mildly enlarged right ventricle cavity. Right ventricle global systolic function is mildly reduced . Tethering of both anterior and posterior leaflets noted with a tenting   height of 1.2 cm. Anterior leaflet length 1.5 cm and posterior leaflet   length 1.0 cm. Moderate-to-severe mitral regurgitation with centrally directed jet. Marline type 3b. PISA radius 0.9 cm, ERO 0.2 cm2, no PV flow reversal.   No hemodynamically significant aortic stenosis is present. Unable to estimate PA systolic pressure.    No evidence for hemodynamically significant pericardial effusion. Signature      ----------------------------------------------------------------   Electronically signed by Nalini Mehta MD(Interpreting   physician) on 10/20/2020 09:59 PM   ----------------------------------------------------------------      Cardiac Catheterization: 2018    LHC performed via right radial approach using a Slender 5 sheath. 2.5mg of diluted Verapamil and 200mcg of nitroglycerine administered through the sheath. 5000U heparin administered IV. Findings:  Left main: 0%  stenosis  LAD: 0. %  stenosis  Circumflex: 0. %   stenosis  RCA: Dominant. 0. %  stenosis        Hemodynamics:. Ao: 112/62. Sheath removed and TR band applied. There was good hemostasis achieved and the distal pulses were intact. Complication: None   Estimated blood loss: 5 cc  Contrast use: 35 cc     Post op diagnosis:  Normal coronary arteries     PLAN:  Per dr. Delfino Elias MD     D: 04/04/2018 8:56:46           Stress Test: 2020    FINDINGS:   LV is dilated. Perfusion images demonstrate no reversible perfusion defect. Severe global hypokinesis. The end diastolic volume is 850 ml. The end systolic volume is 665 ml. The estimated ejection fraction is 20 %. Impression   1. No reversible perfusion defect   2. Ejection fraction is 20 %. 3. Dilated LV. Severe global hypokinesis. Device Evaluation    Make/model   [employer] MRI S ICD  System impedance of 60 ohms  ICD generator reached end of service in April 2022      I have independently reviewed all of the ECGs and rhythm strips per above     Assessment/Plan:    Subcutaneous ICD in situ--DOI 2016  Patient has not had regular follow-up of her device  Device reached end of service in April 2022. ICD generator may have been under advisory.     2.   Dilated cardiomyopathy--HFrEF  Patient on guideline directed medical therapy including Entresto, metoprolol  Normal coronaries by coronary angiography in 2018    3. Cerebrovascular event in the remote past.  No neurological deficits    4. Polysubstance abuse. History of marijuana and alcohol abuse    5. Depression and suicidal ideation. Patient is currently on lithium    Recommendations:    ICD generator change ASAP since the patient does recall ICD discharges in the past few years. I do not have any records of the appropriateness of these shocks. However in view of her severe LV dysfunction, ICD generator change for primary prevention was discussed with her in detail. She is agreeable to proceed and will be scheduled urgently tomorrow. Risks, benefits, and alternatives of ICD generator replacement were discussed in detail today. These risks include but are not limited to bleeding,infection, lead damage or discovery of a non-functional lead which would require lead revision and risks of blood clot, pneumothorax, hemothorax, cardiac perforation and tamponade, lead dislodgement,  The patient understands these risks and agrees to proceed . All of the above was discussed with the patient  reviewing the above stated recommendations. And a total of >50% of that time involved face-to-face time providing counseling and or coordination of care with the other providers, preparation for the clinic visit, reviewing records/tests, counseling/education of the patient, ordering medications/tests/procedures, coordinating care, and documenting clinical information in the EHR. Thank you for allowing me to participate in your patient's care. Please call me if there are any questions or concerns.       Berny Polk MD, [facility]  Cardiac Electrophysiology  [facility]) Physicians  [facility] [place]: [facility] Electrophysiology

## 2023-01-13 NOTE — DISCHARGE INSTRUCTIONS
[facility] Cardiac Electrophysiology Defibrillator Generator Change Patient Discharge Instructions    Remove pressure dressing after 24 hours.      Medications:Resume all home medications    Follow-Up: You will be seen on 1/26/23 at 11:00 am at the Device Clinic for an incision check and brief ICD/pacemaker evaluation.      Incision Care: Please leave the current dressing on for 1 week. Remove the AquaCel dressing on Friday 1/20/23 ; but do not remove the steri strips.  They will fall off on their own.  You may shower starting tomorrow; but do not let the water run on the incision for 7 days. If you find any redness, increased swelling, drainage, warmth, or have a fever greater than 100 degrees, notify the office immediately at [phone] or [phone].    Activity: You may continue regular daily activities tomorrow.  You also may shower starting tomorrow: but do not let the water run directly on the incision.  The dressing is waterproof.    ID Card: You will have a temporary ID card until a permanent card is sent to you by the device company. The permanent card will look like a ’s license or credit card and should arrive within 8 weeks. Carry your ID card with you at all times.       [facility] Electrophysiology Follow Up:  You will be seen on 1/26/23 at 11:00 am at the Device Clinic for  incision check and brief ICD evaluation.  If you need to reschedule this appointment, call the office at [phone] or [phone].      [place] Electrophysiology    [address] [facility] [id number] [phone] OR [phone] extension [phone]

## 2023-01-13 NOTE — ANESTHESIA PRE PROCEDURE
Department of Anesthesiology  Preprocedure Note       Name:  Radha Mckeon   Age:  56 y.o.  :  1965                                          MRN:  [id number]         Date:  2023      Surgeon: Corrine Caballero    Procedure: SICD GENERATOR REPLACEMENT    Medications prior to admission:   Prior to Admission medications    Medication Sig Start Date End Date Taking?  Authorizing Provider   lithium 300 MG capsule Take 1 capsule by mouth 2 times daily (with meals) 11/3/22 5/21/11  GA Joseph CNP   melatonin 3 MG TABS tablet Take 1 tablet by mouth nightly 11/3/22 8/74/29  [facility], [place] - CNP   paliperidone (INVEGA) 3 MG extended release tablet Take 1 tablet by mouth in the morning and at bedtime 11/3/22 6/31/77  GA Joseph CNP   Ergocalciferol (VITAMIN D) 07665 units CAPS Take 50,000 Units by mouth once a week for 7 doses 22  Khushbu Celaya MD   bumetanide [facility]) 2 MG tablet Take 0.5 tablets by mouth daily 11/3/22   Khushbu Celaya MD   vitamin D3 (CHOLECALCIFEROL) 10 MCG (400 UNIT) TABS tablet Take 400 Units by mouth daily Take 2 tablets PO once daily    Historical Provider, MD   pantoprazole (PROTONIX) 20 MG tablet Take 1 tablet by mouth daily 22   Anamika Cespedes MD   nitroGLYCERIN (NITROSTAT) 0.4 MG SL tablet Place 1 tablet under the tongue every 5 minutes as needed for Chest pain 22   Anamika Cespedes MD   aspirin 81 MG chewable tablet Take 1 tablet by mouth daily 22   Anamika Cespedes MD   atorvastatin (LIPITOR) 40 MG tablet Take 1 tablet by mouth daily 22   Anamika Cespedes MD   sacubitril-valsartan (ENTRESTO)  MG per tablet Take 1 tablet by mouth 2 times daily 22   Anamika Cespedes MD   metoprolol succinate (TOPROL XL) 100 MG extended release tablet Take 1 tablet by mouth daily 22   Anamika Cespedes MD   budesonide-formoterol [facility]) 160-4.5 MCG/ACT AERO Inhale 2 puffs into the lungs 2 times daily 22   Anamika Cespedes MD   montelukast (SINGULAIR) 10 MG tablet Take 1 tablet by mouth nightly 5/18/22   Casimiro Rivers MD   cetirizine (ZYRTEC) 10 MG tablet Take 1 tablet by mouth daily 5/18/22   Casimiro Rivers MD   tiotropium (SPIRIVA HANDIHALER) 18 MCG inhalation capsule Inhale 1 capsule into the lungs daily 5/18/22   Casimiro Rivers MD   albuterol sulfate HFA (VENTOLIN HFA) 108 (90 Base) MCG/ACT inhaler Inhale 2 puffs into the lungs 4 times daily as needed for Wheezing 5/18/22   Casimiro Rivers MD       Current medications:    Current Outpatient Medications   Medication Sig Dispense Refill   • lithium 300 MG capsule Take 1 capsule by mouth 2 times daily (with meals) 60 capsule 0   • melatonin 3 MG TABS tablet Take 1 tablet by mouth nightly 30 tablet 0   • paliperidone (INVEGA) 3 MG extended release tablet Take 1 tablet by mouth in the morning and at bedtime 60 tablet 0   • Ergocalciferol (VITAMIN D) 36277 units CAPS Take 50,000 Units by mouth once a week for 7 doses 5 capsule 1   • bumetanide (BUMEX) 2 MG tablet Take 0.5 tablets by mouth daily 90 tablet 3   • vitamin D3 (CHOLECALCIFEROL) 10 MCG (400 UNIT) TABS tablet Take 400 Units by mouth daily Take 2 tablets PO once daily     • pantoprazole (PROTONIX) 20 MG tablet Take 1 tablet by mouth daily 30 tablet 0   • nitroGLYCERIN (NITROSTAT) 0.4 MG SL tablet Place 1 tablet under the tongue every 5 minutes as needed for Chest pain 25 tablet 1   • aspirin 81 MG chewable tablet Take 1 tablet by mouth daily 90 tablet 2   • atorvastatin (LIPITOR) 40 MG tablet Take 1 tablet by mouth daily 90 tablet 2   • sacubitril-valsartan (ENTRESTO)  MG per tablet Take 1 tablet by mouth 2 times daily 180 tablet 2   • metoprolol succinate (TOPROL XL) 100 MG extended release tablet Take 1 tablet by mouth daily 90 tablet 2   • budesonide-formoterol (SYMBICORT) 160-4.5 MCG/ACT AERO Inhale 2 puffs into the lungs 2 times daily 6 each 2   • montelukast (SINGULAIR) 10 MG tablet Take 1 tablet by mouth nightly 90 tablet 2   • cetirizine  (ZYRTEC) 10 MG tablet Take 1 tablet by mouth daily 30 tablet 5    tiotropium (SPIRIVA HANDIHALER) 18 MCG inhalation capsule Inhale 1 capsule into the lungs daily 90 capsule 1    albuterol sulfate HFA (VENTOLIN HFA) 108 (90 Base) MCG/ACT inhaler Inhale 2 puffs into the lungs 4 times daily as needed for Wheezing 18 g 5     No current facility-administered medications for this encounter. Allergies: Allergies   Allergen Reactions    Orange Fruit [Citrus] Hives and Itching     ONLY allergic to oranges - NOT any other citrus fruit. NO oranges or orange juice.  Crestor [Rosuvastatin Calcium]     Loratadine     Norco [Hydrocodone-Acetaminophen] Itching    Sulfa Antibiotics     Aspirin      Itching sometime     Patient takes 81mg Aspirin daily, but is unable to take 324 mg.         Problem List:    Patient Active Problem List   Diagnosis Code    Hyperlipidemia E78.5    Left ovarian cyst N83.202    CVA (cerebral vascular accident) I63.9    COPD (chronic obstructive pulmonary disease) ([facility] J44.9    Carpal tunnel syndrome on left G56.02    Suicide attempt by drug ingestion ([facility] T50.902A    Severe mitral regurgitation I34.0    Tobacco abuse Z72.0    Asthma J45.909    Allergic rhinosinusitis J30.9    History of irregular menstrual bleeding Z87.42    Anemia due to blood loss D50.0    History of MI (myocardial infarction) I25.2    Mass of right lobe of liver R16.0    Chronic systolic CHF (congestive heart failure), NYHA class 3 (HCC) I50.22    Non-rheumatic mitral regurgitation I34.0    Essential hypertension I10    ICD (implantable cardioverter-defibrillator) in place Z95.810    Dyspnea R06.00    Coronary artery disease involving native coronary artery of native heart without angina pectoris I25.10    Nonischemic cardiomyopathy (HCC) I42.8    Blood type AB+ Z67.30    Acute decompensated heart failure (HCC) I50.9    Acute pulmonary edema (HCC) J81.0    Heart failure, left systolic, acute on chronic ([facility]) I50.23    Acetaminophen overdose of undetermined intent T39.1X4A    Hypercalcemia E83.52    Elevated lactic acid level R79.89    1st degree AV block I44.0    Mood disorder ([facility]) F39    MDD (major depressive disorder), recurrent episode, mild ([facility]) F33.0    Severe manic bipolar 1 disorder with psychotic behavior ([address] F31.2       Past Medical History:        Diagnosis Date    Angina at rest [facility])     cath in  showed no CAD    Asthma     Blood type AB+ 2018    CAD (coronary artery disease)     Carpal tunnel syndrome on left     CHF (congestive heart failure) ([facility])     CHF (congestive heart failure) ([facility])     COPD (chronic obstructive pulmonary disease) ([facility])     CVA (cerebral vascular accident) ([address] 2009 and 2010. left parietal    GERD (gastroesophageal reflux disease)     HFrEF (heart failure with reduced ejection fraction) ([address] 2020- echo- LVEF 20-25%, LA enlarged, moderate MR, mild TR, mild PH, LVDD: 6.7, RVDD: 2.2 (17- echo- LVEF 10%, stage III DD, severely dilated LA, appears L>R atrial shunt, mod TR, LVDD: 6.6,  RVDD: 2.3)    History of blood transfusion     Hyperlipidemia Diagnosed in . Dyslipidemia.  Hypertension diagnosed in     ICD (implantable cardioverter-defibrillator) in place 2018    Placed by Dr. Reilly Baptiste.     Left ovarian cyst     Liver lesion 2015    Moderate mitral regurgitation 2015    mild-moderate    Nonischemic cardiomyopathy ([address]     NSTEMI (non-ST elevated myocardial infarction) ([address] 4/10/15--[facility] to [facility]    Suicide attempt by drug ingestion ([address]     attempt in  OD on ultram    Tobacco abuse     Vitamin D deficiency        Past Surgical History:        Procedure Laterality Date    CARDIAC CATHETERIZATION  2018    Dr. Tonio Don- Clean coronaries   Peter Whittaker  2016    SICD    CARDIOVASCULAR STRESS TEST  2009 Normal      SECTION      COLONOSCOPY  10/2015    DIAGNOSTIC CARDIAC CATH LAB PROCEDURE  2007    [facility]: No significant CAD. Mild LVD with apical akinesis. EF 50%.  DIAGNOSTIC CARDIAC CATH LAB PROCEDURE  4/15    with [facility] to [facility] ob marg [email or web address]    ENDOSCOPY, COLON, DIAGNOSTIC      HYSTERECTOMY (CERVIX STATUS UNKNOWN)  10/1/15    at [facility] by Dr. Tara Hernandes PTCA  4/10/15    at [address] [place] [address] ECHOCARDIOGRAM  3/19/13    EF 65%, mild MR    TUBAL LIGATION         Social History:    Social History     Tobacco Use    Smoking status: Former     Packs/day: 0.50     Years: 30.00     Pack years: 15.00     Types: Cigarettes     Quit date: 2018     Years since quittin.5    Smokeless tobacco: Current     Types: Snuff   Substance Use Topics    Alcohol use: Not Currently     Alcohol/week: 4.0 standard drinks     Types: 4 Glasses of wine per week                                Ready to quit: Not Answered  Counseling given: Not Answered      Vital Signs (Current): There were no vitals filed for this visit.                                            BP Readings from Last 3 Encounters:   23 100/60   22 116/68   22 130/68       NPO Status:  >8 HRS                                                                               BMI:   Wt Readings from Last 3 Encounters:   23 116 lb (52.6 kg)   22 118 lb (53.5 kg)   22 116 lb (52.6 kg)     There is no height or weight on file to calculate BMI.    CBC:   Lab Results   Component Value Date/Time    WBC 16.2 2022 02:32 PM    RBC 4.48 2022 02:32 PM    HGB 13.1 2022 02:32 PM    HCT 39.3 2022 02:32 PM    MCV 87.7 2022 02:32 PM    RDW 13.8 2022 02:32 PM     2022 02:32 PM       CMP:   Lab Results   Component Value Date/Time     2022 02:32 PM    K 3.9 2022 02:32 PM    K 5.0 10/26/2022 09:31 PM    CL 93 2022 02:32 PM    CO2 23 2022 02:32 PM BUN 27 11/17/2022 02:32 PM    CREATININE 1.3 11/17/2022 02:32 PM    GFRAA >60 05/18/2022 02:40 PM    LABGLOM 48 11/17/2022 02:32 PM    GLUCOSE 113 11/17/2022 02:32 PM    GLUCOSE 82 03/01/2011 09:00 AM    PROT 8.6 11/17/2022 02:32 PM    CALCIUM 10.4 11/17/2022 02:32 PM    BILITOT 0.3 11/17/2022 02:32 PM    ALKPHOS 103 11/17/2022 02:32 PM    AST 15 11/17/2022 02:32 PM    ALT 7 11/17/2022 02:32 PM       POC Tests: No results for input(s): POCGLU, POCNA, POCK, POCCL, POCBUN, POCHEMO, POCHCT in the last 72 hours.     Coags:   Lab Results   Component Value Date/Time    PROTIME 12.4 10/28/2022 05:07 AM    PROTIME 11.8 12/28/2010 05:32 AM    INR 1.1 10/28/2022 05:07 AM    APTT 30.6 10/28/2022 05:07 AM       HCG (If Applicable):   Lab Results   Component Value Date    PREGTESTUR negative 07/12/2015        ABGs: No results found for: PHART, PO2ART, HMH2QTD, VTU4CTS, BEART, J9JEUXUQ     Type & Screen (If Applicable):  No results found for: LABABO, LABRH    Drug/Infectious Status (If Applicable):  No results found for: HIV, HEPCAB    COVID-19 Screening (If Applicable):   Lab Results   Component Value Date/Time    COVID19 Not Detected 11/17/2022 02:32 PM    COVID19 Not Detected 10/15/2020 11:35 AM           Anesthesia Evaluation  Patient summary reviewed and Nursing notes reviewed no history of anesthetic complications:   Airway: Mallampati: II  TM distance: >3 FB   Neck ROM: full  Mouth opening: > = 3 FB   Dental: normal exam         Pulmonary:normal exam  breath sounds clear to auscultation  (+) COPD:  shortness of breath:  asthma: current smoker                           Cardiovascular:  Exercise tolerance: poor (<4 METS),   (+) hypertension:, valvular problems/murmurs: MR, angina: at rest, pacemaker (SICD):, past MI: > 6 months, CAD: obstructive, dysrhythmias (1* AVB, VT):, CHF:, JOHANSEN:,       NYHA Classification: III  ECG reviewed  Rhythm: regular  Rate: normal  Echocardiogram reviewed  Stress test reviewed  Cleared by cardiology              Neuro/Psych:   (+) CVA:, neuromuscular disease:, psychiatric history (MDD W PRIOR SUICIDE ATTEMPTS, SEVERE MANIC BPD W PSYCHOTIC BEHAVIOR):depression/anxiety              ROS comment: L CARPAL LTUNNEL SYNDROME GI/Hepatic/Renal:   (+) GERD:, liver disease (LIVER MASS):,           Endo/Other:    (+) blood dyscrasia: anemia:., .          Pt had no PAT visit       Abdominal:             Vascular: Other Findings:           Anesthesia Plan      MAC     ASA 4       Induction: intravenous. Anesthetic plan and risks discussed with patient. Use of blood products discussed with patient whom. Plan discussed with attending.                     GA Harrison - CRNA   1/13/2023

## 2023-01-14 NOTE — PROCEDURES
[address]                  [address] [place],  [facility]                                 PROCEDURE NOTE    PATIENT NAME: Edu Griffith                   :        1965  MED REC NO:   [id number]                            ROOM:  ACCOUNT NO:   [de-identified]                           ADMIT DATE: 2023  PROVIDER:     Berny Polk MD    DATE OF PROCEDURE:  2023    NAME OF THE PROCEDURE:  Subcutaneous ICD generator change and system  evaluation. PREPROCEDURE DIAGNOSES:  ICD generator end of service, dilated  cardiomyopathy, congestive heart failure, LVEF of less than 35%. POSTPROCEDURE DIAGNOSES:  ICD generator end of service, dilated  cardiomyopathy, congestive heart failure, LVEF of less than 35%. SURGEON:  Berny Polk MD    COMPLICATIONS:  None. INDICATIONS:  This is a 62-year-old patient with a history of dilated  cardiomyopathy who underwent a subcutaneous ICD placement in 2016 by Dr. Marielle Holguin. The patient has not had a regular followup due to lack of  transportation. She returned to the office sporadically to see her  cardiologist too. She was seen by her cardiologist yesterday and then  reported to me since her device was nonfunctional when interrogated. She was therefore urgently brought in today for a generator change. The  procedure was discussed with her. She understood and was agreeable to  the same. Risks and benefits including those of possible infection,  mechanical malfunction were explained to her in detail. She understood  and was agreeable. DESCRIPTION OF PROCEDURE:  The patient was brought to electrophysiology  area in the postabsorptive, nonsedated state. After the usual  noninvasive blood pressure and heart rate monitoring were instituted,  the patient was sedated with IV medications by Anesthesia. Noninvasive  blood pressure and heart rate monitoring were instituted.   The ICD  system was first x-rayed. The lead appeared intact as did the ICD  generator. After the usual sterile prep and drape and using 1%  lidocaine for local anesthesia, an incision was made at the site of the  ICD generator in the old incision. The pocket was opened completely and  the capsule removed to remove the old ICD generator which was a [employer], serial number [id number]. This was implanted on  08/08/2016. This device was removed from the pocket and this engaged from the  subcutaneous lead which was model 3401, serial number [id number]. The new  device utilized was [facility],  serial number [id number]. This lead was attached to the ICD. A [employer]  Envelope which was an antibiotic infiltrated envelope was placed around  the device. The device was tethered down to the underlying tissues with  non-absorbable sutures. The first layer of the pocket was closed and  then defibrillation threshold test was performed. Ventricular fibrillation was induced once and successfully defibrillated  with 65 joules with high voltage impendence measurement of 68 ohms. The  pocket was now closed in four layers with interrupted 0 and 2-0 sutures  and continuous 3-0 and 4-0 absorbable Vicryl sutures. The incision was  then covered with Steri-Strips and a waterproof bandage. The patient  was awakened and returned to her room for recovery. CONCLUSION:  1. Successful subcutaneous ICD generator change with appropriate  parameters as noted above. 2.  Successful defibrillation with 65 joules. PLAN:  Recovery in hospital for the next 2 hours and discharged home to  be followed up in the office in two weeks. Discharge instructions given  in detail including incision care.         Corie Barrett MD    D: 01/13/2023 16:42:23       T: 01/13/2023 22:46:43     [email or web address]  Job#: [id number]     Doc#: [id number]    CC:

## 2023-01-16 NOTE — ANESTHESIA POSTPROCEDURE EVALUATION
Department of Anesthesiology  Postprocedure Note    Patient: Radha Mckeon  MRN: [id number]  YOB: 1965  Date of evaluation: 1/16/2023      Procedure Summary     Date: 01/13/23 Room / Location: [facility] CATH LAB    Anesthesia Start: 7741 Anesthesia Stop: 2022    Procedure: ICD REPLACEMENT W/ANES Diagnosis:       Presence of automatic (implantable) cardiac defibrillator      Chronic obstructive pulmonary disease, unspecified    Scheduled Providers: GA Orr - CRNA; Jace Baxter MD Responsible Provider: Jace Baxter MD    Anesthesia Type: MAC ASA Status: 4          Anesthesia Type: No value filed.     Praful Phase I:      Praful Phase II:        Anesthesia Post Evaluation    Patient location during evaluation: PACU  Patient participation: complete - patient participated  Level of consciousness: awake  Pain score: 0  Airway patency: patent  Nausea & Vomiting: no nausea  Complications: no  Cardiovascular status: hemodynamically stable  Respiratory status: acceptable  Hydration status: stable  Multimodal analgesia pain management approach

## 2023-01-19 ENCOUNTER — TELEPHONE (OUTPATIENT)
Dept: AUDIOLOGY | Age: 58
End: 2023-01-19

## 2023-01-19 NOTE — TELEPHONE ENCOUNTER
[facility]. She reported her ride did not come to take her to appointment and she was unable to catch the bus. Will let ENT know.

## 2023-01-20 ENCOUNTER — TELEPHONE (OUTPATIENT)
Dept: CARDIAC CATH/INVASIVE PROCEDURES | Age: 58
End: 2023-01-20

## 2023-01-20 NOTE — TELEPHONE ENCOUNTER
I called Gal Mendez to see how she's doing since her [address] on 1/13/23. She states that she is doing fine and that his aquacel dressing was removed today and some of the steri strips came off with the dressing. She says there are are some left with dried blood. She denies any fevers, redness, drainage, bleeding, or swelling from the ICD site. We discussed the importance of keeping area clean and dry, not scratching it and keeping anything that could introduce bacteria away from the site. I reminded her to not let the shower water hit directly on her incision and to gently pat area dry with a clean towel. She is aware of her follow up appt at the [address] [place] [address] Drive on 1/26/23 at 11:00 am and says she has a  set up to transport her and he will be there between 10-10:30, I emphasized importance in coming to this appt. She offers no complaints and is thankful for the phone call.

## 2023-01-26 ENCOUNTER — HOSPITAL ENCOUNTER (OUTPATIENT)
Dept: CARDIOLOGY | Age: 58
Discharge: HOME | End: 2023-01-26
Payer: MEDICAID

## 2023-01-26 ENCOUNTER — OFFICE VISIT (OUTPATIENT)
Dept: CARDIOTHORACIC SURGERY | Age: 58
End: 2023-01-26

## 2023-01-26 ENCOUNTER — NURSE ONLY (OUTPATIENT)
Dept: NON INVASIVE DIAGNOSTICS | Age: 58
End: 2023-01-26

## 2023-01-26 VITALS
HEIGHT: 64 IN | DIASTOLIC BLOOD PRESSURE: 100 MMHG | WEIGHT: 116 LBS | HEART RATE: 66 BPM | BODY MASS INDEX: 19.81 KG/M2 | SYSTOLIC BLOOD PRESSURE: 166 MMHG | RESPIRATION RATE: 20 BRPM

## 2023-01-26 DIAGNOSIS — I34.0 SEVERE MITRAL REGURGITATION: Primary | ICD-10-CM

## 2023-01-26 PROCEDURE — 93306 TTE W/DOPPLER COMPLETE: CPT

## 2023-01-26 NOTE — PATIENT INSTRUCTIONS
We will be in touch to schedule you with Dr. Charissa Myrick in April 2023 to discuss Mitraclip procedure

## 2023-01-26 NOTE — PROGRESS NOTES
[facility] Valve Clinic  Visit Note      Patient name: Gregory Knight    Reason for consult: mitral regurgitation    Referring Physician: Audie Delgadillo MD    Primary Care Physician: Donny Ramos MD    Date of service: 1/26/2023    Chief Complaint: mitral regurgitation    HPI: Ms. Kiana Rock is a 55 yo AAF who presents for evaluation of mitral regurgitation. PMH is significant for chronic HFrEF, NICM, ICD, HTN, HLD, CVA, COPD, tobacco abuse, medical non compliance and mitral regurgitation. She was seen by Dr. Jamie Gray in valve clinic September 2020. CLAUDIA at that time showed mod-severe MR, and Mitraclip was recommended. However, she never followed up despite multiple appointments, calls and letters. She had recent cardiology and EP follow up that prompted discussion of compliance, and referral was again placed to discuss mitral intervention. Her ICD generator was also found to have reached end of life in April 2022, and she underwent urgent replacement 1/13/23. She reportedly denies chest pain, dyspnea, palpitations or syncope. She also denies N/V, F/C, orthopnea, PND and syncope. Allergies: Allergies   Allergen Reactions    Orange Fruit [Citrus] Hives and Itching     ONLY allergic to oranges - NOT any other citrus fruit. NO oranges or orange juice. Crestor [Rosuvastatin Calcium]     Loratadine     Norco [Hydrocodone-Acetaminophen] Itching    Sulfa Antibiotics     Aspirin      Itching sometime     Patient takes 81mg Aspirin daily, but is unable to take 324 mg.         Home medications:    Current Outpatient Medications   Medication Sig Dispense Refill    lithium 300 MG capsule Take 1 capsule by mouth 2 times daily (with meals) 60 capsule 0    melatonin 3 MG TABS tablet Take 1 tablet by mouth nightly 30 tablet 0    paliperidone (INVEGA) 3 MG extended release tablet Take 1 tablet by mouth in the morning and at bedtime 60 tablet 0    Ergocalciferol (VITAMIN D) 23090 units CAPS Take 50,000 Units by mouth once a week for 7 doses 5 capsule 1    bumetanide (BUMEX) 2 MG tablet Take 0.5 tablets by mouth daily 90 tablet 3    vitamin D3 (CHOLECALCIFEROL) 10 MCG (400 UNIT) TABS tablet Take 400 Units by mouth daily Take 2 tablets PO once daily      pantoprazole (PROTONIX) 20 MG tablet Take 1 tablet by mouth daily 30 tablet 0    nitroGLYCERIN (NITROSTAT) 0.4 MG SL tablet Place 1 tablet under the tongue every 5 minutes as needed for Chest pain 25 tablet 1    aspirin 81 MG chewable tablet Take 1 tablet by mouth daily 90 tablet 2    atorvastatin (LIPITOR) 40 MG tablet Take 1 tablet by mouth daily 90 tablet 2    sacubitril-valsartan (ENTRESTO)  MG per tablet Take 1 tablet by mouth 2 times daily 180 tablet 2    metoprolol succinate (TOPROL XL) 100 MG extended release tablet Take 1 tablet by mouth daily 90 tablet 2    budesonide-formoterol (SYMBICORT) 160-4.5 MCG/ACT AERO Inhale 2 puffs into the lungs 2 times daily 6 each 2    montelukast (SINGULAIR) 10 MG tablet Take 1 tablet by mouth nightly 90 tablet 2    cetirizine (ZYRTEC) 10 MG tablet Take 1 tablet by mouth daily 30 tablet 5    tiotropium (SPIRIVA HANDIHALER) 18 MCG inhalation capsule Inhale 1 capsule into the lungs daily 90 capsule 1    albuterol sulfate HFA (VENTOLIN HFA) 108 (90 Base) MCG/ACT inhaler Inhale 2 puffs into the lungs 4 times daily as needed for Wheezing 18 g 5     No current facility-administered medications for this visit. Past Medical History:  Past Medical History:   Diagnosis Date    Angina at rest [facility])     cath in 2007 showed no CAD    Asthma     Blood type AB+ 04/03/2018    CAD (coronary artery disease)     Carpal tunnel syndrome on left     CHF (congestive heart failure) (HCC)     CHF (congestive heart failure) (HCC)     COPD (chronic obstructive pulmonary disease) [facility]     CVA (cerebral vascular accident) ([facility] 12/2009 and 12/2010.     left parietal    GERD (gastroesophageal reflux disease)     HFrEF (heart failure with reduced ejection fraction) ([facility] 2020- echo- LVEF 20-25%, LA enlarged, moderate MR, mild TR, mild PH, LVDD: 6.7, RVDD: 2.2 (17- echo- LVEF 10%, stage III DD, severely dilated LA, appears L>R atrial shunt, mod TR, LVDD: 6.6,  RVDD: 2.3)    History of blood transfusion     Hyperlipidemia Diagnosed in . Dyslipidemia. Hypertension diagnosed in     ICD (implantable cardioverter-defibrillator) in place 2018    Placed by Dr. Tessie Gan. Left ovarian cyst     Liver lesion 2015    Moderate mitral regurgitation 2015    mild-moderate    Nonischemic cardiomyopathy (HCC)     NSTEMI (non-ST elevated myocardial infarction) ([facility] [facility] 4/10/15--adm to [facility]    Suicide attempt by drug ingestion ([facility] [facility]     attempt in  OD on ultram    Tobacco abuse     Vitamin D deficiency        Past Surgical History:  Past Surgical History:   Procedure Laterality Date    CARDIAC CATHETERIZATION  2018    Dr. Vaughn Potter- Clean coronaries    Mei Ramos  2016    SICD    CARDIAC DEFIBRILLATOR PLACEMENT  2023    [employer] SICD-GR (Dr. Shirley Cabral)    [facility] TEST  2009 Normal      SECTION      COLONOSCOPY  10/2015    DIAGNOSTIC CARDIAC CATH LAB PROCEDURE  2007    [facility]: No significant CAD. Mild LVD with apical akinesis. EF 50%.     DIAGNOSTIC CARDIAC CATH LAB PROCEDURE  2015    with PTCA to [facility] ob [email or web address]    ENDOSCOPY, COLON, DIAGNOSTIC      HYSTERECTOMY (CERVIX STATUS UNKNOWN)  10/01/2015    at [facility] by Dr. Barb Edmonds    PTCA  04/10/2015    at [address] [place] [address] ECHOCARDIOGRAM  2013    EF 65%, mild MR    TUBAL LIGATION         Social History:  Social History     Socioeconomic History    Marital status: Legally      Spouse name: Not on file    Number of children: Not on file    Years of education: Not on file    Highest education level: Not on file   Occupational History    Occupation: disability Tobacco Use    Smoking status: Former     Packs/day: 0.50     Years: 30.00     Pack years: 15.00     Types: Cigarettes     Quit date: 2018     Years since quittin.5    Smokeless tobacco: Current     Types: Snuff   Vaping Use    Vaping Use: Never used   Substance and Sexual Activity    Alcohol use: Not Currently     Alcohol/week: 4.0 standard drinks     Types: 4 Glasses of wine per week    Drug use: Yes     Frequency: 3.0 times per week     Types: Marijuana [place])    Sexual activity: Not on file   Other Topics Concern    Not on file   Social History Narrative    Drinks 1 cup of coffee daily     Social Determinants of Health     Financial Resource Strain: Low Risk     Difficulty of Paying Living Expenses: Not hard at all   Food Insecurity: No Food Insecurity    Worried About Running Out of Food in the Last Year: Never true    Ran Out of Food in the Last Year: Never true   Transportation Needs: Not on file   Physical Activity: Not on file   Stress: Not on file   Social Connections: Not on file   Intimate Partner Violence: Not on file   Housing Stability: Not on file       Family History:  Family History   Problem Relation Age of Onset    High Blood Pressure Mother     Stroke Mother     High Blood Pressure Father     Stroke Father     Heart Disease Father     Pacemaker Father        Review of Systems:  Constitutional: Denies fevers, chills, or weight loss. HEENT: Denies visual changes or hearing loss. Heart: As per HPI. Lungs: Denies shortness of breath, cough, or wheezing. Gastrointestinal: Denies nausea, vomiting, constipation, or diarrhea. Genitourinary: Denies dysuria or hematuria. Psychiatric: Patient denies anxiety or depression. Neurologic: Patient denies weakness of the extremities, dizziness, or headaches. All other ROS checked and found to be negative. Objective:  General Appearance: Pleasant 56y.o. year old female who appears stated age. Communicates well, no acute distress. HEENT: Head is normocephalic, atraumatic. EOMs intact, PERRL. Trachea midline. Lungs: Normal respiratory rate and normal effort. She is not in respiratory distress. Breath sounds clear to auscultation. No wheezes. Heart: Normal rate. Regular rhythm. S1 normal and S2 normal. Positive for murmur. Chest: Symmetric chest wall expansion. Extremities: Normal range of motion. Neurological: Patient is alert and oriented to person, place and time. Skin: Warm and dry. Abdomen: Abdomen is soft and non-distended. Bowel sounds are normal.   Pulses: Distal pulses are intact. Skin: Warm and dry without lesions. Assessment:   Patient Active Problem List   Diagnosis    Hyperlipidemia    Left ovarian cyst    CVA (cerebral vascular accident)    COPD (chronic obstructive pulmonary disease) ([facility])    Carpal tunnel syndrome on left    Suicide attempt by drug ingestion ([address]    Severe mitral regurgitation    Tobacco abuse    Asthma    Allergic rhinosinusitis    History of irregular menstrual bleeding    Anemia due to blood loss    History of MI (myocardial infarction)    Mass of right lobe of liver    Chronic systolic CHF (congestive heart failure), NYHA class 3 ([facility])    Non-rheumatic mitral regurgitation    Essential hypertension    S/P ICD (internal cardiac defibrillator) procedure    Dyspnea    Coronary artery disease involving native coronary artery of native heart without angina pectoris    Nonischemic cardiomyopathy ([address]    Blood type AB+    Acute decompensated heart failure (HCC)    Acute pulmonary edema (HCC)    Heart failure, left systolic, acute on chronic (HCC)    Acetaminophen overdose of undetermined intent    Hypercalcemia    Elevated lactic acid level    1st degree AV block    Mood disorder (HCC)    MDD (major depressive disorder), recurrent episode, mild (HCC)    Severe manic bipolar 1 disorder with psychotic behavior ([address]               Plan:  This is secondary MR and her only option is [facility]. We will make her an appt to see Dr. Jania Centeno when he arrives.   Wojciech Womack MD

## 2023-04-03 NOTE — PROGRESS NOTES
reviewing previous notes, test results and 50 min face to face with the patient discussing the diagnosis and importance of compliance with the treatment plan as well as documenting on the day of the visit. A/P    Impression   Alfredo Rojsa is a 56 y.o. female with Bilateral sensorineural hearing loss and significant left tinnitus, confirmed by audiogram and ABR with concern for central component, who will benefit from Return for hearing aid consultation. . The rest of the exam was noted for left tinnitus and needs an MRI    Plan  Patient will benefit from MRI with IAC protocol  Plan based on testing  Likely yearly audio  We will obtain old records    Stan Bassett MD 4/3/23 5:08 PM EDT   Director Otology and Cochlear Implant Programs

## 2023-04-06 ENCOUNTER — PROCEDURE VISIT (OUTPATIENT)
Dept: AUDIOLOGY | Age: 58
End: 2023-04-06
Payer: COMMERCIAL

## 2023-04-06 ENCOUNTER — OFFICE VISIT (OUTPATIENT)
Dept: ENT CLINIC | Age: 58
End: 2023-04-06
Payer: COMMERCIAL

## 2023-04-06 VITALS
HEIGHT: 64 IN | RESPIRATION RATE: 12 BRPM | SYSTOLIC BLOOD PRESSURE: 137 MMHG | DIASTOLIC BLOOD PRESSURE: 83 MMHG | HEART RATE: 58 BPM | WEIGHT: 116 LBS | BODY MASS INDEX: 19.81 KG/M2

## 2023-04-06 DIAGNOSIS — H93.12 TINNITUS OF LEFT EAR: ICD-10-CM

## 2023-04-06 DIAGNOSIS — H90.3 BILATERAL SENSORINEURAL HEARING LOSS: ICD-10-CM

## 2023-04-06 DIAGNOSIS — H90.3 SENSORY HEARING LOSS, BILATERAL: Primary | ICD-10-CM

## 2023-04-06 DIAGNOSIS — H91.93 BILATERAL HEARING LOSS, UNSPECIFIED HEARING LOSS TYPE: Primary | ICD-10-CM

## 2023-04-06 PROCEDURE — 3075F SYST BP GE 130 - 139MM HG: CPT | Performed by: OTOLARYNGOLOGY

## 2023-04-06 PROCEDURE — 92552 PURE TONE AUDIOMETRY AIR: CPT | Performed by: AUDIOLOGIST

## 2023-04-06 PROCEDURE — 3079F DIAST BP 80-89 MM HG: CPT | Performed by: OTOLARYNGOLOGY

## 2023-04-06 PROCEDURE — 92556 SPEECH AUDIOMETRY COMPLETE: CPT | Performed by: AUDIOLOGIST

## 2023-04-06 PROCEDURE — 99215 OFFICE O/P EST HI 40 MIN: CPT | Performed by: OTOLARYNGOLOGY

## 2023-04-06 NOTE — PROGRESS NOTES
This patient was referred for audiometric testing by Dr. Elizabeth Eng due to established hearing loss. Pure tone air conduction audiometry revealed a mild sloping to severe hearing loss, in the right ear and a mild sloping to profound hearing loss, in the left ear. Reliability was fair. Speech reception thresholds were in good agreement with the pure tone averages, bilaterally. Speech discrimination scores were poor (40%), in the right ear and no response to speech discrimination testing, in the left ear. The results were reviewed with the patient. Recommendations for follow up will be made pending physician consult. Patient is a candidate for hearing aids and was medically cleared. After all information needed is collected a prior authorization will be submitted to the patient's insurance. The patient will be contacted regarding approval or denial. Hearing aids were selected and will be ordered if an approval is received. Will order Phonak rechargeable RICs, sand beige.       Bri Faulkner CCC-A  [address] [facility] K.[address]   Electronically signed by Bri Faulkner on 4/6/2023 at 10:50 AM

## 2023-04-07 ENCOUNTER — TELEPHONE (OUTPATIENT)
Dept: ENT CLINIC | Age: 58
End: 2023-04-07

## 2023-04-07 NOTE — TELEPHONE ENCOUNTER
[facility] to authorize order with patient insurance. Patient is scheduled for MRI IAC POSTERIOR FOSSA [address] with radiology on 05/04/23 @ 11am. Patient has been notified of date and time and that they need to arrive at 10am. Patient was informed she needs to be NPO 4 hours prior to procedure. Patient instructed to park in ED parking lot and report to registration. Patient verbalized understanding.   Electronically signed by Josefa Barthel, LPN on 3/6/6870 at 4:37 AM

## 2023-05-10 ENCOUNTER — TELEPHONE (OUTPATIENT)
Dept: ENT CLINIC | Age: 58
End: 2023-05-10

## 2023-05-10 DIAGNOSIS — H90.3 BILATERAL SENSORINEURAL HEARING LOSS: Primary | ICD-10-CM

## 2023-05-10 NOTE — TELEPHONE ENCOUNTER
Pt MRI cancelled. Pt has defibrillator that is not compatible. Please advise alternative testing or if just move forward w/ HA and 1 year audio.

## 2023-06-15 ENCOUNTER — TELEPHONE (OUTPATIENT)
Dept: GENERAL RADIOLOGY | Age: 58
End: 2023-06-15

## 2023-06-15 NOTE — TELEPHONE ENCOUNTER
6/15/23 PT NO SHOWED ON 6/5/23 FOR HER MRI WITH [employer]UL[employer] I R/S HER FOR 8/7/23 TOLD PT SHE NEEDS TO CALL US IF SHE CANNOT KEEP THIS APPT BECAUSE [employer] DRIVES IN FOR THIS TEST. PT NEVER HAD HER LABS DONE SO SHE WILL GO FOR LABS WEEK PRIOR. PEG AT OFFICE NOTIFIED. ON 5/4 PT WAS SCHEDULED FOR MRI BY THE CENTRAL SCHEDULING AND QUESTION WAS ANSWERED NO TO DEFIBULATOR SO I HAD TO R/S HER.

## 2023-07-10 DIAGNOSIS — I10 ESSENTIAL HYPERTENSION: Chronic | ICD-10-CM

## 2023-07-10 DIAGNOSIS — I50.22 CHRONIC SYSTOLIC CHF (CONGESTIVE HEART FAILURE), NYHA CLASS 3 (HCC): ICD-10-CM

## 2023-07-10 DIAGNOSIS — K21.9 GASTROESOPHAGEAL REFLUX DISEASE, UNSPECIFIED WHETHER ESOPHAGITIS PRESENT: ICD-10-CM

## 2023-07-10 DIAGNOSIS — J44.9 CHRONIC OBSTRUCTIVE PULMONARY DISEASE, UNSPECIFIED COPD TYPE (HCC): ICD-10-CM

## 2023-07-10 DIAGNOSIS — L29.8 OTHER PRURITUS: ICD-10-CM

## 2023-07-10 DIAGNOSIS — J45.909 PERSISTENT ASTHMA WITHOUT COMPLICATION, UNSPECIFIED ASTHMA SEVERITY: ICD-10-CM

## 2023-07-10 DIAGNOSIS — J30.2 SEASONAL ALLERGIES: ICD-10-CM

## 2023-07-11 RX ORDER — ALBUTEROL SULFATE 90 UG/1
2 AEROSOL, METERED RESPIRATORY (INHALATION) 4 TIMES DAILY PRN
Qty: 18 G | Refills: 0 | OUTPATIENT
Start: 2023-07-11

## 2023-07-11 RX ORDER — BUDESONIDE AND FORMOTEROL FUMARATE DIHYDRATE 160; 4.5 UG/1; UG/1
2 AEROSOL RESPIRATORY (INHALATION) 2 TIMES DAILY
Qty: 6 EACH | Refills: 0 | OUTPATIENT
Start: 2023-07-11

## 2023-07-11 RX ORDER — MONTELUKAST SODIUM 10 MG/1
10 TABLET ORAL NIGHTLY
Qty: 30 TABLET | Refills: 0 | OUTPATIENT
Start: 2023-07-11

## 2023-07-11 RX ORDER — METOPROLOL SUCCINATE 100 MG/1
100 TABLET, EXTENDED RELEASE ORAL DAILY
Qty: 30 TABLET | Refills: 0 | OUTPATIENT
Start: 2023-07-11

## 2023-07-11 RX ORDER — ATORVASTATIN CALCIUM 40 MG/1
40 TABLET, FILM COATED ORAL DAILY
Qty: 30 TABLET | Refills: 0 | OUTPATIENT
Start: 2023-07-11

## 2023-07-11 RX ORDER — SACUBITRIL AND VALSARTAN 97; 103 MG/1; MG/1
1 TABLET, FILM COATED ORAL 2 TIMES DAILY
Qty: 180 TABLET | Refills: 2 | OUTPATIENT
Start: 2023-07-11

## 2023-07-11 RX ORDER — PALIPERIDONE 3 MG/1
3 TABLET, EXTENDED RELEASE ORAL 2 TIMES DAILY
Qty: 60 TABLET | Refills: 0 | OUTPATIENT
Start: 2023-07-11 | End: 2023-08-10

## 2023-07-11 RX ORDER — CETIRIZINE HYDROCHLORIDE 10 MG/1
10 TABLET ORAL DAILY
Qty: 30 TABLET | Refills: 5 | OUTPATIENT
Start: 2023-07-11

## 2023-07-11 RX ORDER — PANTOPRAZOLE SODIUM 20 MG/1
20 TABLET, DELAYED RELEASE ORAL DAILY
Qty: 30 TABLET | Refills: 0 | OUTPATIENT
Start: 2023-07-11

## 2023-07-11 RX ORDER — BUMETANIDE 2 MG/1
1 TABLET ORAL DAILY
Qty: 30 TABLET | Refills: 0 | OUTPATIENT
Start: 2023-07-11

## 2023-07-16 NOTE — PROGRESS NOTES
[facility] Otolaryngology  Dr. Rosalina Acevedo. Ludwin Mahajan. Ms.Ed        Patient Name:  Lincoln Garcia  :  1965     CHIEF C/O:    Chief Complaint   Patient presents with    Follow-up     Left tinnitus and hearing loss. HISTORY OBTAINED FROM:  patient    HISTORY OF PRESENT ILLNESS:       Rosalio Wallace is a 56y.o. year old female, here today for follow up of hearing evaluation. Patient was scheduled for an ABR with ASR today for further evaluation of her functional hearing. Testing revealed some functionality in the right ear with a questionable test results in the left. Patient states that she has noticed no changes since her last appointment. She continues to have loud roaring tinnitus in the left ear making it difficult for her to hear anything from that ear. She states she relies on what hearing she has on the right. She denies any current ear pain or pressure. She denies any current sinus congestion, rhinorrhea, or postnasal drainage. Past Medical History:   Diagnosis Date    Angina at rest [facility])     cath in  showed no CAD    Asthma     Blood type AB+ 2018    CAD (coronary artery disease)     Carpal tunnel syndrome on left     CHF (congestive heart failure) (HCC)     CHF (congestive heart failure) (HCC)     COPD (chronic obstructive pulmonary disease) ([facility]     CVA (cerebral vascular accident) ([facility] 2009 and 2010. left parietal    GERD (gastroesophageal reflux disease)     HFrEF (heart failure with reduced ejection fraction) ([facility] [facility] 2020- echo- LVEF 20-25%, LA enlarged, moderate MR, mild TR, mild PH, LVDD: 6.7, RVDD: 2.2 (17- echo- LVEF 10%, stage III DD, severely dilated LA, appears L>R atrial shunt, mod TR, LVDD: 6.6,  RVDD: 2.3)    History of blood transfusion     Hyperlipidemia Diagnosed in . Dyslipidemia. Hypertension diagnosed in     ICD (implantable cardioverter-defibrillator) in place 2018    Placed by Dr. Carmina Nieto.     Left ovarian cyst Liver lesion 2015    Moderate mitral regurgitation 2015    mild-moderate    Nonischemic cardiomyopathy (HCC)     NSTEMI (non-ST elevated myocardial infarction) ([facility] 4/10/15--adm to [facility]    Suicide attempt by drug ingestion ([facility] [address]     attempt in  OD on ultram    Tobacco abuse     Vitamin D deficiency      Past Surgical History:   Procedure Laterality Date    CARDIAC CATHETERIZATION  2018    Dr. Nayana Valerio- Clean coronaries    [address]  2016    SICD    CARDIOVASCULAR STRESS TEST  2009 Normal      SECTION      COLONOSCOPY  10/2015    DIAGNOSTIC CARDIAC CATH LAB PROCEDURE  2007    [facility]: No significant CAD. Mild LVD with apical akinesis. EF 50%.     DIAGNOSTIC CARDIAC CATH LAB PROCEDURE  4/15    with PTCA to [facility] ob [email or web address]    ENDOSCOPY, COLON, DIAGNOSTIC      HYSTERECTOMY (CERVIX STATUS UNKNOWN)  10/1/15    at [facility] by Dr. Cotton    PTCA  4/10/15    at [address] [place] [address] ECHOCARDIOGRAM  3/19/13    EF 65%, mild MR    TUBAL LIGATION         Current Outpatient Medications:     Nutritional Supplements (BOOST HIGH PROTEIN) LIQD, Take 1 Units by mouth 3 times daily, Disp: 90 each, Rfl: 2    pantoprazole (PROTONIX) 20 MG tablet, Take 1 tablet by mouth daily, Disp: 30 tablet, Rfl: 0    nitroGLYCERIN (NITROSTAT) 0.4 MG SL tablet, Place 1 tablet under the tongue every 5 minutes as needed for Chest pain, Disp: 25 tablet, Rfl: 1    acetaminophen (TYLENOL) 500 MG tablet, Take 1 tablet by mouth 2 times daily as needed for Pain, Disp: 120 tablet, Rfl: 0    aspirin 81 MG chewable tablet, Take 1 tablet by mouth daily, Disp: 90 tablet, Rfl: 2    atorvastatin (LIPITOR) 40 MG tablet, Take 1 tablet by mouth daily, Disp: 90 tablet, Rfl: 2    sacubitril-valsartan (ENTRESTO)  MG per tablet, Take 1 tablet by mouth 2 times daily, Disp: 180 tablet, Rfl: 2    metoprolol succinate (TOPROL XL) 100 MG extended release tablet, Take 1 tablet by mouth daily, Disp: 90 tablet, Rfl: 2    bumetanide (BUMEX) 2 MG tablet, Take 1 tablet by mouth daily, Disp: 90 tablet, Rfl: 3    spironolactone (ALDACTONE) 25 MG tablet, Take 1 tablet by mouth daily, Disp: 90 tablet, Rfl: 2    budesonide-formoterol (SYMBICORT) 160-4.5 MCG/ACT AERO, Inhale 2 puffs into the lungs 2 times daily, Disp: 6 each, Rfl: 2    montelukast (SINGULAIR) 10 MG tablet, Take 1 tablet by mouth nightly, Disp: 90 tablet, Rfl: 2    cetirizine (ZYRTEC) 10 MG tablet, Take 1 tablet by mouth daily, Disp: 30 tablet, Rfl: 5    tiotropium (SPIRIVA HANDIHALER) 18 MCG inhalation capsule, Inhale 1 capsule into the lungs daily, Disp: 90 capsule, Rfl: 1    albuterol sulfate HFA (VENTOLIN HFA) 108 (90 Base) MCG/ACT inhaler, Inhale 2 puffs into the lungs 4 times daily as needed for Wheezing, Disp: 18 g, Rfl: 5    Blood Pressure KIT, 1 Units by Does not apply route 2 times daily, Disp: 1 kit, Rfl: 0    Ergocalciferol (VITAMIN D2) 10 MCG (400 UNIT) TABS, Take 800 Units by mouth daily, Disp: 90 tablet, Rfl: 1  Orange fruit [citrus], Crestor [rosuvastatin calcium], Loratadine, Norco [hydrocodone-acetaminophen], Sulfa antibiotics, and Aspirin  Social History     Tobacco Use    Smoking status: Every Day     Packs/day: 0.50     Years: 30.00     Pack years: 15.00     Types: Cigarettes     Last attempt to quit: 2018     Years since quittin.2    Smokeless tobacco: Current     Types: Snuff    Tobacco comments:     currently 3 cig/day, 21. Vaping Use    Vaping Use: Never used   Substance Use Topics    Alcohol use: Yes     Alcohol/week: 14.0 standard drinks     Types: 14 Cans of beer per week    Drug use: Yes     Frequency: 3.0 times per week     Types: Marijuana [place])     Family History   Problem Relation Age of Onset    High Blood Pressure Mother     Stroke Mother     High Blood Pressure Father     Stroke Father     Heart Disease Father     Pacemaker Father        Review of Systems   Constitutional: Negative. Negative for activity change and appetite change. HENT:  Positive for hearing loss and tinnitus. Negative for ear discharge and ear pain. Eyes: Negative. Respiratory: Negative. Negative for shortness of breath and stridor. Cardiovascular: Negative. Negative for chest pain and palpitations. Endocrine: Negative. Musculoskeletal: Negative. Skin: Negative. Neurological: Negative. Negative for dizziness. Hematological: Negative. Psychiatric/Behavioral: Negative. Ht 5' 4\" (1.626 m)   Wt 115 lb (52.2 kg)   LMP 05/20/2014   BMI 19.74 kg/m²   Physical Exam  Constitutional:       Appearance: Normal appearance. HENT:      Head: Normocephalic. Right Ear: Tympanic membrane, ear canal and external ear normal. Decreased hearing noted. Left Ear: Tympanic membrane, ear canal and external ear normal. Decreased hearing noted. Nose: Nose normal. No rhinorrhea. Right Turbinates: Not pale. Left Turbinates: Not pale. Mouth/Throat:      Lips: Pink. Mouth: Mucous membranes are moist.      Pharynx: Oropharynx is clear. Eyes:      Conjunctiva/sclera: Conjunctivae normal.      Pupils: Pupils are equal, round, and reactive to light. Cardiovascular:      Rate and Rhythm: Normal rate and regular rhythm. Pulses: Normal pulses. Pulmonary:      Effort: Pulmonary effort is normal. No respiratory distress. Breath sounds: No stridor. Musculoskeletal:         General: Normal range of motion. Cervical back: Normal range of motion. No rigidity. No muscular tenderness. Skin:     General: Skin is warm and dry. Neurological:      General: No focal deficit present. Mental Status: She is alert and oriented to person, place, and time. Psychiatric:         Mood and Affect: Mood normal.         Behavior: Behavior normal.         Thought Content:  Thought content normal.         Judgment: Judgment normal.     ABR/ASSR Testing:            IMPRESSION/PLAN:    Alma Gary was seen today for follow-up. Diagnoses and all orders for this visit:    Tinnitus of left ear  -     Ambulatory referral to ENT    Bilateral hearing loss, unspecified hearing loss type    Profound hearing loss of both ears  -     Ambulatory referral to ENT      At this time patient will be referred to Dr. Jose Bills for further evaluation of her hearing loss and possible options for hearing replacement. Patient is instructed to call with any new or worsening of symptoms prior to her appointment.       Jewel Bailey, MSN, FNP-C  [facility], Nose and Throat    The information contained in this note has been dictated using drug and medical speech recognition software and may contain errors No

## 2023-07-24 ENCOUNTER — OFFICE VISIT (OUTPATIENT)
Dept: FAMILY MEDICINE CLINIC | Age: 58
End: 2023-07-24
Payer: COMMERCIAL

## 2023-07-24 VITALS
SYSTOLIC BLOOD PRESSURE: 145 MMHG | WEIGHT: 125 LBS | OXYGEN SATURATION: 99 % | DIASTOLIC BLOOD PRESSURE: 69 MMHG | HEART RATE: 60 BPM | TEMPERATURE: 97.6 F | BODY MASS INDEX: 21.46 KG/M2

## 2023-07-24 DIAGNOSIS — I63.9 CEREBROVASCULAR ACCIDENT (CVA), UNSPECIFIED MECHANISM (HCC): ICD-10-CM

## 2023-07-24 DIAGNOSIS — Z76.0 MEDICATION REFILL: ICD-10-CM

## 2023-07-24 DIAGNOSIS — R53.83 OTHER FATIGUE: ICD-10-CM

## 2023-07-24 DIAGNOSIS — J44.9 CHRONIC OBSTRUCTIVE PULMONARY DISEASE, UNSPECIFIED COPD TYPE (HCC): ICD-10-CM

## 2023-07-24 DIAGNOSIS — R55 SYNCOPE AND COLLAPSE: Primary | ICD-10-CM

## 2023-07-24 DIAGNOSIS — I50.22 CHRONIC SYSTOLIC CHF (CONGESTIVE HEART FAILURE), NYHA CLASS 3 (HCC): ICD-10-CM

## 2023-07-24 DIAGNOSIS — K21.9 GASTROESOPHAGEAL REFLUX DISEASE, UNSPECIFIED WHETHER ESOPHAGITIS PRESENT: ICD-10-CM

## 2023-07-24 PROBLEM — F33.0 MDD (MAJOR DEPRESSIVE DISORDER), RECURRENT EPISODE, MILD (HCC): Status: RESOLVED | Noted: 2022-10-28 | Resolved: 2023-07-24

## 2023-07-24 PROBLEM — F39 MOOD DISORDER (HCC): Status: RESOLVED | Noted: 2022-10-27 | Resolved: 2023-07-24

## 2023-07-24 LAB
ABSOLUTE IMMATURE GRANULOCYTE: 0.04 K/UL (ref 0–0.58)
ALBUMIN SERPL-MCNC: 4.9 G/DL (ref 3.5–5.2)
ALP BLD-CCNC: 109 U/L (ref 35–104)
ALT SERPL-CCNC: 7 U/L (ref 0–32)
AMPHETAMINE SCREEN URINE: NEGATIVE
ANION GAP SERPL CALCULATED.3IONS-SCNC: 12 MMOL/L (ref 7–16)
AST SERPL-CCNC: 17 U/L (ref 0–31)
BARBITURATE SCREEN URINE: NEGATIVE
BASOPHILS ABSOLUTE: 0.07 K/UL (ref 0–0.2)
BASOPHILS RELATIVE PERCENT: 1 % (ref 0–2)
BENZODIAZEPINE SCREEN, URINE: NEGATIVE
BILIRUB SERPL-MCNC: <0.2 MG/DL (ref 0–1.2)
BILIRUBIN, POC: NEGATIVE
BLOOD URINE, POC: NEGATIVE
BUN BLDV-MCNC: 12 MG/DL (ref 6–20)
BUPRENORPHINE URINE: NEGATIVE
CALCIUM SERPL-MCNC: 10 MG/DL (ref 8.6–10.2)
CANNABINOID SCREEN URINE: POSITIVE
CHLORIDE BLD-SCNC: 106 MMOL/L (ref 98–107)
CLARITY, POC: CLEAR
CO2: 25 MMOL/L (ref 22–29)
COCAINE METABOLITE, URINE: NEGATIVE
COLOR, POC: YELLOW
CREAT SERPL-MCNC: 0.9 MG/DL (ref 0.5–1)
EOSINOPHILS ABSOLUTE: 0.14 K/UL (ref 0.05–0.5)
EOSINOPHILS RELATIVE PERCENT: 2 % (ref 0–6)
FENTANYL URINE: NEGATIVE
GFR SERPL CREATININE-BSD FRML MDRD: >60 ML/MIN/1.73M2
GLUCOSE BLD-MCNC: 106 MG/DL (ref 74–99)
GLUCOSE URINE, POC: NEGATIVE
HCT VFR BLD CALC: 40.4 % (ref 34–48)
HEMOGLOBIN: 12.8 G/DL (ref 11.5–15.5)
IMMATURE GRANULOCYTES: 1 % (ref 0–5)
KETONES, POC: NEGATIVE
LEUKOCYTE EST, POC: NEGATIVE
LYMPHOCYTES ABSOLUTE: 1.92 K/UL (ref 1.5–4)
LYMPHOCYTES RELATIVE PERCENT: 29 % (ref 20–42)
MAGNESIUM: 2 MG/DL (ref 1.6–2.6)
MCH RBC QN AUTO: 28.4 PG (ref 26–35)
MCHC RBC AUTO-ENTMCNC: 31.7 G/DL (ref 32–34.5)
MCV RBC AUTO: 89.8 FL (ref 80–99.9)
METHADONE SCREEN, URINE: NEGATIVE
MONOCYTES ABSOLUTE: 0.48 K/UL (ref 0.1–0.95)
MONOCYTES RELATIVE PERCENT: 7 % (ref 2–12)
NEUTROPHILS ABSOLUTE: 3.95 K/UL (ref 1.8–7.3)
NEUTROPHILS RELATIVE PERCENT: 60 % (ref 43–80)
NITRITE, POC: NEGATIVE
OPIATES, URINE: NEGATIVE
OXYCODONE SCREEN URINE: NEGATIVE
PDW BLD-RTO: 14 % (ref 11.5–15)
PH, POC: 5
PHENCYCLIDINE, URINE: NEGATIVE
PLATELET # BLD: 298 K/UL (ref 130–450)
PMV BLD AUTO: 10.5 FL (ref 7–12)
POTASSIUM SERPL-SCNC: 4.5 MMOL/L (ref 3.5–5)
PROTEIN, POC: NEGATIVE
RBC # BLD: 4.5 M/UL (ref 3.5–5.5)
SODIUM BLD-SCNC: 143 MMOL/L (ref 132–146)
SPECIFIC GRAVITY, POC: 1.02
TEST INFORMATION: ABNORMAL
TOTAL PROTEIN: 7.7 G/DL (ref 6.4–8.3)
TSH SERPL DL<=0.05 MIU/L-ACNC: 1.08 UIU/ML (ref 0.27–4.2)
UROBILINOGEN, POC: 0.2
WBC # BLD: 6.6 K/UL (ref 4.5–11.5)

## 2023-07-24 PROCEDURE — 3023F SPIROM DOC REV: CPT | Performed by: STUDENT IN AN ORGANIZED HEALTH CARE EDUCATION/TRAINING PROGRAM

## 2023-07-24 PROCEDURE — G8420 CALC BMI NORM PARAMETERS: HCPCS | Performed by: STUDENT IN AN ORGANIZED HEALTH CARE EDUCATION/TRAINING PROGRAM

## 2023-07-24 PROCEDURE — 99213 OFFICE O/P EST LOW 20 MIN: CPT | Performed by: STUDENT IN AN ORGANIZED HEALTH CARE EDUCATION/TRAINING PROGRAM

## 2023-07-24 PROCEDURE — 3017F COLORECTAL CA SCREEN DOC REV: CPT | Performed by: STUDENT IN AN ORGANIZED HEALTH CARE EDUCATION/TRAINING PROGRAM

## 2023-07-24 PROCEDURE — 3074F SYST BP LT 130 MM HG: CPT | Performed by: STUDENT IN AN ORGANIZED HEALTH CARE EDUCATION/TRAINING PROGRAM

## 2023-07-24 PROCEDURE — G8427 DOCREV CUR MEDS BY ELIG CLIN: HCPCS | Performed by: STUDENT IN AN ORGANIZED HEALTH CARE EDUCATION/TRAINING PROGRAM

## 2023-07-24 PROCEDURE — 81002 URINALYSIS NONAUTO W/O SCOPE: CPT | Performed by: STUDENT IN AN ORGANIZED HEALTH CARE EDUCATION/TRAINING PROGRAM

## 2023-07-24 PROCEDURE — 3078F DIAST BP <80 MM HG: CPT | Performed by: STUDENT IN AN ORGANIZED HEALTH CARE EDUCATION/TRAINING PROGRAM

## 2023-07-24 PROCEDURE — 36415 COLL VENOUS BLD VENIPUNCTURE: CPT | Performed by: FAMILY MEDICINE

## 2023-07-24 PROCEDURE — 4004F PT TOBACCO SCREEN RCVD TLK: CPT | Performed by: STUDENT IN AN ORGANIZED HEALTH CARE EDUCATION/TRAINING PROGRAM

## 2023-07-24 RX ORDER — BUDESONIDE AND FORMOTEROL FUMARATE DIHYDRATE 160; 4.5 UG/1; UG/1
2 AEROSOL RESPIRATORY (INHALATION) 2 TIMES DAILY
Qty: 6 EACH | Refills: 2 | Status: SHIPPED | OUTPATIENT
Start: 2023-07-24

## 2023-07-24 RX ORDER — NITROGLYCERIN 0.4 MG/1
0.4 TABLET SUBLINGUAL EVERY 5 MIN PRN
Qty: 25 TABLET | Refills: 1 | Status: CANCELLED | OUTPATIENT
Start: 2023-07-24

## 2023-07-24 RX ORDER — SACUBITRIL AND VALSARTAN 97; 103 MG/1; MG/1
1 TABLET, FILM COATED ORAL 2 TIMES DAILY
Qty: 180 TABLET | Refills: 2 | Status: SHIPPED | OUTPATIENT
Start: 2023-07-24

## 2023-07-24 RX ORDER — ATORVASTATIN CALCIUM 40 MG/1
40 TABLET, FILM COATED ORAL DAILY
Qty: 90 TABLET | Refills: 2 | Status: SHIPPED | OUTPATIENT
Start: 2023-07-24

## 2023-07-24 RX ORDER — ALBUTEROL SULFATE 90 UG/1
2 AEROSOL, METERED RESPIRATORY (INHALATION) 4 TIMES DAILY PRN
Qty: 18 G | Refills: 5 | Status: SHIPPED | OUTPATIENT
Start: 2023-07-24

## 2023-07-24 RX ORDER — MONTELUKAST SODIUM 10 MG/1
10 TABLET ORAL NIGHTLY
Qty: 90 TABLET | Refills: 2 | Status: SHIPPED | OUTPATIENT
Start: 2023-07-24

## 2023-07-24 RX ORDER — CETIRIZINE HYDROCHLORIDE 10 MG/1
10 TABLET ORAL DAILY
Qty: 30 TABLET | Refills: 5 | Status: SHIPPED | OUTPATIENT
Start: 2023-07-24

## 2023-07-24 RX ORDER — ERGOCALCIFEROL 1.25 MG/1
50000 CAPSULE ORAL WEEKLY
Qty: 5 CAPSULE | Refills: 1 | Status: SHIPPED | OUTPATIENT
Start: 2023-07-24 | End: 2023-09-05

## 2023-07-24 RX ORDER — BUMETANIDE 2 MG/1
1 TABLET ORAL DAILY
Qty: 90 TABLET | Refills: 3 | Status: SHIPPED | OUTPATIENT
Start: 2023-07-24

## 2023-07-24 RX ORDER — METOPROLOL SUCCINATE 100 MG/1
100 TABLET, EXTENDED RELEASE ORAL DAILY
Qty: 90 TABLET | Refills: 2 | Status: SHIPPED | OUTPATIENT
Start: 2023-07-24

## 2023-07-24 RX ORDER — ASPIRIN 81 MG/1
81 TABLET, CHEWABLE ORAL DAILY
Qty: 90 TABLET | Refills: 2 | Status: SHIPPED | OUTPATIENT
Start: 2023-07-24

## 2023-07-24 RX ORDER — PANTOPRAZOLE SODIUM 20 MG/1
20 TABLET, DELAYED RELEASE ORAL DAILY
Qty: 30 TABLET | Refills: 0 | Status: SHIPPED | OUTPATIENT
Start: 2023-07-24

## 2023-07-24 SDOH — ECONOMIC STABILITY: FOOD INSECURITY: WITHIN THE PAST 12 MONTHS, THE FOOD YOU BOUGHT JUST DIDN'T LAST AND YOU DIDN'T HAVE MONEY TO GET MORE.: NEVER TRUE

## 2023-07-24 SDOH — ECONOMIC STABILITY: FOOD INSECURITY: WITHIN THE PAST 12 MONTHS, YOU WORRIED THAT YOUR FOOD WOULD RUN OUT BEFORE YOU GOT MONEY TO BUY MORE.: NEVER TRUE

## 2023-07-24 SDOH — ECONOMIC STABILITY: INCOME INSECURITY: HOW HARD IS IT FOR YOU TO PAY FOR THE VERY BASICS LIKE FOOD, HOUSING, MEDICAL CARE, AND HEATING?: SOMEWHAT HARD

## 2023-07-24 SDOH — ECONOMIC STABILITY: HOUSING INSECURITY
IN THE LAST 12 MONTHS, WAS THERE A TIME WHEN YOU DID NOT HAVE A STEADY PLACE TO SLEEP OR SLEPT IN A SHELTER (INCLUDING NOW)?: NO

## 2023-07-24 ASSESSMENT — PATIENT HEALTH QUESTIONNAIRE - PHQ9
7. TROUBLE CONCENTRATING ON THINGS, SUCH AS READING THE NEWSPAPER OR WATCHING TELEVISION: 0
SUM OF ALL RESPONSES TO PHQ QUESTIONS 1-9: 0
4. FEELING TIRED OR HAVING LITTLE ENERGY: NOT AT ALL
5. POOR APPETITE OR OVEREATING: 0
6. FEELING BAD ABOUT YOURSELF - OR THAT YOU ARE A FAILURE OR HAVE LET YOURSELF OR YOUR FAMILY DOWN: NOT AT ALL
SUM OF ALL RESPONSES TO PHQ9 QUESTIONS 1 & 2: 0
8. MOVING OR SPEAKING SO SLOWLY THAT OTHER PEOPLE COULD HAVE NOTICED. OR THE OPPOSITE, BEING SO FIGETY OR RESTLESS THAT YOU HAVE BEEN MOVING AROUND A LOT MORE THAN USUAL: 0
1. LITTLE INTEREST OR PLEASURE IN DOING THINGS: NOT AT ALL
7. TROUBLE CONCENTRATING ON THINGS, SUCH AS READING THE NEWSPAPER OR WATCHING TELEVISION: NOT AT ALL
6. FEELING BAD ABOUT YOURSELF - OR THAT YOU ARE A FAILURE OR HAVE LET YOURSELF OR YOUR FAMILY DOWN: 0
SUM OF ALL RESPONSES TO PHQ QUESTIONS 1-9: 0
5. POOR APPETITE OR OVEREATING: NOT AT ALL
3. TROUBLE FALLING OR STAYING ASLEEP: NOT AT ALL
9. THOUGHTS THAT YOU WOULD BE BETTER OFF DEAD, OR OF HURTING YOURSELF: 0
2. FEELING DOWN, DEPRESSED OR HOPELESS: 0
10. IF YOU CHECKED OFF ANY PROBLEMS, HOW DIFFICULT HAVE THESE PROBLEMS MADE IT FOR YOU TO DO YOUR WORK, TAKE CARE OF THINGS AT HOME, OR GET ALONG WITH OTHER PEOPLE: 0
SUM OF ALL RESPONSES TO PHQ QUESTIONS 1-9: 0
3. TROUBLE FALLING OR STAYING ASLEEP: 0
SUM OF ALL RESPONSES TO PHQ QUESTIONS 1-9: 0
8. MOVING OR SPEAKING SO SLOWLY THAT OTHER PEOPLE COULD HAVE NOTICED. OR THE OPPOSITE - BEING SO FIDGETY OR RESTLESS THAT YOU HAVE BEEN MOVING AROUND A LOT MORE THAN USUAL: NOT AT ALL
1. LITTLE INTEREST OR PLEASURE IN DOING THINGS: 0
2. FEELING DOWN, DEPRESSED OR HOPELESS: NOT AT ALL
10. IF YOU CHECKED OFF ANY PROBLEMS, HOW DIFFICULT HAVE THESE PROBLEMS MADE IT FOR YOU TO DO YOUR WORK, TAKE CARE OF THINGS AT HOME, OR GET ALONG WITH OTHER PEOPLE: NOT DIFFICULT AT ALL
SUM OF ALL RESPONSES TO PHQ QUESTIONS 1-9: 0
9. THOUGHTS THAT YOU WOULD BE BETTER OFF DEAD, OR OF HURTING YOURSELF: NOT AT ALL
4. FEELING TIRED OR HAVING LITTLE ENERGY: 0

## 2023-07-24 NOTE — PATIENT INSTRUCTIONS
Call TODAY:  Dr. Adriana Vann and Dr. Sherwin Aj to make an appt     FINANCIAL RESOURCES    HELP NETWORK OF [facility]:  What they do: Provides 24-hr, 7 days a week access to information on community resources for financial help. Brianna Burch  Phone: 03.85.58.72.24 or Wolfgang Khan:  What they offer: Limited assistance to restore/ prevent utility disconnection. Phone Number: [phone]  Website: Office Center  DEPARTMENT OF JOB AND FAMILY SERVICES:  What they do: Aidee Burroughs works first with temporary cash assistance. [facility]  Phone: [phone], [phone]  [facility]  Phone: [phone]  [facility]  Phone: [phone]  Website: [email or web address].[email or web address].[email or web address]    MEDICATIONS  Good Rx  What they offer: Good Rx tracks prescription drug prices and provides free drug coupons for discounts on medications. Website: VipAnalysis.is. com  [employer]edy[employer]  What they offer: [employer] offers free information on medications and healthcare cost savings programs including prescription assistance programs, coupons, and discount programs. Helpline: 543.356.8571  Website: PaymentBack.Qwaya. org  RX Assist  What they offer: Information about free and low-cost medicine programs. Website: https://PadMatcher/  [employer] $4 Prescription Program  What they offer: Prescription Program includes up to a 30-day supply for $4 and a 90-day supply for $10 of some covered generic drugs at commonly prescribed dosages  Website: Shaina.de    Need additional resources? Call 211   Find Help https://www. findhelp.orgTRANSPORTATION RESOURCES       [employer] (CARTS): What they offer: Public transportation for all of [facility]. Call at least 24 hours in advance to make reservation. Reduced rates for age 72 and over.   Marie Marlow Number: [phone]  [facility] Number: Franco Huitron Number:

## 2023-07-25 LAB
CHOLESTEROL: 259 MG/DL
HDLC SERPL-MCNC: 68 MG/DL
LDL CHOLESTEROL: 171 MG/DL
TRIGL SERPL-MCNC: 101 MG/DL
VLDLC SERPL CALC-MCNC: 20 MG/DL

## 2023-08-04 ENCOUNTER — OFFICE VISIT (OUTPATIENT)
Dept: FAMILY MEDICINE CLINIC | Age: 58
End: 2023-08-04
Payer: COMMERCIAL

## 2023-08-04 ENCOUNTER — HOSPITAL ENCOUNTER (OUTPATIENT)
Age: 58
Discharge: HOME | End: 2023-08-04
Payer: COMMERCIAL

## 2023-08-04 ENCOUNTER — HOSPITAL ENCOUNTER (OUTPATIENT)
Dept: NEUROLOGY | Age: 58
Discharge: HOME | End: 2023-08-04
Payer: COMMERCIAL

## 2023-08-04 VITALS
SYSTOLIC BLOOD PRESSURE: 167 MMHG | HEIGHT: 64 IN | HEART RATE: 70 BPM | OXYGEN SATURATION: 98 % | BODY MASS INDEX: 21.17 KG/M2 | TEMPERATURE: 97.2 F | RESPIRATION RATE: 16 BRPM | WEIGHT: 124 LBS | DIASTOLIC BLOOD PRESSURE: 92 MMHG

## 2023-08-04 DIAGNOSIS — R55 SYNCOPE AND COLLAPSE: ICD-10-CM

## 2023-08-04 DIAGNOSIS — Z12.31 BREAST CANCER SCREENING BY MAMMOGRAM: ICD-10-CM

## 2023-08-04 DIAGNOSIS — R55 SYNCOPE AND COLLAPSE: Primary | ICD-10-CM

## 2023-08-04 DIAGNOSIS — H93.12 TINNITUS OF LEFT EAR: ICD-10-CM

## 2023-08-04 DIAGNOSIS — Z12.11 SCREENING FOR COLON CANCER: ICD-10-CM

## 2023-08-04 DIAGNOSIS — H90.3 BILATERAL SENSORINEURAL HEARING LOSS: ICD-10-CM

## 2023-08-04 LAB
BUN SERPL-MCNC: 15 MG/DL (ref 6–20)
CREAT SERPL-MCNC: 0.9 MG/DL (ref 0.5–1)
GFR SERPL CREATININE-BSD FRML MDRD: >60 ML/MIN/1.73M2

## 2023-08-04 PROCEDURE — G8420 CALC BMI NORM PARAMETERS: HCPCS | Performed by: STUDENT IN AN ORGANIZED HEALTH CARE EDUCATION/TRAINING PROGRAM

## 2023-08-04 PROCEDURE — 3080F DIAST BP >= 90 MM HG: CPT | Performed by: STUDENT IN AN ORGANIZED HEALTH CARE EDUCATION/TRAINING PROGRAM

## 2023-08-04 PROCEDURE — G8427 DOCREV CUR MEDS BY ELIG CLIN: HCPCS | Performed by: STUDENT IN AN ORGANIZED HEALTH CARE EDUCATION/TRAINING PROGRAM

## 2023-08-04 PROCEDURE — 3077F SYST BP >= 140 MM HG: CPT | Performed by: STUDENT IN AN ORGANIZED HEALTH CARE EDUCATION/TRAINING PROGRAM

## 2023-08-04 PROCEDURE — 99213 OFFICE O/P EST LOW 20 MIN: CPT | Performed by: STUDENT IN AN ORGANIZED HEALTH CARE EDUCATION/TRAINING PROGRAM

## 2023-08-04 PROCEDURE — 36415 COLL VENOUS BLD VENIPUNCTURE: CPT

## 2023-08-04 PROCEDURE — 3017F COLORECTAL CA SCREEN DOC REV: CPT | Performed by: STUDENT IN AN ORGANIZED HEALTH CARE EDUCATION/TRAINING PROGRAM

## 2023-08-04 PROCEDURE — 84520 ASSAY OF UREA NITROGEN: CPT

## 2023-08-04 PROCEDURE — 95816 EEG AWAKE AND DROWSY: CPT

## 2023-08-04 PROCEDURE — 4004F PT TOBACCO SCREEN RCVD TLK: CPT | Performed by: STUDENT IN AN ORGANIZED HEALTH CARE EDUCATION/TRAINING PROGRAM

## 2023-08-04 PROCEDURE — 82565 ASSAY OF CREATININE: CPT

## 2023-08-07 ENCOUNTER — HOSPITAL ENCOUNTER (OUTPATIENT)
Dept: CT IMAGING | Age: 58
Discharge: HOME | End: 2023-08-09
Payer: MEDICAID

## 2023-08-07 ENCOUNTER — HOSPITAL ENCOUNTER (OUTPATIENT)
Dept: MRI IMAGING | Age: 58
Discharge: HOME | End: 2023-08-09
Attending: OTOLARYNGOLOGY
Payer: MEDICAID

## 2023-08-07 VITALS
SYSTOLIC BLOOD PRESSURE: 155 MMHG | DIASTOLIC BLOOD PRESSURE: 91 MMHG | HEART RATE: 70 BPM | OXYGEN SATURATION: 98 % | TEMPERATURE: 97.4 F

## 2023-08-07 DIAGNOSIS — H93.12 TINNITUS OF LEFT EAR: ICD-10-CM

## 2023-08-07 DIAGNOSIS — H90.3 BILATERAL SENSORINEURAL HEARING LOSS: ICD-10-CM

## 2023-08-07 DIAGNOSIS — R55 SYNCOPE AND COLLAPSE: ICD-10-CM

## 2023-08-07 PROCEDURE — 6360000004 HC RX CONTRAST MEDICATION: Performed by: RADIOLOGY

## 2023-08-07 PROCEDURE — 70553 MRI BRAIN STEM W/O & W/DYE: CPT

## 2023-08-07 PROCEDURE — A9579 GAD-BASE MR CONTRAST NOS,1ML: HCPCS | Performed by: RADIOLOGY

## 2023-08-07 PROCEDURE — 70450 CT HEAD/BRAIN W/O DYE: CPT

## 2023-08-07 RX ADMIN — GADOTERIDOL 11 ML: 279.3 INJECTION, SOLUTION INTRAVENOUS at 14:06

## 2023-08-15 NOTE — PROGRESS NOTES
Last Year: Never true    Ran Out of Food in the Last Year: Never true   Transportation Needs: Unmet Transportation Needs    Lack of Transportation (Medical):  Not on file    Lack of Transportation (Non-Medical): Yes   Physical Activity: Not on file   Stress: Not on file   Social Connections: Not on file   Intimate Partner Violence: Not on file   Housing Stability: Unknown    Unable to Pay for Housing in the Last Year: Not on file    Number of [address] in the Last Year: Not on file    Unstable Housing in the Last Year: No        TOBACCO  Social History     Tobacco Use   Smoking Status Every Day    Packs/day: 0.50    Years: 30.00    Pack years: 15.00    Types: Cigarettes    Last attempt to quit: 2018    Years since quittin.1   Smokeless Tobacco Current    Types: Snuff        ALCOHOL   Social History     Substance and Sexual Activity   Alcohol Use Not Currently    Alcohol/week: 4.0 standard drinks    Types: 4 Glasses of wine per week        [address]   Social History     Substance and Sexual Activity   Drug Use Yes    Frequency: 3.0 times per week    Types: Marijuana Nicole Vineland)         CURRENT OUTPATIENT MEDICATIONS:   Outpatient Medications Marked as Taking for the 23 encounter (Office Visit) with Marivel Hastings MD   Medication Sig Dispense Refill    Vitamin D, Ergocalciferol, 60996 units CAPS Take 50,000 Units by mouth once a week for 7 doses 5 capsule 1    albuterol sulfate HFA (VENTOLIN HFA) 108 (90 Base) MCG/ACT inhaler Inhale 2 puffs into the lungs 4 times daily as needed for Wheezing 18 g 5    aspirin 81 MG chewable tablet Take 1 tablet by mouth daily 90 tablet 2    atorvastatin (LIPITOR) 40 MG tablet Take 1 tablet by mouth daily 90 tablet 2    budesonide-formoterol (SYMBICORT) 160-4.5 MCG/ACT AERO Inhale 2 puffs into the lungs 2 times daily 6 each 2    bumetanide (BUMEX) 2 MG tablet Take 0.5 tablets by mouth daily 90 tablet 3    cetirizine (ZYRTEC) 10 MG tablet Take 1 tablet by mouth daily 30 tablet

## 2023-08-17 ENCOUNTER — OFFICE VISIT (OUTPATIENT)
Dept: ENT CLINIC | Age: 58
End: 2023-08-17
Payer: MEDICAID

## 2023-08-17 ENCOUNTER — PROCEDURE VISIT (OUTPATIENT)
Dept: AUDIOLOGY | Age: 58
End: 2023-08-17

## 2023-08-17 VITALS — HEIGHT: 64 IN | WEIGHT: 124 LBS | BODY MASS INDEX: 21.17 KG/M2

## 2023-08-17 DIAGNOSIS — H90.3 BILATERAL SENSORINEURAL HEARING LOSS: Primary | ICD-10-CM

## 2023-08-17 DIAGNOSIS — H91.93 BILATERAL HEARING LOSS, UNSPECIFIED HEARING LOSS TYPE: Primary | ICD-10-CM

## 2023-08-17 DIAGNOSIS — H91.93 PROFOUND HEARING LOSS OF BOTH EARS: ICD-10-CM

## 2023-08-17 PROCEDURE — 4004F PT TOBACCO SCREEN RCVD TLK: CPT | Performed by: OTOLARYNGOLOGY

## 2023-08-17 PROCEDURE — 99214 OFFICE O/P EST MOD 30 MIN: CPT | Performed by: OTOLARYNGOLOGY

## 2023-08-17 PROCEDURE — 99024 POSTOP FOLLOW-UP VISIT: CPT | Performed by: AUDIOLOGIST

## 2023-08-17 PROCEDURE — G8420 CALC BMI NORM PARAMETERS: HCPCS | Performed by: OTOLARYNGOLOGY

## 2023-08-17 PROCEDURE — 3017F COLORECTAL CA SCREEN DOC REV: CPT | Performed by: OTOLARYNGOLOGY

## 2023-08-17 PROCEDURE — G8427 DOCREV CUR MEDS BY ELIG CLIN: HCPCS | Performed by: OTOLARYNGOLOGY

## 2023-08-17 NOTE — PROGRESS NOTES
The patient came in for a hearing aid evaluation. Hearing test results were reviewed and the patient is a candidate for hearing aids. She has  been medically cleared. The patient will be contacted regarding approval or denial.  Hearing aids were selected and will be ordered if an approval is received. Pending approval, will order Phonak rechargeable RICsaviva.        Bri Singer [facility]  [address] [place] [address]   Electronically signed by Bri iSnger on 8/21/2023 at 7:28 AM

## 2023-08-27 DIAGNOSIS — K21.9 GASTROESOPHAGEAL REFLUX DISEASE, UNSPECIFIED WHETHER ESOPHAGITIS PRESENT: ICD-10-CM

## 2023-08-28 NOTE — TELEPHONE ENCOUNTER
Last Appointment:  8/4/2023  Future Appointments  9/6/2023   3:30 PM    SCHEDULE, MHYX LAURENWN* ATOWN AUDIO         [facility]  9/19/2023  9:20 AM    Quintella Primrose, MD [facility]  9/19/2023  2:30 PM    Jorge Cabral ECHO RM 1  SEY[facility] CARDIO         [facility]  9/21/2023  9:30 AM    Tarsha Orellana MD      [facility] CARDIO        [facility]  10/2/2023  12:45 PM   Nael Negro MD           [facility] Surgical        [facility]  10/5/2023  1:00 PM    Aguila Love MD           CARDIO SURG         [facility]  8/15/2024  10:45 AM   Christine Ramirez MD        [address] [place] [address]

## 2023-08-29 RX ORDER — PANTOPRAZOLE SODIUM 20 MG/1
TABLET, DELAYED RELEASE ORAL
Qty: 30 TABLET | Refills: 0 | Status: SHIPPED | OUTPATIENT
Start: 2023-08-29

## 2023-09-27 ENCOUNTER — PROCEDURE VISIT (OUTPATIENT)
Dept: AUDIOLOGY | Age: 58
End: 2023-09-27

## 2023-09-27 DIAGNOSIS — H91.93 BILATERAL HEARING LOSS, UNSPECIFIED HEARING LOSS TYPE: Primary | ICD-10-CM

## 2023-09-27 NOTE — PROGRESS NOTES
Fit with binaural  RITE  hearing aids. Instructed in use and care. Gave  battery charger, warranty information and scheduled  30 day check for 10/25. Made following adjustments: changed to vented dome due to small ear canals. Hearing aid contract/battery warning form reviewed and signed. Patient was satisfied and will follow up on above date, unless problems arise. No charge visit today. Will bill at 30 day follow up per [facility] guidelines.        Bri Jenkins [facility]  [address] [place] [address]   Electronically signed by Bri Jenkins on 9/27/2023 at 3:24 PM

## 2023-10-24 ENCOUNTER — TELEPHONE (OUTPATIENT)
Dept: AUDIOLOGY | Age: 58
End: 2023-10-24

## 2023-10-24 NOTE — TELEPHONE ENCOUNTER
Patient called and reported crackling sound in hearing aids. Asked patient if she had changed domes, possibly wax. She said she would try and will follow up after.

## 2023-11-07 ENCOUNTER — HOSPITAL ENCOUNTER (OUTPATIENT)
Dept: CARDIOLOGY | Age: 58
Discharge: HOME | End: 2023-11-09
Payer: MEDICAID

## 2023-11-07 VITALS
DIASTOLIC BLOOD PRESSURE: 91 MMHG | SYSTOLIC BLOOD PRESSURE: 155 MMHG | HEIGHT: 64 IN | BODY MASS INDEX: 21.17 KG/M2 | HEART RATE: 68 BPM | WEIGHT: 124 LBS

## 2023-11-07 DIAGNOSIS — I34.0 MITRAL REGURGITATION: ICD-10-CM

## 2023-11-07 LAB
ECHO AO ASC DIAM: 2.5 CM
ECHO AO ASCENDING AORTA INDEX: 1.56 CM/M2
ECHO AV AREA PEAK VELOCITY: 2.4 CM2
ECHO AV AREA VTI: 2.2 CM2
ECHO AV AREA/BSA PEAK VELOCITY: 1.5 CM2/M2
ECHO AV AREA/BSA VTI: 1.4 CM2/M2
ECHO AV CUSP MM: 1.9 CM
ECHO AV MEAN GRADIENT: 4 MMHG
ECHO AV MEAN VELOCITY: 0.9 M/S
ECHO AV PEAK GRADIENT: 8 MMHG
ECHO AV PEAK VELOCITY: 1.4 M/S
ECHO AV VELOCITY RATIO: 0.79
ECHO AV VTI: 28.3 CM
ECHO BSA: 1.59 M2
ECHO LA DIAMETER INDEX: 2.38 CM/M2
ECHO LA DIAMETER: 3.8 CM
ECHO LA VOL A-L A2C: 68 ML (ref 22–52)
ECHO LA VOL A-L A2C: 72 ML (ref 22–52)
ECHO LA VOL A-L A4C: 84 ML (ref 22–52)
ECHO LA VOL A-L A4C: 87 ML (ref 22–52)
ECHO LA VOLUME AREA LENGTH: 86 ML
ECHO LA VOLUME INDEX AREA LENGTH: 54 ML/M2 (ref 16–34)
ECHO LV DP/DT: 431.42 MMHG/S
ECHO LV EDV A2C: 140 ML
ECHO LV EDV A4C: 132 ML
ECHO LV EDV BP: 137 ML (ref 56–104)
ECHO LV EDV INDEX A4C: 83 ML/M2
ECHO LV EDV INDEX BP: 86 ML/M2
ECHO LV EDV NDEX A2C: 88 ML/M2
ECHO LV EJECTION FRACTION A2C: 38 %
ECHO LV EJECTION FRACTION A4C: 38 %
ECHO LV EJECTION FRACTION BIPLANE: 41 % (ref 55–100)
ECHO LV ESV A2C: 86 ML
ECHO LV ESV A4C: 82 ML
ECHO LV ESV BP: 81 ML (ref 19–49)
ECHO LV ESV INDEX A2C: 54 ML/M2
ECHO LV ESV INDEX A4C: 51 ML/M2
ECHO LV ESV INDEX BP: 51 ML/M2
ECHO LV FRACTIONAL SHORTENING: 18 % (ref 28–44)
ECHO LV INTERNAL DIMENSION DIASTOLE INDEX: 3.75 CM/M2
ECHO LV INTERNAL DIMENSION DIASTOLIC: 6 CM (ref 3.9–5.3)
ECHO LV INTERNAL DIMENSION SYSTOLIC INDEX: 3.06 CM/M2
ECHO LV INTERNAL DIMENSION SYSTOLIC: 4.9 CM
ECHO LV ISOVOLUMETRIC RELAXATION TIME (IVRT): 92.3 MS
ECHO LV IVSD: 0.7 CM (ref 0.6–0.9)
ECHO LV IVSS: 1.1 CM
ECHO LV MASS 2D: 186.1 G (ref 67–162)
ECHO LV MASS INDEX 2D: 116.3 G/M2 (ref 43–95)
ECHO LV POSTERIOR WALL DIASTOLIC: 0.9 CM (ref 0.6–0.9)
ECHO LV POSTERIOR WALL SYSTOLIC: 1 CM
ECHO LV RELATIVE WALL THICKNESS RATIO: 0.3
ECHO LVOT AREA: 3.1 CM2
ECHO LVOT AV VTI INDEX: 0.71
ECHO LVOT DIAM: 2 CM
ECHO LVOT MEAN GRADIENT: 2 MMHG
ECHO LVOT PEAK GRADIENT: 4 MMHG
ECHO LVOT PEAK VELOCITY: 1.1 M/S
ECHO LVOT STROKE VOLUME INDEX: 39.3 ML/M2
ECHO LVOT SV: 62.8 ML
ECHO LVOT VTI: 20 CM
ECHO MV "A" WAVE DURATION: 152.3 MSEC
ECHO MV A VELOCITY: 1.15 M/S
ECHO MV AREA PHT: 2.6 CM2
ECHO MV AREA VTI: 1.7 CM2
ECHO MV E DECELERATION TIME (DT): 193.6 MS
ECHO MV E VELOCITY: 0.69 M/S
ECHO MV E/A RATIO: 0.6
ECHO MV LVOT VTI INDEX: 1.86
ECHO MV MAX VELOCITY: 1.3 M/S
ECHO MV MEAN GRADIENT: 2 MMHG
ECHO MV MEAN VELOCITY: 0.7 M/S
ECHO MV PEAK GRADIENT: 7 MMHG
ECHO MV PRESSURE HALF TIME (PHT): 85.7 MS
ECHO MV REGURGITANT ALIASING (NYQUIST) VELOCITY: 32 CM/S
ECHO MV REGURGITANT VELOCITY PISA: 5.9 M/S
ECHO MV REGURGITANT VTIA: 236.5 CM
ECHO MV VTI: 37.2 CM
ECHO PV MAX VELOCITY: 0.8 M/S
ECHO PV MEAN GRADIENT: 1 MMHG
ECHO PV MEAN VELOCITY: 0.6 M/S
ECHO PV PEAK GRADIENT: 3 MMHG
ECHO PV VTI: 18.4 CM
ECHO PVEIN A DURATION: 96.9 MS
ECHO PVEIN A VELOCITY: 0.3 M/S
ECHO PVEIN PEAK D VELOCITY: 0.3 M/S
ECHO PVEIN PEAK S VELOCITY: 0.6 M/S
ECHO PVEIN S/D RATIO: 2
ECHO RV INTERNAL DIMENSION: 3.5 CM

## 2023-11-07 PROCEDURE — 93306 TTE W/DOPPLER COMPLETE: CPT

## 2023-11-07 PROCEDURE — 93306 TTE W/DOPPLER COMPLETE: CPT | Performed by: INTERNAL MEDICINE

## 2023-11-16 ENCOUNTER — OFFICE VISIT (OUTPATIENT)
Dept: CARDIOLOGY CLINIC | Age: 58
End: 2023-11-16
Payer: MEDICAID

## 2023-11-16 VITALS
BODY MASS INDEX: 21.79 KG/M2 | HEIGHT: 63 IN | HEART RATE: 63 BPM | SYSTOLIC BLOOD PRESSURE: 155 MMHG | DIASTOLIC BLOOD PRESSURE: 96 MMHG | WEIGHT: 123 LBS

## 2023-11-16 DIAGNOSIS — I42.8 NICM (NONISCHEMIC CARDIOMYOPATHY) (HCC): Primary | ICD-10-CM

## 2023-11-16 PROCEDURE — 93000 ELECTROCARDIOGRAM COMPLETE: CPT | Performed by: INTERNAL MEDICINE

## 2023-11-16 PROCEDURE — 99205 OFFICE O/P NEW HI 60 MIN: CPT | Performed by: INTERNAL MEDICINE

## 2023-11-16 PROCEDURE — 3017F COLORECTAL CA SCREEN DOC REV: CPT | Performed by: INTERNAL MEDICINE

## 2023-11-16 PROCEDURE — G8427 DOCREV CUR MEDS BY ELIG CLIN: HCPCS | Performed by: INTERNAL MEDICINE

## 2023-11-16 PROCEDURE — 4004F PT TOBACCO SCREEN RCVD TLK: CPT | Performed by: INTERNAL MEDICINE

## 2023-11-16 PROCEDURE — 3078F DIAST BP <80 MM HG: CPT | Performed by: INTERNAL MEDICINE

## 2023-11-16 PROCEDURE — G8420 CALC BMI NORM PARAMETERS: HCPCS | Performed by: INTERNAL MEDICINE

## 2023-11-16 PROCEDURE — G8484 FLU IMMUNIZE NO ADMIN: HCPCS | Performed by: INTERNAL MEDICINE

## 2023-11-16 PROCEDURE — 3074F SYST BP LT 130 MM HG: CPT | Performed by: INTERNAL MEDICINE

## 2023-11-16 ASSESSMENT — ENCOUNTER SYMPTOMS
COUGH: 0
BLOOD IN STOOL: 0
CHEST TIGHTNESS: 1
VOMITING: 0
NAUSEA: 0
ABDOMINAL PAIN: 0
BACK PAIN: 0
SHORTNESS OF BREATH: 1

## 2023-11-16 NOTE — PROGRESS NOTES
y.o. female referred by Dr. Leah Moore for mitral regurgitation. PMH is significant for chronic HFrEF, NICM, ICD, HTN, HLD, CVA, COPD, tobacco abuse, medical non compliance and mitral regurgitation. She was seen by Dr. Goran Erwin in valve clinic September 2020. CLAUDIA at that time showed mod-severe MR, and Mitraclip was recommended. However, she never followed up despite multiple scheduled appointments, calls and letters. Mitral regurgitation:  Based on last TTE 11/7/23, patient appears to have functional mitral regurgitation 2/2 dilated LV and reduced EF. Regurgitation appears to be in the mild to moderate range but could be underestimated. We need to further optimize her GDMT (add Jardiance)  Left and right heart cath along with CLAUDIA for further evaluation of MR etiology and severity and the candidacy for Transcatheter edge to edge repair with mitraclip.         Kyleigh Kate MD  Interventional cardiology / structural heart disease    [facility]  [address]  [phone]

## 2023-11-24 ENCOUNTER — TELEPHONE (OUTPATIENT)
Dept: CARDIOLOGY CLINIC | Age: 58
End: 2023-11-24

## 2023-11-24 DIAGNOSIS — I34.0 NONRHEUMATIC MITRAL VALVE REGURGITATION: Primary | ICD-10-CM

## 2023-11-24 DIAGNOSIS — Z01.810 PREOP CARDIOVASCULAR EXAM: Primary | ICD-10-CM

## 2023-11-24 DIAGNOSIS — I34.0 NONRHEUMATIC MITRAL VALVE REGURGITATION: ICD-10-CM

## 2023-11-24 RX ORDER — SODIUM CHLORIDE 9 MG/ML
INJECTION, SOLUTION INTRAVENOUS CONTINUOUS
OUTPATIENT
Start: 2023-12-28

## 2023-11-24 NOTE — TELEPHONE ENCOUNTER
CLAUDIA & HEART CATH INSTRUCTIONS    I called Manuela & went over her CLAUDIA & Heart Cath Instructions  Place: [address]  Date: 12/28/23  CLAUDIA Time: 9:30 am   Cath Time: 10:30 am  Arrival Time: 7:30 am  Preop lab work:to be done at a [facility]) facility a week in advance of procedures  NPO post midnight on: 12/27/23   Take an ASA 81 mg  prior to coming to the hosp.   Pt states she is allergic to  mg dose; but not the 81 mg  More detailed instructions mailed to pt

## 2023-11-24 NOTE — TELEPHONE ENCOUNTER
PRIOR AUTH FOR CLAUDIA & R & L HEART CATH    I called [facility] to obtain prior authorizations for Manuela's CLAUDIA & R & L Heart Cath scheduled on 12/28/23  I followed all of the prompts and gave them all the answers to the questions asked & a recorded line stated that no prior auth is required for the above cardiac procedures.

## 2023-12-08 ENCOUNTER — APPOINTMENT (OUTPATIENT)
Dept: CT IMAGING | Age: 58
End: 2023-12-08
Payer: MEDICAID

## 2023-12-08 ENCOUNTER — HOSPITAL ENCOUNTER (EMERGENCY)
Age: 58
Discharge: LEFT BEFORE DISCHARGE | End: 2023-12-08
Attending: EMERGENCY MEDICINE
Payer: MEDICAID

## 2023-12-08 VITALS
HEART RATE: 97 BPM | TEMPERATURE: 97.8 F | DIASTOLIC BLOOD PRESSURE: 93 MMHG | OXYGEN SATURATION: 98 % | RESPIRATION RATE: 17 BRPM | BODY MASS INDEX: 22.2 KG/M2 | HEIGHT: 64 IN | WEIGHT: 130 LBS | SYSTOLIC BLOOD PRESSURE: 139 MMHG

## 2023-12-08 DIAGNOSIS — Z53.21 ELOPED FROM EMERGENCY DEPARTMENT: ICD-10-CM

## 2023-12-08 DIAGNOSIS — R10.9 FLANK PAIN: Primary | ICD-10-CM

## 2023-12-08 LAB
ALBUMIN SERPL-MCNC: 4.6 G/DL (ref 3.5–5.2)
ALP SERPL-CCNC: 107 U/L (ref 35–104)
ALT SERPL-CCNC: 11 U/L (ref 0–32)
ANION GAP SERPL CALCULATED.3IONS-SCNC: 16 MMOL/L (ref 7–16)
AST SERPL-CCNC: 19 U/L (ref 0–31)
BASOPHILS # BLD: 0.08 K/UL (ref 0–0.2)
BASOPHILS NFR BLD: 1 % (ref 0–2)
BILIRUB SERPL-MCNC: 0.4 MG/DL (ref 0–1.2)
BUN SERPL-MCNC: 16 MG/DL (ref 6–20)
CALCIUM SERPL-MCNC: 10 MG/DL (ref 8.6–10.2)
CHLORIDE SERPL-SCNC: 96 MMOL/L (ref 98–107)
CO2 SERPL-SCNC: 24 MMOL/L (ref 22–29)
CREAT SERPL-MCNC: 0.9 MG/DL (ref 0.5–1)
EOSINOPHIL # BLD: 0.13 K/UL (ref 0.05–0.5)
EOSINOPHILS RELATIVE PERCENT: 2 % (ref 0–6)
ERYTHROCYTE [DISTWIDTH] IN BLOOD BY AUTOMATED COUNT: 14.2 % (ref 11.5–15)
GFR SERPL CREATININE-BSD FRML MDRD: >60 ML/MIN/1.73M2
GLUCOSE SERPL-MCNC: 107 MG/DL (ref 74–99)
HCT VFR BLD AUTO: 43.1 % (ref 34–48)
HGB BLD-MCNC: 14.2 G/DL (ref 11.5–15.5)
IMM GRANULOCYTES # BLD AUTO: 0.03 K/UL (ref 0–0.58)
IMM GRANULOCYTES NFR BLD: 0 % (ref 0–5)
LACTATE BLDV-SCNC: 1.5 MMOL/L (ref 0.5–2.2)
LIPASE SERPL-CCNC: 15 U/L (ref 13–60)
LYMPHOCYTES NFR BLD: 1.86 K/UL (ref 1.5–4)
LYMPHOCYTES RELATIVE PERCENT: 26 % (ref 20–42)
MCH RBC QN AUTO: 29 PG (ref 26–35)
MCHC RBC AUTO-ENTMCNC: 32.9 G/DL (ref 32–34.5)
MCV RBC AUTO: 88.1 FL (ref 80–99.9)
MONOCYTES NFR BLD: 0.73 K/UL (ref 0.1–0.95)
MONOCYTES NFR BLD: 10 % (ref 2–12)
NEUTROPHILS NFR BLD: 61 % (ref 43–80)
NEUTS SEG NFR BLD: 4.33 K/UL (ref 1.8–7.3)
PLATELET CONFIRMATION: NORMAL
PLATELET, FLUORESCENCE: 278 K/UL (ref 130–450)
PMV BLD AUTO: 10.9 FL (ref 7–12)
POTASSIUM SERPL-SCNC: 3.5 MMOL/L (ref 3.5–5)
PROT SERPL-MCNC: 7.7 G/DL (ref 6.4–8.3)
RBC # BLD AUTO: 4.89 M/UL (ref 3.5–5.5)
SODIUM SERPL-SCNC: 136 MMOL/L (ref 132–146)
WBC OTHER # BLD: 7.2 K/UL (ref 4.5–11.5)

## 2023-12-08 PROCEDURE — 87086 URINE CULTURE/COLONY COUNT: CPT

## 2023-12-08 PROCEDURE — 83605 ASSAY OF LACTIC ACID: CPT

## 2023-12-08 PROCEDURE — 85025 COMPLETE CBC W/AUTO DIFF WBC: CPT

## 2023-12-08 PROCEDURE — 74176 CT ABD & PELVIS W/O CONTRAST: CPT

## 2023-12-08 PROCEDURE — 80053 COMPREHEN METABOLIC PANEL: CPT

## 2023-12-08 PROCEDURE — 83690 ASSAY OF LIPASE: CPT

## 2023-12-08 PROCEDURE — 81001 URINALYSIS AUTO W/SCOPE: CPT

## 2023-12-08 PROCEDURE — 99284 EMERGENCY DEPT VISIT MOD MDM: CPT

## 2023-12-08 ASSESSMENT — LIFESTYLE VARIABLES
HOW OFTEN DO YOU HAVE A DRINK CONTAINING ALCOHOL: 2-4 TIMES A MONTH
HOW MANY STANDARD DRINKS CONTAINING ALCOHOL DO YOU HAVE ON A TYPICAL DAY: 1 OR 2

## 2023-12-08 ASSESSMENT — ENCOUNTER SYMPTOMS
ABDOMINAL PAIN: 0
ABDOMINAL DISTENTION: 0

## 2023-12-08 NOTE — ED PROVIDER NOTES
[facility]  Department of Emergency Medicine   EM Physician H&P                  Judith Simmonds 66 y.o. female PMHx of COPD, hyperlipidemia, hypertension, tobacco use, asthma, CAD presents to the ED c/o lower left flank pain. Onset: 2 days. Location/Radiation: Left flank. Duration: Intermittent. Characterization: Dull achy. Aggravating Factors: Palpation of the area. Relieving Factors: None. Severity: Mild to moderate. Assx Sxs: Flank pain. She Denies: Fever/chills, abdominal pain, nausea/vomiting, chest pain, shortness of breath, dysuria/treatment. Review of Systems   Constitutional:  Negative for activity change and appetite change. Gastrointestinal:  Negative for abdominal distention and abdominal pain. Genitourinary:  Positive for flank pain. Physical Exam  Constitutional:       General: She is not in acute distress. Appearance: Normal appearance. She is not ill-appearing or toxic-appearing. HENT:      Head: Normocephalic and atraumatic. Right Ear: External ear normal.      Left Ear: External ear normal.      Nose: Nose normal.      Mouth/Throat:      Mouth: Mucous membranes are moist.      Pharynx: Oropharynx is clear. Eyes:      Extraocular Movements: Extraocular movements intact. Pupils: Pupils are equal, round, and reactive to light. Cardiovascular:      Rate and Rhythm: Normal rate and regular rhythm. Pulses: Normal pulses. Heart sounds: Normal heart sounds. Pulmonary:      Effort: Pulmonary effort is normal. No respiratory distress. Breath sounds: Normal breath sounds. No wheezing or rales. Abdominal:      General: There is no distension. Palpations: Abdomen is soft. Tenderness: There is no abdominal tenderness. Musculoskeletal:         General: No signs of injury. Normal range of motion. Cervical back: Normal range of motion and neck supple. Right lower leg: No edema.       Left lower

## 2023-12-08 NOTE — ED NOTES
Department of Emergency Medicine  FIRST PROVIDER TRIAGE NOTE             Independent MLP           12/8/23  10:47 AM EST    Date of Encounter: 12/8/23   MRN: [id number]      HPI: Sarah Hansen is a 66 y.o. female who presents to the ED for Flank Pain (Left flank pain, + N/V, + diarrhea x 2 days. )   Left flank pain, with nausea and vomiitng    ROS: Negative for cp or sob. PE: Gen Appearance/Constitutional: alert  CV: regular rate     Initial Plan of Care: All treatment areas with department are currently occupied. Plan to order/Initiate the following while awaiting opening in ED: labs, EKG, and imaging studies.   Initiate Treatment-Testing, Proceed toTreatment Area When Bed Available for ED Attending/MLP to Continue Care    Electronically signed by GA Ngo CNP   DD: 12/8/23       Lander Lanes, APRN - CNP  12/08/23 1048

## 2023-12-09 LAB
BACTERIA URNS QL MICRO: ABNORMAL
BILIRUB UR QL STRIP: NEGATIVE
CLARITY UR: CLEAR
COLOR UR: YELLOW
CRYSTALS URNS MICRO: ABNORMAL /HPF
GLUCOSE UR STRIP-MCNC: NEGATIVE MG/DL
HGB UR QL STRIP.AUTO: ABNORMAL
KETONES UR STRIP-MCNC: NEGATIVE MG/DL
LEUKOCYTE ESTERASE UR QL STRIP: NEGATIVE
MICROORGANISM SPEC CULT: NO GROWTH
NITRITE UR QL STRIP: NEGATIVE
PH UR STRIP: 5.5 [PH] (ref 5–9)
PROT UR STRIP-MCNC: ABNORMAL MG/DL
RBC #/AREA URNS HPF: ABNORMAL /HPF
SP GR UR STRIP: >1.03 (ref 1–1.03)
SPECIMEN DESCRIPTION: NORMAL
UROBILINOGEN UR STRIP-ACNC: 0.2 EU/DL (ref 0–1)
WBC #/AREA URNS HPF: ABNORMAL /HPF

## 2023-12-13 ENCOUNTER — TELEPHONE (OUTPATIENT)
Dept: FAMILY MEDICINE CLINIC | Age: 58
End: 2023-12-13

## 2023-12-13 NOTE — TELEPHONE ENCOUNTER
Pt called to obtain test results from ER visits. ER instructed pt to call clinic to call PCP for results.

## 2023-12-23 PROCEDURE — 93295 DEV INTERROG REMOTE 1/2/MLT: CPT | Performed by: INTERNAL MEDICINE

## 2023-12-23 PROCEDURE — 93296 REM INTERROG EVL PM/IDS: CPT | Performed by: INTERNAL MEDICINE

## 2023-12-27 ENCOUNTER — TELEPHONE (OUTPATIENT)
Dept: CARDIOTHORACIC SURGERY | Age: 58
End: 2023-12-27

## 2023-12-27 ENCOUNTER — TELEPHONE (OUTPATIENT)
Age: 58
End: 2023-12-27

## 2023-12-27 NOTE — TELEPHONE ENCOUNTER
PATIENT CANCELLED.  SHE DID NOT REALIZE SHE WOULD NOT BE ABLE TO DRIVE HOME BY HERSELF.  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

## 2023-12-27 NOTE — TELEPHONE ENCOUNTER
CLAUDIA & Heart Cath is cancelled for tomorrow 12/28/23 because pt has transportation issues. Will rescheduled after  1/1/24.

## 2023-12-28 ENCOUNTER — HOSPITAL ENCOUNTER (OUTPATIENT)
Age: 58
Discharge: HOME | End: 2023-12-28
Attending: INTERNAL MEDICINE

## 2024-01-12 ENCOUNTER — TELEPHONE (OUTPATIENT)
Dept: CARDIOTHORACIC SURGERY | Age: 59
End: 2024-01-12

## 2024-01-12 ENCOUNTER — TELEPHONE (OUTPATIENT)
Dept: CARDIOLOGY CLINIC | Age: 59
End: 2024-01-12

## 2024-01-12 DIAGNOSIS — Z01.810 PREOP CARDIOVASCULAR EXAM: ICD-10-CM

## 2024-01-12 DIAGNOSIS — Q23.3 MR (CONGENITAL MITRAL REGURGITATION): Primary | ICD-10-CM

## 2024-01-12 RX ORDER — SODIUM CHLORIDE 9 MG/ML
INJECTION, SOLUTION INTRAVENOUS PRN
Status: CANCELLED | OUTPATIENT
Start: 2024-01-18

## 2024-01-12 RX ORDER — SODIUM CHLORIDE 9 MG/ML
INJECTION, SOLUTION INTRAVENOUS CONTINUOUS
Status: CANCELLED | OUTPATIENT
Start: 2024-01-18

## 2024-01-12 NOTE — TELEPHONE ENCOUNTER
CLAUDIA & CATH INSURANCE AUTH    Insurance Company: [facility] [employer]    CPT CODES:    CLAUDIA: 08975             L Heart Cath: 39746               R Heart Cath: 76457    ICD 10 Codes:  MR:  I34.0        CLAUDIA Physician: Dr. Brown     NPI: [id number]       Tax ID: [id number]    Dr. Duron        NPI: [id number]     TAX ID: [id number]                                                                            GINA    NPI:  [id number]      TAX ID:  [id number]      [address] [place] [address]    Authorization: I called [facility] & followed all the prompts & a recording said that Manuela's plan is excluded from requiring a prior auth for the above procedures

## 2024-01-12 NOTE — TELEPHONE ENCOUNTER
HEART CATH INSTRUCTIONS    I called Manuela & went over her CLAUDIA & heart cath instructions  Place: [facility]  Date & Time of CLAUDIA: 1/18/24 at 12:30 pm  Cath is at 1:30 pm     Arrival Time: 11:30 am  Preop lab work: to be done a few days prior at a [facility]  NPO post midnight the night before    Take an ASA 81 mg 4 tabs prior to coming to the hosp  Hold Bumex on 1/18  More detailed instructions mailed to pt & emailed to [email or web address]

## 2024-01-17 ENCOUNTER — TELEPHONE (OUTPATIENT)
Age: 59
End: 2024-01-17

## 2024-01-18 ENCOUNTER — ANESTHESIA (OUTPATIENT)
Age: 59
End: 2024-01-18
Payer: MEDICAID

## 2024-01-18 ENCOUNTER — ANESTHESIA EVENT (OUTPATIENT)
Age: 59
End: 2024-01-18
Payer: MEDICAID

## 2024-01-18 ENCOUNTER — HOSPITAL ENCOUNTER (OUTPATIENT)
Age: 59
Setting detail: OUTPATIENT SURGERY
Discharge: HOME | End: 2024-01-18
Attending: INTERNAL MEDICINE | Admitting: INTERNAL MEDICINE
Payer: MEDICAID

## 2024-01-18 ENCOUNTER — HOSPITAL ENCOUNTER (OUTPATIENT)
Age: 59
Setting detail: OUTPATIENT SURGERY
Discharge: HOME | End: 2024-01-20
Attending: INTERNAL MEDICINE
Payer: MEDICAID

## 2024-01-18 VITALS
DIASTOLIC BLOOD PRESSURE: 89 MMHG | TEMPERATURE: 97.6 F | SYSTOLIC BLOOD PRESSURE: 137 MMHG | HEIGHT: 64 IN | OXYGEN SATURATION: 99 % | WEIGHT: 120 LBS | RESPIRATION RATE: 16 BRPM | BODY MASS INDEX: 20.49 KG/M2 | HEART RATE: 69 BPM

## 2024-01-18 VITALS
HEART RATE: 78 BPM | SYSTOLIC BLOOD PRESSURE: 154 MMHG | TEMPERATURE: 96.8 F | RESPIRATION RATE: 18 BRPM | DIASTOLIC BLOOD PRESSURE: 88 MMHG | OXYGEN SATURATION: 95 %

## 2024-01-18 DIAGNOSIS — I34.0 NONRHEUMATIC MITRAL VALVE REGURGITATION: ICD-10-CM

## 2024-01-18 DIAGNOSIS — I34.0 MITRAL VALVE INSUFFICIENCY, UNSPECIFIED ETIOLOGY: ICD-10-CM

## 2024-01-18 LAB
ABO + RH BLD: NORMAL
ANION GAP SERPL CALCULATED.3IONS-SCNC: 11 MMOL/L (ref 7–16)
ARM BAND NUMBER: NORMAL
BLOOD BANK SAMPLE EXPIRATION: NORMAL
BLOOD GROUP ANTIBODIES SERPL: NEGATIVE
BUN SERPL-MCNC: 9 MG/DL (ref 6–20)
CALCIUM SERPL-MCNC: 9.6 MG/DL (ref 8.6–10.2)
CHLORIDE SERPL-SCNC: 104 MMOL/L (ref 98–107)
CO2 SERPL-SCNC: 24 MMOL/L (ref 22–29)
CREAT SERPL-MCNC: 0.8 MG/DL (ref 0.5–1)
ECHO AO SINUS VALSALVA DIAM: 2.9 CM
ECHO AO SINUS VALSALVA INDEX: 1.85 CM/M2
ECHO BSA: 1.57 M2
ECHO BSA: 1.57 M2
ECHO EST RA PRESSURE: 8 MMHG
ERYTHROCYTE [DISTWIDTH] IN BLOOD BY AUTOMATED COUNT: 13.4 % (ref 11.5–15)
GFR SERPL CREATININE-BSD FRML MDRD: >60 ML/MIN/1.73M2
GLUCOSE SERPL-MCNC: 106 MG/DL (ref 74–99)
HCT VFR BLD AUTO: 38.4 % (ref 34–48)
HGB BLD-MCNC: 12.2 G/DL (ref 11.5–15.5)
MCH RBC QN AUTO: 28.5 PG (ref 26–35)
MCHC RBC AUTO-ENTMCNC: 31.8 G/DL (ref 32–34.5)
MCV RBC AUTO: 89.7 FL (ref 80–99.9)
PLATELET # BLD AUTO: 397 K/UL (ref 130–450)
PMV BLD AUTO: 9.8 FL (ref 7–12)
POTASSIUM SERPL-SCNC: 4.1 MMOL/L (ref 3.5–5)
RBC # BLD AUTO: 4.28 M/UL (ref 3.5–5.5)
SODIUM SERPL-SCNC: 139 MMOL/L (ref 132–146)
WBC OTHER # BLD: 6.2 K/UL (ref 4.5–11.5)

## 2024-01-18 PROCEDURE — 36415 COLL VENOUS BLD VENIPUNCTURE: CPT

## 2024-01-18 PROCEDURE — 85027 COMPLETE CBC AUTOMATED: CPT

## 2024-01-18 PROCEDURE — 86850 RBC ANTIBODY SCREEN: CPT

## 2024-01-18 PROCEDURE — 3700000000 HC ANESTHESIA ATTENDED CARE: Performed by: INTERNAL MEDICINE

## 2024-01-18 PROCEDURE — C1751 CATH, INF, PER/CENT/MIDLINE: HCPCS | Performed by: INTERNAL MEDICINE

## 2024-01-18 PROCEDURE — 99152 MOD SED SAME PHYS/QHP 5/>YRS: CPT | Performed by: INTERNAL MEDICINE

## 2024-01-18 PROCEDURE — 6360000002 HC RX W HCPCS

## 2024-01-18 PROCEDURE — 2500000003 HC RX 250 WO HCPCS: Performed by: INTERNAL MEDICINE

## 2024-01-18 PROCEDURE — 86900 BLOOD TYPING SEROLOGIC ABO: CPT

## 2024-01-18 PROCEDURE — 93460 R&L HRT ART/VENTRICLE ANGIO: CPT | Performed by: INTERNAL MEDICINE

## 2024-01-18 PROCEDURE — 6360000002 HC RX W HCPCS: Performed by: INTERNAL MEDICINE

## 2024-01-18 PROCEDURE — 2709999900 HC NON-CHARGEABLE SUPPLY: Performed by: INTERNAL MEDICINE

## 2024-01-18 PROCEDURE — 6360000004 HC RX CONTRAST MEDICATION: Performed by: INTERNAL MEDICINE

## 2024-01-18 PROCEDURE — 2580000003 HC RX 258

## 2024-01-18 PROCEDURE — 7100000011 HC PHASE II RECOVERY - ADDTL 15 MIN: Performed by: INTERNAL MEDICINE

## 2024-01-18 PROCEDURE — 86901 BLOOD TYPING SEROLOGIC RH(D): CPT

## 2024-01-18 PROCEDURE — 7100000010 HC PHASE II RECOVERY - FIRST 15 MIN: Performed by: INTERNAL MEDICINE

## 2024-01-18 PROCEDURE — 93453 R&L HRT CATH W/VENTRICLGRPHY: CPT | Performed by: INTERNAL MEDICINE

## 2024-01-18 PROCEDURE — C1769 GUIDE WIRE: HCPCS | Performed by: INTERNAL MEDICINE

## 2024-01-18 PROCEDURE — 3700000001 HC ADD 15 MINUTES (ANESTHESIA): Performed by: INTERNAL MEDICINE

## 2024-01-18 PROCEDURE — 93320 DOPPLER ECHO COMPLETE: CPT

## 2024-01-18 PROCEDURE — 80048 BASIC METABOLIC PNL TOTAL CA: CPT

## 2024-01-18 PROCEDURE — C1894 INTRO/SHEATH, NON-LASER: HCPCS | Performed by: INTERNAL MEDICINE

## 2024-01-18 RX ORDER — SODIUM CHLORIDE 0.9 % (FLUSH) 0.9 %
5-40 SYRINGE (ML) INJECTION PRN
OUTPATIENT
Start: 2024-01-18

## 2024-01-18 RX ORDER — SODIUM CHLORIDE 9 MG/ML
INJECTION, SOLUTION INTRAVENOUS CONTINUOUS PRN
Status: DISCONTINUED | OUTPATIENT
Start: 2024-01-18 | End: 2024-01-18 | Stop reason: SDUPTHER

## 2024-01-18 RX ORDER — SODIUM CHLORIDE 9 MG/ML
INJECTION, SOLUTION INTRAVENOUS PRN
OUTPATIENT
Start: 2024-01-18

## 2024-01-18 RX ORDER — SODIUM CHLORIDE 0.9 % (FLUSH) 0.9 %
5-40 SYRINGE (ML) INJECTION EVERY 12 HOURS SCHEDULED
OUTPATIENT
Start: 2024-01-18

## 2024-01-18 RX ORDER — MIDAZOLAM HYDROCHLORIDE 1 MG/ML
INJECTION INTRAMUSCULAR; INTRAVENOUS PRN
Status: DISCONTINUED | OUTPATIENT
Start: 2024-01-18 | End: 2024-01-18 | Stop reason: HOSPADM

## 2024-01-18 RX ORDER — HEPARIN SODIUM 10000 [USP'U]/ML
INJECTION, SOLUTION INTRAVENOUS; SUBCUTANEOUS PRN
Status: DISCONTINUED | OUTPATIENT
Start: 2024-01-18 | End: 2024-01-18 | Stop reason: HOSPADM

## 2024-01-18 RX ORDER — VERAPAMIL HYDROCHLORIDE 2.5 MG/ML
INJECTION, SOLUTION INTRAVENOUS PRN
Status: DISCONTINUED | OUTPATIENT
Start: 2024-01-18 | End: 2024-01-18 | Stop reason: HOSPADM

## 2024-01-18 RX ORDER — FENTANYL CITRATE 50 UG/ML
INJECTION, SOLUTION INTRAMUSCULAR; INTRAVENOUS PRN
Status: DISCONTINUED | OUTPATIENT
Start: 2024-01-18 | End: 2024-01-18 | Stop reason: HOSPADM

## 2024-01-18 RX ORDER — NITROGLYCERIN 20 MG/100ML
INJECTION INTRAVENOUS CONTINUOUS PRN
Status: COMPLETED | OUTPATIENT
Start: 2024-01-18 | End: 2024-01-18

## 2024-01-18 RX ORDER — PROPOFOL 10 MG/ML
INJECTION, EMULSION INTRAVENOUS PRN
Status: DISCONTINUED | OUTPATIENT
Start: 2024-01-18 | End: 2024-01-18 | Stop reason: SDUPTHER

## 2024-01-18 RX ORDER — ACETAMINOPHEN 325 MG/1
650 TABLET ORAL EVERY 4 HOURS PRN
OUTPATIENT
Start: 2024-01-18

## 2024-01-18 RX ADMIN — PROPOFOL 2250 MG: 10 INJECTION, EMULSION INTRAVENOUS at 16:05

## 2024-01-18 RX ADMIN — SODIUM CHLORIDE: 9 INJECTION, SOLUTION INTRAVENOUS at 16:01

## 2024-01-18 ASSESSMENT — LIFESTYLE VARIABLES: SMOKING_STATUS: 1

## 2024-01-18 ASSESSMENT — ENCOUNTER SYMPTOMS: SHORTNESS OF BREATH: 1

## 2024-01-18 NOTE — PROCEDURES
Preprocedure diagnosis: VHD    Procedures: CLAUDIA    Primary procedure  Written informed consent was obtained, the CLAUDIA was performed under stable fasting condition. The patient was set up for monitoring of surface EKG and pulse oximetry continuously. Blood pressure was monitored and with automatic cuff measurements. Supplemental oxygen was administered. The procedure was performed under anesthesia by anesthesiology. After ensuring adequate anesthesia, CLAUDIA probe was intubated without difficulty.    Result: prelim - mild to moderate MR, full report to follow    Complication: None    Patient was monitored until awake and vitals remained stable.    Lanre Brown M.D.  [facility] cardiology

## 2024-01-18 NOTE — ANESTHESIA PRE PROCEDURE
Department of Anesthesiology  Preprocedure Note       Name:  Manuela Chaney   Age:  58 y.o.  :  1965                                          MRN:  [id number]         Date:  2024      Surgeon: Dr. Brown    Procedure: CLAUDIA    Medications prior to admission:   Prior to Admission medications    Medication Sig Start Date End Date Taking? Authorizing Provider   empagliflozin (JARDIANCE) 10 MG tablet Take 1 tablet by mouth daily 23   David Duron MD   pantoprazole (PROTONIX) 20 MG tablet take 1 tablet by mouth once daily 23   Palma Carroll MD   Vitamin D, Ergocalciferol, 55221 units CAPS Take 50,000 Units by mouth once a week for 7 doses 23  Palma Carroll MD   albuterol sulfate HFA (VENTOLIN HFA) 108 (90 Base) MCG/ACT inhaler Inhale 2 puffs into the lungs 4 times daily as needed for Wheezing 23   Palma Carroll MD   aspirin 81 MG chewable tablet Take 1 tablet by mouth daily 23   Palma Carroll MD   atorvastatin (LIPITOR) 40 MG tablet Take 1 tablet by mouth daily 23   Palma Carroll MD   budesonide-formoterol (SYMBICORT) 160-4.5 MCG/ACT AERO Inhale 2 puffs into the lungs 2 times daily 23   Palma Carroll MD   bumetanide (BUMEX) 2 MG tablet Take 0.5 tablets by mouth daily 23   Palma Carroll MD   cetirizine (ZYRTEC) 10 MG tablet Take 1 tablet by mouth daily 23   Palma Carroll MD   metoprolol succinate (TOPROL XL) 100 MG extended release tablet Take 1 tablet by mouth daily 23   Palma Carroll MD   sacubitril-valsartan (ENTRESTO)  MG per tablet Take 1 tablet by mouth 2 times daily 23   Palma Carroll MD   montelukast (SINGULAIR) 10 MG tablet Take 1 tablet by mouth nightly 23   Palma Carroll MD   nitroGLYCERIN (NITROSTAT) 0.4 MG SL tablet Place 1 tablet under the tongue every 5 minutes as needed for Chest pain 22   Casimiro Rivers MD       Current medications:    No current outpatient medications on file.     No current

## 2024-01-18 NOTE — DISCHARGE INSTRUCTIONS
VENOUS DISCHARGE INSTRUCTIONS:    Discharge Instructions:    Please follow instructions closely for prevention of complications.    Special Instructions:  Splint Procedure site with activity today.  Splint Procedure site with:             ~Changing Positions             ~Coughing             ~Sneezing             ~Laughing    Call your physician if you notice:  ~increased pain at the Procedure site.  ~increase swelling at the Procedure site.  ~redness or drainage at the Procedure site.  ~numbness, tingling or cramping in the Procedure leg at rest or with activity.        *Drink 3-4 extra glasses of water today.  *No tub bathing or swimming for 3 days.  *Avoid stairs for 24 hours. Minimal walking today.  *No driving, working, or for 48 hours.  *Do not lift anything heavier than 10 pounds for 3 days.  *Continue low fat diet.    **If you have any questions or problems after discharge, please notify your doctor. Call 9-1-1 if you have active bleeding from your catheterization site.         ***DO NOT DRIVE YOURSELF TO THE HOSPITAL. Lay down and apply pressure to site until help arrives. POST-CATH  RADIAL DISCHARGE INSTRUCTIONS:    Please follow instructions closely for prevention of complications.     INSTRUCTIONS FOR CARE OF CATHETERIZATION SITE: RADIAL (WRIST) APPROACH:    DO NOT BEND wrist for 48 hours  DO NOT PUSH with affected wrist for 48 hrs.  DO NOT submerge wrist in water for 3 days  NO blood pressure, IV or blood work in affected arm for 3- 5 days    KEEP SPLINT (ARMBOARD) ON UNTIL TOMORROW MORNING  Remove dressing from wrist tomorrow morning. Gently cleanse, pat dry, apply band aid for 24 hours.    Call your physician if you notice:      *Increase in pain at catheterization site      *Increase in swelling at catheterization site      *Redness or drainage at the catheterization site      *Numbness, tingling or cramping in the catheterization arm at rest or with activity    CALL 911 if you have active bleeding

## 2024-01-19 NOTE — ANESTHESIA POSTPROCEDURE EVALUATION
Department of Anesthesiology  Postprocedure Note    Patient: Manuela Chaney  MRN: [id number]  YOB: 1965  Date of evaluation: 1/19/2024    Procedure Summary       Date: 01/18/24 Room / Location: [facility] Cardiac Cath Lab; [facility] Noninvasive Cardiology    Anesthesia Start: 1600 Anesthesia Stop: 1630    Procedure: CLAUDIA (PRN CONTRAST/BUBBLE/3D) Diagnosis: Nonrheumatic mitral valve regurgitation    Scheduled Providers: Sonia Solano MD Responsible Provider: Sonia Solano MD    Anesthesia Type: MAC ASA Status: 4            Anesthesia Type: MAC    Praful Phase I:      Praful Phase II:      Anesthesia Post Evaluation    Patient location during evaluation: PACU  Patient participation: complete - patient participated  Level of consciousness: awake  Pain score: 0  Airway patency: patent  Nausea & Vomiting: no nausea  Cardiovascular status: hemodynamically stable  Respiratory status: acceptable  Hydration status: stable    No notable events documented.

## 2024-01-23 ENCOUNTER — HOSPITAL ENCOUNTER (OUTPATIENT)
Age: 59
Discharge: HOME | End: 2024-01-23
Payer: MEDICAID

## 2024-01-23 DIAGNOSIS — I34.0 NONRHEUMATIC MITRAL VALVE REGURGITATION: ICD-10-CM

## 2024-01-23 DIAGNOSIS — Z01.810 PREOP CARDIOVASCULAR EXAM: ICD-10-CM

## 2024-01-23 LAB
ANION GAP SERPL CALCULATED.3IONS-SCNC: 8 MMOL/L (ref 7–16)
BASOPHILS # BLD: 0.08 K/UL (ref 0–0.2)
BASOPHILS NFR BLD: 1 % (ref 0–2)
BUN SERPL-MCNC: 13 MG/DL (ref 6–20)
CALCIUM SERPL-MCNC: 9.9 MG/DL (ref 8.6–10.2)
CHLORIDE SERPL-SCNC: 104 MMOL/L (ref 98–107)
CO2 SERPL-SCNC: 29 MMOL/L (ref 22–29)
CREAT SERPL-MCNC: 0.9 MG/DL (ref 0.5–1)
EOSINOPHIL # BLD: 0.11 K/UL (ref 0.05–0.5)
EOSINOPHILS RELATIVE PERCENT: 2 % (ref 0–6)
ERYTHROCYTE [DISTWIDTH] IN BLOOD BY AUTOMATED COUNT: 13.2 % (ref 11.5–15)
GFR SERPL CREATININE-BSD FRML MDRD: >60 ML/MIN/1.73M2
GLUCOSE SERPL-MCNC: 127 MG/DL (ref 74–99)
HCT VFR BLD AUTO: 39.4 % (ref 34–48)
HGB BLD-MCNC: 12.6 G/DL (ref 11.5–15.5)
IMM GRANULOCYTES # BLD AUTO: <0.03 K/UL (ref 0–0.58)
IMM GRANULOCYTES NFR BLD: 0 % (ref 0–5)
INR PPP: 1
LYMPHOCYTES NFR BLD: 2.14 K/UL (ref 1.5–4)
LYMPHOCYTES RELATIVE PERCENT: 34 % (ref 20–42)
MCH RBC QN AUTO: 28.8 PG (ref 26–35)
MCHC RBC AUTO-ENTMCNC: 32 G/DL (ref 32–34.5)
MCV RBC AUTO: 90.2 FL (ref 80–99.9)
MONOCYTES NFR BLD: 0.48 K/UL (ref 0.1–0.95)
MONOCYTES NFR BLD: 8 % (ref 2–12)
NEUTROPHILS NFR BLD: 56 % (ref 43–80)
NEUTS SEG NFR BLD: 3.58 K/UL (ref 1.8–7.3)
PLATELET # BLD AUTO: 444 K/UL (ref 130–450)
PMV BLD AUTO: 9.9 FL (ref 7–12)
POTASSIUM SERPL-SCNC: 4 MMOL/L (ref 3.5–5)
PROTHROMBIN TIME: 11.3 SEC (ref 9.3–12.4)
RBC # BLD AUTO: 4.37 M/UL (ref 3.5–5.5)
SODIUM SERPL-SCNC: 141 MMOL/L (ref 132–146)
WBC OTHER # BLD: 6.4 K/UL (ref 4.5–11.5)

## 2024-01-23 PROCEDURE — 80048 BASIC METABOLIC PNL TOTAL CA: CPT

## 2024-01-23 PROCEDURE — 36415 COLL VENOUS BLD VENIPUNCTURE: CPT

## 2024-01-23 PROCEDURE — 85610 PROTHROMBIN TIME: CPT

## 2024-01-23 PROCEDURE — 85025 COMPLETE CBC W/AUTO DIFF WBC: CPT

## 2024-02-13 ENCOUNTER — OFFICE VISIT (OUTPATIENT)
Dept: CARDIOLOGY CLINIC | Age: 59
End: 2024-02-13

## 2024-02-13 VITALS
RESPIRATION RATE: 18 BRPM | WEIGHT: 117 LBS | SYSTOLIC BLOOD PRESSURE: 130 MMHG | BODY MASS INDEX: 19.97 KG/M2 | HEART RATE: 72 BPM | DIASTOLIC BLOOD PRESSURE: 80 MMHG | HEIGHT: 64 IN

## 2024-02-13 DIAGNOSIS — I38 VHD (VALVULAR HEART DISEASE): Primary | ICD-10-CM

## 2024-02-13 NOTE — PROGRESS NOTES
OFFICE VISIT    NAME: Manuela Chaney     YOB: 1965   AGE: 58 y.o.   MEDICAL RECORD NUMBER: [id number]       CONSULTATION INFORMATION:   DATE OF CLINIC CONSULTATION: 2/13/2024   PRINCIPLE DIAGNOSIS / reason for visit: F/U MR, HFrEF    CARE TEAM MEMBERS    PCP : Palma Carroll MD     PRIMARY CARDIOLOGIST: Dr. Drew   REFERRING PROVIDER: Dr. Drew    HISTORY OF PRESENT ILLNESS:   Manuela Chaney is a 58 y.o. female referred by Dr. Drew for evaluation of mitral regurgitation. Past medical history of chronic HFrEF, NICM, ICD, HTN, HLD, CVA, COPD, tobacco abuse, medical non compliance and mitral regurgitation. She was seen by Dr. Wiley in valve clinic September 2020. CLAUDIA at that time showed mod-severe MR, and Mitraclip was recommended. However, she never followed up despite multiple scheduled appointments, calls and letters.  I saw her in clinic on 11/30/2023.  We ordered repeat CLAUDIA which showed mild to moderate MR.  Left and right heart cath with normal coronaries, no significant V wave on right heart cath.    Today patient presents to follow-up, denies having chest pain or shortness of breath.  She reports some dizziness, fatigue and weakness whenever she takes one of her medicines but she cannot recall which one of them is that.  Other than that she denies having lower extremity edema or orthopnea.        -CLAUDIA 1/18/2024:    Left Ventricle: Severely reduced left ventricular systolic function with a visually estimated EF of 25 - 30%. Left ventricle size is normal. Normal wall thickness. Severe global hypokinesis present.    Aortic Valve: No stenosis.    Mitral Valve: Mild to moderate regurgitation with a centrally directed jet.    Tricuspid Valve: Unable to assess RVSP due to inadequate or insignificant tricuspid regurgitation.    Left Atrium: Left atrium is moderately dilated. Normal sized appendage. No left atrial appendage thrombus noted. No left atrial appendage mass noted.

## 2024-02-20 ENCOUNTER — TELEPHONE (OUTPATIENT)
Dept: FAMILY MEDICINE CLINIC | Age: 59
End: 2024-02-20

## 2024-02-20 NOTE — TELEPHONE ENCOUNTER
Patient called having abdominal pain x 3 days , High dose  ibuprofen  takes off the edge but does not take away the pain . Placed patient on Spock  schedule for 2 PM tomorrow .

## 2024-02-21 ENCOUNTER — OFFICE VISIT (OUTPATIENT)
Dept: FAMILY MEDICINE CLINIC | Age: 59
End: 2024-02-21
Payer: MEDICAID

## 2024-02-21 VITALS
RESPIRATION RATE: 16 BRPM | HEART RATE: 75 BPM | SYSTOLIC BLOOD PRESSURE: 144 MMHG | WEIGHT: 116 LBS | TEMPERATURE: 97 F | DIASTOLIC BLOOD PRESSURE: 70 MMHG | HEIGHT: 64 IN | OXYGEN SATURATION: 100 % | BODY MASS INDEX: 19.81 KG/M2

## 2024-02-21 DIAGNOSIS — R10.32 LEFT LOWER QUADRANT ABDOMINAL PAIN: Primary | ICD-10-CM

## 2024-02-21 LAB
ABSOLUTE IMMATURE GRANULOCYTE: 0.04 K/UL (ref 0–0.58)
ALBUMIN SERPL-MCNC: 4.4 G/DL (ref 3.5–5.2)
ALP BLD-CCNC: 96 U/L (ref 35–104)
ALT SERPL-CCNC: <5 U/L (ref 0–32)
ANION GAP SERPL CALCULATED.3IONS-SCNC: 14 MMOL/L (ref 7–16)
AST SERPL-CCNC: 10 U/L (ref 0–31)
BASOPHILS ABSOLUTE: 0.07 K/UL (ref 0–0.2)
BASOPHILS RELATIVE PERCENT: 1 % (ref 0–2)
BILIRUB SERPL-MCNC: 0.4 MG/DL (ref 0–1.2)
BUN BLDV-MCNC: 7 MG/DL (ref 6–20)
CALCIUM SERPL-MCNC: 10.1 MG/DL (ref 8.6–10.2)
CHLORIDE BLD-SCNC: 101 MMOL/L (ref 98–107)
CO2: 25 MMOL/L (ref 22–29)
CREAT SERPL-MCNC: 0.7 MG/DL (ref 0.5–1)
EOSINOPHILS ABSOLUTE: 0.11 K/UL (ref 0.05–0.5)
EOSINOPHILS RELATIVE PERCENT: 1 % (ref 0–6)
GFR SERPL CREATININE-BSD FRML MDRD: >60 ML/MIN/1.73M2
GLUCOSE BLD-MCNC: 105 MG/DL (ref 74–99)
HCT VFR BLD CALC: 39.7 % (ref 34–48)
HEMOGLOBIN: 12.5 G/DL (ref 11.5–15.5)
IMMATURE GRANULOCYTES: 0 % (ref 0–5)
LYMPHOCYTES ABSOLUTE: 1.92 K/UL (ref 1.5–4)
LYMPHOCYTES RELATIVE PERCENT: 19 % (ref 20–42)
MCH RBC QN AUTO: 28.3 PG (ref 26–35)
MCHC RBC AUTO-ENTMCNC: 31.5 G/DL (ref 32–34.5)
MCV RBC AUTO: 90 FL (ref 80–99.9)
MONOCYTES ABSOLUTE: 0.97 K/UL (ref 0.1–0.95)
MONOCYTES RELATIVE PERCENT: 9 % (ref 2–12)
NEUTROPHILS ABSOLUTE: 7.17 K/UL (ref 1.8–7.3)
NEUTROPHILS RELATIVE PERCENT: 70 % (ref 43–80)
PDW BLD-RTO: 14.1 % (ref 11.5–15)
PLATELET # BLD: 329 K/UL (ref 130–450)
PMV BLD AUTO: 10.6 FL (ref 7–12)
POTASSIUM SERPL-SCNC: 4.5 MMOL/L (ref 3.5–5)
RBC # BLD: 4.41 M/UL (ref 3.5–5.5)
SODIUM BLD-SCNC: 140 MMOL/L (ref 132–146)
TOTAL PROTEIN: 7.8 G/DL (ref 6.4–8.3)
WBC # BLD: 10.3 K/UL (ref 4.5–11.5)

## 2024-02-21 PROCEDURE — G8484 FLU IMMUNIZE NO ADMIN: HCPCS

## 2024-02-21 PROCEDURE — 36415 COLL VENOUS BLD VENIPUNCTURE: CPT | Performed by: FAMILY MEDICINE

## 2024-02-21 PROCEDURE — G8427 DOCREV CUR MEDS BY ELIG CLIN: HCPCS

## 2024-02-21 PROCEDURE — 3078F DIAST BP <80 MM HG: CPT

## 2024-02-21 PROCEDURE — 3077F SYST BP >= 140 MM HG: CPT

## 2024-02-21 PROCEDURE — 4004F PT TOBACCO SCREEN RCVD TLK: CPT

## 2024-02-21 PROCEDURE — G8420 CALC BMI NORM PARAMETERS: HCPCS

## 2024-02-21 PROCEDURE — 3017F COLORECTAL CA SCREEN DOC REV: CPT

## 2024-02-21 PROCEDURE — 99213 OFFICE O/P EST LOW 20 MIN: CPT

## 2024-02-21 RX ORDER — AMOXICILLIN AND CLAVULANATE POTASSIUM 875; 125 MG/1; MG/1
1 TABLET, FILM COATED ORAL 2 TIMES DAILY
Qty: 14 TABLET | Refills: 0 | Status: SHIPPED | OUTPATIENT
Start: 2024-02-21 | End: 2024-02-28

## 2024-02-21 NOTE — PROGRESS NOTES
S: 58 y.o. female with HTN, HLD, COPD, HFrEF.  Abdominal pain, 4 days ago, 8/10, sharp pain, stabbing, initially in lower abdomen, now to LLQ to back, down to legs sometimes.  Worse with movement.  Worse with changes in position.  Relieved with ibuprofen.  No F/C.  No N/V.  Last BM was 2 days ago, normal at that time, no blood or mucus.  No urinary symptoms.  No changes in diet or medications.  No longer taking Jardiance.  Never had EGD or colonoscopy.  Last week has lost about 4 pounds.  No diarrhea.    O: VS: BP (!) 144/70   Pulse 75   Temp 97 °F (36.1 °C) (Temporal)   Resp 16   Ht 1.626 m (5' 4\")   Wt 52.6 kg (116 lb)   LMP 05/12/2014 (Approximate)   SpO2 100%   BMI 19.91 kg/m²    General: NAD, appropriate affect and grooming   CV:  RRR, systolic murmur    Resp: CTAB   Abd:  Soft, mild tenderness LUQ, no tenderness right side.  Severe tenderness and guarding LLQ to back.  Bowel sounds hyperactive.     Ext:  No edema  Impression: LLQ abdominal pain, likely diverticulitis   Plan: Labs CBC diff, CMP.  CT abd/pelvis with contrast urgently.  Augmentin.  Close follow up, 2-3 days.  If symptoms worsen, may need to present to ED.  No Fevers, VSS for now.  Start with clears, advance diet with caution.    Attending Physician Statement  I have discussed the case, including pertinent history and exam findings with the resident.  I agree with the documented assessment and plan.

## 2024-02-21 NOTE — PROGRESS NOTES
[facility]  FAMILY MEDICINE RESIDENCY PROGRAM  DATE OF VISIT : 2024    Patient : Manuela Chaney   Age : 58 y.o.    : 1965   MRN : [id number]   ______________________________________________________________________    Chief Complaint:   Chief Complaint   Patient presents with    Abdominal Pain     Lower   x 4 days  more on the left side  appetite poor . Increased pain with BM        HPI:   History obtained from the patient. Manuela Chaney is a 58 y.o. female with HTN, HLD, COPD, CHFrEF (EF 25-30%) . She presents to the clinic for Abdominal pain.    Patient reports low abdominal pain radiating to back and legs x 4 days. Pain is 8/10 in intensity, sharp, it started B/L, and now more localized to LLQ and radiating to back. Pain is exacerbated with exertion, coughing and sneezing. It is minimally relieved with Ibuprofen. Last BM was 2 days ago, she was diaphoretic during the BM. Endorses SOB, decreased appetite, weight loss (about 4lbs in  week). Denies nausea or vomiting, fever, mucous or blood in stool. Denies dysuria, hematuria, frequency or urgency. Denies changes to diet or medication regimen besides recently stopped taking Jardiance that was prescribed for CHF.    She has never had a colonoscopy or EGD in the past. Has had a hysterectomy cervix preserved, unsure if ovaries or fallopian tubes were removed.      Medications:    Current Outpatient Medications   Medication Sig Dispense Refill    amoxicillin-clavulanate (AUGMENTIN) 875-125 MG per tablet Take 1 tablet by mouth 2 times daily for 7 days 14 tablet 0    pantoprazole (PROTONIX) 20 MG tablet take 1 tablet by mouth once daily 30 tablet 0    Vitamin D, Ergocalciferol, 59131 units CAPS Take 50,000 Units by mouth once a week for 7 doses 5 capsule 1    albuterol sulfate HFA (VENTOLIN HFA) 108 (90 Base) MCG/ACT inhaler Inhale 2 puffs into the lungs 4 times daily as needed for Wheezing 18 g 5    aspirin 81 MG chewable tablet

## 2024-02-22 ENCOUNTER — HOSPITAL ENCOUNTER (OUTPATIENT)
Dept: CT IMAGING | Age: 59
Discharge: HOME | End: 2024-02-24
Payer: MEDICAID

## 2024-02-22 ENCOUNTER — HOSPITAL ENCOUNTER (OUTPATIENT)
Age: 59
Discharge: HOME | End: 2024-02-24
Payer: MEDICAID

## 2024-02-22 DIAGNOSIS — R10.32 LEFT LOWER QUADRANT ABDOMINAL PAIN: Primary | ICD-10-CM

## 2024-02-22 DIAGNOSIS — R10.32 LEFT LOWER QUADRANT ABDOMINAL PAIN: ICD-10-CM

## 2024-02-22 PROCEDURE — 74178 CT ABD&PLV WO CNTR FLWD CNTR: CPT

## 2024-02-22 PROCEDURE — 6360000004 HC RX CONTRAST MEDICATION: Performed by: RADIOLOGY

## 2024-02-22 RX ADMIN — IOPAMIDOL 75 ML: 755 INJECTION, SOLUTION INTRAVENOUS at 12:06

## 2024-02-27 ENCOUNTER — OFFICE VISIT (OUTPATIENT)
Dept: FAMILY MEDICINE CLINIC | Age: 59
End: 2024-02-27
Payer: MEDICAID

## 2024-02-27 VITALS
RESPIRATION RATE: 16 BRPM | DIASTOLIC BLOOD PRESSURE: 63 MMHG | OXYGEN SATURATION: 98 % | TEMPERATURE: 97.2 F | SYSTOLIC BLOOD PRESSURE: 96 MMHG | HEART RATE: 73 BPM

## 2024-02-27 DIAGNOSIS — K57.92 DIVERTICULITIS: Primary | ICD-10-CM

## 2024-02-27 PROCEDURE — G8484 FLU IMMUNIZE NO ADMIN: HCPCS

## 2024-02-27 PROCEDURE — 3017F COLORECTAL CA SCREEN DOC REV: CPT

## 2024-02-27 PROCEDURE — 4004F PT TOBACCO SCREEN RCVD TLK: CPT

## 2024-02-27 PROCEDURE — G8427 DOCREV CUR MEDS BY ELIG CLIN: HCPCS

## 2024-02-27 PROCEDURE — 3078F DIAST BP <80 MM HG: CPT

## 2024-02-27 PROCEDURE — 3074F SYST BP LT 130 MM HG: CPT

## 2024-02-27 PROCEDURE — G8420 CALC BMI NORM PARAMETERS: HCPCS

## 2024-02-27 PROCEDURE — 99213 OFFICE O/P EST LOW 20 MIN: CPT

## 2024-02-27 ASSESSMENT — PATIENT HEALTH QUESTIONNAIRE - PHQ9
9. THOUGHTS THAT YOU WOULD BE BETTER OFF DEAD, OR OF HURTING YOURSELF: 0
6. FEELING BAD ABOUT YOURSELF - OR THAT YOU ARE A FAILURE OR HAVE LET YOURSELF OR YOUR FAMILY DOWN: 0
SUM OF ALL RESPONSES TO PHQ QUESTIONS 1-9: 0
3. TROUBLE FALLING OR STAYING ASLEEP: 0
SUM OF ALL RESPONSES TO PHQ9 QUESTIONS 1 & 2: 0
1. LITTLE INTEREST OR PLEASURE IN DOING THINGS: 0
5. POOR APPETITE OR OVEREATING: 0
SUM OF ALL RESPONSES TO PHQ QUESTIONS 1-9: 0
7. TROUBLE CONCENTRATING ON THINGS, SUCH AS READING THE NEWSPAPER OR WATCHING TELEVISION: 0
SUM OF ALL RESPONSES TO PHQ QUESTIONS 1-9: 0
10. IF YOU CHECKED OFF ANY PROBLEMS, HOW DIFFICULT HAVE THESE PROBLEMS MADE IT FOR YOU TO DO YOUR WORK, TAKE CARE OF THINGS AT HOME, OR GET ALONG WITH OTHER PEOPLE: 0
8. MOVING OR SPEAKING SO SLOWLY THAT OTHER PEOPLE COULD HAVE NOTICED. OR THE OPPOSITE, BEING SO FIGETY OR RESTLESS THAT YOU HAVE BEEN MOVING AROUND A LOT MORE THAN USUAL: 0
4. FEELING TIRED OR HAVING LITTLE ENERGY: 0
SUM OF ALL RESPONSES TO PHQ QUESTIONS 1-9: 0
2. FEELING DOWN, DEPRESSED OR HOPELESS: 0

## 2024-02-27 NOTE — PROGRESS NOTES
Attending Physician Statement    S:   Chief Complaint   Patient presents with    Abdominal Pain     LL Quadrant feeling better     Constipation      Patient  states that she has not had a bm in almost 2 week tried Milk of mag no results       PMH- HTN, COPD, HLD, CHF   F/U after diverticultis. Finishing augmentin.  Pain is improved, no fevers.  Has constipation.  Some dizziness with standing (has had previously)   Recent cath ok, does have ICD  O: Blood pressure 95/60, pulse 61, temperature 97.2 °F (36.2 °C), temperature source Temporal, resp. rate 16, last menstrual period 05/12/2014, SpO2 98 %, not currently breastfeeding.   Exam:   Heart - RRR with systolic murmur   Lungs - clear   Abdomen - mild tenderness with deep palpation, normal BS  A: Diverticulitis, dizziness  P:  Finish augmentin   Refer to surgery - future colonoscopy   F/U one week   Follow-up as ordered    I have discussed the case, including pertinent history and exam findings with the resident.  I also have personally seen and examined the patient.  I agree with the documented assessment and plan.    Farhat Mills MD

## 2024-02-27 NOTE — PROGRESS NOTES
[facility]  FAMILY MEDICINE RESIDENCY PROGRAM  DATE OF VISIT : 2024    Patient : Manuela Chaney   Age : 58 y.o.    : 1965   MRN : [id number]   ______________________________________________________________________    Chief Complaint:   Chief Complaint   Patient presents with    Abdominal Pain     LL Quadrant feeling better     Constipation      Patient  states that she has not had a bm in almost 2 week tried Milk of mag no results        HPI:   History obtained from the patient. Manuela Chaney is a 58 y.o. female with PMHx of HTN, HLD, HFrEF s/p ICD placement, COPD . She presents to the clinic for follow up of LLQ pain and with concern of constipation, dizziness and blurry vision.    At prior visit, patient had endorsed LLQ tenderness. Findings on physical exam were concerning for diverticulitis. Patient was started on Augmentin x 7 days, CT Abdomen with contrast was ordered and a plan for Clear liquid diet + close follow up.  CT Abdomen confirmed a distal colon diverticulitis with a yuniel-diverticular abscess. Today, patient reports improvement in LLQ pain since starting Abx. Denies Fever, chills, SOB. She however endorses constipation with last BM being 4 days ago. She has 1 episode of watery BM within the last 4 days.     States that blurry vision and dizziness starts when rising from a sitted position, and she needs to hold on to wall for support. Endorses a syncopal episode about 3 weeks ago. Denies hitting head. Endorses weakness in legs.  Last time ICD was checked was in  with Dr Pena. Device was functioning appropriately. Had a Lt heart cath in  with no significant CAD. No follow ups with cardiology or EP scheduled. Denies Chest pain, fever, chills, or any other neurologic symptoms. Endorses intermittent SOB.        Medications:    Current Outpatient Medications   Medication Sig Dispense Refill    amoxicillin-clavulanate (AUGMENTIN) 875-125 MG per tablet Take 1

## 2024-02-27 NOTE — PROGRESS NOTES
Denies dizziness  from seated to Supine    Patient sates that she had a little dizziness from supine to Seated.  Patient denies any dizziness from seated to Standing.

## 2024-02-28 ASSESSMENT — ENCOUNTER SYMPTOMS
SHORTNESS OF BREATH: 0
CHEST TIGHTNESS: 0
RHINORRHEA: 0
VOMITING: 0
ABDOMINAL DISTENTION: 0
NAUSEA: 0
CONSTIPATION: 1
SINUS PAIN: 0
ABDOMINAL PAIN: 0
SORE THROAT: 0
DIARRHEA: 0
COUGH: 0

## 2024-06-11 ENCOUNTER — OFFICE VISIT (OUTPATIENT)
Dept: FAMILY MEDICINE CLINIC | Age: 59
End: 2024-06-11
Payer: MEDICAID

## 2024-06-11 VITALS
RESPIRATION RATE: 19 BRPM | BODY MASS INDEX: 18.78 KG/M2 | HEIGHT: 64 IN | DIASTOLIC BLOOD PRESSURE: 90 MMHG | TEMPERATURE: 98 F | OXYGEN SATURATION: 98 % | WEIGHT: 110 LBS | HEART RATE: 74 BPM | SYSTOLIC BLOOD PRESSURE: 165 MMHG

## 2024-06-11 DIAGNOSIS — Z20.2 POSSIBLE EXPOSURE TO STI: Primary | ICD-10-CM

## 2024-06-11 PROCEDURE — G8420 CALC BMI NORM PARAMETERS: HCPCS | Performed by: STUDENT IN AN ORGANIZED HEALTH CARE EDUCATION/TRAINING PROGRAM

## 2024-06-11 PROCEDURE — 99213 OFFICE O/P EST LOW 20 MIN: CPT | Performed by: STUDENT IN AN ORGANIZED HEALTH CARE EDUCATION/TRAINING PROGRAM

## 2024-06-11 PROCEDURE — 3079F DIAST BP 80-89 MM HG: CPT | Performed by: STUDENT IN AN ORGANIZED HEALTH CARE EDUCATION/TRAINING PROGRAM

## 2024-06-11 PROCEDURE — 3017F COLORECTAL CA SCREEN DOC REV: CPT | Performed by: STUDENT IN AN ORGANIZED HEALTH CARE EDUCATION/TRAINING PROGRAM

## 2024-06-11 PROCEDURE — 4004F PT TOBACCO SCREEN RCVD TLK: CPT | Performed by: STUDENT IN AN ORGANIZED HEALTH CARE EDUCATION/TRAINING PROGRAM

## 2024-06-11 PROCEDURE — 3077F SYST BP >= 140 MM HG: CPT | Performed by: STUDENT IN AN ORGANIZED HEALTH CARE EDUCATION/TRAINING PROGRAM

## 2024-06-11 PROCEDURE — G8428 CUR MEDS NOT DOCUMENT: HCPCS | Performed by: STUDENT IN AN ORGANIZED HEALTH CARE EDUCATION/TRAINING PROGRAM

## 2024-06-11 NOTE — PROGRESS NOTES
[facility]  Precepting Note    Subjective:  STI exposure  Partner was exposed to trichomoniasis  Asymptomatic    ROS otherwise negative     Past medical, surgical, family and social history were reviewed, non-contributory, and unchanged unless otherwise stated.    Objective:    BP (!) 165/90   Pulse 74   Temp 98 °F (36.7 °C) (Temporal)   Resp 19   Ht 1.626 m (5' 4\")   Wt 49.9 kg (110 lb)   LMP 05/12/2014 (Approximate)   SpO2 98%   BMI 18.88 kg/m²     Exam is as noted by resident with the following changes, additions or corrections:      Assessment/Plan:  STI exposure- health track culture sent     Attending Physician Statement  I have reviewed the chart, including any radiology or labs. I have discussed the case, including pertinent history and exam findings with the resident.  I agree with the assessment, plan and orders as documented by the resident.  Please refer to the resident note for additional information.      Electronically signed by Roberto Parker MD on 6/11/2024 at 11:06 AM    
There is no distension.      Palpations: Abdomen is soft. There is no mass.      Tenderness: There is no abdominal tenderness.   Musculoskeletal:         General: No swelling or tenderness. Normal range of motion.      Cervical back: Normal range of motion and neck supple.      Right lower leg: No edema.      Left lower leg: No edema.   Skin:     Coloration: Skin is not jaundiced.      Findings: No rash.   Neurological:      General: No focal deficit present.      Mental Status: She is alert and oriented to person, place, and time.      Cranial Nerves: No cranial nerve deficit.      Sensory: No sensory deficit.      Motor: No weakness.   Psychiatric:         Mood and Affect: Mood normal.         Behavior: Behavior normal.         A / P:  Manuela was seen today for sexually transmitted diseases.    Diagnoses and all orders for this visit:    Possible exposure to STI  -     Will track culture since using patient's urine  - MISCELLANEOUS SENDOUT STI; Future    RTO: Return if symptoms worsen or fail to improve.      Patient was discussed with attending physician: Dr. Parker    An electronic signature was used to authenticate this note.  ---- Matias Edmond MD on 6/12/2024 at 7:52 AM

## 2024-06-12 ENCOUNTER — TELEPHONE (OUTPATIENT)
Dept: FAMILY MEDICINE CLINIC | Age: 59
End: 2024-06-12

## 2024-06-12 DIAGNOSIS — N76.0 BACTERIAL VAGINOSIS: Primary | ICD-10-CM

## 2024-06-12 DIAGNOSIS — B96.89 BACTERIAL VAGINOSIS: Primary | ICD-10-CM

## 2024-06-12 DIAGNOSIS — Z20.2 POSSIBLE EXPOSURE TO STI: ICD-10-CM

## 2024-06-12 RX ORDER — METRONIDAZOLE 500 MG/1
500 TABLET ORAL 2 TIMES DAILY
Qty: 14 TABLET | Refills: 0 | Status: SHIPPED | OUTPATIENT
Start: 2024-06-12 | End: 2024-06-19

## 2024-06-12 NOTE — TELEPHONE ENCOUNTER
Called and explained results to patient.  Medication was sent for treatment of bacterial vaginosis.

## 2024-06-12 NOTE — TELEPHONE ENCOUNTER
Manuela called in and is wanting to know the results of her test from yesterday.  Can you please review and call patient with results.    Please advise    Thank you

## 2024-07-17 ENCOUNTER — TELEPHONE (OUTPATIENT)
Dept: FAMILY MEDICINE CLINIC | Age: 59
End: 2024-07-17

## 2024-07-18 DIAGNOSIS — I50.22 CHRONIC SYSTOLIC CHF (CONGESTIVE HEART FAILURE), NYHA CLASS 3 (HCC): ICD-10-CM

## 2024-07-18 DIAGNOSIS — Z76.0 MEDICATION REFILL: ICD-10-CM

## 2024-07-18 DIAGNOSIS — K21.9 GASTROESOPHAGEAL REFLUX DISEASE, UNSPECIFIED WHETHER ESOPHAGITIS PRESENT: ICD-10-CM

## 2024-07-18 DIAGNOSIS — I63.9 CEREBROVASCULAR ACCIDENT (CVA), UNSPECIFIED MECHANISM (HCC): ICD-10-CM

## 2024-07-18 DIAGNOSIS — J44.9 CHRONIC OBSTRUCTIVE PULMONARY DISEASE, UNSPECIFIED COPD TYPE (HCC): ICD-10-CM

## 2024-07-18 RX ORDER — ERGOCALCIFEROL 1.25 MG/1
50000 CAPSULE ORAL WEEKLY
Qty: 5 CAPSULE | Refills: 1 | Status: SHIPPED | OUTPATIENT
Start: 2024-07-18 | End: 2024-08-30

## 2024-07-18 RX ORDER — CETIRIZINE HYDROCHLORIDE 10 MG/1
10 TABLET ORAL DAILY
Qty: 30 TABLET | Refills: 5 | Status: SHIPPED | OUTPATIENT
Start: 2024-07-18

## 2024-07-18 RX ORDER — SACUBITRIL AND VALSARTAN 97; 103 MG/1; MG/1
1 TABLET, FILM COATED ORAL 2 TIMES DAILY
Qty: 180 TABLET | Refills: 2 | Status: SHIPPED | OUTPATIENT
Start: 2024-07-18

## 2024-07-18 RX ORDER — ATORVASTATIN CALCIUM 40 MG/1
40 TABLET, FILM COATED ORAL DAILY
Qty: 90 TABLET | Refills: 2 | Status: SHIPPED | OUTPATIENT
Start: 2024-07-18

## 2024-07-18 RX ORDER — METOPROLOL SUCCINATE 100 MG/1
100 TABLET, EXTENDED RELEASE ORAL DAILY
Qty: 90 TABLET | Refills: 2 | Status: SHIPPED | OUTPATIENT
Start: 2024-07-18

## 2024-07-18 RX ORDER — BUMETANIDE 2 MG/1
1 TABLET ORAL DAILY
Qty: 90 TABLET | Refills: 3 | Status: SHIPPED | OUTPATIENT
Start: 2024-07-18

## 2024-07-18 RX ORDER — ALBUTEROL SULFATE 90 UG/1
2 AEROSOL, METERED RESPIRATORY (INHALATION) 4 TIMES DAILY PRN
Qty: 18 G | Refills: 5 | Status: SHIPPED | OUTPATIENT
Start: 2024-07-18

## 2024-07-18 RX ORDER — ASPIRIN 81 MG/1
81 TABLET, CHEWABLE ORAL DAILY
Qty: 90 TABLET | Refills: 2 | Status: SHIPPED | OUTPATIENT
Start: 2024-07-18

## 2024-07-18 RX ORDER — PANTOPRAZOLE SODIUM 20 MG/1
20 TABLET, DELAYED RELEASE ORAL DAILY
Qty: 30 TABLET | Refills: 4 | Status: SHIPPED | OUTPATIENT
Start: 2024-07-18

## 2024-07-18 RX ORDER — NITROGLYCERIN 0.4 MG/1
0.4 TABLET SUBLINGUAL EVERY 5 MIN PRN
Qty: 25 TABLET | Refills: 1 | Status: SHIPPED | OUTPATIENT
Start: 2024-07-18

## 2024-07-18 RX ORDER — BUDESONIDE AND FORMOTEROL FUMARATE DIHYDRATE 160; 4.5 UG/1; UG/1
2 AEROSOL RESPIRATORY (INHALATION) 2 TIMES DAILY
Qty: 6 EACH | Refills: 2 | Status: SHIPPED | OUTPATIENT
Start: 2024-07-18

## 2024-09-20 ENCOUNTER — OFFICE VISIT (OUTPATIENT)
Dept: FAMILY MEDICINE CLINIC | Age: 59
End: 2024-09-20
Payer: MEDICAID

## 2024-09-20 VITALS
HEART RATE: 73 BPM | DIASTOLIC BLOOD PRESSURE: 79 MMHG | RESPIRATION RATE: 18 BRPM | TEMPERATURE: 98.1 F | OXYGEN SATURATION: 93 % | WEIGHT: 110 LBS | SYSTOLIC BLOOD PRESSURE: 120 MMHG | BODY MASS INDEX: 18.78 KG/M2 | HEIGHT: 64 IN

## 2024-09-20 DIAGNOSIS — Z23 NEED FOR INFLUENZA VACCINATION: ICD-10-CM

## 2024-09-20 DIAGNOSIS — J44.1 COPD EXACERBATION (HCC): Primary | ICD-10-CM

## 2024-09-20 LAB
INFLUENZA VIRUS A RNA: NEGATIVE
INFLUENZA VIRUS B RNA: NEGATIVE
Lab: NORMAL
QC PASS/FAIL: NORMAL
SARS-COV-2 RDRP RESP QL NAA+PROBE: NEGATIVE

## 2024-09-20 PROCEDURE — 4004F PT TOBACCO SCREEN RCVD TLK: CPT

## 2024-09-20 PROCEDURE — 87502 INFLUENZA DNA AMP PROBE: CPT

## 2024-09-20 PROCEDURE — 3078F DIAST BP <80 MM HG: CPT

## 2024-09-20 PROCEDURE — 3017F COLORECTAL CA SCREEN DOC REV: CPT

## 2024-09-20 PROCEDURE — G2211 COMPLEX E/M VISIT ADD ON: HCPCS

## 2024-09-20 PROCEDURE — 87635 SARS-COV-2 COVID-19 AMP PRB: CPT

## 2024-09-20 PROCEDURE — G8420 CALC BMI NORM PARAMETERS: HCPCS

## 2024-09-20 PROCEDURE — 3074F SYST BP LT 130 MM HG: CPT

## 2024-09-20 PROCEDURE — G8428 CUR MEDS NOT DOCUMENT: HCPCS

## 2024-09-20 PROCEDURE — 3023F SPIROM DOC REV: CPT

## 2024-09-20 PROCEDURE — 99214 OFFICE O/P EST MOD 30 MIN: CPT

## 2024-09-20 RX ORDER — PREDNISONE 20 MG/1
40 TABLET ORAL DAILY
Qty: 20 TABLET | Refills: 0 | Status: SHIPPED | OUTPATIENT
Start: 2024-09-20 | End: 2024-09-30

## 2024-09-20 RX ORDER — BENZONATATE 100 MG/1
100 CAPSULE ORAL 3 TIMES DAILY PRN
Qty: 30 CAPSULE | Refills: 0 | Status: SHIPPED | OUTPATIENT
Start: 2024-09-20 | End: 2024-09-30

## 2024-09-20 RX ORDER — AZITHROMYCIN 250 MG/1
TABLET, FILM COATED ORAL
Qty: 6 TABLET | Refills: 0 | Status: SHIPPED | OUTPATIENT
Start: 2024-09-20 | End: 2024-09-30

## 2024-09-20 SDOH — ECONOMIC STABILITY: FOOD INSECURITY: WITHIN THE PAST 12 MONTHS, YOU WORRIED THAT YOUR FOOD WOULD RUN OUT BEFORE YOU GOT MONEY TO BUY MORE.: NEVER TRUE

## 2024-09-20 SDOH — ECONOMIC STABILITY: INCOME INSECURITY: HOW HARD IS IT FOR YOU TO PAY FOR THE VERY BASICS LIKE FOOD, HOUSING, MEDICAL CARE, AND HEATING?: NOT HARD AT ALL

## 2024-09-20 SDOH — ECONOMIC STABILITY: FOOD INSECURITY: WITHIN THE PAST 12 MONTHS, THE FOOD YOU BOUGHT JUST DIDN'T LAST AND YOU DIDN'T HAVE MONEY TO GET MORE.: NEVER TRUE

## 2024-09-26 ENCOUNTER — TELEPHONE (OUTPATIENT)
Dept: FAMILY MEDICINE CLINIC | Age: 59
End: 2024-09-26

## 2024-09-26 NOTE — TELEPHONE ENCOUNTER
Patient called to give an update since starting antibiotic and steroid. She stated she is feeling a lot better. Stated she has no complaints.

## 2025-02-13 ENCOUNTER — OFFICE VISIT (OUTPATIENT)
Dept: FAMILY MEDICINE CLINIC | Age: 60
End: 2025-02-13
Payer: MEDICARE

## 2025-02-13 VITALS
SYSTOLIC BLOOD PRESSURE: 114 MMHG | HEIGHT: 63 IN | DIASTOLIC BLOOD PRESSURE: 71 MMHG | TEMPERATURE: 97.7 F | RESPIRATION RATE: 16 BRPM | WEIGHT: 118 LBS | BODY MASS INDEX: 20.91 KG/M2 | OXYGEN SATURATION: 97 % | HEART RATE: 61 BPM

## 2025-02-13 DIAGNOSIS — R10.13 EPIGASTRIC PAIN: ICD-10-CM

## 2025-02-13 DIAGNOSIS — J44.9 CHRONIC OBSTRUCTIVE PULMONARY DISEASE, UNSPECIFIED COPD TYPE (HCC): ICD-10-CM

## 2025-02-13 DIAGNOSIS — K21.9 GASTROESOPHAGEAL REFLUX DISEASE, UNSPECIFIED WHETHER ESOPHAGITIS PRESENT: ICD-10-CM

## 2025-02-13 DIAGNOSIS — I50.22 CHRONIC SYSTOLIC CHF (CONGESTIVE HEART FAILURE), NYHA CLASS 3 (HCC): ICD-10-CM

## 2025-02-13 DIAGNOSIS — I63.9 CEREBROVASCULAR ACCIDENT (CVA), UNSPECIFIED MECHANISM (HCC): ICD-10-CM

## 2025-02-13 DIAGNOSIS — Z76.0 MEDICATION REFILL: ICD-10-CM

## 2025-02-13 DIAGNOSIS — N12 PYELONEPHRITIS: Primary | ICD-10-CM

## 2025-02-13 LAB
ALBUMIN: 4.6 G/DL (ref 3.5–5.2)
ALP BLD-CCNC: 120 U/L (ref 35–104)
ALT SERPL-CCNC: 10 U/L (ref 0–32)
AST SERPL-CCNC: 15 U/L (ref 0–31)
BACTERIA: ABNORMAL
BILIRUB SERPL-MCNC: 0.4 MG/DL (ref 0–1.2)
BILIRUBIN DIRECT: <0.2 MG/DL (ref 0–0.3)
BILIRUBIN, INDIRECT: ABNORMAL MG/DL (ref 0–1)
BILIRUBIN, URINE: NEGATIVE
COLOR, UA: YELLOW
EPITHELIAL CELLS, UA: ABNORMAL /HPF
GLUCOSE URINE: NEGATIVE MG/DL
KETONES, URINE: ABNORMAL MG/DL
LEUKOCYTE ESTERASE, URINE: NEGATIVE
LIPASE: 14 U/L (ref 13–60)
NITRITE, URINE: NEGATIVE
PH, URINE: 5.5 (ref 5–8)
PROTEIN UA: ABNORMAL MG/DL
RBC UA: ABNORMAL /HPF
SPECIFIC GRAVITY UA: 1.02 (ref 1–1.03)
TOTAL PROTEIN: 7.2 G/DL (ref 6.4–8.3)
TURBIDITY: CLEAR
URINE HGB: NEGATIVE
UROBILINOGEN, URINE: 0.2 EU/DL (ref 0–1)
WBC UA: ABNORMAL /HPF

## 2025-02-13 PROCEDURE — 3078F DIAST BP <80 MM HG: CPT

## 2025-02-13 PROCEDURE — 3017F COLORECTAL CA SCREEN DOC REV: CPT

## 2025-02-13 PROCEDURE — 99213 OFFICE O/P EST LOW 20 MIN: CPT

## 2025-02-13 PROCEDURE — 4004F PT TOBACCO SCREEN RCVD TLK: CPT

## 2025-02-13 PROCEDURE — 3023F SPIROM DOC REV: CPT

## 2025-02-13 PROCEDURE — G8428 CUR MEDS NOT DOCUMENT: HCPCS

## 2025-02-13 PROCEDURE — G8420 CALC BMI NORM PARAMETERS: HCPCS

## 2025-02-13 PROCEDURE — 3074F SYST BP LT 130 MM HG: CPT

## 2025-02-13 RX ORDER — BUMETANIDE 2 MG/1
1 TABLET ORAL DAILY
Qty: 90 TABLET | Refills: 3 | Status: SHIPPED | OUTPATIENT
Start: 2025-02-13

## 2025-02-13 RX ORDER — SACUBITRIL AND VALSARTAN 97; 103 MG/1; MG/1
1 TABLET, FILM COATED ORAL 2 TIMES DAILY
Qty: 180 TABLET | Refills: 2 | Status: SHIPPED | OUTPATIENT
Start: 2025-02-13

## 2025-02-13 RX ORDER — ERGOCALCIFEROL 1.25 MG/1
50000 CAPSULE ORAL WEEKLY
Qty: 5 CAPSULE | Refills: 1 | Status: SHIPPED | OUTPATIENT
Start: 2025-02-13 | End: 2025-03-28

## 2025-02-13 RX ORDER — METOPROLOL SUCCINATE 100 MG/1
100 TABLET, EXTENDED RELEASE ORAL DAILY
Qty: 90 TABLET | Refills: 2 | Status: SHIPPED | OUTPATIENT
Start: 2025-02-13

## 2025-02-13 RX ORDER — BUDESONIDE AND FORMOTEROL FUMARATE DIHYDRATE 160; 4.5 UG/1; UG/1
2 AEROSOL RESPIRATORY (INHALATION) 2 TIMES DAILY
Qty: 6 EACH | Refills: 2 | Status: SHIPPED | OUTPATIENT
Start: 2025-02-13

## 2025-02-13 RX ORDER — ALBUTEROL SULFATE 90 UG/1
2 INHALANT RESPIRATORY (INHALATION) 4 TIMES DAILY PRN
Qty: 18 G | Refills: 5 | Status: SHIPPED | OUTPATIENT
Start: 2025-02-13

## 2025-02-13 RX ORDER — ATORVASTATIN CALCIUM 40 MG/1
40 TABLET, FILM COATED ORAL DAILY
Qty: 90 TABLET | Refills: 2 | Status: SHIPPED | OUTPATIENT
Start: 2025-02-13

## 2025-02-13 RX ORDER — NITROGLYCERIN 0.4 MG/1
0.4 TABLET SUBLINGUAL EVERY 5 MIN PRN
Qty: 25 TABLET | Refills: 1 | Status: SHIPPED | OUTPATIENT
Start: 2025-02-13

## 2025-02-13 RX ORDER — ASPIRIN 81 MG/1
81 TABLET, CHEWABLE ORAL DAILY
Qty: 90 TABLET | Refills: 2 | Status: SHIPPED | OUTPATIENT
Start: 2025-02-13

## 2025-02-13 RX ORDER — PANTOPRAZOLE SODIUM 40 MG/1
40 TABLET, DELAYED RELEASE ORAL
Qty: 90 TABLET | Refills: 1 | Status: SHIPPED | OUTPATIENT
Start: 2025-02-13

## 2025-02-13 RX ORDER — CEFDINIR 300 MG/1
300 CAPSULE ORAL 2 TIMES DAILY
Qty: 20 CAPSULE | Refills: 0 | Status: SHIPPED | OUTPATIENT
Start: 2025-02-13 | End: 2025-02-23

## 2025-02-13 SDOH — ECONOMIC STABILITY: FOOD INSECURITY: WITHIN THE PAST 12 MONTHS, THE FOOD YOU BOUGHT JUST DIDN'T LAST AND YOU DIDN'T HAVE MONEY TO GET MORE.: NEVER TRUE

## 2025-02-13 SDOH — ECONOMIC STABILITY: FOOD INSECURITY: WITHIN THE PAST 12 MONTHS, YOU WORRIED THAT YOUR FOOD WOULD RUN OUT BEFORE YOU GOT MONEY TO BUY MORE.: NEVER TRUE

## 2025-02-13 ASSESSMENT — PATIENT HEALTH QUESTIONNAIRE - PHQ9
SUM OF ALL RESPONSES TO PHQ9 QUESTIONS 1 & 2: 0
2. FEELING DOWN, DEPRESSED OR HOPELESS: NOT AT ALL
SUM OF ALL RESPONSES TO PHQ QUESTIONS 1-9: 0
1. LITTLE INTEREST OR PLEASURE IN DOING THINGS: NOT AT ALL
SUM OF ALL RESPONSES TO PHQ QUESTIONS 1-9: 0

## 2025-02-13 ASSESSMENT — LIFESTYLE VARIABLES
HOW MANY STANDARD DRINKS CONTAINING ALCOHOL DO YOU HAVE ON A TYPICAL DAY: 1 OR 2
HOW OFTEN DO YOU HAVE A DRINK CONTAINING ALCOHOL: 2-4 TIMES A MONTH

## 2025-02-13 NOTE — PROGRESS NOTES
S: Manuela Chaney 59 y.o. female  here for evaluation of left-sided back pain.    Costovertebral Back Pain:  Onset x 1 month.  Location: Left CVA.  Worsening over the last 2 weeks.  Patient was unable to characterize the pain.  Patient reports that at times it radiates to the right CVA and around to the epigastrium.  Associated with nausea and lightheadedness.  Pepto-Bismol does provide some mild relief.  No improvement of symptoms with change of position or ibuprofen.  Denies trauma or overuse.  No fever or chills.  Denies dysuria but does endorse some urinary frequency.  She denies weight loss.  ROS: Brownish vaginal discharge; has appointment with OB/GYN in 1 week.    PMH: History of upper GI ulcers as diagnosed on endoscopy; 2024--diagnosis of diverticulitis with antibiotic therapy x 2 rounds.  O: VS: /71   Pulse 61   Temp 97.7 °F (36.5 °C) (Temporal)   Resp 16   Ht 1.6 m (5' 3\")   Wt 53.5 kg (118 lb)   LMP 05/12/2014 (Approximate)   SpO2 97%   BMI 20.90 kg/m²    General: NAD              HEENT: WDL              Neck: WDL   CV:  RRR, no gallops, rubs; 3/6 holosystolic murmur noted at left upper sternal border.  No JVD.   Resp: CTAB no R/R/W   Abd:  Soft, tenderness to palpation of epigastrium and suprapubic region.  No masses positive left CVAT   Ext:  no C/C/E    Assessment / Plan:      Manuela was seen today for back pain.    Diagnoses and all orders for this visit:    Gastroesophageal reflux disease, unspecified whether esophagitis present      Assessment/Plan:    1) Costovertebral Back Pain, Left: Differential diagnosis includes complicated cystitis or pyelonephritis; pathology of epigastrium which could include gastroenteritis; pancreatic pathology; gallbladder/liver        -     Urinalysis and urine culture.        -     CT abdomen and pelvis with and without contrast        -     Stat creatinine        -     LFTs, lipase        -     Increase Protonix to 40 mg a day        -     Empiric

## 2025-02-13 NOTE — PROGRESS NOTES
[facility]  FAMILY MEDICINE RESIDENCY PROGRAM  DATE OF VISIT : 2025    Patient : Manuela Chaney   Age : 59 y.o.    : 1965   MRN : [id number]   ______________________________________________________________________    Chief Complaint:   Chief Complaint   Patient presents with    Back Pain     Worse on left side       HPI:   History obtained from the patient. Manuela Chaney is a 59 y.o. female who presents to the clinic for back pain and concern for UTI.    Patient reports Lt sided flank pain x 1 month that has progressively worsened over the last 2 weeks. She is unable to characterize the pain. Sometimes radiates across back and to epigastrium, she gets nauseous and light headed. She takes pepto-bismol and sx will medina. Changes in position and taking ibuprofen do not relieve Sx. She denies trauma or lifting heavy objects recently. There is no fever, burning or pain with urination. She is unsure about having urinary urgency. Endorsing changes in appetite.  Had an EGD in the , that showed ulcers. She is on protonix 20 mg daily. Has a Hx of Diverticulitis on  and was treated with antibiotics x 2. Denies any recurrence of diverticular symptoms.  Endorsing brown vaginal discharge and has upcoming appointment with OB.       Medications:    Current Outpatient Medications   Medication Sig Dispense Refill    pantoprazole (PROTONIX) 40 MG tablet Take 1 tablet by mouth every morning (before breakfast) 90 tablet 1    cefdinir (OMNICEF) 300 MG capsule Take 1 capsule by mouth 2 times daily for 10 days 20 capsule 0    albuterol sulfate HFA (VENTOLIN HFA) 108 (90 Base) MCG/ACT inhaler Inhale 2 puffs into the lungs 4 times daily as needed for Wheezing 18 g 5    aspirin 81 MG chewable tablet Take 1 tablet by mouth daily 90 tablet 2    atorvastatin (LIPITOR) 40 MG tablet Take 1 tablet by mouth daily 90 tablet 2    budesonide-formoterol (SYMBICORT) 160-4.5 MCG/ACT AERO Inhale 2 puffs into the

## 2025-02-14 LAB
CULTURE: NO GROWTH
SPECIMEN DESCRIPTION: NORMAL

## 2025-02-27 ENCOUNTER — TELEPHONE (OUTPATIENT)
Dept: OBGYN | Age: 60
End: 2025-02-27

## 2025-02-27 ENCOUNTER — APPOINTMENT (OUTPATIENT)
Dept: GENERAL RADIOLOGY | Age: 60
DRG: 291 | End: 2025-02-27
Payer: MEDICARE

## 2025-02-27 ENCOUNTER — HOSPITAL ENCOUNTER (INPATIENT)
Age: 60
LOS: 1 days | Discharge: HOME | DRG: 291 | End: 2025-03-02
Attending: STUDENT IN AN ORGANIZED HEALTH CARE EDUCATION/TRAINING PROGRAM | Admitting: FAMILY MEDICINE
Payer: MEDICARE

## 2025-02-27 DIAGNOSIS — I50.9 CONGESTIVE HEART FAILURE, UNSPECIFIED HF CHRONICITY, UNSPECIFIED HEART FAILURE TYPE (HCC): ICD-10-CM

## 2025-02-27 DIAGNOSIS — R01.1 HEART MURMUR: ICD-10-CM

## 2025-02-27 DIAGNOSIS — R07.9 ACUTE CHEST PAIN: Primary | ICD-10-CM

## 2025-02-27 DIAGNOSIS — I50.23 ACUTE ON CHRONIC SYSTOLIC CONGESTIVE HEART FAILURE (HCC): ICD-10-CM

## 2025-02-27 DIAGNOSIS — R07.9 CHEST PAIN, UNSPECIFIED TYPE: ICD-10-CM

## 2025-02-27 LAB
ALBUMIN SERPL-MCNC: 4.5 G/DL (ref 3.5–5.2)
ALP SERPL-CCNC: 123 U/L (ref 35–104)
ALT SERPL-CCNC: 19 U/L (ref 0–32)
ANION GAP SERPL CALCULATED.3IONS-SCNC: 12 MMOL/L (ref 7–16)
AST SERPL-CCNC: 20 U/L (ref 0–31)
ATYPICAL LYMPHOCYTE ABSOLUTE COUNT: 0.16 K/UL (ref 0–0.46)
ATYPICAL LYMPHOCYTES: 3 % (ref 0–4)
BASOPHILS # BLD: 0.05 K/UL (ref 0–0.2)
BASOPHILS NFR BLD: 1 % (ref 0–2)
BILIRUB SERPL-MCNC: 0.8 MG/DL (ref 0–1.2)
BNP SERPL-MCNC: ABNORMAL PG/ML (ref 0–125)
BUN SERPL-MCNC: 20 MG/DL (ref 6–20)
CALCIUM SERPL-MCNC: 9.4 MG/DL (ref 8.6–10.2)
CHLORIDE SERPL-SCNC: 106 MMOL/L (ref 98–107)
CO2 SERPL-SCNC: 22 MMOL/L (ref 22–29)
CREAT SERPL-MCNC: 0.9 MG/DL (ref 0.5–1)
EOSINOPHIL # BLD: 0.27 K/UL (ref 0.05–0.5)
EOSINOPHILS RELATIVE PERCENT: 4 % (ref 0–6)
ERYTHROCYTE [DISTWIDTH] IN BLOOD BY AUTOMATED COUNT: 14.2 % (ref 11.5–15)
GFR, ESTIMATED: 74 ML/MIN/1.73M2
GLUCOSE SERPL-MCNC: 137 MG/DL (ref 74–99)
HCT VFR BLD AUTO: 38.3 % (ref 34–48)
HGB BLD-MCNC: 12.4 G/DL (ref 11.5–15.5)
LYMPHOCYTES NFR BLD: 1.78 K/UL (ref 1.5–4)
LYMPHOCYTES RELATIVE PERCENT: 29 % (ref 20–42)
MCH RBC QN AUTO: 28.3 PG (ref 26–35)
MCHC RBC AUTO-ENTMCNC: 32.4 G/DL (ref 32–34.5)
MCV RBC AUTO: 87.4 FL (ref 80–99.9)
MONOCYTES NFR BLD: 0.54 K/UL (ref 0.1–0.95)
MONOCYTES NFR BLD: 9 % (ref 2–12)
NEUTROPHILS NFR BLD: 55 % (ref 43–80)
NEUTS SEG NFR BLD: 3.4 K/UL (ref 1.8–7.3)
PLATELET # BLD AUTO: 272 K/UL (ref 130–450)
PMV BLD AUTO: 10.6 FL (ref 7–12)
POTASSIUM SERPL-SCNC: 3.9 MMOL/L (ref 3.5–5)
PROT SERPL-MCNC: 6.7 G/DL (ref 6.4–8.3)
RBC # BLD AUTO: 4.38 M/UL (ref 3.5–5.5)
RBC # BLD: NORMAL 10*6/UL
SODIUM SERPL-SCNC: 140 MMOL/L (ref 132–146)
TROPONIN I SERPL HS-MCNC: 21 NG/L (ref 0–9)
TROPONIN I SERPL HS-MCNC: 24 NG/L (ref 0–9)
WBC OTHER # BLD: 6.2 K/UL (ref 4.5–11.5)

## 2025-02-27 PROCEDURE — 81001 URINALYSIS AUTO W/SCOPE: CPT

## 2025-02-27 PROCEDURE — 96375 TX/PRO/DX INJ NEW DRUG ADDON: CPT

## 2025-02-27 PROCEDURE — 80053 COMPREHEN METABOLIC PANEL: CPT

## 2025-02-27 PROCEDURE — 99285 EMERGENCY DEPT VISIT HI MDM: CPT

## 2025-02-27 PROCEDURE — 83880 ASSAY OF NATRIURETIC PEPTIDE: CPT

## 2025-02-27 PROCEDURE — 6360000002 HC RX W HCPCS

## 2025-02-27 PROCEDURE — 93005 ELECTROCARDIOGRAM TRACING: CPT

## 2025-02-27 PROCEDURE — 84484 ASSAY OF TROPONIN QUANT: CPT

## 2025-02-27 PROCEDURE — 71045 X-RAY EXAM CHEST 1 VIEW: CPT

## 2025-02-27 PROCEDURE — 6370000000 HC RX 637 (ALT 250 FOR IP)

## 2025-02-27 PROCEDURE — G0378 HOSPITAL OBSERVATION PER HR: HCPCS

## 2025-02-27 PROCEDURE — 85025 COMPLETE CBC W/AUTO DIFF WBC: CPT

## 2025-02-27 PROCEDURE — 36415 COLL VENOUS BLD VENIPUNCTURE: CPT

## 2025-02-27 RX ORDER — SODIUM CHLORIDE 9 MG/ML
INJECTION, SOLUTION INTRAVENOUS PRN
Status: DISCONTINUED | OUTPATIENT
Start: 2025-02-27 | End: 2025-03-02 | Stop reason: HOSPADM

## 2025-02-27 RX ORDER — ONDANSETRON 2 MG/ML
4 INJECTION INTRAMUSCULAR; INTRAVENOUS EVERY 6 HOURS PRN
Status: DISCONTINUED | OUTPATIENT
Start: 2025-02-27 | End: 2025-02-27

## 2025-02-27 RX ORDER — ALBUTEROL SULFATE 90 UG/1
2 INHALANT RESPIRATORY (INHALATION) 4 TIMES DAILY PRN
Status: DISCONTINUED | OUTPATIENT
Start: 2025-02-27 | End: 2025-02-27 | Stop reason: SDUPTHER

## 2025-02-27 RX ORDER — POTASSIUM CHLORIDE 7.45 MG/ML
10 INJECTION INTRAVENOUS PRN
Status: DISCONTINUED | OUTPATIENT
Start: 2025-02-27 | End: 2025-03-01

## 2025-02-27 RX ORDER — BUMETANIDE 1 MG/1
1 TABLET ORAL DAILY
Status: DISCONTINUED | OUTPATIENT
Start: 2025-02-28 | End: 2025-03-02 | Stop reason: HOSPADM

## 2025-02-27 RX ORDER — 0.9 % SODIUM CHLORIDE 0.9 %
1000 INTRAVENOUS SOLUTION INTRAVENOUS ONCE
Status: DISCONTINUED | OUTPATIENT
Start: 2025-02-27 | End: 2025-02-27

## 2025-02-27 RX ORDER — ONDANSETRON 4 MG/1
4 TABLET, ORALLY DISINTEGRATING ORAL EVERY 8 HOURS PRN
Status: DISCONTINUED | OUTPATIENT
Start: 2025-02-27 | End: 2025-02-27

## 2025-02-27 RX ORDER — SACUBITRIL AND VALSARTAN 97; 103 MG/1; MG/1
1 TABLET, FILM COATED ORAL 2 TIMES DAILY
Status: DISCONTINUED | OUTPATIENT
Start: 2025-02-27 | End: 2025-03-02 | Stop reason: HOSPADM

## 2025-02-27 RX ORDER — MAGNESIUM SULFATE IN WATER 40 MG/ML
2000 INJECTION, SOLUTION INTRAVENOUS PRN
Status: DISCONTINUED | OUTPATIENT
Start: 2025-02-27 | End: 2025-03-02 | Stop reason: HOSPADM

## 2025-02-27 RX ORDER — ERGOCALCIFEROL 1.25 MG/1
50000 CAPSULE, LIQUID FILLED ORAL WEEKLY
Status: DISCONTINUED | OUTPATIENT
Start: 2025-03-05 | End: 2025-03-02 | Stop reason: HOSPADM

## 2025-02-27 RX ORDER — CETIRIZINE HYDROCHLORIDE 10 MG/1
10 TABLET ORAL DAILY
Status: DISCONTINUED | OUTPATIENT
Start: 2025-02-28 | End: 2025-03-02 | Stop reason: HOSPADM

## 2025-02-27 RX ORDER — METOPROLOL SUCCINATE 100 MG/1
100 TABLET, EXTENDED RELEASE ORAL DAILY
Status: DISCONTINUED | OUTPATIENT
Start: 2025-02-28 | End: 2025-03-02 | Stop reason: HOSPADM

## 2025-02-27 RX ORDER — SODIUM CHLORIDE 0.9 % (FLUSH) 0.9 %
5-40 SYRINGE (ML) INJECTION PRN
Status: DISCONTINUED | OUTPATIENT
Start: 2025-02-27 | End: 2025-03-02 | Stop reason: HOSPADM

## 2025-02-27 RX ORDER — POLYETHYLENE GLYCOL 3350 17 G/17G
17 POWDER, FOR SOLUTION ORAL DAILY PRN
Status: DISCONTINUED | OUTPATIENT
Start: 2025-02-27 | End: 2025-03-02 | Stop reason: HOSPADM

## 2025-02-27 RX ORDER — FUROSEMIDE 10 MG/ML
20 INJECTION INTRAMUSCULAR; INTRAVENOUS ONCE
Status: COMPLETED | OUTPATIENT
Start: 2025-02-27 | End: 2025-02-27

## 2025-02-27 RX ORDER — ACETAMINOPHEN 325 MG/1
650 TABLET ORAL EVERY 6 HOURS PRN
Status: DISCONTINUED | OUTPATIENT
Start: 2025-02-27 | End: 2025-03-02 | Stop reason: HOSPADM

## 2025-02-27 RX ORDER — FUROSEMIDE 20 MG/1
40 TABLET ORAL DAILY
Status: DISCONTINUED | OUTPATIENT
Start: 2025-02-28 | End: 2025-02-28

## 2025-02-27 RX ORDER — ALBUTEROL SULFATE 0.83 MG/ML
2.5 SOLUTION RESPIRATORY (INHALATION) 4 TIMES DAILY PRN
Status: DISCONTINUED | OUTPATIENT
Start: 2025-02-27 | End: 2025-03-02 | Stop reason: HOSPADM

## 2025-02-27 RX ORDER — PANTOPRAZOLE SODIUM 40 MG/1
40 TABLET, DELAYED RELEASE ORAL
Status: DISCONTINUED | OUTPATIENT
Start: 2025-02-28 | End: 2025-03-02 | Stop reason: HOSPADM

## 2025-02-27 RX ORDER — SODIUM CHLORIDE 0.9 % (FLUSH) 0.9 %
5-40 SYRINGE (ML) INJECTION EVERY 12 HOURS SCHEDULED
Status: DISCONTINUED | OUTPATIENT
Start: 2025-02-27 | End: 2025-03-02 | Stop reason: HOSPADM

## 2025-02-27 RX ORDER — ENOXAPARIN SODIUM 100 MG/ML
40 INJECTION SUBCUTANEOUS DAILY
Status: DISCONTINUED | OUTPATIENT
Start: 2025-02-28 | End: 2025-03-02 | Stop reason: HOSPADM

## 2025-02-27 RX ORDER — NITROGLYCERIN 0.4 MG/1
0.4 TABLET SUBLINGUAL EVERY 5 MIN PRN
Status: DISCONTINUED | OUTPATIENT
Start: 2025-02-27 | End: 2025-03-02 | Stop reason: HOSPADM

## 2025-02-27 RX ORDER — ATORVASTATIN CALCIUM 40 MG/1
40 TABLET, FILM COATED ORAL DAILY
Status: DISCONTINUED | OUTPATIENT
Start: 2025-02-28 | End: 2025-03-02 | Stop reason: HOSPADM

## 2025-02-27 RX ORDER — ACETAMINOPHEN 650 MG/1
650 SUPPOSITORY RECTAL EVERY 6 HOURS PRN
Status: DISCONTINUED | OUTPATIENT
Start: 2025-02-27 | End: 2025-03-02 | Stop reason: HOSPADM

## 2025-02-27 RX ORDER — ASPIRIN 81 MG/1
243 TABLET, CHEWABLE ORAL ONCE
Status: COMPLETED | OUTPATIENT
Start: 2025-02-27 | End: 2025-02-27

## 2025-02-27 RX ORDER — ASPIRIN 81 MG/1
81 TABLET, CHEWABLE ORAL DAILY
Status: DISCONTINUED | OUTPATIENT
Start: 2025-02-28 | End: 2025-03-02 | Stop reason: HOSPADM

## 2025-02-27 RX ORDER — POTASSIUM CHLORIDE 1500 MG/1
40 TABLET, EXTENDED RELEASE ORAL PRN
Status: DISCONTINUED | OUTPATIENT
Start: 2025-02-27 | End: 2025-03-01

## 2025-02-27 RX ADMIN — FUROSEMIDE 20 MG: 10 INJECTION, SOLUTION INTRAMUSCULAR; INTRAVENOUS at 20:50

## 2025-02-27 RX ADMIN — ASPIRIN 81 MG CHEWABLE TABLET 243 MG: 81 TABLET CHEWABLE at 16:18

## 2025-02-27 ASSESSMENT — LIFESTYLE VARIABLES
HOW OFTEN DO YOU HAVE A DRINK CONTAINING ALCOHOL: NEVER
HOW MANY STANDARD DRINKS CONTAINING ALCOHOL DO YOU HAVE ON A TYPICAL DAY: PATIENT DOES NOT DRINK

## 2025-02-27 ASSESSMENT — PAIN - FUNCTIONAL ASSESSMENT: PAIN_FUNCTIONAL_ASSESSMENT: NONE - DENIES PAIN

## 2025-02-27 NOTE — TELEPHONE ENCOUNTER
Patient left a message to get her appointment rescheduled.  I attempted to contact patient, left a voicemail for her to return call.  Can be scheduled at first available appointment.

## 2025-02-27 NOTE — ED PROVIDER NOTES
[facility] EMERGENCY DEPARTMENT  EMERGENCY DEPARTMENT ENCOUNTER        Pt Name: Manuela Chaney  MRN: [id number]  Birthdate 1965  Date of evaluation: 2/27/2025  Provider: Anshul Bloom DO  PCP: Winter Rae MD  Note Started: 4:57 PM EST 2/27/25    CHIEF COMPLAINT       Chief Complaint   Patient presents with    Chest Pain     Chest pain beginning a few days ago worsening when laying down.        HISTORY OF PRESENT ILLNESS: 1 or more Elements   History received from: Patient    Manuela Chaney is a 59 y.o. female who presents to the emergency department with chief complaint of chest pain.  Patient states chest pain has been going on for the past couple of days.  States is in the midsternal region.  States that the chest pain is worse when she lays flat and that she also becomes short of breath when she lays down.  States that she has history of for CHF as well as acute coronary syndrome.  Patient does not know much otherwise about her medical history but does state that she thinks she has a pacemaker.  Otherwise patient denies any lightheadedness or dizziness and has not had a falls or injuries.  Patient has not had any recent travels, surgeries, states that she is not having any fever, chills, nausea, vomiting, abdominal pain, hematuria or dysuria, constipation or diarrhea.    Nursing Notes were all reviewed and agreed with or any disagreements were addressed in the HPI.    REVIEW OF SYSTEMS :    Positives and Pertinent negatives as per HPI.    PAST MEDICAL HISTORY/Chronic Conditions Affecting Care    has a past medical history of Angina at rest, Asthma, Blood type AB+ (04/03/2018), CAD (coronary artery disease), Carpal tunnel syndrome on left, CHF (congestive heart failure) ([facility]), COPD (chronic obstructive pulmonary disease) ([facility]), CVA (cerebral vascular accident) ([facility]) (12/2009 and 12/2010.), GERD (gastroesophageal reflux disease), HFrEF (heart failure with reduced

## 2025-02-28 LAB
AMPHET UR QL SCN: NEGATIVE
ANION GAP SERPL CALCULATED.3IONS-SCNC: 12 MMOL/L (ref 7–16)
APAP SERPL-MCNC: <5 UG/ML (ref 10–30)
BARBITURATES UR QL SCN: NEGATIVE
BASOPHILS # BLD: 0.1 K/UL (ref 0–0.2)
BASOPHILS NFR BLD: 2 % (ref 0–2)
BENZODIAZ UR QL: NEGATIVE
BILIRUB UR QL STRIP: NEGATIVE
BNP SERPL-MCNC: ABNORMAL PG/ML (ref 0–125)
BUN SERPL-MCNC: 20 MG/DL (ref 6–20)
BUPRENORPHINE UR QL: NEGATIVE
CALCIUM SERPL-MCNC: 9.3 MG/DL (ref 8.6–10.2)
CANNABINOIDS UR QL SCN: POSITIVE
CHLORIDE SERPL-SCNC: 105 MMOL/L (ref 98–107)
CLARITY UR: CLEAR
CO2 SERPL-SCNC: 23 MMOL/L (ref 22–29)
COCAINE UR QL SCN: NEGATIVE
COLOR UR: YELLOW
CREAT SERPL-MCNC: 1 MG/DL (ref 0.5–1)
EKG ATRIAL RATE: 122 BPM
EKG ATRIAL RATE: 133 BPM
EKG P AXIS: -2 DEGREES
EKG P AXIS: 5 DEGREES
EKG Q-T INTERVAL: 344 MS
EKG Q-T INTERVAL: 416 MS
EKG QRS DURATION: 108 MS
EKG QRS DURATION: 114 MS
EKG QTC CALCULATION (BAZETT): 448 MS
EKG QTC CALCULATION (BAZETT): 474 MS
EKG R AXIS: -81 DEGREES
EKG R AXIS: -92 DEGREES
EKG T AXIS: -99 DEGREES
EKG T AXIS: 81 DEGREES
EKG VENTRICULAR RATE: 102 BPM
EKG VENTRICULAR RATE: 78 BPM
EOSINOPHIL # BLD: 0.05 K/UL (ref 0.05–0.5)
EOSINOPHILS RELATIVE PERCENT: 1 % (ref 0–6)
ERYTHROCYTE [DISTWIDTH] IN BLOOD BY AUTOMATED COUNT: 14.3 % (ref 11.5–15)
ETHANOLAMINE SERPL-MCNC: <10 MG/DL (ref 0–0.08)
FENTANYL UR QL: NEGATIVE
GFR, ESTIMATED: 68 ML/MIN/1.73M2
GLUCOSE SERPL-MCNC: 124 MG/DL (ref 74–99)
GLUCOSE UR STRIP-MCNC: NEGATIVE MG/DL
HCT VFR BLD AUTO: 37.7 % (ref 34–48)
HGB BLD-MCNC: 12 G/DL (ref 11.5–15.5)
HGB UR QL STRIP.AUTO: ABNORMAL
IMM GRANULOCYTES # BLD AUTO: <0.03 K/UL (ref 0–0.58)
IMM GRANULOCYTES NFR BLD: 0 % (ref 0–5)
KETONES UR STRIP-MCNC: NEGATIVE MG/DL
LEUKOCYTE ESTERASE UR QL STRIP: NEGATIVE
LYMPHOCYTES NFR BLD: 1.53 K/UL (ref 1.5–4)
LYMPHOCYTES RELATIVE PERCENT: 31 % (ref 20–42)
MAGNESIUM SERPL-MCNC: 1.7 MG/DL (ref 1.6–2.6)
MCH RBC QN AUTO: 28 PG (ref 26–35)
MCHC RBC AUTO-ENTMCNC: 31.8 G/DL (ref 32–34.5)
MCV RBC AUTO: 87.9 FL (ref 80–99.9)
METHADONE UR QL: NEGATIVE
MONOCYTES NFR BLD: 0.38 K/UL (ref 0.1–0.95)
MONOCYTES NFR BLD: 8 % (ref 2–12)
NEUTROPHILS NFR BLD: 58 % (ref 43–80)
NEUTS SEG NFR BLD: 2.91 K/UL (ref 1.8–7.3)
NITRITE UR QL STRIP: NEGATIVE
OPIATES UR QL SCN: NEGATIVE
OXYCODONE UR QL SCN: NEGATIVE
PCP UR QL SCN: NEGATIVE
PH UR STRIP: 5.5 [PH] (ref 5–8)
PHOSPHATE SERPL-MCNC: 3.5 MG/DL (ref 2.5–4.5)
PLATELET # BLD AUTO: 256 K/UL (ref 130–450)
PMV BLD AUTO: 10.8 FL (ref 7–12)
POTASSIUM SERPL-SCNC: 3.9 MMOL/L (ref 3.5–5)
PROT UR STRIP-MCNC: NEGATIVE MG/DL
RBC # BLD AUTO: 4.29 M/UL (ref 3.5–5.5)
RBC #/AREA URNS HPF: ABNORMAL /HPF
SALICYLATES SERPL-MCNC: 1.7 MG/DL (ref 0–30)
SODIUM SERPL-SCNC: 140 MMOL/L (ref 132–146)
SP GR UR STRIP: 1.02 (ref 1–1.03)
TEST INFORMATION: ABNORMAL
TOXIC TRICYCLIC SC,BLOOD: NEGATIVE
TROPONIN I SERPL HS-MCNC: 20 NG/L (ref 0–9)
TSH SERPL DL<=0.05 MIU/L-ACNC: 0.93 UIU/ML (ref 0.27–4.2)
UROBILINOGEN UR STRIP-ACNC: 0.2 EU/DL (ref 0–1)
WBC #/AREA URNS HPF: ABNORMAL /HPF
WBC OTHER # BLD: 5 K/UL (ref 4.5–11.5)

## 2025-02-28 PROCEDURE — 99222 1ST HOSP IP/OBS MODERATE 55: CPT | Performed by: FAMILY MEDICINE

## 2025-02-28 PROCEDURE — G0378 HOSPITAL OBSERVATION PER HR: HCPCS

## 2025-02-28 PROCEDURE — 2500000003 HC RX 250 WO HCPCS

## 2025-02-28 PROCEDURE — 93010 ELECTROCARDIOGRAM REPORT: CPT | Performed by: INTERNAL MEDICINE

## 2025-02-28 PROCEDURE — APPSS180 APP SPLIT SHARED TIME > 60 MINUTES: Performed by: CLINICAL NURSE SPECIALIST

## 2025-02-28 PROCEDURE — 83880 ASSAY OF NATRIURETIC PEPTIDE: CPT

## 2025-02-28 PROCEDURE — 80307 DRUG TEST PRSMV CHEM ANLYZR: CPT

## 2025-02-28 PROCEDURE — 99223 1ST HOSP IP/OBS HIGH 75: CPT | Performed by: INTERNAL MEDICINE

## 2025-02-28 PROCEDURE — 6370000000 HC RX 637 (ALT 250 FOR IP)

## 2025-02-28 PROCEDURE — G0480 DRUG TEST DEF 1-7 CLASSES: HCPCS

## 2025-02-28 PROCEDURE — 85025 COMPLETE CBC W/AUTO DIFF WBC: CPT

## 2025-02-28 PROCEDURE — 96366 THER/PROPH/DIAG IV INF ADDON: CPT

## 2025-02-28 PROCEDURE — 93005 ELECTROCARDIOGRAM TRACING: CPT

## 2025-02-28 PROCEDURE — 36415 COLL VENOUS BLD VENIPUNCTURE: CPT

## 2025-02-28 PROCEDURE — 80179 DRUG ASSAY SALICYLATE: CPT

## 2025-02-28 PROCEDURE — 94640 AIRWAY INHALATION TREATMENT: CPT

## 2025-02-28 PROCEDURE — 6360000002 HC RX W HCPCS

## 2025-02-28 PROCEDURE — 83735 ASSAY OF MAGNESIUM: CPT

## 2025-02-28 PROCEDURE — 84484 ASSAY OF TROPONIN QUANT: CPT

## 2025-02-28 PROCEDURE — 80143 DRUG ASSAY ACETAMINOPHEN: CPT

## 2025-02-28 PROCEDURE — 84100 ASSAY OF PHOSPHORUS: CPT

## 2025-02-28 PROCEDURE — 96375 TX/PRO/DX INJ NEW DRUG ADDON: CPT

## 2025-02-28 PROCEDURE — 96365 THER/PROPH/DIAG IV INF INIT: CPT

## 2025-02-28 PROCEDURE — 80048 BASIC METABOLIC PNL TOTAL CA: CPT

## 2025-02-28 PROCEDURE — 84443 ASSAY THYROID STIM HORMONE: CPT

## 2025-02-28 PROCEDURE — 6360000002 HC RX W HCPCS: Performed by: INTERNAL MEDICINE

## 2025-02-28 PROCEDURE — 96376 TX/PRO/DX INJ SAME DRUG ADON: CPT

## 2025-02-28 RX ORDER — BUMETANIDE 0.25 MG/ML
1 INJECTION, SOLUTION INTRAMUSCULAR; INTRAVENOUS 2 TIMES DAILY
Status: DISCONTINUED | OUTPATIENT
Start: 2025-02-28 | End: 2025-03-02

## 2025-02-28 RX ORDER — LIDOCAINE 4 G/G
1 PATCH TOPICAL DAILY
Status: DISCONTINUED | OUTPATIENT
Start: 2025-02-28 | End: 2025-03-02 | Stop reason: HOSPADM

## 2025-02-28 RX ORDER — FUROSEMIDE 10 MG/ML
40 INJECTION INTRAMUSCULAR; INTRAVENOUS 2 TIMES DAILY
Status: DISCONTINUED | OUTPATIENT
Start: 2025-02-28 | End: 2025-02-28

## 2025-02-28 RX ORDER — ACETAMINOPHEN 500 MG
1000 TABLET ORAL ONCE
Status: COMPLETED | OUTPATIENT
Start: 2025-02-28 | End: 2025-02-28

## 2025-02-28 RX ORDER — MAGNESIUM SULFATE IN WATER 40 MG/ML
2000 INJECTION, SOLUTION INTRAVENOUS ONCE
Status: COMPLETED | OUTPATIENT
Start: 2025-02-28 | End: 2025-02-28

## 2025-02-28 RX ADMIN — ALBUTEROL SULFATE 2.5 MG: 2.5 SOLUTION RESPIRATORY (INHALATION) at 01:07

## 2025-02-28 RX ADMIN — SODIUM CHLORIDE, PRESERVATIVE FREE 10 ML: 5 INJECTION INTRAVENOUS at 00:46

## 2025-02-28 RX ADMIN — SACUBITRIL AND VALSARTAN 1 TABLET: 97; 103 TABLET, FILM COATED ORAL at 20:21

## 2025-02-28 RX ADMIN — SODIUM CHLORIDE, PRESERVATIVE FREE 10 ML: 5 INJECTION INTRAVENOUS at 20:21

## 2025-02-28 RX ADMIN — ATORVASTATIN CALCIUM 40 MG: 40 TABLET, FILM COATED ORAL at 09:59

## 2025-02-28 RX ADMIN — ARFORMOTEROL TARTRATE: 15 SOLUTION RESPIRATORY (INHALATION) at 08:46

## 2025-02-28 RX ADMIN — ACETAMINOPHEN 1000 MG: 500 TABLET ORAL at 18:02

## 2025-02-28 RX ADMIN — ASPIRIN 81 MG CHEWABLE TABLET 81 MG: 81 TABLET CHEWABLE at 09:59

## 2025-02-28 RX ADMIN — ALBUTEROL SULFATE 2.5 MG: 2.5 SOLUTION RESPIRATORY (INHALATION) at 15:37

## 2025-02-28 RX ADMIN — IPRATROPIUM BROMIDE 0.5 MG: 0.5 SOLUTION RESPIRATORY (INHALATION) at 15:37

## 2025-02-28 RX ADMIN — PANTOPRAZOLE SODIUM 40 MG: 40 TABLET, DELAYED RELEASE ORAL at 06:26

## 2025-02-28 RX ADMIN — FUROSEMIDE 40 MG: 10 INJECTION, SOLUTION INTRAMUSCULAR; INTRAVENOUS at 10:01

## 2025-02-28 RX ADMIN — METOPROLOL SUCCINATE 100 MG: 100 TABLET, EXTENDED RELEASE ORAL at 09:59

## 2025-02-28 RX ADMIN — BUMETANIDE 1 MG: 0.25 INJECTION INTRAMUSCULAR; INTRAVENOUS at 18:01

## 2025-02-28 RX ADMIN — SACUBITRIL AND VALSARTAN 1 TABLET: 97; 103 TABLET, FILM COATED ORAL at 01:28

## 2025-02-28 RX ADMIN — ARFORMOTEROL TARTRATE: 15 SOLUTION RESPIRATORY (INHALATION) at 21:47

## 2025-02-28 RX ADMIN — MAGNESIUM SULFATE HEPTAHYDRATE 2000 MG: 40 INJECTION, SOLUTION INTRAVENOUS at 13:01

## 2025-02-28 RX ADMIN — IPRATROPIUM BROMIDE 0.5 MG: 0.5 SOLUTION RESPIRATORY (INHALATION) at 21:47

## 2025-02-28 RX ADMIN — IPRATROPIUM BROMIDE 0.5 MG: 0.5 SOLUTION RESPIRATORY (INHALATION) at 08:46

## 2025-02-28 RX ADMIN — SODIUM CHLORIDE, PRESERVATIVE FREE 10 ML: 5 INJECTION INTRAVENOUS at 10:01

## 2025-02-28 RX ADMIN — SACUBITRIL AND VALSARTAN 1 TABLET: 97; 103 TABLET, FILM COATED ORAL at 09:59

## 2025-02-28 NOTE — NURSE NAVIGATOR
Patient's chart updated to reflect:      .    - HF care plan, HF education points and HF discharge instructions.  -Orders: 2 gram sodium diet, daily weights, I/O.  -PCP or cardiology follow up appointments to be scheduled within 7 days of hospital discharge.  -CHF education session will be provided to the patient prior to hospital discharge.     Marley Hernandez RN, CHFN   Heart Failure Navigator

## 2025-02-28 NOTE — PLAN OF CARE
Problem: Chronic Conditions and Co-morbidities  Goal: Patient's chronic conditions and co-morbidity symptoms are monitored and maintained or improved  Outcome: Progressing     Problem: Safety - Adult  Goal: Free from fall injury  Outcome: Progressing     Problem: Cardiovascular - Adult  Goal: Maintains optimal cardiac output and hemodynamic stability  Outcome: Progressing  Goal: Absence of cardiac dysrhythmias or at baseline  Outcome: Progressing

## 2025-02-28 NOTE — CARE COORDINATION
associated with the providers was provided to:     Patient Representative Name:       The Patient and/or Patient Representative Agree with the Discharge Plan?      HARI Nash  Case Management Department  Ph: [phone] Fax: N/A

## 2025-02-28 NOTE — PLAN OF CARE
Problem: Safety - Adult  Goal: Free from fall injury  2/28/2025 1103 by Nina Balderrama RN  Outcome: Progressing  2/28/2025 1102 by Nina Balderrama RN  Outcome: Progressing  2/28/2025 0636 by Marley Cisse RN  Outcome: Progressing     Problem: Cardiovascular - Adult  Goal: Maintains optimal cardiac output and hemodynamic stability  2/28/2025 1103 by Nina Balderrama RN  Outcome: Progressing  2/28/2025 1102 by Nina Balderrama RN  Outcome: Progressing  2/28/2025 0636 by Marley Cisse RN  Outcome: Progressing  Goal: Absence of cardiac dysrhythmias or at baseline  2/28/2025 0636 by Marley Cisse RN  Outcome: Progressing

## 2025-02-28 NOTE — PLAN OF CARE
Problem: Safety - Adult  Goal: Free from fall injury  2/28/2025 1102 by Nina Balderrama RN  Outcome: Progressing  2/28/2025 0636 by Marley Cisse RN  Outcome: Progressing     Problem: Cardiovascular - Adult  Goal: Maintains optimal cardiac output and hemodynamic stability  2/28/2025 1102 by Nina Balderrama RN  Outcome: Progressing  2/28/2025 0636 by Marley Cisse RN  Outcome: Progressing  Goal: Absence of cardiac dysrhythmias or at baseline  2/28/2025 0636 by Marley Cisse RN  Outcome: Progressing

## 2025-02-28 NOTE — H&P
Est, Glom Filt Rate 74 >60 mL/min/1.73m2    Calcium 9.4 8.6 - 10.2 mg/dL    Total Protein 6.7 6.4 - 8.3 g/dL    Albumin 4.5 3.5 - 5.2 g/dL    Total Bilirubin 0.8 0.0 - 1.2 mg/dL    Alkaline Phosphatase 123 (H) 35 - 104 U/L    ALT 19 0 - 32 U/L    AST 20 0 - 31 U/L   Troponin   Result Value Ref Range    Troponin, High Sensitivity 24 (H) 0 - 9 ng/L   Brain Natriuretic Peptide   Result Value Ref Range    NT Pro-BNP 19,794 (H) 0 - 125 pg/mL   Troponin   Result Value Ref Range    Troponin, High Sensitivity 21 (H) 0 - 9 ng/L       Imaging:  XR CHEST PORTABLE   Final Result   No acute process.      Cardiomegaly.             Assessment and Plan  Principal Problem:    Acute chest pain  Resolved Problems:    * No resolved hospital problems. *    Chest Pain   Orthopnea  HTN  Likely 2/2 HFrEF exacerbation versus CAD versus angina versus other  Consult cardiology appreciate recommendations  Plan for repeat troponin and proBNP in the morning  Repeat EKG in the a.m.  Plan to order UDS and SDS  Plan for daily diuresis with 40 mg Lasix; consider switching to IV Lasix/Bumex as needed  Continue GDMT with Toprol and Entresto daily  Continue daily Lipitor 40 mg and aspirin 81 mg  Consider CTA Pulm if patient decompensates   Patient to be n.p.o. after midnight in case of cardiac procedure, can resume if no cardiac intervention planned  Strict I's and O's  Daily weights  Monitor BMP, mag, Phos daily    COPD  Continue daily breathing treatments with Pulmicort, Brovana, albuterol and Atrovent    Possible pyelonephritis  Plan for repeat urine culture and urinalysis with microscopy  Consider having patient bring in antibiotics to determine if coverage is needed while inpatient        DVT / GI prophylaxis: lovenox 40mg SC daily and Protonix    Dispo - Observation       Electronically signed by Leydi Gibson MD on 2/27/25 at 8:38 PM EST  This case was discussed with attending physician: Dr. Paige

## 2025-02-28 NOTE — DISCHARGE INSTRUCTIONS
whether you need another dose.       My Goal for Self-management of Heart Failure Includes 5 steps :    1. Notice a change in symptoms ( weight gain, short of breath, leg swelling, decreased activity level, bloating....)    2. Evaluate the change: (use the Heart Failure Zones )     3. Decide to take action: decide what your options are, such as: (call your doctor for an extra visit, take a prescribed medication, such as your water pill if your doctor has given you directions to do so, CALL YOUR DOCTOR)    4. Come up with a strategy:  (now you call the doctor for advice / appointment. This is where you take action!!!  Do not wait, catch the symptom early and treat it before it worsens.    5. Evaluate the response: The next day, check your Heart Failure Zones: are you in the GREEN ZONE (safe zone)?  Worsening symptoms of YELLOW ZONE? Or have you moved to the RED ZONE and need to call 911 or go to the Emergency Room for evaluation? Call your doctor's office to update them on your symptoms of heart failure.

## 2025-03-01 PROBLEM — R07.9 CHEST PAIN: Status: ACTIVE | Noted: 2025-03-01

## 2025-03-01 LAB
ANION GAP SERPL CALCULATED.3IONS-SCNC: 15 MMOL/L (ref 7–16)
BASOPHILS # BLD: 0.05 K/UL (ref 0–0.2)
BASOPHILS NFR BLD: 1 % (ref 0–2)
BUN SERPL-MCNC: 26 MG/DL (ref 6–20)
CALCIUM SERPL-MCNC: 9.6 MG/DL (ref 8.6–10.2)
CHLORIDE SERPL-SCNC: 102 MMOL/L (ref 98–107)
CO2 SERPL-SCNC: 26 MMOL/L (ref 22–29)
CREAT SERPL-MCNC: 1.2 MG/DL (ref 0.5–1)
EOSINOPHIL # BLD: 0.06 K/UL (ref 0.05–0.5)
EOSINOPHILS RELATIVE PERCENT: 1 % (ref 0–6)
ERYTHROCYTE [DISTWIDTH] IN BLOOD BY AUTOMATED COUNT: 14.2 % (ref 11.5–15)
GFR, ESTIMATED: 52 ML/MIN/1.73M2
GLUCOSE SERPL-MCNC: 131 MG/DL (ref 74–99)
HCT VFR BLD AUTO: 37.6 % (ref 34–48)
HGB BLD-MCNC: 12 G/DL (ref 11.5–15.5)
IMM GRANULOCYTES # BLD AUTO: 0.03 K/UL (ref 0–0.58)
IMM GRANULOCYTES NFR BLD: 0 % (ref 0–5)
LYMPHOCYTES NFR BLD: 1.7 K/UL (ref 1.5–4)
LYMPHOCYTES RELATIVE PERCENT: 25 % (ref 20–42)
MAGNESIUM SERPL-MCNC: 2.2 MG/DL (ref 1.6–2.6)
MCH RBC QN AUTO: 28.4 PG (ref 26–35)
MCHC RBC AUTO-ENTMCNC: 31.9 G/DL (ref 32–34.5)
MCV RBC AUTO: 89.1 FL (ref 80–99.9)
MONOCYTES NFR BLD: 0.54 K/UL (ref 0.1–0.95)
MONOCYTES NFR BLD: 8 % (ref 2–12)
NEUTROPHILS NFR BLD: 66 % (ref 43–80)
NEUTS SEG NFR BLD: 4.52 K/UL (ref 1.8–7.3)
PHOSPHATE SERPL-MCNC: 4.6 MG/DL (ref 2.5–4.5)
PLATELET # BLD AUTO: 271 K/UL (ref 130–450)
PMV BLD AUTO: 10.7 FL (ref 7–12)
POTASSIUM SERPL-SCNC: 3.9 MMOL/L (ref 3.5–5)
RBC # BLD AUTO: 4.22 M/UL (ref 3.5–5.5)
SODIUM SERPL-SCNC: 143 MMOL/L (ref 132–146)
WBC OTHER # BLD: 6.9 K/UL (ref 4.5–11.5)

## 2025-03-01 PROCEDURE — 6370000000 HC RX 637 (ALT 250 FOR IP): Performed by: INTERNAL MEDICINE

## 2025-03-01 PROCEDURE — 6360000002 HC RX W HCPCS: Performed by: INTERNAL MEDICINE

## 2025-03-01 PROCEDURE — 83735 ASSAY OF MAGNESIUM: CPT

## 2025-03-01 PROCEDURE — 6360000002 HC RX W HCPCS

## 2025-03-01 PROCEDURE — 96376 TX/PRO/DX INJ SAME DRUG ADON: CPT

## 2025-03-01 PROCEDURE — 6370000000 HC RX 637 (ALT 250 FOR IP)

## 2025-03-01 PROCEDURE — 99233 SBSQ HOSP IP/OBS HIGH 50: CPT | Performed by: INTERNAL MEDICINE

## 2025-03-01 PROCEDURE — 80048 BASIC METABOLIC PNL TOTAL CA: CPT

## 2025-03-01 PROCEDURE — 36415 COLL VENOUS BLD VENIPUNCTURE: CPT

## 2025-03-01 PROCEDURE — 2700000000 HC OXYGEN THERAPY PER DAY

## 2025-03-01 PROCEDURE — 99232 SBSQ HOSP IP/OBS MODERATE 35: CPT | Performed by: FAMILY MEDICINE

## 2025-03-01 PROCEDURE — 2500000003 HC RX 250 WO HCPCS

## 2025-03-01 PROCEDURE — 1200000000 HC SEMI PRIVATE

## 2025-03-01 PROCEDURE — 84100 ASSAY OF PHOSPHORUS: CPT

## 2025-03-01 PROCEDURE — 94640 AIRWAY INHALATION TREATMENT: CPT

## 2025-03-01 PROCEDURE — 85025 COMPLETE CBC W/AUTO DIFF WBC: CPT

## 2025-03-01 RX ORDER — SPIRONOLACTONE 25 MG/1
25 TABLET ORAL DAILY
Status: DISCONTINUED | OUTPATIENT
Start: 2025-03-01 | End: 2025-03-02 | Stop reason: HOSPADM

## 2025-03-01 RX ADMIN — IPRATROPIUM BROMIDE 0.5 MG: 0.5 SOLUTION RESPIRATORY (INHALATION) at 16:02

## 2025-03-01 RX ADMIN — IPRATROPIUM BROMIDE 0.5 MG: 0.5 SOLUTION RESPIRATORY (INHALATION) at 19:41

## 2025-03-01 RX ADMIN — SACUBITRIL AND VALSARTAN 1 TABLET: 97; 103 TABLET, FILM COATED ORAL at 10:02

## 2025-03-01 RX ADMIN — SODIUM CHLORIDE, PRESERVATIVE FREE 10 ML: 5 INJECTION INTRAVENOUS at 20:06

## 2025-03-01 RX ADMIN — ARFORMOTEROL TARTRATE: 15 SOLUTION RESPIRATORY (INHALATION) at 19:42

## 2025-03-01 RX ADMIN — METOPROLOL SUCCINATE 100 MG: 100 TABLET, EXTENDED RELEASE ORAL at 10:02

## 2025-03-01 RX ADMIN — ARFORMOTEROL TARTRATE: 15 SOLUTION RESPIRATORY (INHALATION) at 08:09

## 2025-03-01 RX ADMIN — IPRATROPIUM BROMIDE 0.5 MG: 0.5 SOLUTION RESPIRATORY (INHALATION) at 12:46

## 2025-03-01 RX ADMIN — SPIRONOLACTONE 25 MG: 25 TABLET ORAL at 15:46

## 2025-03-01 RX ADMIN — SACUBITRIL AND VALSARTAN 1 TABLET: 97; 103 TABLET, FILM COATED ORAL at 20:06

## 2025-03-01 RX ADMIN — PANTOPRAZOLE SODIUM 40 MG: 40 TABLET, DELAYED RELEASE ORAL at 05:16

## 2025-03-01 RX ADMIN — IPRATROPIUM BROMIDE 0.5 MG: 0.5 SOLUTION RESPIRATORY (INHALATION) at 08:09

## 2025-03-01 RX ADMIN — ATORVASTATIN CALCIUM 40 MG: 40 TABLET, FILM COATED ORAL at 10:02

## 2025-03-01 RX ADMIN — BUMETANIDE 1 MG: 0.25 INJECTION INTRAMUSCULAR; INTRAVENOUS at 19:02

## 2025-03-01 RX ADMIN — SODIUM CHLORIDE, PRESERVATIVE FREE 10 ML: 5 INJECTION INTRAVENOUS at 10:06

## 2025-03-01 RX ADMIN — BUMETANIDE 1 MG: 0.25 INJECTION INTRAMUSCULAR; INTRAVENOUS at 10:00

## 2025-03-01 RX ADMIN — ASPIRIN 81 MG CHEWABLE TABLET 81 MG: 81 TABLET CHEWABLE at 10:02

## 2025-03-01 NOTE — CONSULTS
dominant and normal)     Echocardiogram (Dr. De La Cruz, 12/2017):    Severely dilated left atrium, with LUZ MARINA 59 ml/m2. Appears to be possible   left to right inter atrial shunt demosntrated with color doppler on image   0065, 0066., 0067.   Eccentric hypertrophy. Severely dilated left ventricle. Severe left   ventricular systolic function. Visually estimated LVEF is 10%. Severe   global hypokinesis with regional variation. Grade 3 restrictive diastolic   dysfunction, which correlates with very poor prognosis.   Apically tethered mitral valve leaflets with posterior leaflet   restriction. Severe mitral regurgitation. Eccentric posteriorly directed   jet. Trace pericardial effusion, no tamponade physiology.   RV measurements biased by such significant LV dilatation with septal shift   into the RV. Severe RV dysfunction. RVSP is 50 mm Hg consistent with   pulmonary hypertension.   Moderate tricuspid regurgitation, central regurgitant jet.   Findings communicated with the floor; prior to this time patient signed   out of the hospital against medical advice.    4/3/2018 US carotid  Peak systolic and end diastolic velocities, ICA/CCA ratios and   antegrade vertebral artery flow as above. See above for details fo the   examination and below for internal carotid artery interpretation   guidelines.   1. Elevated peak systolic velocity in the left proximal internal   carotid artery falling within the estimated luminal stenosis range of   50-69%.   2. Scattered areas of mild to moderate calcified and noncalcified   plaque left carotid arterial systems, most prominent proximal distal   left internal carotid artery.   3. Right carotid arterial system is not evaluated secondary to   ultrasound technician been unable to access secondary to bandaging.          4/3/2018 bilateral FAVIAN  Normal ankle arm index       4/4/2018 [facility] (Dr. Centeno)  Findings:  Left main: 0%  stenosis  LAD: 0. %  stenosis  Circumflex: 0. %   stenosis  RCA: 
right heart filling pressures (mean RA 12 mmHg, RV 39/16 mmHg).   Mildly depressed CO (4.2 L/min) and Riley Cardiac Index (CI 2.9 L/min/m2) calculated via the indirect Riley method.   Normal pulmonary artery pressure with mean PAP 27 mmHg (PA 34/21 mmHg).    No hemodynamically significant gradient across the aortic valve on LV-Ao pullback.    Remote device interrogation 12/23/2023:        Social History:    15 pack years  ETOH abuse up to 2 24 ounce beers a day  Illicit Drugs: Marijuana     Family History:   Mother HTN and CVA  Sister Open heart in her 40's  Father CABG, CVA,  and PPM      Medications Prior to Admit:  Prior to Admission medications    Medication Sig Start Date End Date Taking? Authorizing Provider   pantoprazole (PROTONIX) 40 MG tablet Take 1 tablet by mouth every morning (before breakfast) 2/13/25  Yes Winter Rae MD   albuterol sulfate HFA (VENTOLIN HFA) 108 (90 Base) MCG/ACT inhaler Inhale 2 puffs into the lungs 4 times daily as needed for Wheezing 2/13/25  Yes Winter Rae MD   aspirin 81 MG chewable tablet Take 1 tablet by mouth daily 2/13/25  Yes Winter Rae MD   atorvastatin (LIPITOR) 40 MG tablet Take 1 tablet by mouth daily 2/13/25  Yes Winter Rae MD   budesonide-formoterol (SYMBICORT) 160-4.5 MCG/ACT AERO Inhale 2 puffs into the lungs 2 times daily 2/13/25  Yes Winter Rae MD   bumetanide (BUMEX) 2 MG tablet Take 0.5 tablets by mouth daily 2/13/25  Yes Winter Rae MD   metoprolol succinate (TOPROL XL) 100 MG extended release tablet Take 1 tablet by mouth daily 2/13/25  Yes Winter Rae MD   nitroGLYCERIN (NITROSTAT) 0.4 MG SL tablet Place 1 tablet under the tongue every 5 minutes as needed for Chest pain 2/13/25  Yes Winter Rae MD   sacubitril-valsartan (ENTRESTO)  MG per tablet Take 1 tablet by mouth 2 times daily 2/13/25  Yes Winter Rae MD   Vitamin D, Ergocalciferol, 25774

## 2025-03-01 NOTE — PLAN OF CARE
Problem: Chronic Conditions and Co-morbidities  Goal: Patient's chronic conditions and co-morbidity symptoms are monitored and maintained or improved  Outcome: Progressing     Problem: Safety - Adult  Goal: Free from fall injury  2/28/2025 2213 by Alda Whitaker RN  Outcome: Progressing  Flowsheets (Taken 2/28/2025 1915)  Free From Fall Injury: Instruct family/caregiver on patient safety  2/28/2025 1103 by Nina Balderrama RN  Outcome: Progressing  2/28/2025 1102 by Nina Balderrama RN  Outcome: Progressing     Problem: Cardiovascular - Adult  Goal: Maintains optimal cardiac output and hemodynamic stability  2/28/2025 2213 by Alda Whitaker RN  Outcome: Progressing  2/28/2025 1103 by Nina Balderrama RN  Outcome: Progressing  2/28/2025 1102 by Nina Balderrama RN  Outcome: Progressing  Goal: Absence of cardiac dysrhythmias or at baseline  Outcome: Progressing

## 2025-03-02 ENCOUNTER — APPOINTMENT (OUTPATIENT)
Age: 60
DRG: 291 | End: 2025-03-02
Payer: MEDICARE

## 2025-03-02 VITALS
SYSTOLIC BLOOD PRESSURE: 139 MMHG | OXYGEN SATURATION: 97 % | WEIGHT: 119 LBS | HEART RATE: 57 BPM | HEIGHT: 64 IN | DIASTOLIC BLOOD PRESSURE: 89 MMHG | RESPIRATION RATE: 17 BRPM | BODY MASS INDEX: 20.32 KG/M2 | TEMPERATURE: 98.3 F

## 2025-03-02 LAB
ANION GAP SERPL CALCULATED.3IONS-SCNC: 13 MMOL/L (ref 7–16)
BASOPHILS # BLD: 0.05 K/UL (ref 0–0.2)
BASOPHILS NFR BLD: 1 % (ref 0–2)
BUN SERPL-MCNC: 27 MG/DL (ref 6–20)
CALCIUM SERPL-MCNC: 9.3 MG/DL (ref 8.6–10.2)
CHLORIDE SERPL-SCNC: 103 MMOL/L (ref 98–107)
CO2 SERPL-SCNC: 25 MMOL/L (ref 22–29)
CREAT SERPL-MCNC: 1.2 MG/DL (ref 0.5–1)
ECHO AO ASC DIAM: 2.5 CM
ECHO AO ASCENDING AORTA INDEX: 1.59 CM/M2
ECHO AV AREA PEAK VELOCITY: 1.5 CM2
ECHO AV AREA VTI: 1.3 CM2
ECHO AV AREA/BSA PEAK VELOCITY: 1 CM2/M2
ECHO AV AREA/BSA VTI: 0.8 CM2/M2
ECHO AV CUSP MM: 1.8 CM
ECHO AV MEAN GRADIENT: 3 MMHG
ECHO AV MEAN VELOCITY: 0.9 M/S
ECHO AV PEAK GRADIENT: 6 MMHG
ECHO AV PEAK VELOCITY: 1.2 M/S
ECHO AV VELOCITY RATIO: 0.58
ECHO AV VTI: 18.3 CM
ECHO BSA: 1.56 M2
ECHO EST RA PRESSURE: 8 MMHG
ECHO IVC PROX: 1.7 CM
ECHO LA DIAMETER INDEX: 3.38 CM/M2
ECHO LA DIAMETER: 5.3 CM
ECHO LA VOL A-L A2C: 173 ML (ref 22–52)
ECHO LA VOL A-L A4C: 147 ML (ref 22–52)
ECHO LA VOL BP: 158 ML (ref 22–52)
ECHO LA VOL MOD A2C: 168 ML (ref 22–52)
ECHO LA VOL MOD A4C: 141 ML (ref 22–52)
ECHO LA VOL/BSA BIPLANE: 101 ML/M2 (ref 16–34)
ECHO LA VOLUME AREA LENGTH: 164 ML
ECHO LA VOLUME INDEX A-L A2C: 110 ML/M2 (ref 16–34)
ECHO LA VOLUME INDEX A-L A4C: 94 ML/M2 (ref 16–34)
ECHO LA VOLUME INDEX AREA LENGTH: 104 ML/M2 (ref 16–34)
ECHO LA VOLUME INDEX MOD A2C: 107 ML/M2 (ref 16–34)
ECHO LA VOLUME INDEX MOD A4C: 90 ML/M2 (ref 16–34)
ECHO LV E' LATERAL VELOCITY: 0.1 CM/S
ECHO LV E' SEPTAL VELOCITY: 0.06 CM/S
ECHO LV EDV A2C: 181 ML
ECHO LV EDV A4C: 162 ML
ECHO LV EDV BP: 180 ML (ref 56–104)
ECHO LV EDV INDEX A4C: 103 ML/M2
ECHO LV EDV INDEX BP: 115 ML/M2
ECHO LV EDV NDEX A2C: 115 ML/M2
ECHO LV EF PHYSICIAN: 30 %
ECHO LV EJECTION FRACTION A2C: 22 %
ECHO LV EJECTION FRACTION A4C: 26 %
ECHO LV EJECTION FRACTION BIPLANE: 26 % (ref 55–100)
ECHO LV ESV A2C: 141 ML
ECHO LV ESV A4C: 119 ML
ECHO LV ESV BP: 133 ML (ref 19–49)
ECHO LV ESV INDEX A2C: 90 ML/M2
ECHO LV ESV INDEX A4C: 76 ML/M2
ECHO LV ESV INDEX BP: 85 ML/M2
ECHO LV FRACTIONAL SHORTENING: 12 % (ref 28–44)
ECHO LV INTERNAL DIMENSION DIASTOLE INDEX: 4.27 CM/M2
ECHO LV INTERNAL DIMENSION DIASTOLIC: 6.7 CM (ref 3.9–5.3)
ECHO LV INTERNAL DIMENSION SYSTOLIC INDEX: 3.76 CM/M2
ECHO LV INTERNAL DIMENSION SYSTOLIC: 5.9 CM
ECHO LV ISOVOLUMETRIC RELAXATION TIME (IVRT): 49.5 MS
ECHO LV IVSD: 0.7 CM (ref 0.6–0.9)
ECHO LV IVSS: 1.3 CM
ECHO LV MASS 2D: 226.1 G (ref 67–162)
ECHO LV MASS INDEX 2D: 144 G/M2 (ref 43–95)
ECHO LV POSTERIOR WALL DIASTOLIC: 0.9 CM (ref 0.6–0.9)
ECHO LV POSTERIOR WALL SYSTOLIC: 1.1 CM
ECHO LV RELATIVE WALL THICKNESS RATIO: 0.27
ECHO LVOT AREA: 2.8 CM2
ECHO LVOT AV VTI INDEX: 0.46
ECHO LVOT DIAM: 1.9 CM
ECHO LVOT MEAN GRADIENT: 1 MMHG
ECHO LVOT PEAK GRADIENT: 2 MMHG
ECHO LVOT PEAK VELOCITY: 0.7 M/S
ECHO LVOT STROKE VOLUME INDEX: 15.3 ML/M2
ECHO LVOT SV: 24.1 ML
ECHO LVOT VTI: 8.5 CM
ECHO MV "A" WAVE DURATION: 163.7 MSEC
ECHO MV A VELOCITY: 0.53 M/S
ECHO MV AREA PHT: 2.9 CM2
ECHO MV AREA VTI: 0.5 CM2
ECHO MV E DECELERATION TIME (DT): 137.9 MS
ECHO MV E VELOCITY: 1.84 M/S
ECHO MV E/A RATIO: 3.47
ECHO MV E/E' LATERAL: 1840
ECHO MV E/E' RATIO (AVERAGED): 2453.33
ECHO MV E/E' SEPTAL: 3066.67
ECHO MV EROA PISA: 0.3 CM2
ECHO MV LVOT VTI INDEX: 5.56
ECHO MV MAX VELOCITY: 1.8 M/S
ECHO MV MEAN GRADIENT: 5 MMHG
ECHO MV MEAN VELOCITY: 1 M/S
ECHO MV PEAK GRADIENT: 14 MMHG
ECHO MV PRESSURE HALF TIME (PHT): 75.3 MS
ECHO MV REGURGITANT ALIASING (NYQUIST) VELOCITY: 36 CM/S
ECHO MV REGURGITANT RADIUS PISA: 0.82 CM
ECHO MV REGURGITANT VELOCITY PISA: 4.9 M/S
ECHO MV REGURGITANT VOLUME PISA: 48.37 ML
ECHO MV REGURGITANT VTIA: 155.9 CM
ECHO MV VTI: 47.3 CM
ECHO PULMONARY ARTERY END DIASTOLIC PRESSURE: 12 MMHG
ECHO PV MAX VELOCITY: 0.7 M/S
ECHO PV MEAN GRADIENT: 1 MMHG
ECHO PV MEAN VELOCITY: 0.4 M/S
ECHO PV PEAK GRADIENT: 2 MMHG
ECHO PV REGURGITANT MAX VELOCITY: 1.7 M/S
ECHO PV VTI: 12.1 CM
ECHO PVEIN A DURATION: 163.7 MS
ECHO PVEIN A VELOCITY: 0.2 M/S
ECHO PVEIN PEAK D VELOCITY: 0.8 M/S
ECHO PVEIN PEAK S VELOCITY: 0.6 M/S
ECHO PVEIN S/D RATIO: 0.8
ECHO RIGHT VENTRICULAR SYSTOLIC PRESSURE (RVSP): 57 MMHG
ECHO RV BASAL DIMENSION: 4.3 CM
ECHO RV INTERNAL DIMENSION: 3.6 CM
ECHO RV LONGITUDINAL DIMENSION: 7.8 CM
ECHO RV MID DIMENSION: 2.8 CM
ECHO RV TAPSE: 1.7 CM (ref 1.7–?)
ECHO TV REGURGITANT MAX VELOCITY: 3.51 M/S
ECHO TV REGURGITANT PEAK GRADIENT: 49 MMHG
EOSINOPHIL # BLD: 0.06 K/UL (ref 0.05–0.5)
EOSINOPHILS RELATIVE PERCENT: 1 % (ref 0–6)
ERYTHROCYTE [DISTWIDTH] IN BLOOD BY AUTOMATED COUNT: 14.4 % (ref 11.5–15)
GFR, ESTIMATED: 56 ML/MIN/1.73M2
GLUCOSE SERPL-MCNC: 132 MG/DL (ref 74–99)
HCT VFR BLD AUTO: 37.4 % (ref 34–48)
HGB BLD-MCNC: 12 G/DL (ref 11.5–15.5)
IMM GRANULOCYTES # BLD AUTO: 0.03 K/UL (ref 0–0.58)
IMM GRANULOCYTES NFR BLD: 0 % (ref 0–5)
LYMPHOCYTES NFR BLD: 2.16 K/UL (ref 1.5–4)
LYMPHOCYTES RELATIVE PERCENT: 26 % (ref 20–42)
MAGNESIUM SERPL-MCNC: 2.1 MG/DL (ref 1.6–2.6)
MCH RBC QN AUTO: 28.6 PG (ref 26–35)
MCHC RBC AUTO-ENTMCNC: 32.1 G/DL (ref 32–34.5)
MCV RBC AUTO: 89 FL (ref 80–99.9)
MONOCYTES NFR BLD: 0.54 K/UL (ref 0.1–0.95)
MONOCYTES NFR BLD: 7 % (ref 2–12)
NEUTROPHILS NFR BLD: 66 % (ref 43–80)
NEUTS SEG NFR BLD: 5.43 K/UL (ref 1.8–7.3)
PHOSPHATE SERPL-MCNC: 3.9 MG/DL (ref 2.5–4.5)
PLATELET # BLD AUTO: 290 K/UL (ref 130–450)
PMV BLD AUTO: 10.9 FL (ref 7–12)
POTASSIUM SERPL-SCNC: 4 MMOL/L (ref 3.5–5)
RBC # BLD AUTO: 4.2 M/UL (ref 3.5–5.5)
SODIUM SERPL-SCNC: 141 MMOL/L (ref 132–146)
WBC OTHER # BLD: 8.3 K/UL (ref 4.5–11.5)

## 2025-03-02 PROCEDURE — 93306 TTE W/DOPPLER COMPLETE: CPT

## 2025-03-02 PROCEDURE — 6370000000 HC RX 637 (ALT 250 FOR IP)

## 2025-03-02 PROCEDURE — 83735 ASSAY OF MAGNESIUM: CPT

## 2025-03-02 PROCEDURE — 2500000003 HC RX 250 WO HCPCS

## 2025-03-02 PROCEDURE — 6370000000 HC RX 637 (ALT 250 FOR IP): Performed by: INTERNAL MEDICINE

## 2025-03-02 PROCEDURE — 93306 TTE W/DOPPLER COMPLETE: CPT | Performed by: INTERNAL MEDICINE

## 2025-03-02 PROCEDURE — 80048 BASIC METABOLIC PNL TOTAL CA: CPT

## 2025-03-02 PROCEDURE — 84100 ASSAY OF PHOSPHORUS: CPT

## 2025-03-02 PROCEDURE — 85025 COMPLETE CBC W/AUTO DIFF WBC: CPT

## 2025-03-02 PROCEDURE — 99238 HOSP IP/OBS DSCHRG MGMT 30/<: CPT | Performed by: FAMILY MEDICINE

## 2025-03-02 PROCEDURE — 94640 AIRWAY INHALATION TREATMENT: CPT

## 2025-03-02 PROCEDURE — 6360000002 HC RX W HCPCS

## 2025-03-02 PROCEDURE — 99232 SBSQ HOSP IP/OBS MODERATE 35: CPT | Performed by: INTERNAL MEDICINE

## 2025-03-02 PROCEDURE — 36415 COLL VENOUS BLD VENIPUNCTURE: CPT

## 2025-03-02 RX ORDER — SPIRONOLACTONE 25 MG/1
25 TABLET ORAL DAILY
Qty: 30 TABLET | Refills: 3 | Status: SHIPPED | OUTPATIENT
Start: 2025-03-02

## 2025-03-02 RX ADMIN — BUMETANIDE 1 MG: 1 TABLET ORAL at 11:16

## 2025-03-02 RX ADMIN — ATORVASTATIN CALCIUM 40 MG: 40 TABLET, FILM COATED ORAL at 11:14

## 2025-03-02 RX ADMIN — SODIUM CHLORIDE, PRESERVATIVE FREE 10 ML: 5 INJECTION INTRAVENOUS at 11:12

## 2025-03-02 RX ADMIN — SACUBITRIL AND VALSARTAN 1 TABLET: 97; 103 TABLET, FILM COATED ORAL at 11:14

## 2025-03-02 RX ADMIN — ASPIRIN 81 MG CHEWABLE TABLET 81 MG: 81 TABLET CHEWABLE at 11:14

## 2025-03-02 RX ADMIN — IPRATROPIUM BROMIDE 0.5 MG: 0.5 SOLUTION RESPIRATORY (INHALATION) at 13:34

## 2025-03-02 RX ADMIN — SPIRONOLACTONE 25 MG: 25 TABLET ORAL at 11:14

## 2025-03-02 RX ADMIN — ARFORMOTEROL TARTRATE: 15 SOLUTION RESPIRATORY (INHALATION) at 07:40

## 2025-03-02 RX ADMIN — IPRATROPIUM BROMIDE 0.5 MG: 0.5 SOLUTION RESPIRATORY (INHALATION) at 07:40

## 2025-03-02 RX ADMIN — METOPROLOL SUCCINATE 100 MG: 100 TABLET, EXTENDED RELEASE ORAL at 11:14

## 2025-03-02 RX ADMIN — PANTOPRAZOLE SODIUM 40 MG: 40 TABLET, DELAYED RELEASE ORAL at 05:10

## 2025-03-02 NOTE — PLAN OF CARE
Problem: Chronic Conditions and Co-morbidities  Goal: Patient's chronic conditions and co-morbidity symptoms are monitored and maintained or improved  3/1/2025 2145 by Alda Whitaker RN  Outcome: Progressing  3/1/2025 1617 by Ysabel Gonzalez RN  Outcome: Progressing     Problem: Safety - Adult  Goal: Free from fall injury  3/1/2025 2145 by Alda Whitaker RN  Outcome: Progressing  Flowsheets (Taken 3/1/2025 1930)  Free From Fall Injury: Instruct family/caregiver on patient safety  3/1/2025 1617 by Ysabel Gonzalez RN  Outcome: Progressing     Problem: Cardiovascular - Adult  Goal: Maintains optimal cardiac output and hemodynamic stability  3/1/2025 2145 by Alda Whitaker RN  Outcome: Progressing  3/1/2025 1617 by Ysabel Gonzalez RN  Outcome: Progressing  Goal: Absence of cardiac dysrhythmias or at baseline  3/1/2025 2145 by Alda Whitaker RN  Outcome: Progressing  3/1/2025 1617 by Ysabel Gonzalez RN  Outcome: Progressing

## 2025-03-02 NOTE — PROGRESS NOTES
Inpatient Cardiology Progress note     PATIENT IS BEING FOLLOWED FOR: \"VHD with history of CAD, CHF presenting with chest pain\"     Manuela Chaney is a 59 y.o. female who is known to Dr. Yang as well as Dr. Duron and seen in consultation at this admission by Dr. Duron on 2025     She was seen on  in the office of her primary care provider (the family medicine clinic) due to back pain and was treated for suspected pyelonephritis; CT of the abdomen pelvis was ordered but not completed.  She presented to the emergency room on  due to ongoing back pain, two days of orthopnea and chest pressure when lying down, which was relieved with sitting up.      SUBJECTIVE: Feeling much better.  Denies shortness of breath or chest heaviness.  OBJECTIVE: Laying almost flat in bed in no distress no distress     ROS:  Consist: Denies fevers, chills or night sweats  Heart: Denies chest pain, palpitations, lightheadedness, dizziness or syncope  Lungs: Denies SOB, cough, wheezing, orthopnea or PND  GI: Denies abdominal pain, vomiting or diarrhea    PHYSICAL EXAM:   BP (!) 143/84   Pulse 79   Temp 97.6 °F (36.4 °C) (Oral)   Resp 17   Ht 1.626 m (5' 4\")   Wt 54 kg (119 lb)   LMP 2014 (Approximate)   SpO2 94%   BMI 20.43 kg/m²    B/P Range last 24 hours: Systolic (24hrs), Av , Min:110 , Max:143    Diastolic (24hrs), Av, Min:74, Max:90    CONST: Well developed, thin -American lady who appears of stated age. Awake, alert and cooperative. No apparent distress  HEENT:   Head- Normocephalic, atraumatic   Eyes- Conjunctivae pink, anicteric  Throat- Oral mucosa pink and moist  Neck-  No stridor, trachea midline, no jugular venous distention. No carotid bruit  CHEST: Chest symmetrical and non-tender to palpation. No accessory muscle use or intercostal retractions  RESPIRATORY:  Lung sounds - clear throughout fields   CARDIOVASCULAR:     Heart Inspection- shows no noted 
    Inpatient Cardiology Progress note     PATIENT IS BEING FOLLOWED FOR: \"VHD with history of CAD, CHF presenting with chest pain\"     Manuela Chaney is a 59 y.o. female who is known to Dr. Yang as well as Dr. Duron and seen in consultation at this admission by Dr. Duron on 2025     She was seen on  in the office of her primary care provider (the family medicine clinic) due to back pain and was treated for suspected pyelonephritis; CT of the abdomen pelvis was ordered but not completed.  She presented to the emergency room on  due to ongoing back pain, two days of orthopnea and chest pressure when lying down, which was relieved with sitting up.      SUBJECTIVE: Feeling much better.  Denies shortness of breath or chest heaviness.  OBJECTIVE: Laying at 30 degrees in bed in no distress no apparent distress     ROS:  Consist: Denies fevers, chills or night sweats  Heart: Denies chest pain, palpitations, lightheadedness, dizziness or syncope  Lungs: Denies SOB, cough, wheezing, orthopnea or PND  GI: Denies abdominal pain, vomiting or diarrhea    PHYSICAL EXAM:   /80   Pulse 78   Temp 97.7 °F (36.5 °C) (Oral)   Resp 18   Ht 1.626 m (5' 4\")   Wt 53.7 kg (118 lb 6.2 oz)   LMP 2014 (Approximate)   SpO2 98%   BMI 20.32 kg/m²    B/P Range last 24 hours: Systolic (24hrs), Av , Min:110 , Max:127    Diastolic (24hrs), Av, Min:61, Max:93    CONST: Well developed, thin -American lady who appears of stated age. Awake, alert and cooperative. No apparent distress  HEENT:   Head- Normocephalic, atraumatic   Eyes- Conjunctivae pink, anicteric  Throat- Oral mucosa pink and moist  Neck-  No stridor, trachea midline, no jugular venous distention. No carotid bruit  CHEST: Chest symmetrical and non-tender to palpation. No accessory muscle use or intercostal retractions  RESPIRATORY:  Lung sounds - clear throughout fields   CARDIOVASCULAR:     Heart Inspection- shows no 
4 Eyes Skin Assessment     NAME:  Manuela Chaney  YOB: 1965  MEDICAL RECORD NUMBER:  [id number]    The patient is being assessed for  Admission    I agree that at least one RN has performed a thorough Head to Toe Skin Assessment on the patient. ALL assessment sites listed below have been assessed.      Areas assessed by both nurses:    Head, Face, Ears, Shoulders, Back, Chest, Arms, Elbows, Hands, Sacrum. Buttock, Coccyx, Ischium, Legs. Feet and Heels, and Under Medical Devices         Does the Patient have a Wound? No noted wound(s)       Randolph Prevention initiated by RN: No  Wound Care Orders initiated by RN: No    Pressure Injury (Stage 3,4, Unstageable, DTI, NWPT, and Complex wounds) if present, place Wound referral order by RN under : No    New Ostomies, if present place, Ostomy referral order under : No     Nurse 1 eSignature: Electronically signed by Marley Cisse RN on 2/28/25 at 12:54 AM EST    **SHARE this note so that the co-signing nurse can place an eSignature**    Nurse 2 eSignature: Electronically signed by Marbella Holly RN on 2/28/25 at 12:54 AM EST  
Messaged FMOC re: pain medication  
Respiratory notified of patient stating that she was having uncomfortable breathing, notified for PRN breathing treatment   
[facility] - Family Medicine Inpatient   Resident Progress Note    S:  Hospital day: 1   Brief Synopsis: Manuela Chaney is a 59 y.o. female with a PMH of CAD, CVA, bipolar 1 disorder, HFrEF (last EF 1/2024 25-30%), second degree AV block s/p ICD placement who presented to the ED with shortness of breath, heart palpitations, and orthopnea. Patient was admitted for cardiac evaluation, suspecting HFrEF v. CAD. Of note, patient was seen in outpatient clinic 2/13/25 for flank pain, suspected to have pyelonephritis. Urine culture obtained and was clear, but pt received 10 days empiric treatment with Omnicef which she states she has completed.    Overnight/interim:   No acute events overnight.   Patient was seen and examined at bedside this morning. In no acute distress. Saturating well on RA this morning. Denies having any CP/SOB, palpitations, fever, chills, N/V/D, dysuria, hematuria, urinary urgency, frequency or abdominal pain.   Awaiting Echo to be completed.    Cont meds:    sodium chloride       Scheduled meds:    spironolactone  25 mg Oral Daily    bumetanide  1 mg IntraVENous BID    lidocaine  1 patch TransDERmal Daily    aspirin  81 mg Oral Daily    atorvastatin  40 mg Oral Daily    arformoterol 15 mcg-budesonide 0.5 mg neb solution   Nebulization BID RT    bumetanide  1 mg Oral Daily    [Held by provider] cetirizine  10 mg Oral Daily    metoprolol succinate  100 mg Oral Daily    pantoprazole  40 mg Oral QAM AC    sacubitril-valsartan  1 tablet Oral BID    [START ON 3/5/2025] vitamin D  50,000 Units Oral Weekly    sodium chloride flush  5-40 mL IntraVENous 2 times per day    enoxaparin  40 mg SubCUTAneous Daily    ipratropium  0.5 mg Nebulization 4x Daily RT     PRN meds: [Held by provider] nitroGLYCERIN, sodium chloride flush, sodium chloride, magnesium sulfate, polyethylene glycol, acetaminophen **OR** acetaminophen, albuterol     I reviewed the patient's past medical and surgical history, Medications and 
    Orthopnea  Hx HFrEF- acute exacerbation  Hx CAD  Hx 2nd degree AV block   Cardiology has been consulted  EKG obtained this morning shows patient in accelerated junctional rhythm- patient remains hemodynamically stable and asymptomatic at this time   Lasix 40 mg IV BID   Strict intake/output, daily weights   Obtain echocardiogram  Trend BNP  Continue Entresto, Toprol XL  Continue Lipitor and ASA 81 mg    Hx possible pyelonephritis  Outpatient urine cx from 2/13/25 was clear, pt s/p 10 days of Omnicef  Residual flank pain but no others signs/symptoms concerning for UTI/pyelo so will avoid further antibiotics at this time    Hx COPD  Respiratory status improved  Daily breathing treatments      PTOT: Not indicated at this time  Diet: NPO for possible procedure  DVT/GI Prophylaxis: Lovenox / Protonix  Pain Control: Tylenol  Code: Full  Disposition: Observation    Electronically signed by José Manuel Deleon MD on 2/28/2025 at 8:29 AM  Attending physician: Dr. Valverde    
54 mL/min (based on SCr of 1 mg/dL).  Other pertinent labs as noted below    Radiology:  XR CHEST PORTABLE   Final Result   No acute process.      Cardiomegaly.             Resident Assessment and Plan     Orthopnea  Hx HFrEF- acute exacerbation  Hx CAD  Hx 2nd degree AV block   Cardiology on board   Continue Entresto BID, Toprol XL, Lipitor, ASA 81 mg, and Bumex 1 mg IV BID  Started Aldactone 25 mg daily   Will consider Hydralazine and Imdur down the road   Will be started on SGLT2-I prior to discharge if no invasive procedure is planned  Will interrogate S-ICD  Referral to CHF clinic placed  EKG obtained on 2/28 shows patient in accelerated junctional rhythm- patient remains hemodynamically stable and asymptomatic at this time   Strict intake/output, daily weights   Pending Echocardiogram  Trend BNP    Hx Possible Pyelonephritis  Outpatient urine cx from 2/13/25 was clear, pt s/p 10 days of Omnicef  Residual flank pain but no others signs/symptoms concerning for UTI/pyelo so will avoid further antibiotics at this time    Hx COPD  Respiratory status improved  Daily breathing treatments      PTOT: Not indicated at this time  Diet: Adult, Cardiac  DVT/GI Prophylaxis: Lovenox / Protonix  Pain Control: Tylenol  Code: Full  Disposition: Continue current management       Electronically signed by Yen Balderas MD on 3/1/2025 at 4:40 AM  Attending physician: Dr. Leiva

## 2025-03-02 NOTE — DISCHARGE SUMMARY
Discharge Summary    Manuela Chaney  :  1965  MRN:  [id number]    Admit date:  2025  Discharge date:  3/2/2025    Admitting Physician:  Ming Paige MD    Discharge Diagnoses:    Patient Active Problem List   Diagnosis    Hyperlipidemia    Left ovarian cyst    CVA (cerebral vascular accident)    COPD (chronic obstructive pulmonary disease) (HCC)    Carpal tunnel syndrome on left    Suicide attempt by drug ingestion (HCC)    Severe mitral regurgitation    Tobacco abuse    Asthma    Allergic rhinosinusitis    History of irregular menstrual bleeding    Anemia due to blood loss    History of MI (myocardial infarction)    Mass of right lobe of liver    Chronic systolic CHF (congestive heart failure), NYHA class 3 (HCC)    Non-rheumatic mitral regurgitation    Essential hypertension    S/P ICD (internal cardiac defibrillator) procedure    Dyspnea    Coronary artery disease involving native coronary artery of native heart without angina pectoris    Nonischemic cardiomyopathy (HCC)    Blood type AB+    Acute decompensated heart failure (HCC)    Acute pulmonary edema (HCC)    Acute on chronic systolic congestive heart failure (HCC)    Acetaminophen overdose of undetermined intent    Hypercalcemia    Elevated lactic acid level    1st degree AV block    Severe manic bipolar 1 disorder with psychotic behavior (HCC)    Acute chest pain    Chest pain       Admission Condition:  fair    Discharged Condition:  stable    Hospital Course:  Manuela Chaney is a 59 y.o. female with a PMH of CAD, CVA, bipolar 1 disorder, HFrEF (last EF 2024 25-30%), second degree AV block s/p ICD placement who presented to the ED with shortness of breath, heart palpitations, and orthopnea. Patient was admitted for cardiac evaluation and was found to be in CHF exacerbation. Cardiology was consulted and she received diuresis with IV Bumex before it was switched to PO. Her cardiac medications were continued while inpatient and Aldactone

## 2025-03-02 NOTE — PLAN OF CARE
Problem: Chronic Conditions and Co-morbidities  Goal: Patient's chronic conditions and co-morbidity symptoms are monitored and maintained or improved  Outcome: Adequate for Discharge     Problem: Safety - Adult  Goal: Free from fall injury  Outcome: Adequate for Discharge     Problem: Cardiovascular - Adult  Goal: Maintains optimal cardiac output and hemodynamic stability  Outcome: Adequate for Discharge  Goal: Absence of cardiac dysrhythmias or at baseline  Outcome: Adequate for Discharge

## 2025-03-03 ENCOUNTER — TELEPHONE (OUTPATIENT)
Dept: FAMILY MEDICINE CLINIC | Age: 60
End: 2025-03-03

## 2025-03-03 LAB
EKG ATRIAL RATE: 122 BPM
EKG Q-T INTERVAL: 364 MS
EKG QRS DURATION: 112 MS
EKG QTC CALCULATION (BAZETT): 499 MS
EKG R AXIS: -82 DEGREES
EKG T AXIS: 82 DEGREES
EKG VENTRICULAR RATE: 113 BPM

## 2025-03-03 RX ORDER — NITROGLYCERIN 0.4 MG/1
TABLET SUBLINGUAL
Qty: 25 TABLET | Refills: 1 | Status: SHIPPED | OUTPATIENT
Start: 2025-03-03

## 2025-03-03 NOTE — TELEPHONE ENCOUNTER
Care Transitions Initial Follow Up Call    Outreach made within 2 business days of discharge: Yes    Patient: Manuela Chaney Patient : 1965   MRN: [id number]  Reason for Admission: chest pain  Discharge Date: 3/2/25       Spoke with: Manuela    Discharge department/facility: [facility] Interactive Patient Contact:  Was patient able to fill all prescriptions: Yes  Was patient instructed to bring all medications to the follow-up visit: Yes  Is patient taking all medications as directed in the discharge summary? Yes  Does patient understand their discharge instructions: Yes  Does patient have questions or concerns that need addressed prior to 7-14 day follow up office visit: no    Additional needs identified to be addressed with provider  No needs identified- reminded of appt on 3-6-25             Scheduled appointment with PCP within 7-14 days    Follow Up  Future Appointments   Date Time Provider Department Center   3/6/2025  9:40 AM Winter Rae MD [facility] [place] [facility] DEP   3/6/2025  2:30 PM [facility] CT SCAN 3 [facility] Rad/Car   3/10/2025  2:20 PM Winter Rae MD [facility] [place] [facility] DEP       Lorenzo Becerra RN

## 2025-03-03 NOTE — TELEPHONE ENCOUNTER
Name of Medication(s) Requested:  Requested Prescriptions     Pending Prescriptions Disp Refills    nitroGLYCERIN (NITROSTAT) 0.4 MG SL tablet [Pharmacy Med Name: NITROGLYCERIN 0.4MG SUB TAB 25S] 25 tablet 1     Sig: DISSOLVE 1 TABLET UNDER THE TONGUE EVERY 5 MINUTES AS NEEDED FOR CHEST PAIN       Medication is on current medication list Yes    Dosage and directions were verified? Yes    Quantity verified: 30 day supply     Pharmacy Verified?  Yes    Last Appointment:  2/13/2025    Future appts:  Future Appointments   Date Time Provider Department Center   3/6/2025  9:40 AM Winter Rae MD [facility] [place] [facility] DEP   3/6/2025  2:30 PM [facility] CT SCAN 3 [facility] CT [facility] Rad/Car   3/10/2025  2:20 PM Winter Rae MD [facility] [place] [facility] DEP        (If no appt send self scheduling link. .REFILLAPPT)  Scheduling request sent?     [] Yes  [x] No    Does patient need updated?  [] Yes  [x] No

## 2025-03-12 ENCOUNTER — TELEPHONE (OUTPATIENT)
Dept: CARDIOLOGY CLINIC | Age: 60
End: 2025-03-12

## 2025-03-12 ENCOUNTER — TELEPHONE (OUTPATIENT)
Dept: CARDIOLOGY | Age: 60
End: 2025-03-12

## 2025-03-12 NOTE — TELEPHONE ENCOUNTER
Called pt to schedule echo. Pt wants to be scheduled at the hospital and will call them to get scheduled there.     Electronically signed by Lenore Dillard on 3/12/2025 at 9:12 AM

## 2025-03-13 ENCOUNTER — TELEPHONE (OUTPATIENT)
Dept: CARDIOLOGY CLINIC | Age: 60
End: 2025-03-13

## 2025-03-13 NOTE — TELEPHONE ENCOUNTER
----- Message from Dr. Miguel Angel Yang MD sent at 3/13/2025  7:51 AM EDT -----  Regarding: RE: Post Hosp  Schedule OV next available with Me/ ANDREI  ----- Message -----  From: Courtney Amador MA  Sent: 3/12/2025   2:58 PM EDT  To: Miguel Angel Yang MD  Subject: Post Hosp                                        Please advise on hospital follow up  ----- Message -----  From: Lj Valdes MA  Sent: 3/12/2025   2:49 PM EDT  To: Courtney Amador MA    Hello,    She is a patient of Dr. Yang,and will need a hospital follow up. Patient only seen Dr. Duron for [facility].

## (undated) DEVICE — CANNULA NSL CANN NSL L25FT TBNG AD O2 SUP SFT UC

## (undated) DEVICE — PAD, DEFIB, ADULT, RADIOTRAN, PHYSIO, LO: Brand: MEDLINE

## (undated) DEVICE — BAND COMPR L21CM SHT CLR PLAS HEMSTAT EXT HK AND LOOP RETEN

## (undated) DEVICE — GLIDESHEATH SLENDER ACCESS KIT: Brand: GLIDESHEATH SLENDER

## (undated) DEVICE — GUIDEWIRE VASC L260CM 0.035IN J TIP L3MM PTFE FIX COR NAMIC

## (undated) DEVICE — KIT ANGIO W/ AT P65 PREM HND CTRL FOR CNTRST DEL ANGIOTOUCH

## (undated) DEVICE — SWAN-GANZ TRUE SIZE THERMODULTION CATHETER, 5F: Brand: SWAN-GANZ TRUE SIZE

## (undated) DEVICE — MICROPUNCTURE INTRODUCER SET SILHOUETTE TRANSITIONLESS PUSH-PLUS DESIGN - STIFFENED CANNULA WITH STAINLESS STEEL WIRE GUIDE: Brand: MICROPUNCTURE

## (undated) DEVICE — KIT MFLD ISOLATN NACL CNTRST PRT TBNG SPIK W/ PRSS TRNSDUC

## (undated) DEVICE — ANGIOGRAPHIC CATHETER: Brand: EXPO™

## (undated) DEVICE — NEEDLE ANGIO 1 WALL STYL 18 GAX70 CM THN ADV

## (undated) DEVICE — PACK SURG CARDIAC CATH

## (undated) DEVICE — SHEATH INTRO 5FR L11CM 0038IN SIL TRICSP VLV W GWIRE SMOOTH

## (undated) DEVICE — DRESSING HEMSTAT IMPREG KAOLIN SFT NONWOVEN INTVENT AND DIAG